# Patient Record
Sex: MALE | Race: BLACK OR AFRICAN AMERICAN | NOT HISPANIC OR LATINO | Employment: OTHER | URBAN - METROPOLITAN AREA
[De-identification: names, ages, dates, MRNs, and addresses within clinical notes are randomized per-mention and may not be internally consistent; named-entity substitution may affect disease eponyms.]

---

## 2017-01-23 ENCOUNTER — HOSPITAL ENCOUNTER (OUTPATIENT)
Dept: FAMILY MEDICINE CLINIC | Facility: CLINIC | Age: 52
Setting detail: SPECIMEN
Discharge: HOME | End: 2017-01-23
Attending: FAMILY MEDICINE | Admitting: FAMILY MEDICINE

## 2017-01-23 ENCOUNTER — CONVERSION ENCOUNTER (OUTPATIENT)
Dept: ORTHOPEDIC SURGERY | Facility: CLINIC | Age: 52
End: 2017-01-23

## 2017-01-23 LAB
ALBUMIN SERPL-MCNC: 2.7 G/DL (ref 3.5–4.8)
ALBUMIN/GLOB SERPL: 0.6 {RATIO} (ref 1–1.7)
ALP SERPL-CCNC: 122 IU/L (ref 32–91)
ALT SERPL-CCNC: 14 IU/L (ref 17–63)
ANION GAP SERPL CALC-SCNC: 11.5 MMOL/L (ref 10–20)
AST SERPL-CCNC: 21 IU/L (ref 15–41)
BILIRUB SERPL-MCNC: 0.4 MG/DL (ref 0.3–1.2)
BUN SERPL-MCNC: 29 MG/DL (ref 8–20)
BUN/CREAT SERPL: 6.9 (ref 6.2–20.3)
CALCIUM SERPL-MCNC: 9.3 MG/DL (ref 8.9–10.3)
CHLORIDE SERPL-SCNC: 106 MMOL/L (ref 101–111)
CHOLEST SERPL-MCNC: 265 MG/DL
CHOLEST/HDLC SERPL: 8.1 {RATIO}
CONV CO2: 25 MMOL/L (ref 22–32)
CONV LDL CHOLESTEROL DIRECT: 188 MG/DL (ref 0–100)
CONV TOTAL PROTEIN: 7.5 G/DL (ref 6.1–7.9)
CREAT UR-MCNC: 4.2 MG/DL (ref 0.7–1.2)
GLOBULIN UR ELPH-MCNC: 4.8 G/DL (ref 2.5–3.8)
GLUCOSE SERPL-MCNC: 86 MG/DL (ref 65–99)
HDLC SERPL-MCNC: 33 MG/DL
LDLC/HDLC SERPL: 5.7 {RATIO}
LIPID INTERPRETATION: ABNORMAL
POTASSIUM SERPL-SCNC: 3.5 MMOL/L (ref 3.6–5.1)
SODIUM SERPL-SCNC: 139 MMOL/L (ref 136–144)
TRIGL SERPL-MCNC: 161 MG/DL
VLDLC SERPL CALC-MCNC: 44.6 MG/DL

## 2017-02-08 ENCOUNTER — CONVERSION ENCOUNTER (OUTPATIENT)
Dept: ORTHOPEDIC SURGERY | Facility: CLINIC | Age: 52
End: 2017-02-08

## 2017-02-14 ENCOUNTER — HOSPITAL ENCOUNTER (OUTPATIENT)
Dept: LAB | Facility: HOSPITAL | Age: 52
Discharge: HOME | End: 2017-02-14
Attending: INTERNAL MEDICINE | Admitting: INTERNAL MEDICINE

## 2017-02-14 LAB
ANION GAP SERPL CALC-SCNC: 11.2 MMOL/L (ref 10–20)
BUN SERPL-MCNC: 25 MG/DL (ref 8–20)
BUN/CREAT SERPL: 5.4 (ref 6.2–20.3)
CALCIUM SERPL-MCNC: 8.7 MG/DL (ref 8.9–10.3)
CHLORIDE SERPL-SCNC: 106 MMOL/L (ref 101–111)
CONV CO2: 24 MMOL/L (ref 22–32)
CREAT UR-MCNC: 4.6 MG/DL (ref 0.7–1.2)
GLUCOSE SERPL-MCNC: 117 MG/DL (ref 65–99)
POTASSIUM SERPL-SCNC: 3.2 MMOL/L (ref 3.6–5.1)
SODIUM SERPL-SCNC: 138 MMOL/L (ref 136–144)

## 2017-02-20 ENCOUNTER — HOSPITAL ENCOUNTER (OUTPATIENT)
Dept: PREADMISSION TESTING | Facility: HOSPITAL | Age: 52
Discharge: HOME | End: 2017-02-20
Attending: SURGERY | Admitting: SURGERY

## 2017-02-20 LAB
ANION GAP SERPL CALC-SCNC: 9.4 MMOL/L (ref 10–20)
BACTERIA SPEC AEROBE CULT: NORMAL
BUN SERPL-MCNC: 24 MG/DL (ref 8–20)
BUN/CREAT SERPL: 5.7 (ref 6.2–20.3)
CALCIUM SERPL-MCNC: 8.9 MG/DL (ref 8.9–10.3)
CHLORIDE SERPL-SCNC: 106 MMOL/L (ref 101–111)
CONV CO2: 28 MMOL/L (ref 22–32)
CREAT UR-MCNC: 4.2 MG/DL (ref 0.7–1.2)
GLUCOSE SERPL-MCNC: 108 MG/DL (ref 65–99)
HCT VFR BLD AUTO: 30.1 % (ref 40–54)
HGB BLD-MCNC: 10.1 G/DL (ref 14–18)
Lab: NORMAL
MICRO REPORT STATUS: NORMAL
POTASSIUM SERPL-SCNC: 3.4 MMOL/L (ref 3.6–5.1)
SODIUM SERPL-SCNC: 140 MMOL/L (ref 136–144)
SPECIMEN SOURCE: NORMAL

## 2017-02-22 ENCOUNTER — HOSPITAL ENCOUNTER (OUTPATIENT)
Dept: CARDIOLOGY | Facility: HOSPITAL | Age: 52
Discharge: HOME | End: 2017-02-22
Attending: SURGERY | Admitting: SURGERY

## 2017-02-23 ENCOUNTER — HOSPITAL ENCOUNTER (OUTPATIENT)
Dept: PREOP | Facility: HOSPITAL | Age: 52
Setting detail: HOSPITAL OUTPATIENT SURGERY
Discharge: HOME | End: 2017-02-23
Attending: SURGERY | Admitting: SURGERY

## 2017-02-23 LAB
GLUCOSE BLD-MCNC: 116 MG/DL (ref 70–105)
GLUCOSE BLD-MCNC: 174 MG/DL (ref 70–105)
GLUCOSE BLD-MCNC: 192 MG/DL (ref 70–105)
POTASSIUM SERPL-SCNC: NORMAL MMOL/L (ref 3.6–5.1)

## 2017-03-17 ENCOUNTER — CONVERSION ENCOUNTER (OUTPATIENT)
Dept: ORTHOPEDIC SURGERY | Facility: CLINIC | Age: 52
End: 2017-03-17

## 2017-04-14 ENCOUNTER — CONVERSION ENCOUNTER (OUTPATIENT)
Dept: ORTHOPEDIC SURGERY | Facility: CLINIC | Age: 52
End: 2017-04-14

## 2017-04-27 ENCOUNTER — HOSPITAL ENCOUNTER (OUTPATIENT)
Dept: PHYSICAL THERAPY | Facility: HOSPITAL | Age: 52
Setting detail: RECURRING SERIES
Discharge: HOME | End: 2017-05-30
Attending: PHYSICIAN ASSISTANT | Admitting: PHYSICIAN ASSISTANT

## 2017-05-10 ENCOUNTER — HOSPITAL ENCOUNTER (OUTPATIENT)
Dept: CARDIOLOGY | Facility: HOSPITAL | Age: 52
Discharge: HOME | End: 2017-05-10
Attending: PHYSICIAN ASSISTANT | Admitting: PHYSICIAN ASSISTANT

## 2017-05-12 ENCOUNTER — CONVERSION ENCOUNTER (OUTPATIENT)
Dept: ORTHOPEDIC SURGERY | Facility: CLINIC | Age: 52
End: 2017-05-12

## 2017-06-09 ENCOUNTER — HOSPITAL ENCOUNTER (OUTPATIENT)
Dept: FAMILY MEDICINE CLINIC | Facility: CLINIC | Age: 52
Setting detail: SPECIMEN
Discharge: HOME | End: 2017-06-09
Attending: FAMILY MEDICINE | Admitting: FAMILY MEDICINE

## 2017-06-09 ENCOUNTER — CONVERSION ENCOUNTER (OUTPATIENT)
Dept: ORTHOPEDIC SURGERY | Facility: CLINIC | Age: 52
End: 2017-06-09

## 2017-06-09 LAB
ALBUMIN SERPL-MCNC: 3.1 G/DL (ref 3.5–4.8)
ALBUMIN/GLOB SERPL: 0.8 {RATIO} (ref 1–1.7)
ALP SERPL-CCNC: 155 IU/L (ref 32–91)
ALT SERPL-CCNC: 28 IU/L (ref 17–63)
ANION GAP SERPL CALC-SCNC: 15.8 MMOL/L (ref 10–20)
AST SERPL-CCNC: 17 IU/L (ref 15–41)
BILIRUB SERPL-MCNC: 0.4 MG/DL (ref 0.3–1.2)
BUN SERPL-MCNC: 43 MG/DL (ref 8–20)
BUN/CREAT SERPL: 7.2 (ref 6.2–20.3)
CALCIUM SERPL-MCNC: 8.8 MG/DL (ref 8.9–10.3)
CHLORIDE SERPL-SCNC: 109 MMOL/L (ref 101–111)
CHOLEST SERPL-MCNC: 275 MG/DL
CHOLEST/HDLC SERPL: 8.3 {RATIO}
CONV CO2: 19 MMOL/L (ref 22–32)
CONV LDL CHOLESTEROL DIRECT: 181 MG/DL (ref 0–100)
CONV TOTAL PROTEIN: 7.1 G/DL (ref 6.1–7.9)
CREAT UR-MCNC: 6 MG/DL (ref 0.7–1.2)
GLOBULIN UR ELPH-MCNC: 4 G/DL (ref 2.5–3.8)
GLUCOSE SERPL-MCNC: 163 MG/DL (ref 65–99)
HDLC SERPL-MCNC: 33 MG/DL
LDLC/HDLC SERPL: 5.5 {RATIO}
LIPID INTERPRETATION: ABNORMAL
POTASSIUM SERPL-SCNC: 4.8 MMOL/L (ref 3.6–5.1)
SODIUM SERPL-SCNC: 139 MMOL/L (ref 136–144)
TRIGL SERPL-MCNC: 278 MG/DL
VLDLC SERPL CALC-MCNC: 61.2 MG/DL

## 2017-06-13 ENCOUNTER — HOSPITAL ENCOUNTER (OUTPATIENT)
Dept: LAB | Facility: HOSPITAL | Age: 52
Discharge: HOME | End: 2017-06-13
Attending: INTERNAL MEDICINE | Admitting: INTERNAL MEDICINE

## 2017-06-14 LAB — HBV SURFACE AG SERPL QL IA: NONREACTIVE

## 2017-07-13 ENCOUNTER — CONVERSION ENCOUNTER (OUTPATIENT)
Dept: ORTHOPEDIC SURGERY | Facility: CLINIC | Age: 52
End: 2017-07-13

## 2017-07-18 ENCOUNTER — HOSPITAL ENCOUNTER (OUTPATIENT)
Dept: OTHER | Facility: HOSPITAL | Age: 52
Discharge: HOME | End: 2017-07-18
Attending: INTERNAL MEDICINE | Admitting: INTERNAL MEDICINE

## 2017-07-18 LAB
HCT VFR BLD AUTO: 29.1 % (ref 40–54)
HGB BLD-MCNC: 9.1 G/DL (ref 14–18)

## 2017-08-21 ENCOUNTER — HOSPITAL ENCOUNTER (OUTPATIENT)
Dept: OTHER | Facility: HOSPITAL | Age: 52
Setting detail: RECURRING SERIES
Discharge: HOME | End: 2017-08-22
Attending: INTERNAL MEDICINE | Admitting: INTERNAL MEDICINE

## 2017-08-22 LAB
BASOPHILS # BLD AUTO: 0.1 10*3/UL (ref 0–0.2)
BASOPHILS NFR BLD AUTO: 1 % (ref 0–2)
DIFFERENTIAL METHOD BLD: (no result)
EOSINOPHIL # BLD AUTO: 0.6 10*3/UL (ref 0–0.3)
EOSINOPHIL # BLD AUTO: 7 % (ref 0–3)
ERYTHROCYTE [DISTWIDTH] IN BLOOD BY AUTOMATED COUNT: 19.3 % (ref 11.5–14.5)
HCT VFR BLD AUTO: 27.2 % (ref 40–54)
HGB BLD-MCNC: 8.7 G/DL (ref 14–18)
LYMPHOCYTES # BLD AUTO: 2.1 10*3/UL (ref 0.8–4.8)
LYMPHOCYTES NFR BLD AUTO: 25 % (ref 18–42)
MCH RBC QN AUTO: 31.7 PG (ref 26–32)
MCHC RBC AUTO-ENTMCNC: 32.2 G/DL (ref 32–36)
MCV RBC AUTO: (no result) FL (ref 80–94)
MONOCYTES # BLD AUTO: 0.7 10*3/UL (ref 0.1–1.3)
MONOCYTES NFR BLD AUTO: 8 % (ref 2–11)
NEUTROPHILS # BLD AUTO: 5.1 10*3/UL (ref 2.3–8.6)
NEUTROPHILS NFR BLD AUTO: 59 % (ref 50–75)
NRBC BLD AUTO-RTO: 2 /100{WBCS}
NRBC/RBC NFR BLD MANUAL: 0 10*3/UL
PLATELET # BLD AUTO: 204 10*3/UL (ref 150–450)
PMV BLD AUTO: 9.1 FL (ref 7.4–10.4)
RBC # BLD AUTO: 2.76 10*6/UL (ref 4.6–6)
WBC # BLD AUTO: 8.6 10*3/UL (ref 4.5–11.5)

## 2017-09-07 ENCOUNTER — CONVERSION ENCOUNTER (OUTPATIENT)
Dept: ORTHOPEDIC SURGERY | Facility: CLINIC | Age: 52
End: 2017-09-07

## 2017-10-26 ENCOUNTER — HOSPITAL ENCOUNTER (OUTPATIENT)
Dept: CT IMAGING | Facility: HOSPITAL | Age: 52
Discharge: HOME | End: 2017-10-26
Attending: INTERNAL MEDICINE | Admitting: INTERNAL MEDICINE

## 2017-10-26 LAB — CREAT BLDA-MCNC: 7.6 MG/DL (ref 0.6–1.3)

## 2017-11-07 ENCOUNTER — OFFICE (AMBULATORY)
Dept: URBAN - METROPOLITAN AREA CLINIC 64 | Facility: CLINIC | Age: 52
End: 2017-11-07
Payer: MEDICAID

## 2017-11-07 VITALS
DIASTOLIC BLOOD PRESSURE: 65 MMHG | WEIGHT: 182 LBS | HEIGHT: 69 IN | SYSTOLIC BLOOD PRESSURE: 102 MMHG | HEART RATE: 98 BPM

## 2017-11-07 DIAGNOSIS — R13.10 DYSPHAGIA, UNSPECIFIED: ICD-10-CM

## 2017-11-07 DIAGNOSIS — R11.2 NAUSEA WITH VOMITING, UNSPECIFIED: ICD-10-CM

## 2017-11-07 DIAGNOSIS — K21.0 GASTRO-ESOPHAGEAL REFLUX DISEASE WITH ESOPHAGITIS: ICD-10-CM

## 2017-11-07 DIAGNOSIS — K59.00 CONSTIPATION, UNSPECIFIED: ICD-10-CM

## 2017-11-07 DIAGNOSIS — R93.3 ABNORMAL FINDINGS ON DIAGNOSTIC IMAGING OF OTHER PARTS OF DI: ICD-10-CM

## 2017-11-07 PROCEDURE — 99213 OFFICE O/P EST LOW 20 MIN: CPT | Performed by: NURSE PRACTITIONER

## 2017-11-07 RX ORDER — RANITIDINE HYDROCHLORIDE 300 MG/1
300 TABLET, FILM COATED ORAL
Qty: 30 | Refills: 11 | Status: COMPLETED
Start: 2017-11-07 | End: 2017-12-12

## 2017-11-07 RX ORDER — POLYETHYLENE GLYCOL 3350 17 G/17G
POWDER, FOR SOLUTION ORAL
Qty: 3 | Refills: 0 | Status: ACTIVE
Start: 2017-11-07

## 2017-12-05 ENCOUNTER — HOSPITAL ENCOUNTER (OUTPATIENT)
Dept: PREOP | Facility: HOSPITAL | Age: 52
Setting detail: HOSPITAL OUTPATIENT SURGERY
Discharge: HOME | End: 2017-12-05
Attending: INTERNAL MEDICINE | Admitting: INTERNAL MEDICINE

## 2017-12-05 ENCOUNTER — ON CAMPUS - OUTPATIENT (AMBULATORY)
Dept: URBAN - METROPOLITAN AREA HOSPITAL 85 | Facility: HOSPITAL | Age: 52
End: 2017-12-05

## 2017-12-05 DIAGNOSIS — K21.0 GASTRO-ESOPHAGEAL REFLUX DISEASE WITH ESOPHAGITIS: ICD-10-CM

## 2017-12-05 DIAGNOSIS — K29.50 UNSPECIFIED CHRONIC GASTRITIS WITHOUT BLEEDING: ICD-10-CM

## 2017-12-05 DIAGNOSIS — R13.10 DYSPHAGIA, UNSPECIFIED: ICD-10-CM

## 2017-12-05 LAB
GLUCOSE BLD-MCNC: ABNORMAL MG/DL (ref 70–105)

## 2017-12-05 PROCEDURE — 43239 EGD BIOPSY SINGLE/MULTIPLE: CPT | Performed by: INTERNAL MEDICINE

## 2017-12-12 ENCOUNTER — OFFICE (AMBULATORY)
Dept: URBAN - METROPOLITAN AREA CLINIC 64 | Facility: CLINIC | Age: 52
End: 2017-12-12

## 2017-12-12 VITALS
HEART RATE: 96 BPM | DIASTOLIC BLOOD PRESSURE: 92 MMHG | HEIGHT: 69 IN | WEIGHT: 186 LBS | SYSTOLIC BLOOD PRESSURE: 161 MMHG

## 2017-12-12 DIAGNOSIS — K21.0 GASTRO-ESOPHAGEAL REFLUX DISEASE WITH ESOPHAGITIS: ICD-10-CM

## 2017-12-12 DIAGNOSIS — R13.10 DYSPHAGIA, UNSPECIFIED: ICD-10-CM

## 2017-12-12 DIAGNOSIS — K59.00 CONSTIPATION, UNSPECIFIED: ICD-10-CM

## 2017-12-12 PROCEDURE — 99214 OFFICE O/P EST MOD 30 MIN: CPT | Performed by: INTERNAL MEDICINE

## 2017-12-12 RX ORDER — RANITIDINE HYDROCHLORIDE 300 MG/1
300 TABLET, FILM COATED ORAL
Qty: 30 | Refills: 11 | Status: COMPLETED
Start: 2017-11-07 | End: 2017-12-12

## 2018-08-28 ENCOUNTER — CONVERSION ENCOUNTER (OUTPATIENT)
Dept: ORTHOPEDIC SURGERY | Facility: CLINIC | Age: 53
End: 2018-08-28

## 2018-08-28 ENCOUNTER — HOSPITAL ENCOUNTER (OUTPATIENT)
Dept: LAB | Facility: HOSPITAL | Age: 53
Discharge: HOME | End: 2018-08-28
Attending: ORTHOPAEDIC SURGERY | Admitting: ORTHOPAEDIC SURGERY

## 2018-08-28 LAB
BASOPHILS # BLD AUTO: 0.1 10*3/UL (ref 0–0.2)
BASOPHILS NFR BLD AUTO: 1 % (ref 0–2)
CRP SERPL-MCNC: 1.64 MG/DL (ref 0–0.7)
DIFFERENTIAL METHOD BLD: (no result)
EOSINOPHIL # BLD AUTO: 0.5 10*3/UL (ref 0–0.3)
EOSINOPHIL # BLD AUTO: 5 % (ref 0–3)
ERYTHROCYTE [DISTWIDTH] IN BLOOD BY AUTOMATED COUNT: 20 % (ref 11.5–14.5)
ERYTHROCYTE [SEDIMENTATION RATE] IN BLOOD BY WESTERGREN METHOD: 50 MM/HR (ref 0–20)
HCT VFR BLD AUTO: 37.2 % (ref 40–54)
HGB BLD-MCNC: 11.9 G/DL (ref 14–18)
LYMPHOCYTES # BLD AUTO: 1.6 10*3/UL (ref 0.8–4.8)
LYMPHOCYTES NFR BLD AUTO: 16 % (ref 18–42)
MCH RBC QN AUTO: 29.7 PG (ref 26–32)
MCHC RBC AUTO-ENTMCNC: 32.1 G/DL (ref 32–36)
MCV RBC AUTO: 92.5 FL (ref 80–94)
MONOCYTES # BLD AUTO: 0.8 10*3/UL (ref 0.1–1.3)
MONOCYTES NFR BLD AUTO: 8 % (ref 2–11)
NEUTROPHILS # BLD AUTO: 6.9 10*3/UL (ref 2.3–8.6)
NEUTROPHILS NFR BLD AUTO: 70 % (ref 50–75)
NRBC BLD AUTO-RTO: 0 /100{WBCS}
NRBC/RBC NFR BLD MANUAL: 0 10*3/UL
PLATELET # BLD AUTO: (no result) 10*3/UL (ref 150–450)
PMV BLD AUTO: 9.5 FL (ref 7.4–10.4)
RBC # BLD AUTO: 4.02 10*6/UL (ref 4.6–6)
WBC # BLD AUTO: 9.8 10*3/UL (ref 4.5–11.5)

## 2018-11-21 ENCOUNTER — HOSPITAL ENCOUNTER (OUTPATIENT)
Dept: OTHER | Facility: HOSPITAL | Age: 53
Setting detail: SPECIMEN
Discharge: HOME | End: 2018-11-21
Attending: INTERNAL MEDICINE | Admitting: INTERNAL MEDICINE

## 2018-11-21 ENCOUNTER — OFFICE (AMBULATORY)
Dept: URBAN - METROPOLITAN AREA PATHOLOGY 4 | Facility: PATHOLOGY | Age: 53
End: 2018-11-21

## 2018-11-21 ENCOUNTER — ON CAMPUS - OUTPATIENT (AMBULATORY)
Dept: URBAN - METROPOLITAN AREA HOSPITAL 2 | Facility: HOSPITAL | Age: 53
End: 2018-11-21

## 2018-11-21 VITALS
HEART RATE: 85 BPM | SYSTOLIC BLOOD PRESSURE: 95 MMHG | SYSTOLIC BLOOD PRESSURE: 133 MMHG | HEART RATE: 93 BPM | DIASTOLIC BLOOD PRESSURE: 52 MMHG | OXYGEN SATURATION: 97 % | HEART RATE: 94 BPM | HEIGHT: 69 IN | DIASTOLIC BLOOD PRESSURE: 83 MMHG | WEIGHT: 194 LBS | RESPIRATION RATE: 16 BRPM | DIASTOLIC BLOOD PRESSURE: 95 MMHG | DIASTOLIC BLOOD PRESSURE: 53 MMHG | OXYGEN SATURATION: 100 % | OXYGEN SATURATION: 99 % | SYSTOLIC BLOOD PRESSURE: 104 MMHG | SYSTOLIC BLOOD PRESSURE: 137 MMHG | HEART RATE: 90 BPM | SYSTOLIC BLOOD PRESSURE: 117 MMHG | SYSTOLIC BLOOD PRESSURE: 88 MMHG | DIASTOLIC BLOOD PRESSURE: 60 MMHG | SYSTOLIC BLOOD PRESSURE: 99 MMHG | DIASTOLIC BLOOD PRESSURE: 63 MMHG | TEMPERATURE: 96.9 F | HEART RATE: 89 BPM | RESPIRATION RATE: 18 BRPM | DIASTOLIC BLOOD PRESSURE: 74 MMHG | OXYGEN SATURATION: 98 % | HEART RATE: 88 BPM

## 2018-11-21 DIAGNOSIS — K22.2 ESOPHAGEAL OBSTRUCTION: ICD-10-CM

## 2018-11-21 DIAGNOSIS — K22.10 ULCER OF ESOPHAGUS WITHOUT BLEEDING: ICD-10-CM

## 2018-11-21 DIAGNOSIS — K21.0 GASTRO-ESOPHAGEAL REFLUX DISEASE WITH ESOPHAGITIS: ICD-10-CM

## 2018-11-21 DIAGNOSIS — R13.10 DYSPHAGIA, UNSPECIFIED: ICD-10-CM

## 2018-11-21 LAB
GI HISTOLOGY: A. SELECT: (no result)
GI HISTOLOGY: PDF REPORT: (no result)

## 2018-11-21 PROCEDURE — 88305 TISSUE EXAM BY PATHOLOGIST: CPT | Mod: 26 | Performed by: INTERNAL MEDICINE

## 2018-11-21 PROCEDURE — 43450 DILATE ESOPHAGUS 1/MULT PASS: CPT | Performed by: INTERNAL MEDICINE

## 2018-11-21 PROCEDURE — 88312 SPECIAL STAINS GROUP 1: CPT | Mod: 26 | Performed by: INTERNAL MEDICINE

## 2018-11-21 PROCEDURE — 43239 EGD BIOPSY SINGLE/MULTIPLE: CPT | Performed by: INTERNAL MEDICINE

## 2018-11-21 RX ORDER — PANTOPRAZOLE SODIUM 40 MG/1
TABLET, DELAYED RELEASE ORAL
Qty: 90 | Refills: 4 | Status: ACTIVE

## 2019-04-15 ENCOUNTER — HOSPITAL ENCOUNTER (OUTPATIENT)
Dept: SLEEP MEDICINE | Facility: HOSPITAL | Age: 54
Discharge: HOME | End: 2019-04-15
Attending: INTERNAL MEDICINE | Admitting: INTERNAL MEDICINE

## 2019-04-29 ENCOUNTER — HOSPITAL ENCOUNTER (OUTPATIENT)
Dept: SLEEP MEDICINE | Facility: HOSPITAL | Age: 54
Discharge: HOME | End: 2019-04-29
Attending: INTERNAL MEDICINE | Admitting: INTERNAL MEDICINE

## 2019-06-04 VITALS
HEIGHT: 69 IN | WEIGHT: 191 LBS | WEIGHT: 184 LBS | WEIGHT: 196.6 LBS | BODY MASS INDEX: 29.12 KG/M2 | SYSTOLIC BLOOD PRESSURE: 132 MMHG | DIASTOLIC BLOOD PRESSURE: 124 MMHG | SYSTOLIC BLOOD PRESSURE: 96 MMHG | WEIGHT: 167 LBS | WEIGHT: 184 LBS | DIASTOLIC BLOOD PRESSURE: 93 MMHG | OXYGEN SATURATION: 100 % | OXYGEN SATURATION: 100 % | SYSTOLIC BLOOD PRESSURE: 158 MMHG | WEIGHT: 176 LBS | OXYGEN SATURATION: 100 % | HEART RATE: 107 BPM | WEIGHT: 191 LBS | SYSTOLIC BLOOD PRESSURE: 182 MMHG | DIASTOLIC BLOOD PRESSURE: 120 MMHG | HEART RATE: 101 BPM | DIASTOLIC BLOOD PRESSURE: 103 MMHG | WEIGHT: 184 LBS | HEART RATE: 64 BPM | DIASTOLIC BLOOD PRESSURE: 80 MMHG | OXYGEN SATURATION: 99 % | WEIGHT: 186 LBS | SYSTOLIC BLOOD PRESSURE: 238 MMHG | DIASTOLIC BLOOD PRESSURE: 78 MMHG | HEART RATE: 77 BPM | HEART RATE: 81 BPM | HEART RATE: 97 BPM | DIASTOLIC BLOOD PRESSURE: 61 MMHG | SYSTOLIC BLOOD PRESSURE: 202 MMHG | OXYGEN SATURATION: 100 % | HEART RATE: 67 BPM | SYSTOLIC BLOOD PRESSURE: 135 MMHG

## 2019-06-12 ENCOUNTER — HOSPITAL ENCOUNTER (OUTPATIENT)
Dept: LAB | Facility: HOSPITAL | Age: 54
Setting detail: SPECIMEN
Discharge: HOME | End: 2019-06-12
Attending: INTERNAL MEDICINE | Admitting: INTERNAL MEDICINE

## 2019-06-12 LAB
ALBUMIN SERPL-MCNC: 3.5 G/DL (ref 3.5–4.8)
ALBUMIN/GLOB SERPL: 1 {RATIO} (ref 1–1.7)
ALP SERPL-CCNC: 153 IU/L (ref 32–91)
ALT SERPL-CCNC: 17 IU/L (ref 17–63)
ANION GAP SERPL CALC-SCNC: 18.2 MMOL/L (ref 10–20)
AST SERPL-CCNC: 15 IU/L (ref 15–41)
BILIRUB SERPL-MCNC: 0.5 MG/DL (ref 0.3–1.2)
BUN SERPL-MCNC: 55 MG/DL (ref 8–20)
BUN/CREAT SERPL: 8.3 (ref 6.2–20.3)
CALCIUM SERPL-MCNC: 8.9 MG/DL (ref 8.9–10.3)
CHLORIDE SERPL-SCNC: 99 MMOL/L (ref 101–111)
CHOLEST SERPL-MCNC: 151 MG/DL
CHOLEST/HDLC SERPL: 5 {RATIO}
CONV CO2: 24 MMOL/L (ref 22–32)
CONV LDL CHOLESTEROL DIRECT: 95 MG/DL (ref 0–100)
CONV TOTAL PROTEIN: 7 G/DL (ref 6.1–7.9)
CREAT UR-MCNC: 6.6 MG/DL (ref 0.7–1.2)
GLOBULIN UR ELPH-MCNC: 3.5 G/DL (ref 2.5–3.8)
GLUCOSE SERPL-MCNC: 138 MG/DL (ref 65–99)
HBA1C MFR BLD: 9.5 % (ref 0–5.6)
HDLC SERPL-MCNC: 30 MG/DL
LDLC/HDLC SERPL: 3.1 {RATIO}
LIPID INTERPRETATION: ABNORMAL
POTASSIUM SERPL-SCNC: 4.2 MMOL/L (ref 3.6–5.1)
SODIUM SERPL-SCNC: 137 MMOL/L (ref 136–144)
TRIGL SERPL-MCNC: 169 MG/DL
VLDLC SERPL CALC-MCNC: 25.7 MG/DL

## 2019-06-24 PROBLEM — M48.50XA COMPRESSION FRACTURE OF VERTEBRA (HCC): Status: ACTIVE | Noted: 2018-08-28

## 2019-06-24 PROBLEM — G40.909 SEIZURE DISORDER: Status: ACTIVE | Noted: 2017-07-05

## 2019-06-24 PROBLEM — R13.10 DYSPHAGIA: Status: ACTIVE | Noted: 2017-08-24

## 2019-06-24 PROBLEM — N18.6 ESRD (END STAGE RENAL DISEASE) (HCC): Status: ACTIVE | Noted: 2019-05-14

## 2019-06-24 PROBLEM — M25.512 SHOULDER PAIN, LEFT: Status: ACTIVE | Noted: 2017-04-14

## 2019-06-24 PROBLEM — M46.44 DISCITIS OF THORACIC REGION: Status: ACTIVE | Noted: 2018-08-28

## 2019-06-24 PROBLEM — I77.0 AV FISTULA (HCC): Status: ACTIVE | Noted: 2017-04-14

## 2019-06-24 PROBLEM — E11.319 DIABETIC RETINOPATHY: Status: ACTIVE | Noted: 2017-06-09

## 2019-06-24 PROBLEM — M46.20 OSTEOMYELITIS OF SPINE: Status: ACTIVE | Noted: 2018-08-28

## 2019-06-24 PROBLEM — H60.501 ACUTE OTITIS EXTERNA OF RIGHT EAR: Status: ACTIVE | Noted: 2017-01-23

## 2019-08-05 RX ORDER — LORAZEPAM 0.5 MG/1
TABLET ORAL
Qty: 90 TABLET | Refills: 1 | OUTPATIENT
Start: 2019-08-05

## 2019-08-12 RX ORDER — LORAZEPAM 0.5 MG/1
TABLET ORAL
Qty: 90 TABLET | Refills: 1 | OUTPATIENT
Start: 2019-08-12

## 2019-08-26 RX ORDER — LORAZEPAM 0.5 MG/1
TABLET ORAL
Qty: 90 TABLET | Refills: 1 | OUTPATIENT
Start: 2019-08-26

## 2019-09-03 ENCOUNTER — OFFICE VISIT (OUTPATIENT)
Dept: ENDOCRINOLOGY | Facility: CLINIC | Age: 54
End: 2019-09-03

## 2019-09-03 ENCOUNTER — APPOINTMENT (OUTPATIENT)
Dept: LAB | Facility: HOSPITAL | Age: 54
End: 2019-09-03

## 2019-09-03 VITALS
HEIGHT: 69 IN | HEART RATE: 95 BPM | DIASTOLIC BLOOD PRESSURE: 65 MMHG | BODY MASS INDEX: 28.73 KG/M2 | WEIGHT: 194 LBS | OXYGEN SATURATION: 97 % | SYSTOLIC BLOOD PRESSURE: 105 MMHG

## 2019-09-03 DIAGNOSIS — I10 ESSENTIAL HYPERTENSION: ICD-10-CM

## 2019-09-03 DIAGNOSIS — N18.6 ESRD (END STAGE RENAL DISEASE) (HCC): ICD-10-CM

## 2019-09-03 DIAGNOSIS — E11.42 DIABETIC PERIPHERAL NEUROPATHY ASSOCIATED WITH TYPE 2 DIABETES MELLITUS (HCC): ICD-10-CM

## 2019-09-03 DIAGNOSIS — E78.2 MIXED HYPERLIPIDEMIA: ICD-10-CM

## 2019-09-03 DIAGNOSIS — E11.29 TYPE 2 DIABETES MELLITUS WITH OTHER DIABETIC KIDNEY COMPLICATION, WITH LONG-TERM CURRENT USE OF INSULIN (HCC): Primary | ICD-10-CM

## 2019-09-03 DIAGNOSIS — Z79.4 TYPE 2 DIABETES MELLITUS WITH OTHER DIABETIC KIDNEY COMPLICATION, WITH LONG-TERM CURRENT USE OF INSULIN (HCC): Primary | ICD-10-CM

## 2019-09-03 PROCEDURE — 99214 OFFICE O/P EST MOD 30 MIN: CPT | Performed by: INTERNAL MEDICINE

## 2019-09-03 RX ORDER — LOSARTAN POTASSIUM AND HYDROCHLOROTHIAZIDE 25; 100 MG/1; MG/1
TABLET ORAL
COMMUNITY
Start: 2013-11-18 | End: 2021-02-10

## 2019-09-03 RX ORDER — FUROSEMIDE 20 MG/1
TABLET ORAL
COMMUNITY
Start: 2015-11-28 | End: 2020-07-07

## 2019-09-03 RX ORDER — LEVOFLOXACIN 500 MG/1
TABLET, FILM COATED ORAL
COMMUNITY
Start: 2015-12-22 | End: 2021-02-10

## 2019-09-03 RX ORDER — AMLODIPINE BESYLATE 5 MG/1
TABLET ORAL
COMMUNITY
Start: 2013-11-18 | End: 2021-02-10

## 2019-09-03 RX ORDER — DOXYCYCLINE HYCLATE 100 MG/1
100 CAPSULE ORAL 2 TIMES DAILY
Refills: 0 | COMMUNITY
Start: 2019-08-08 | End: 2020-07-07

## 2019-09-03 RX ORDER — TERBINAFINE HYDROCHLORIDE 250 MG/1
250 TABLET ORAL DAILY
Refills: 1 | COMMUNITY
Start: 2019-08-08 | End: 2021-02-10

## 2019-09-03 RX ORDER — NIFEDIPINE 60 MG/1
TABLET, FILM COATED, EXTENDED RELEASE ORAL
COMMUNITY
Start: 2015-11-25 | End: 2019-09-03

## 2019-09-03 RX ORDER — PREGABALIN 75 MG/1
CAPSULE ORAL
COMMUNITY
Start: 2015-10-29 | End: 2019-09-03

## 2019-09-03 RX ORDER — HYDROCODONE BITARTRATE AND ACETAMINOPHEN 5; 325 MG/1; MG/1
TABLET ORAL
COMMUNITY
Start: 2015-10-26 | End: 2019-09-03

## 2019-09-03 RX ORDER — FLASH GLUCOSE SCANNING READER
EACH MISCELLANEOUS
COMMUNITY
End: 2021-02-10

## 2019-09-03 RX ORDER — MELOXICAM 7.5 MG/1
TABLET ORAL
COMMUNITY
Start: 2014-03-15 | End: 2019-09-03

## 2019-09-03 RX ORDER — PANTOPRAZOLE SODIUM 40 MG/1
TABLET, DELAYED RELEASE ORAL
Refills: 3 | COMMUNITY
Start: 2019-07-09 | End: 2021-02-10

## 2019-09-03 RX ORDER — CLONIDINE HYDROCHLORIDE 0.3 MG/1
TABLET ORAL
COMMUNITY
Start: 2015-11-25 | End: 2019-09-03

## 2019-09-03 RX ORDER — RAMIPRIL 5 MG/1
CAPSULE ORAL
COMMUNITY
Start: 2015-11-25 | End: 2019-09-03

## 2019-09-03 RX ORDER — BLOOD SUGAR DIAGNOSTIC
STRIP MISCELLANEOUS
COMMUNITY
Start: 2015-11-08 | End: 2021-02-10

## 2019-09-03 RX ORDER — FOLIC ACID 1 MG/1
TABLET ORAL
COMMUNITY
Start: 2015-12-07 | End: 2019-09-03

## 2019-09-03 NOTE — PATIENT INSTRUCTIONS
Please continue current insulin regimen and get all blood test done in the next few days  Please bring blood sugar records at each visit  Please always keep glucose source with you in case of low blood sugars and make sure your sugars above 100 while driving.  Please always get annual eye exam and flu shot.

## 2019-09-03 NOTE — PROGRESS NOTES
Endocrine Progress Note Outpatient     Patient Care Team:  Provider, No Known as PCP - Kerri Kelly MD as PCP - Claims Attributed  Provider, No Known as PCP - Family Medicine    Chief Complaint: Follow up type 2 diabetes    HPI: 54-year-old male with history of type 2 diabetes, hypertension, hyperlipidemia, end-stage renal disease on hemodialysis is here for follow-up.  For type 2 diabetes he is currently on Lantus 30 units subcu daily at bedtime along with Humalog 10 units with each meal.  He tells me that his blood sugars are running about 150 and does not have any low blood sugars.  Unfortunately did not bring blood sugar records for review today.  Hypertension: Well-controlled  Hyperlipidemia: He is currently on Lipitor  Renal failure: On dialysis.    Past Medical History:   Diagnosis Date   • Anemia    • Cataract    • Constipation    • CVA (cerebral vascular accident) (CMS/[facility])     x 8   • Depression    • H/O colonoscopy 2016   • Hyperlipidemia    • Hypertension    • Insomnia    • Renal failure     Stage IV - AI -- on dialysis -- [facility]   • TIA (transient ischemic attack)    • Type 2 diabetes mellitus (CMS/[facility])        Social History     Socioeconomic History   • Marital status:      Spouse name: Not on file   • Number of children: Not on file   • Years of education: Not on file   • Highest education level: Not on file   Tobacco Use   • Smoking status: Never Smoker   • Tobacco comment: Passive Smoke exposure: no   Substance and Sexual Activity   • Alcohol use: Yes   • Drug use: No       Family History   Problem Relation Age of Onset   • Heart disease Mother    • Hypertension Mother    • Hyperlipidemia Mother    • Stroke Mother    • Cancer Sister         Breast Cancer   • Diabetes Sister    • Heart disease Maternal Grandmother    • Hyperlipidemia Maternal Grandmother    • Hypertension Maternal Grandmother        Allergies   Allergen Reactions   • Latex Unknown (See Comments)   • Other Unknown  (See Comments)   • Penicillins Unknown (See Comments)   • Amoxicillin Rash       ROS:   Constitutional:  Denies fatigue, tiredness.    Eyes:  Denies change in visual acuity   HENT:  Denies nasal congestion or sore throat   Respiratory: denies cough, shortness of breath.   Cardiovascular:  denies chest pain, edema   GI:  Denies abdominal pain, nausea, vomiting.   Musculoskeletal:  Denies back pain or joint pain   Integument:  Denies dry skin and rash   Neurologic:  Denies headache, focal weakness or sensory changes   Endocrine:  Denies polyuria or polydipsia   Psychiatric:  Denies depression or anxiety      Vitals:    09/03/19 1331   BP: 105/65   Pulse: 95   SpO2: 97%       Physical Exam:  GEN: NAD, conversant  EYES: EOMI, PERRL, no conjunctival erythema  NECK: no thyromegaly, full ROM   CV: RRR, no murmurs/rubs/gallops, no peripheral edema  LUNG: CTAB, no wheezes/rales/ronchi  SKIN: no rashes, no acanthosis  MSK: no deformities, full ROM of all extremities  NEURO: no tremors, DTR normal  PSYCH: AOX3, appropriate mood, affect normal      Results Review:     I reviewed the patient's new clinical results.    Lab Results   Component Value Date    HGBA1C 9.5 (H) 06/12/2019      Lab Results   Component Value Date    GLUCOSE 138 (H) 06/12/2019    BUN 55 (H) 06/12/2019    CREATININE 6.6 (H) 06/12/2019    EGFRIFAFRI 9 (L) 10/26/2017    BCR 8.3 06/12/2019    K 4.2 06/12/2019    CO2 24 06/12/2019    CALCIUM 8.9 06/12/2019    ALBUMIN 3.5 06/12/2019    LABIL2 1.0 06/12/2019    AST 15 06/12/2019    ALT 17 06/12/2019    CHOL 151 06/12/2019    TRIG 169 (H) 06/12/2019    LDL 95 06/12/2019    HDL 30 (L) 06/12/2019     Lab Results   Component Value Date    TSH 0.31 (L) 07/19/2017         Medication Review: Reviewed.       Current Outpatient Medications:   •  amitriptyline (ELAVIL) 50 MG tablet, TAKE 1 TABLET BY BY MOUTH EVERY DAY AT BEDTIME, Disp: , Rfl: 2  •  amLODIPine (NORVASC) 5 MG tablet, TAKE ONE TABLET BY MOUTH ONE TIME DAILY,  "Disp: , Rfl:   •  atorvastatin (LIPITOR) 40 MG tablet, Take 40 mg by mouth Every Night., Disp: , Rfl: 12  •  B-D INS SYR ULTRAFINE 1CC/31G 31G X 5/16\" 1 ML misc, USE FOUR TIMES DAILY AS DIRECTED (E11.65), Disp: , Rfl: 11  •  clopidogrel (PLAVIX) 75 MG tablet, Daily., Disp: , Rfl:   •  Continuous Blood Gluc  (FREESTYLE ANNABELLE 14 DAY READER) device, , Disp: , Rfl:   •  doxycycline (VIBRAMYCIN) 100 MG capsule, Take 100 mg by mouth 2 (Two) Times a Day., Disp: , Rfl: 0  •  ergocalciferol (DRISDOL) 83904 units capsule, 1 capsule Every 7 (Seven) Days., Disp: , Rfl:   •  furosemide (LASIX) 20 MG tablet, , Disp: , Rfl:   •  gabapentin (NEURONTIN) 600 MG tablet, Take 600 mg by mouth 2 (Two) Times a Day., Disp: , Rfl: 5  •  glucagon (GLUCAGON EMERGENCY) 1 MG injection, GLUCAGON EMERGENCY 1 MG KIT, Disp: , Rfl:   •  Insulin Lispro (HUMALOG KWIKPEN) 100 UNIT/ML solution pen-injector, HUMALOG KWIKPEN 100 UNIT/ML SOPN, Disp: , Rfl:   •  Insulin Pen Needle (BD PEN NEEDLE JEREMY U/F) 32G X 4 MM misc, BD PEN NEEDLE JEREMY U/F 32G X 4 MM, Disp: , Rfl:   •  Insulin Syringe-Needle U-100 31G X 5/16\" 0.3 ML misc, , Disp: , Rfl:   •  LANTUS 100 UNIT/ML injection, INJECT 30 UNITS SUBCUTANEOUSLY EVERY NIGHT AT BEDTIME, Disp: , Rfl: 12  •  levoFLOXacin (LEVAQUIN) 500 MG tablet, Patient is ESRD stage 4 and according to pharmacist recommendation patient should have 500 mg qod, 5 pills for 10 days, Disp: , Rfl:   •  LORazepam (ATIVAN) 0.5 MG tablet, Take 0.5 mg by mouth 3 (Three) Times a Day., Disp: , Rfl: 1  •  losartan-hydrochlorothiazide (HYZAAR) 100-25 MG per tablet, TAKE ONE TABLET BY MOUTH ONE TIME DAILY, Disp: , Rfl:   •  metoprolol tartrate (LOPRESSOR) 50 MG tablet, Take 50 mg by mouth 2 (Two) Times a Day., Disp: , Rfl: 2  •  pantoprazole (PROTONIX) 40 MG EC tablet, TAKE ONE TABLET BY MOUTH EVERY MORNING 30 MINUTES BEFORE FOOD, Disp: , Rfl: 3  •  risperiDONE (risperDAL) 1 MG tablet, Take 1 mg by mouth 2 (Two) Times a Day., Disp: , " "Rfl: 2  •  sevelamer (RENVELA) 800 MG tablet, Take 800 mg by mouth 3 (Three) Times a Day With Meals., Disp: , Rfl: 5  •  terbinafine (lamiSIL) 250 MG tablet, Take 250 mg by mouth Daily., Disp: , Rfl: 1      Assessment/Plan   1.  Diabetes noticed type II: According to the patient blood sugars have improved however I do not have any recent A1c or blood sugar records.  Will check A1c.  I have advised him to bring blood sugar records at each visit.  We will continue current insulin regimen for now.  2.  Hypertension: Well-controlled, continue current medication  3.  Hyperlipidemia: On Lipitor, will follow lipid panel  4.  Renal failure: On dialysis.            Ophelia Murrieta MD FACE.  06/15/19  4:34 PM      EMR Dragon / transcription disclaimer:     \"Dictated utilizing Dragon dictation\".                 "

## 2019-09-04 ENCOUNTER — LAB (OUTPATIENT)
Dept: LAB | Facility: HOSPITAL | Age: 54
End: 2019-09-04

## 2019-09-04 DIAGNOSIS — N18.6 ESRD (END STAGE RENAL DISEASE) (HCC): ICD-10-CM

## 2019-09-04 DIAGNOSIS — E11.42 DIABETIC PERIPHERAL NEUROPATHY ASSOCIATED WITH TYPE 2 DIABETES MELLITUS (HCC): ICD-10-CM

## 2019-09-04 DIAGNOSIS — I10 ESSENTIAL HYPERTENSION: ICD-10-CM

## 2019-09-04 DIAGNOSIS — E78.2 MIXED HYPERLIPIDEMIA: ICD-10-CM

## 2019-09-04 LAB
ALBUMIN SERPL-MCNC: 3.4 G/DL (ref 3.5–4.8)
ALBUMIN/GLOB SERPL: 1 G/DL (ref 1–1.7)
ALP SERPL-CCNC: 109 U/L (ref 32–91)
ALT SERPL W P-5'-P-CCNC: 14 U/L (ref 17–63)
ANION GAP SERPL CALCULATED.3IONS-SCNC: 22.5 MMOL/L (ref 5–15)
ARTICHOKE IGE QN: 75 MG/DL (ref 0–100)
AST SERPL-CCNC: 21 U/L (ref 15–41)
BILIRUB SERPL-MCNC: 0.6 MG/DL (ref 0.3–1.2)
BUN BLD-MCNC: 48 MG/DL (ref 8–20)
BUN/CREAT SERPL: 4.9 (ref 6.2–20.3)
CALCIUM SPEC-SCNC: 8.1 MG/DL (ref 8.9–10.3)
CHLORIDE SERPL-SCNC: 92 MMOL/L (ref 101–111)
CHOLEST SERPL-MCNC: 121 MG/DL
CO2 SERPL-SCNC: 22 MMOL/L (ref 22–32)
CREAT BLD-MCNC: 9.7 MG/DL (ref 0.7–1.2)
GFR SERPL CREATININE-BSD FRML MDRD: 7 ML/MIN/1.73
GFR SERPL CREATININE-BSD FRML MDRD: ABNORMAL ML/MIN/{1.73_M2}
GLOBULIN UR ELPH-MCNC: 3.4 GM/DL (ref 2.5–3.8)
GLUCOSE BLD-MCNC: 251 MG/DL (ref 65–99)
HBA1C MFR BLD: 8.9 % (ref 3.5–5.6)
HDLC SERPL QL: 4.17
HDLC SERPL-MCNC: 29 MG/DL
LDLC/HDLC SERPL: 2.33 {RATIO}
POTASSIUM BLD-SCNC: 4.5 MMOL/L (ref 3.6–5.1)
PROT SERPL-MCNC: 6.8 G/DL (ref 6.1–7.9)
SODIUM BLD-SCNC: 132 MMOL/L (ref 136–144)
TRIGL SERPL-MCNC: 122 MG/DL
VLDLC SERPL-MCNC: 24.4 MG/DL

## 2019-09-04 PROCEDURE — 80061 LIPID PANEL: CPT

## 2019-09-04 PROCEDURE — 83036 HEMOGLOBIN GLYCOSYLATED A1C: CPT

## 2019-09-04 PROCEDURE — 80053 COMPREHEN METABOLIC PANEL: CPT

## 2019-09-04 PROCEDURE — 36415 COLL VENOUS BLD VENIPUNCTURE: CPT

## 2019-12-18 DIAGNOSIS — E11.29 TYPE 2 DIABETES MELLITUS WITH OTHER DIABETIC KIDNEY COMPLICATION, WITH LONG-TERM CURRENT USE OF INSULIN (HCC): ICD-10-CM

## 2019-12-18 DIAGNOSIS — E78.2 MIXED HYPERLIPIDEMIA: Primary | ICD-10-CM

## 2019-12-18 DIAGNOSIS — I10 ESSENTIAL HYPERTENSION: ICD-10-CM

## 2019-12-18 DIAGNOSIS — Z79.4 TYPE 2 DIABETES MELLITUS WITH OTHER DIABETIC KIDNEY COMPLICATION, WITH LONG-TERM CURRENT USE OF INSULIN (HCC): ICD-10-CM

## 2020-03-12 RX ORDER — HYDROXYZINE HYDROCHLORIDE 25 MG/1
TABLET, FILM COATED ORAL
COMMUNITY
Start: 2020-03-09 | End: 2021-02-10 | Stop reason: SDUPTHER

## 2020-03-12 RX ORDER — LOSARTAN POTASSIUM 50 MG/1
50 TABLET ORAL EVERY MORNING
COMMUNITY
Start: 2020-02-26 | End: 2021-10-14

## 2020-03-12 RX ORDER — GABAPENTIN 600 MG/1
TABLET ORAL
COMMUNITY
Start: 2019-12-05 | End: 2021-02-10

## 2020-03-12 RX ORDER — GABAPENTIN 600 MG/1
TABLET ORAL
COMMUNITY
Start: 2020-01-03 | End: 2021-02-10 | Stop reason: SDUPTHER

## 2020-03-12 RX ORDER — CLOTRIMAZOLE AND BETAMETHASONE DIPROPIONATE 10; .64 MG/G; MG/G
CREAM TOPICAL
COMMUNITY
Start: 2020-01-03 | End: 2021-02-10

## 2020-06-03 RX ORDER — GABAPENTIN 600 MG/1
TABLET ORAL
Qty: 180 TABLET | Refills: 4 | OUTPATIENT
Start: 2020-06-03

## 2020-06-03 RX ORDER — PEN NEEDLE, DIABETIC 29 G X1/2"
NEEDLE, DISPOSABLE MISCELLANEOUS
Qty: 100 EACH | Refills: 10 | OUTPATIENT
Start: 2020-06-03

## 2020-07-07 ENCOUNTER — HOSPITAL ENCOUNTER (OUTPATIENT)
Dept: INTERVENTIONAL RADIOLOGY/VASCULAR | Facility: HOSPITAL | Age: 55
Discharge: HOME | End: 2020-07-07
Admitting: INTERNAL MEDICINE

## 2020-07-07 VITALS
WEIGHT: 175 LBS | RESPIRATION RATE: 13 BRPM | HEART RATE: 84 BPM | SYSTOLIC BLOOD PRESSURE: 192 MMHG | OXYGEN SATURATION: 100 % | BODY MASS INDEX: 25.92 KG/M2 | DIASTOLIC BLOOD PRESSURE: 108 MMHG | HEIGHT: 69 IN

## 2020-07-07 DIAGNOSIS — Z99.2 END STAGE RENAL DISEASE ON DIALYSIS (HCC): ICD-10-CM

## 2020-07-07 DIAGNOSIS — N18.6 END STAGE RENAL DISEASE ON DIALYSIS (HCC): ICD-10-CM

## 2020-07-07 PROCEDURE — C1894 INTRO/SHEATH, NON-LASER: HCPCS

## 2020-07-07 PROCEDURE — C1725 CATH, TRANSLUMIN NON-LASER: HCPCS

## 2020-07-07 PROCEDURE — C1769 GUIDE WIRE: HCPCS

## 2020-07-07 PROCEDURE — 25010000002 FENTANYL CITRATE (PF) 100 MCG/2ML SOLUTION: Performed by: RADIOLOGY

## 2020-07-07 PROCEDURE — 0 IOPAMIDOL PER 1 ML: Performed by: INTERNAL MEDICINE

## 2020-07-07 RX ORDER — FENTANYL CITRATE 50 UG/ML
INJECTION, SOLUTION INTRAMUSCULAR; INTRAVENOUS
Status: DISCONTINUED | OUTPATIENT
Start: 2020-07-07 | End: 2020-07-08 | Stop reason: HOSPADM

## 2020-07-07 RX ADMIN — FENTANYL CITRATE 100 MCG: 50 INJECTION, SOLUTION INTRAMUSCULAR; INTRAVENOUS at 15:20

## 2020-07-07 RX ADMIN — IOPAMIDOL 5 ML: 755 INJECTION, SOLUTION INTRAVENOUS at 15:42

## 2020-07-07 RX ADMIN — IOPAMIDOL 50 ML: 755 INJECTION, SOLUTION INTRAVENOUS at 15:42

## 2020-07-07 NOTE — DISCHARGE INSTRUCTIONS
Dialysis Fistulogram, Care After  This sheet gives you information about how to care for yourself after your procedure. Your health care provider may also give you more specific instructions. If you have problems or questions, contact your health care provider.  What can I expect after the procedure?  After the procedure, it is common to have:  · A small amount of discomfort in the area where the small, thin tube (catheter) was placed for the procedure.  · A small amount of bruising around the fistula.  · Sleepiness and tiredness (fatigue).  Follow these instructions at home:  Activity    · Rest at home and do not lift anything that is heavier than 5 lb (2.3 kg) on the day after your procedure.  · Return to your normal activities as told by your health care provider. Ask your health care provider what activities are safe for you.  · Do not drive or use heavy machinery while taking prescription pain medicine.  · Do not drive for 24 hours if you were given a medicine to help you relax (sedative) during your procedure.  Medicines    · Take over-the-counter and prescription medicines only as told by your health care provider.  Puncture site care  · Follow instructions from your health care provider about how to take care of the site where catheters were inserted. Make sure you:  ? Wash your hands with soap and water before you change your bandage (dressing). If soap and water are not available, use hand .  ? Change your dressing as told by your health care provider.  ? Leave stitches (sutures), skin glue, or adhesive strips in place. These skin closures may need to stay in place for 2 weeks or longer. If adhesive strip edges start to loosen and curl up, you may trim the loose edges. Do not remove adhesive strips completely unless your health care provider tells you to do that.  · Check your puncture area every day for signs of infection. Check for:  ? Redness, swelling, or pain.  ? Fluid or  blood.  ? Warmth.  ? Pus or a bad smell.  General instructions  · Do not take baths, swim, or use a hot tub until your health care provider approves. Ask your health care provider if you may take showers. You may only be allowed to take sponge baths.  · Monitor your dialysis fistula closely. Check to make sure that you can feel a vibration or buzz (a thrill) when you put your fingers over the fistula.  · Prevent damage to your graft or fistula:  ? Do not wear tight-fitting clothing or jewelry on the arm or leg that has your graft or fistula.  ? Tell all your health care providers that you have a dialysis fistula or graft.  ? Do not allow blood draws, IVs, or blood pressure readings to be done in the arm that has your fistula or graft.  ? Do not allow flu shots or vaccinations in the arm with your fistula or graft.  · Keep all follow-up visits as told by your health care provider. This is important.  Contact a health care provider if:  · You have redness, swelling, or pain at the site where the catheter was put in.  · You have fluid or blood coming from the catheter site.  · The catheter site feels warm to the touch.  · You have pus or a bad smell coming from the catheter site.  · You have a fever or chills.  Get help right away if:  · You feel weak.  · You have trouble balancing.  · You have trouble moving your arms or legs.  · You have problems with your speech or vision.  · You can no longer feel a vibration or buzz when you put your fingers over your dialysis fistula.  · The limb that was used for the procedure:  ? Swells.  ? Is painful.  ? Is cold.  ? Is discolored, such as blue or pale white.  · You have chest pain or shortness of breath.  Summary  · After a dialysis fistulogram, it is common to have a small amount of discomfort or bruising in the area where the small, thin tube (catheter) was placed.  · Rest at home on the day after your procedure. Return to your normal activities as told by your health care  provider.  · Take over-the-counter and prescription medicines only as told by your health care provider.  · Follow instructions from your health care provider about how to take care of the site where the catheter was inserted.  · Keep all follow-up visits as told by your health care provider.  This information is not intended to replace advice given to you by your health care provider. Make sure you discuss any questions you have with your health care provider.  Document Released: 05/04/2015 Document Revised: 01/18/2019 Document Reviewed: 01/18/2019  Elsevier Patient Education © 2020 [employer]

## 2020-08-11 ENCOUNTER — APPOINTMENT (OUTPATIENT)
Dept: GENERAL RADIOLOGY | Facility: HOSPITAL | Age: 55
End: 2020-08-11

## 2020-08-11 ENCOUNTER — APPOINTMENT (OUTPATIENT)
Dept: CT IMAGING | Facility: HOSPITAL | Age: 55
End: 2020-08-11

## 2020-08-11 ENCOUNTER — HOSPITAL ENCOUNTER (INPATIENT)
Facility: HOSPITAL | Age: 55
LOS: 8 days | Discharge: NURSING, REHAB OR LONG-TERM CARE | End: 2020-08-19
Attending: INTERNAL MEDICINE | Admitting: INTERNAL MEDICINE

## 2020-08-11 ENCOUNTER — HOSPITAL ENCOUNTER (EMERGENCY)
Facility: HOSPITAL | Age: 55
Discharge: STILL ADMITTED OR ADMINISTRATIVE | End: 2020-08-12

## 2020-08-11 DIAGNOSIS — M62.82 EXERTIONAL RHABDOMYOLYSIS: ICD-10-CM

## 2020-08-11 DIAGNOSIS — G40.901 STATUS EPILEPTICUS (HCC): Primary | ICD-10-CM

## 2020-08-11 DIAGNOSIS — J69.0 ASPIRATION PNEUMONIA OF BOTH LOWER LOBES DUE TO VOMIT (HCC): ICD-10-CM

## 2020-08-11 DIAGNOSIS — E87.5 HYPERKALEMIA: ICD-10-CM

## 2020-08-11 DIAGNOSIS — E11.65 UNCONTROLLED TYPE 2 DIABETES MELLITUS WITH HYPERGLYCEMIA (HCC): ICD-10-CM

## 2020-08-11 DIAGNOSIS — N18.6 CHRONIC KIDNEY DISEASE ON CHRONIC DIALYSIS (HCC): ICD-10-CM

## 2020-08-11 DIAGNOSIS — Z99.2 CHRONIC KIDNEY DISEASE ON CHRONIC DIALYSIS (HCC): ICD-10-CM

## 2020-08-11 LAB
ALBUMIN SERPL-MCNC: 3.9 G/DL (ref 3.5–5.2)
ALBUMIN/GLOB SERPL: 1.3 G/DL
ALP SERPL-CCNC: 115 U/L (ref 39–117)
ALT SERPL W P-5'-P-CCNC: 18 U/L (ref 1–41)
AMPHET+METHAMPHET UR QL: NEGATIVE
ANION GAP SERPL CALCULATED.3IONS-SCNC: 17 MMOL/L (ref 5–15)
ANION GAP SERPL CALCULATED.3IONS-SCNC: 20 MMOL/L (ref 5–15)
ANION GAP SERPL CALCULATED.3IONS-SCNC: 27 MMOL/L (ref 5–15)
APTT PPP: 24.2 SECONDS (ref 24–31)
ARTERIAL PATENCY WRIST A: POSITIVE
ARTERIAL PATENCY WRIST A: POSITIVE
AST SERPL-CCNC: 28 U/L (ref 1–40)
ATMOSPHERIC PRESS: ABNORMAL MM[HG]
ATMOSPHERIC PRESS: ABNORMAL MM[HG]
BACTERIA UR QL AUTO: ABNORMAL /HPF
BARBITURATES UR QL SCN: NEGATIVE
BASE EXCESS BLDA CALC-SCNC: -10.2 MMOL/L (ref 0–3)
BASE EXCESS BLDA CALC-SCNC: -12.3 MMOL/L (ref 0–3)
BASOPHILS # BLD AUTO: 0 10*3/MM3 (ref 0–0.2)
BASOPHILS NFR BLD AUTO: 0.1 % (ref 0–1.5)
BDY SITE: ABNORMAL
BDY SITE: ABNORMAL
BENZODIAZ UR QL SCN: NEGATIVE
BILIRUB SERPL-MCNC: 0.3 MG/DL (ref 0–1.2)
BILIRUB UR QL STRIP: NEGATIVE
BUN SERPL-MCNC: 67 MG/DL (ref 8–23)
BUN SERPL-MCNC: 70 MG/DL (ref 6–20)
BUN SERPL-MCNC: 71 MG/DL (ref 6–20)
BUN SERPL-MCNC: ABNORMAL MG/DL
BUN/CREAT SERPL: ABNORMAL
CALCIUM SPEC-SCNC: 7.4 MG/DL (ref 8.6–10.5)
CALCIUM SPEC-SCNC: 9.2 MG/DL (ref 8.6–10.5)
CALCIUM SPEC-SCNC: 9.3 MG/DL (ref 8.6–10.5)
CANNABINOIDS SERPL QL: NEGATIVE
CHLORIDE SERPL-SCNC: 102 MMOL/L (ref 98–107)
CHLORIDE SERPL-SCNC: 86 MMOL/L (ref 98–107)
CHLORIDE SERPL-SCNC: 92 MMOL/L (ref 98–107)
CK SERPL-CCNC: 2382 U/L (ref 20–200)
CLARITY UR: ABNORMAL
CO2 BLDA-SCNC: 16.2 MMOL/L (ref 22–29)
CO2 BLDA-SCNC: 16.2 MMOL/L (ref 22–29)
CO2 SERPL-SCNC: 13 MMOL/L (ref 22–29)
CO2 SERPL-SCNC: 14 MMOL/L (ref 22–29)
CO2 SERPL-SCNC: 15 MMOL/L (ref 22–29)
COCAINE UR QL: NEGATIVE
COLOR UR: YELLOW
CREAT SERPL-MCNC: 11.44 MG/DL (ref 0.76–1.27)
CREAT SERPL-MCNC: 11.55 MG/DL (ref 0.76–1.27)
CREAT SERPL-MCNC: 9.84 MG/DL (ref 0.76–1.27)
D DIMER PPP FEU-MCNC: 4.82 MG/L (FEU) (ref 0–0.59)
D-LACTATE SERPL-SCNC: 2.3 MMOL/L (ref 0.5–2)
D-LACTATE SERPL-SCNC: 3.1 MMOL/L (ref 0.5–2)
DEPRECATED RDW RBC AUTO: 53.8 FL (ref 37–54)
EOSINOPHIL # BLD AUTO: 0 10*3/MM3 (ref 0–0.4)
EOSINOPHIL NFR BLD AUTO: 0 % (ref 0.3–6.2)
ERYTHROCYTE [DISTWIDTH] IN BLOOD BY AUTOMATED COUNT: 17.8 % (ref 12.3–15.4)
GFR SERPL CREATININE-BSD FRML MDRD: 4 ML/MIN/1.73
GFR SERPL CREATININE-BSD FRML MDRD: 5 ML/MIN/1.73
GFR SERPL CREATININE-BSD FRML MDRD: 6 ML/MIN/1.73
GFR SERPL CREATININE-BSD FRML MDRD: 7 ML/MIN/1.73
GFR SERPL CREATININE-BSD FRML MDRD: ABNORMAL ML/MIN/{1.73_M2}
GFR SERPL CREATININE-BSD FRML MDRD: ABNORMAL ML/MIN/{1.73_M2}
GLOBULIN UR ELPH-MCNC: 3.1 GM/DL
GLUCOSE BLDC GLUCOMTR-MCNC: 361 MG/DL (ref 70–105)
GLUCOSE BLDC GLUCOMTR-MCNC: >500 MG/DL (ref 70–105)
GLUCOSE BLDC GLUCOMTR-MCNC: >500 MG/DL (ref 70–105)
GLUCOSE SERPL-MCNC: 550 MG/DL (ref 65–99)
GLUCOSE SERPL-MCNC: 724 MG/DL (ref 65–99)
GLUCOSE SERPL-MCNC: 872 MG/DL (ref 65–99)
GLUCOSE UR STRIP-MCNC: ABNORMAL MG/DL
GRAN CASTS URNS QL MICRO: ABNORMAL /LPF
HCO3 BLDA-SCNC: 15 MMOL/L (ref 21–28)
HCO3 BLDA-SCNC: 15.2 MMOL/L (ref 21–28)
HCT VFR BLD AUTO: 36.5 % (ref 37.5–51)
HEMODILUTION: NO
HEMODILUTION: NO
HGB BLD-MCNC: 10.9 G/DL (ref 13–17.7)
HGB UR QL STRIP.AUTO: ABNORMAL
HOLD SPECIMEN: NORMAL
HYALINE CASTS UR QL AUTO: ABNORMAL /LPF
INHALED O2 CONCENTRATION: 100 %
INHALED O2 CONCENTRATION: 80 %
INR PPP: 1.07 (ref 0.93–1.1)
KETONES UR QL STRIP: ABNORMAL
L PNEUMO1 AG UR QL IA: NEGATIVE
LACTATE HOLD SPECIMEN: NORMAL
LEUKOCYTE ESTERASE UR QL STRIP.AUTO: ABNORMAL
LIPASE SERPL-CCNC: 12 U/L (ref 13–60)
LYMPHOCYTES # BLD AUTO: 1.3 10*3/MM3 (ref 0.7–3.1)
LYMPHOCYTES NFR BLD AUTO: 6.5 % (ref 19.6–45.3)
MAGNESIUM SERPL-MCNC: 1.3 MG/DL (ref 1.6–2.6)
MAGNESIUM SERPL-MCNC: 1.6 MG/DL (ref 1.6–2.6)
MAGNESIUM SERPL-MCNC: 1.7 MG/DL (ref 1.6–2.6)
MCH RBC QN AUTO: 25.9 PG (ref 26.6–33)
MCHC RBC AUTO-ENTMCNC: 29.9 G/DL (ref 31.5–35.7)
MCV RBC AUTO: 86.6 FL (ref 79–97)
METHADONE UR QL SCN: NEGATIVE
MODALITY: ABNORMAL
MODALITY: ABNORMAL
MONOCYTES # BLD AUTO: 1.9 10*3/MM3 (ref 0.1–0.9)
MONOCYTES NFR BLD AUTO: 9.7 % (ref 5–12)
NEUTROPHILS NFR BLD AUTO: 16.2 10*3/MM3 (ref 1.7–7)
NEUTROPHILS NFR BLD AUTO: 83.7 % (ref 42.7–76)
NITRITE UR QL STRIP: NEGATIVE
NRBC BLD AUTO-RTO: 0 /100 WBC (ref 0–0.2)
NT-PROBNP SERPL-MCNC: ABNORMAL PG/ML (ref 0–900)
OPIATES UR QL: NEGATIVE
OXYCODONE UR QL SCN: NEGATIVE
PCO2 BLDA: 31.5 MM HG (ref 35–48)
PCO2 BLDA: 38.3 MM HG (ref 35–48)
PEEP RESPIRATORY: 5 CM[H2O]
PH BLDA: 7.2 PH UNITS (ref 7.35–7.45)
PH BLDA: 7.29 PH UNITS (ref 7.35–7.45)
PH UR STRIP.AUTO: 6 [PH] (ref 5–8)
PHOSPHATE SERPL-MCNC: 2.9 MG/DL (ref 2.5–4.5)
PHOSPHATE SERPL-MCNC: 3.9 MG/DL (ref 2.5–4.5)
PLATELET # BLD AUTO: 120 10*3/MM3 (ref 140–450)
PMV BLD AUTO: 10.9 FL (ref 6–12)
PO2 BLDA: 394.3 MM HG (ref 83–108)
PO2 BLDA: 91.9 MM HG (ref 83–108)
POTASSIUM SERPL-SCNC: 3.7 MMOL/L (ref 3.5–5.2)
POTASSIUM SERPL-SCNC: 4.8 MMOL/L (ref 3.5–5.2)
POTASSIUM SERPL-SCNC: 5.3 MMOL/L (ref 3.5–5.2)
PROT SERPL-MCNC: 7 G/DL (ref 6–8.5)
PROT UR QL STRIP: ABNORMAL
PROTHROMBIN TIME: 11.5 SECONDS (ref 9.6–11.7)
RBC # BLD AUTO: 4.21 10*6/MM3 (ref 4.14–5.8)
RBC # UR: ABNORMAL /HPF
REF LAB TEST METHOD: ABNORMAL
RESPIRATORY RATE: 18
S PNEUM AG SPEC QL LA: NEGATIVE
SAO2 % BLDCOA: 100 % (ref 94–98)
SAO2 % BLDCOA: 95.1 % (ref 94–98)
SARS-COV-2 RNA PNL SPEC NAA+PROBE: NOT DETECTED
SODIUM SERPL-SCNC: 126 MMOL/L (ref 136–145)
SODIUM SERPL-SCNC: 127 MMOL/L (ref 136–145)
SODIUM SERPL-SCNC: 133 MMOL/L (ref 136–145)
SP GR UR STRIP: 1.02 (ref 1–1.03)
SQUAMOUS #/AREA URNS HPF: ABNORMAL /HPF
TROPONIN T SERPL-MCNC: 0.23 NG/ML (ref 0–0.03)
UROBILINOGEN UR QL STRIP: ABNORMAL
VENTILATOR MODE: ABNORMAL
VT ON VENT VENT: 450 ML
WBC # BLD AUTO: 19.3 10*3/MM3 (ref 3.4–10.8)
WBC UR QL AUTO: ABNORMAL /HPF

## 2020-08-11 PROCEDURE — 80307 DRUG TEST PRSMV CHEM ANLYZR: CPT | Performed by: EMERGENCY MEDICINE

## 2020-08-11 PROCEDURE — 87205 SMEAR GRAM STAIN: CPT | Performed by: NURSE PRACTITIONER

## 2020-08-11 PROCEDURE — 36600 WITHDRAWAL OF ARTERIAL BLOOD: CPT

## 2020-08-11 PROCEDURE — 84484 ASSAY OF TROPONIN QUANT: CPT | Performed by: EMERGENCY MEDICINE

## 2020-08-11 PROCEDURE — 63710000001 INSULIN REGULAR HUMAN PER 5 UNITS: Performed by: EMERGENCY MEDICINE

## 2020-08-11 PROCEDURE — 80051 ELECTROLYTE PANEL: CPT

## 2020-08-11 PROCEDURE — 82330 ASSAY OF CALCIUM: CPT

## 2020-08-11 PROCEDURE — 82962 GLUCOSE BLOOD TEST: CPT

## 2020-08-11 PROCEDURE — 5A1945Z RESPIRATORY VENTILATION, 24-96 CONSECUTIVE HOURS: ICD-10-PCS | Performed by: EMERGENCY MEDICINE

## 2020-08-11 PROCEDURE — 71045 X-RAY EXAM CHEST 1 VIEW: CPT

## 2020-08-11 PROCEDURE — 85025 COMPLETE CBC W/AUTO DIFF WBC: CPT | Performed by: EMERGENCY MEDICINE

## 2020-08-11 PROCEDURE — P9612 CATHETERIZE FOR URINE SPEC: HCPCS

## 2020-08-11 PROCEDURE — 63710000001 INSULIN REGULAR HUMAN PER 5 UNITS: Performed by: NURSE PRACTITIONER

## 2020-08-11 PROCEDURE — 94799 UNLISTED PULMONARY SVC/PX: CPT

## 2020-08-11 PROCEDURE — 87070 CULTURE OTHR SPECIMN AEROBIC: CPT | Performed by: NURSE PRACTITIONER

## 2020-08-11 PROCEDURE — 82803 BLOOD GASES ANY COMBINATION: CPT

## 2020-08-11 PROCEDURE — 87077 CULTURE AEROBIC IDENTIFY: CPT | Performed by: NURSE PRACTITIONER

## 2020-08-11 PROCEDURE — 87641 MR-STAPH DNA AMP PROBE: CPT | Performed by: NURSE PRACTITIONER

## 2020-08-11 PROCEDURE — 0DH67UZ INSERTION OF FEEDING DEVICE INTO STOMACH, VIA NATURAL OR ARTIFICIAL OPENING: ICD-10-PCS | Performed by: HOSPITALIST

## 2020-08-11 PROCEDURE — 87899 AGENT NOS ASSAY W/OPTIC: CPT | Performed by: NURSE PRACTITIONER

## 2020-08-11 PROCEDURE — 25010000002 LEVETIRACETAM IN NACL 0.82% 500 MG/100ML SOLUTION: Performed by: EMERGENCY MEDICINE

## 2020-08-11 PROCEDURE — 25010000002 PROPOFOL 10 MG/ML EMULSION: Performed by: EMERGENCY MEDICINE

## 2020-08-11 PROCEDURE — 80048 BASIC METABOLIC PNL TOTAL CA: CPT | Performed by: NURSE PRACTITIONER

## 2020-08-11 PROCEDURE — 87147 CULTURE TYPE IMMUNOLOGIC: CPT | Performed by: NURSE PRACTITIONER

## 2020-08-11 PROCEDURE — 70450 CT HEAD/BRAIN W/O DYE: CPT

## 2020-08-11 PROCEDURE — 94002 VENT MGMT INPAT INIT DAY: CPT

## 2020-08-11 PROCEDURE — 93005 ELECTROCARDIOGRAM TRACING: CPT

## 2020-08-11 PROCEDURE — 87040 BLOOD CULTURE FOR BACTERIA: CPT | Performed by: NURSE PRACTITIONER

## 2020-08-11 PROCEDURE — 83690 ASSAY OF LIPASE: CPT | Performed by: NURSE PRACTITIONER

## 2020-08-11 PROCEDURE — 83880 ASSAY OF NATRIURETIC PEPTIDE: CPT | Performed by: EMERGENCY MEDICINE

## 2020-08-11 PROCEDURE — 99285 EMERGENCY DEPT VISIT HI MDM: CPT

## 2020-08-11 PROCEDURE — 85730 THROMBOPLASTIN TIME PARTIAL: CPT | Performed by: EMERGENCY MEDICINE

## 2020-08-11 PROCEDURE — 85610 PROTHROMBIN TIME: CPT | Performed by: EMERGENCY MEDICINE

## 2020-08-11 PROCEDURE — 83735 ASSAY OF MAGNESIUM: CPT | Performed by: NURSE PRACTITIONER

## 2020-08-11 PROCEDURE — 25010000002 PIPERACILLIN SOD-TAZOBACTAM PER 1 G: Performed by: EMERGENCY MEDICINE

## 2020-08-11 PROCEDURE — 82550 ASSAY OF CK (CPK): CPT | Performed by: EMERGENCY MEDICINE

## 2020-08-11 PROCEDURE — 25010000002 LORAZEPAM PER 2 MG: Performed by: EMERGENCY MEDICINE

## 2020-08-11 PROCEDURE — 0100U HC BIOFIRE FILMARRAY RESP PANEL 2: CPT | Performed by: NURSE PRACTITIONER

## 2020-08-11 PROCEDURE — 85018 HEMOGLOBIN: CPT

## 2020-08-11 PROCEDURE — 83036 HEMOGLOBIN GLYCOSYLATED A1C: CPT | Performed by: NURSE PRACTITIONER

## 2020-08-11 PROCEDURE — 84100 ASSAY OF PHOSPHORUS: CPT | Performed by: NURSE PRACTITIONER

## 2020-08-11 PROCEDURE — 85379 FIBRIN DEGRADATION QUANT: CPT | Performed by: EMERGENCY MEDICINE

## 2020-08-11 PROCEDURE — 83605 ASSAY OF LACTIC ACID: CPT

## 2020-08-11 PROCEDURE — 3E0G76Z INTRODUCTION OF NUTRITIONAL SUBSTANCE INTO UPPER GI, VIA NATURAL OR ARTIFICIAL OPENING: ICD-10-PCS | Performed by: HOSPITALIST

## 2020-08-11 PROCEDURE — 0BH17EZ INSERTION OF ENDOTRACHEAL AIRWAY INTO TRACHEA, VIA NATURAL OR ARTIFICIAL OPENING: ICD-10-PCS | Performed by: EMERGENCY MEDICINE

## 2020-08-11 PROCEDURE — 87635 SARS-COV-2 COVID-19 AMP PRB: CPT | Performed by: EMERGENCY MEDICINE

## 2020-08-11 PROCEDURE — 81001 URINALYSIS AUTO W/SCOPE: CPT | Performed by: EMERGENCY MEDICINE

## 2020-08-11 PROCEDURE — 87186 SC STD MICRODIL/AGAR DIL: CPT | Performed by: NURSE PRACTITIONER

## 2020-08-11 PROCEDURE — 87181 SC STD AGAR DILUTION PER AGT: CPT | Performed by: NURSE PRACTITIONER

## 2020-08-11 PROCEDURE — 99211 OFF/OP EST MAY X REQ PHY/QHP: CPT

## 2020-08-11 PROCEDURE — 80053 COMPREHEN METABOLIC PANEL: CPT | Performed by: EMERGENCY MEDICINE

## 2020-08-11 PROCEDURE — 87086 URINE CULTURE/COLONY COUNT: CPT | Performed by: EMERGENCY MEDICINE

## 2020-08-11 PROCEDURE — 93005 ELECTROCARDIOGRAM TRACING: CPT | Performed by: INTERNAL MEDICINE

## 2020-08-11 PROCEDURE — 82607 VITAMIN B-12: CPT | Performed by: NURSE PRACTITIONER

## 2020-08-11 RX ORDER — INSULIN GLARGINE 100 [IU]/ML
40 INJECTION, SOLUTION SUBCUTANEOUS NIGHTLY
COMMUNITY
End: 2020-08-19 | Stop reason: HOSPADM

## 2020-08-11 RX ORDER — PROPOFOL 10 MG/ML
VIAL (ML) INTRAVENOUS
Status: DISCONTINUED
Start: 2020-08-11 | End: 2020-08-12 | Stop reason: HOSPADM

## 2020-08-11 RX ORDER — AMITRIPTYLINE HYDROCHLORIDE 50 MG/1
50 TABLET, FILM COATED ORAL NIGHTLY
COMMUNITY
End: 2021-02-16 | Stop reason: HOSPADM

## 2020-08-11 RX ORDER — SODIUM CHLORIDE 0.9 % (FLUSH) 0.9 %
10 SYRINGE (ML) INJECTION ONCE AS NEEDED
Status: DISCONTINUED | OUTPATIENT
Start: 2020-08-11 | End: 2020-08-12

## 2020-08-11 RX ORDER — HYDROXYZINE HYDROCHLORIDE 25 MG/1
25 TABLET, FILM COATED ORAL 2 TIMES DAILY PRN
COMMUNITY
End: 2021-02-10 | Stop reason: SDUPTHER

## 2020-08-11 RX ORDER — PIPERACILLIN SODIUM, TAZOBACTAM SODIUM 3; .375 G/15ML; G/15ML
INJECTION, POWDER, LYOPHILIZED, FOR SOLUTION INTRAVENOUS
Status: DISPENSED
Start: 2020-08-11 | End: 2020-08-12

## 2020-08-11 RX ORDER — LEVETIRACETAM 5 MG/ML
INJECTION INTRAVASCULAR
Status: COMPLETED | OUTPATIENT
Start: 2020-08-11 | End: 2020-08-11

## 2020-08-11 RX ORDER — HEPARIN SODIUM 5000 [USP'U]/ML
5000 INJECTION, SOLUTION INTRAVENOUS; SUBCUTANEOUS EVERY 8 HOURS SCHEDULED
Status: DISCONTINUED | OUTPATIENT
Start: 2020-08-11 | End: 2020-08-19 | Stop reason: HOSPADM

## 2020-08-11 RX ORDER — LORAZEPAM 2 MG/ML
INJECTION INTRAMUSCULAR
Status: COMPLETED | OUTPATIENT
Start: 2020-08-11 | End: 2020-08-12

## 2020-08-11 RX ORDER — MAGNESIUM SULFATE HEPTAHYDRATE 40 MG/ML
2 INJECTION, SOLUTION INTRAVENOUS ONCE
Status: COMPLETED | OUTPATIENT
Start: 2020-08-11 | End: 2020-08-12

## 2020-08-11 RX ORDER — DEXTROSE AND SODIUM CHLORIDE 5; .45 G/100ML; G/100ML
75 INJECTION, SOLUTION INTRAVENOUS CONTINUOUS PRN
Status: DISCONTINUED | OUTPATIENT
Start: 2020-08-11 | End: 2020-08-12

## 2020-08-11 RX ORDER — PROPOFOL 10 MG/ML
VIAL (ML) INTRAVENOUS
Status: DISPENSED
Start: 2020-08-11 | End: 2020-08-12

## 2020-08-11 RX ORDER — ETOMIDATE 2 MG/ML
INJECTION INTRAVENOUS
Status: COMPLETED | OUTPATIENT
Start: 2020-08-11 | End: 2020-08-11

## 2020-08-11 RX ORDER — ACETAMINOPHEN 650 MG/1
650 SUPPOSITORY RECTAL EVERY 4 HOURS PRN
Status: DISCONTINUED | OUTPATIENT
Start: 2020-08-11 | End: 2020-08-19 | Stop reason: HOSPADM

## 2020-08-11 RX ORDER — NALOXONE HYDROCHLORIDE 1 MG/ML
INJECTION INTRAMUSCULAR; INTRAVENOUS; SUBCUTANEOUS
Status: COMPLETED | OUTPATIENT
Start: 2020-08-11 | End: 2020-08-11

## 2020-08-11 RX ORDER — DEXTROSE MONOHYDRATE 25 G/50ML
12.5 INJECTION, SOLUTION INTRAVENOUS AS NEEDED
Status: DISCONTINUED | OUTPATIENT
Start: 2020-08-11 | End: 2020-08-12

## 2020-08-11 RX ORDER — LABETALOL HYDROCHLORIDE 5 MG/ML
10 INJECTION, SOLUTION INTRAVENOUS EVERY 6 HOURS PRN
Status: DISCONTINUED | OUTPATIENT
Start: 2020-08-11 | End: 2020-08-19 | Stop reason: HOSPADM

## 2020-08-11 RX ORDER — DEXTROSE MONOHYDRATE 25 G/50ML
50 INJECTION, SOLUTION INTRAVENOUS
Status: DISCONTINUED | OUTPATIENT
Start: 2020-08-11 | End: 2020-08-12

## 2020-08-11 RX ORDER — SODIUM CHLORIDE 450 MG/100ML
150 INJECTION, SOLUTION INTRAVENOUS CONTINUOUS PRN
Status: DISCONTINUED | OUTPATIENT
Start: 2020-08-11 | End: 2020-08-12

## 2020-08-11 RX ORDER — SODIUM CHLORIDE 0.9 % (FLUSH) 0.9 %
10 SYRINGE (ML) INJECTION AS NEEDED
Status: DISCONTINUED | OUTPATIENT
Start: 2020-08-11 | End: 2020-08-12

## 2020-08-11 RX ORDER — LEVETIRACETAM 10 MG/ML
INJECTION INTRAVASCULAR
Status: DISPENSED
Start: 2020-08-11 | End: 2020-08-12

## 2020-08-11 RX ORDER — ONDANSETRON 4 MG/1
4 TABLET, FILM COATED ORAL EVERY 6 HOURS PRN
Status: DISCONTINUED | OUTPATIENT
Start: 2020-08-11 | End: 2020-08-19 | Stop reason: HOSPADM

## 2020-08-11 RX ORDER — ACETAMINOPHEN 325 MG/1
650 TABLET ORAL EVERY 4 HOURS PRN
Status: DISCONTINUED | OUTPATIENT
Start: 2020-08-11 | End: 2020-08-19 | Stop reason: HOSPADM

## 2020-08-11 RX ORDER — ONDANSETRON 2 MG/ML
4 INJECTION INTRAMUSCULAR; INTRAVENOUS EVERY 6 HOURS PRN
Status: DISCONTINUED | OUTPATIENT
Start: 2020-08-11 | End: 2020-08-19 | Stop reason: HOSPADM

## 2020-08-11 RX ORDER — SODIUM CHLORIDE 9 MG/ML
10 INJECTION, SOLUTION INTRAVENOUS CONTINUOUS PRN
Status: DISCONTINUED | OUTPATIENT
Start: 2020-08-11 | End: 2020-08-12

## 2020-08-11 RX ORDER — SODIUM CHLORIDE 0.9 % (FLUSH) 0.9 %
3 SYRINGE (ML) INJECTION EVERY 12 HOURS SCHEDULED
Status: DISCONTINUED | OUTPATIENT
Start: 2020-08-11 | End: 2020-08-12

## 2020-08-11 RX ORDER — RISPERIDONE 1 MG/1
1 TABLET ORAL 2 TIMES DAILY
COMMUNITY
End: 2021-02-10 | Stop reason: SDUPTHER

## 2020-08-11 RX ORDER — PROPOFOL 10 MG/ML
VIAL (ML) INTRAVENOUS
Status: COMPLETED | OUTPATIENT
Start: 2020-08-11 | End: 2020-08-11

## 2020-08-11 RX ORDER — LEVETIRACETAM 10 MG/ML
1000 INJECTION INTRAVASCULAR EVERY 24 HOURS
Status: DISCONTINUED | OUTPATIENT
Start: 2020-08-12 | End: 2020-08-12

## 2020-08-11 RX ORDER — DEXTROSE AND SODIUM CHLORIDE 5; .9 G/100ML; G/100ML
150 INJECTION, SOLUTION INTRAVENOUS CONTINUOUS PRN
Status: DISCONTINUED | OUTPATIENT
Start: 2020-08-11 | End: 2020-08-12

## 2020-08-11 RX ORDER — METOPROLOL TARTRATE 50 MG/1
50 TABLET, FILM COATED ORAL 2 TIMES DAILY
COMMUNITY
End: 2021-02-10 | Stop reason: SDUPTHER

## 2020-08-11 RX ORDER — ACETAMINOPHEN 650 MG/1
975 SUPPOSITORY RECTAL ONCE
Status: COMPLETED | OUTPATIENT
Start: 2020-08-11 | End: 2020-08-11

## 2020-08-11 RX ORDER — PANTOPRAZOLE SODIUM 40 MG/1
40 TABLET, DELAYED RELEASE ORAL DAILY
COMMUNITY
End: 2021-02-10 | Stop reason: SDUPTHER

## 2020-08-11 RX ORDER — ATORVASTATIN CALCIUM 40 MG/1
40 TABLET, FILM COATED ORAL DAILY
COMMUNITY
End: 2021-02-10

## 2020-08-11 RX ORDER — PANTOPRAZOLE SODIUM 40 MG/10ML
40 INJECTION, POWDER, LYOPHILIZED, FOR SOLUTION INTRAVENOUS
Status: DISCONTINUED | OUTPATIENT
Start: 2020-08-12 | End: 2020-08-16

## 2020-08-11 RX ORDER — SODIUM CHLORIDE 0.9 % (FLUSH) 0.9 %
10 SYRINGE (ML) INJECTION AS NEEDED
Status: DISCONTINUED | OUTPATIENT
Start: 2020-08-11 | End: 2020-08-19 | Stop reason: HOSPADM

## 2020-08-11 RX ORDER — SODIUM CHLORIDE 0.9 % (FLUSH) 0.9 %
10 SYRINGE (ML) INJECTION EVERY 12 HOURS SCHEDULED
Status: DISCONTINUED | OUTPATIENT
Start: 2020-08-11 | End: 2020-08-19 | Stop reason: HOSPADM

## 2020-08-11 RX ORDER — CHLORHEXIDINE GLUCONATE 0.12 MG/ML
15 RINSE ORAL EVERY 12 HOURS SCHEDULED
Status: DISCONTINUED | OUTPATIENT
Start: 2020-08-11 | End: 2020-08-19 | Stop reason: HOSPADM

## 2020-08-11 RX ADMIN — NALOXONE HYDROCHLORIDE 2 MG: 1 INJECTION PARENTERAL at 18:51

## 2020-08-11 RX ADMIN — SODIUM CHLORIDE 0.1 UNITS/KG/HR: 9 INJECTION, SOLUTION INTRAVENOUS at 20:40

## 2020-08-11 RX ADMIN — SODIUM CHLORIDE 1000 ML: 0.9 INJECTION, SOLUTION INTRAVENOUS at 19:29

## 2020-08-11 RX ADMIN — LEVETIRACETAM 1 G: 5 INJECTION INTRAVENOUS at 19:01

## 2020-08-11 RX ADMIN — ETOMIDATE 20 MG: 2 INJECTION, SOLUTION INTRAVENOUS at 18:52

## 2020-08-11 RX ADMIN — LORAZEPAM 2 MG: 2 INJECTION, SOLUTION INTRAMUSCULAR; INTRAVENOUS at 18:50

## 2020-08-11 RX ADMIN — PIPERACILLIN SODIUM,TAZOBACTAM SODIUM 3.38 G: 3; .375 INJECTION, POWDER, FOR SOLUTION INTRAVENOUS at 19:09

## 2020-08-11 RX ADMIN — SODIUM CHLORIDE 8 UNITS/HR: 9 INJECTION, SOLUTION INTRAVENOUS at 22:25

## 2020-08-11 RX ADMIN — PROPOFOL 50 MCG/KG/MIN: 10 INJECTION, EMULSION INTRAVENOUS at 19:15

## 2020-08-11 RX ADMIN — ACETAMINOPHEN 975 MG: 650 SUPPOSITORY RECTAL at 20:45

## 2020-08-11 RX ADMIN — SODIUM CHLORIDE 500 ML: 900 INJECTION, SOLUTION INTRAVENOUS at 22:22

## 2020-08-12 ENCOUNTER — APPOINTMENT (OUTPATIENT)
Dept: CARDIOLOGY | Facility: HOSPITAL | Age: 55
End: 2020-08-12

## 2020-08-12 ENCOUNTER — APPOINTMENT (OUTPATIENT)
Dept: MRI IMAGING | Facility: HOSPITAL | Age: 55
End: 2020-08-12

## 2020-08-12 ENCOUNTER — APPOINTMENT (OUTPATIENT)
Dept: GENERAL RADIOLOGY | Facility: HOSPITAL | Age: 55
End: 2020-08-12

## 2020-08-12 ENCOUNTER — APPOINTMENT (OUTPATIENT)
Dept: NEUROLOGY | Facility: HOSPITAL | Age: 55
End: 2020-08-12

## 2020-08-12 PROBLEM — N18.6 ESRD (END STAGE RENAL DISEASE): Status: ACTIVE | Noted: 2020-08-12

## 2020-08-12 PROBLEM — J69.0 ASPIRATION PNEUMONIA DUE TO VOMITUS (HCC): Status: ACTIVE | Noted: 2020-08-12

## 2020-08-12 PROBLEM — I10 ESSENTIAL HYPERTENSION: Status: ACTIVE | Noted: 2020-08-12

## 2020-08-12 PROBLEM — E11.10 DKA (DIABETIC KETOACIDOSES): Status: ACTIVE | Noted: 2020-08-12

## 2020-08-12 PROBLEM — Z86.73 HISTORY OF CVA (CEREBROVASCULAR ACCIDENT): Status: ACTIVE | Noted: 2020-08-12

## 2020-08-12 PROBLEM — J96.01 ACUTE RESPIRATORY FAILURE WITH HYPOXIA (HCC): Status: ACTIVE | Noted: 2020-08-12

## 2020-08-12 PROBLEM — F32.A DEPRESSION: Status: ACTIVE | Noted: 2020-08-12

## 2020-08-12 PROBLEM — A41.9 SEPSIS (HCC): Status: ACTIVE | Noted: 2020-08-12

## 2020-08-12 LAB
ANION GAP SERPL CALCULATED.3IONS-SCNC: 15 MMOL/L (ref 5–15)
ANION GAP SERPL CALCULATED.3IONS-SCNC: 19 MMOL/L (ref 5–15)
ANISOCYTOSIS BLD QL: ABNORMAL
ARTERIAL PATENCY WRIST A: POSITIVE
ATMOSPHERIC PRESS: ABNORMAL MM[HG]
B PARAPERT DNA SPEC QL NAA+PROBE: NOT DETECTED
B PERT DNA SPEC QL NAA+PROBE: NOT DETECTED
BACTERIA SPEC AEROBE CULT: NO GROWTH
BASE EXCESS BLDA CALC-SCNC: -5.7 MMOL/L (ref 0–3)
BDY SITE: ABNORMAL
BH CV ECHO MEAS - ACS: 2 CM
BH CV ECHO MEAS - AO MAX PG (FULL): 5.1 MMHG
BH CV ECHO MEAS - AO MAX PG: 10.5 MMHG
BH CV ECHO MEAS - AO MEAN PG (FULL): 2.2 MMHG
BH CV ECHO MEAS - AO MEAN PG: 4.9 MMHG
BH CV ECHO MEAS - AO ROOT AREA (BSA CORRECTED): 1.7
BH CV ECHO MEAS - AO ROOT AREA: 8.3 CM^2
BH CV ECHO MEAS - AO ROOT DIAM: 3.3 CM
BH CV ECHO MEAS - AO V2 MAX: 161.8 CM/SEC
BH CV ECHO MEAS - AO V2 MEAN: 100.8 CM/SEC
BH CV ECHO MEAS - AO V2 VTI: 20.3 CM
BH CV ECHO MEAS - AORTIC HR: 109.5 BPM
BH CV ECHO MEAS - AORTIC R-R: 0.55 SEC
BH CV ECHO MEAS - AVA(I,A): 3.9 CM^2
BH CV ECHO MEAS - AVA(I,D): 3.9 CM^2
BH CV ECHO MEAS - AVA(V,A): 3 CM^2
BH CV ECHO MEAS - AVA(V,D): 3 CM^2
BH CV ECHO MEAS - BSA(HAYCOCK): 2 M^2
BH CV ECHO MEAS - BSA: 1.9 M^2
BH CV ECHO MEAS - BZI_BMI: 41.6 KILOGRAMS/M^2
BH CV ECHO MEAS - BZI_METRIC_HEIGHT: 149.9 CM
BH CV ECHO MEAS - BZI_METRIC_WEIGHT: 93.4 KG
BH CV ECHO MEAS - CI(AO): 9.9 L/MIN/M^2
BH CV ECHO MEAS - CI(LVOT): 4.7 L/MIN/M^2
BH CV ECHO MEAS - CO(AO): 18.5 L/MIN
BH CV ECHO MEAS - CO(LVOT): 8.8 L/MIN
BH CV ECHO MEAS - EDV(CUBED): 51.9 ML
BH CV ECHO MEAS - EDV(MOD-SP4): 80.4 ML
BH CV ECHO MEAS - EDV(TEICH): 59.3 ML
BH CV ECHO MEAS - EF(CUBED): 40.2 %
BH CV ECHO MEAS - EF(MOD-BP): 44 %
BH CV ECHO MEAS - EF(MOD-SP4): 44.2 %
BH CV ECHO MEAS - EF(TEICH): 33.8 %
BH CV ECHO MEAS - ESV(CUBED): 31.1 ML
BH CV ECHO MEAS - ESV(MOD-SP4): 44.9 ML
BH CV ECHO MEAS - ESV(TEICH): 39.2 ML
BH CV ECHO MEAS - FS: 15.7 %
BH CV ECHO MEAS - IVS/LVPW: 1.1
BH CV ECHO MEAS - IVSD: 1.7 CM
BH CV ECHO MEAS - LA DIMENSION(2D): 3.5 CM
BH CV ECHO MEAS - LV DIASTOLIC VOL/BSA (35-75): 43.1 ML/M^2
BH CV ECHO MEAS - LV MASS(C)D: 244.1 GRAMS
BH CV ECHO MEAS - LV MASS(C)DI: 130.7 GRAMS/M^2
BH CV ECHO MEAS - LV MAX PG: 5.4 MMHG
BH CV ECHO MEAS - LV MEAN PG: 2.7 MMHG
BH CV ECHO MEAS - LV SYSTOLIC VOL/BSA (12-30): 24 ML/M^2
BH CV ECHO MEAS - LV V1 MAX: 115.9 CM/SEC
BH CV ECHO MEAS - LV V1 MEAN: 74.3 CM/SEC
BH CV ECHO MEAS - LV V1 VTI: 19 CM
BH CV ECHO MEAS - LVIDD: 3.7 CM
BH CV ECHO MEAS - LVIDS: 3.1 CM
BH CV ECHO MEAS - LVOT AREA: 4.2 CM^2
BH CV ECHO MEAS - LVOT DIAM: 2.3 CM
BH CV ECHO MEAS - LVPWD: 1.6 CM
BH CV ECHO MEAS - MV A MAX VEL: 102.1 CM/SEC
BH CV ECHO MEAS - MV DEC SLOPE: 277.8 CM/SEC^2
BH CV ECHO MEAS - MV DEC TIME: 0.22 SEC
BH CV ECHO MEAS - MV E MAX VEL: 59.9 CM/SEC
BH CV ECHO MEAS - MV E/A: 0.59
BH CV ECHO MEAS - MV MAX PG: 4 MMHG
BH CV ECHO MEAS - MV MEAN PG: 2 MMHG
BH CV ECHO MEAS - MV V2 MAX: 100 CM/SEC
BH CV ECHO MEAS - MV V2 MEAN: 67.6 CM/SEC
BH CV ECHO MEAS - MV V2 VTI: 17.1 CM
BH CV ECHO MEAS - MVA(VTI): 4.7 CM^2
BH CV ECHO MEAS - PA MAX PG (FULL): 2 MMHG
BH CV ECHO MEAS - PA MAX PG: 5.4 MMHG
BH CV ECHO MEAS - PA V2 MAX: 115.8 CM/SEC
BH CV ECHO MEAS - PULM A REVS DUR: 0.11 SEC
BH CV ECHO MEAS - PULM A REVS VEL: 29.2 CM/SEC
BH CV ECHO MEAS - PULM DIAS VEL: 47.4 CM/SEC
BH CV ECHO MEAS - PULM S/D: 1.2
BH CV ECHO MEAS - PULM SYS VEL: 55.2 CM/SEC
BH CV ECHO MEAS - RV MAX PG: 3.4 MMHG
BH CV ECHO MEAS - RV MEAN PG: 1.9 MMHG
BH CV ECHO MEAS - RV V1 MAX: 92.2 CM/SEC
BH CV ECHO MEAS - RV V1 MEAN: 65.7 CM/SEC
BH CV ECHO MEAS - RV V1 VTI: 13 CM
BH CV ECHO MEAS - RVDD: 3.9 CM
BH CV ECHO MEAS - SI(AO): 90.4 ML/M^2
BH CV ECHO MEAS - SI(CUBED): 11.2 ML/M^2
BH CV ECHO MEAS - SI(LVOT): 42.9 ML/M^2
BH CV ECHO MEAS - SI(MOD-SP4): 19 ML/M^2
BH CV ECHO MEAS - SI(TEICH): 10.7 ML/M^2
BH CV ECHO MEAS - SV(AO): 168.8 ML
BH CV ECHO MEAS - SV(CUBED): 20.9 ML
BH CV ECHO MEAS - SV(LVOT): 80.1 ML
BH CV ECHO MEAS - SV(MOD-SP4): 35.5 ML
BH CV ECHO MEAS - SV(TEICH): 20.1 ML
BUN SERPL-MCNC: 71 MG/DL (ref 6–20)
BUN SERPL-MCNC: 76 MG/DL (ref 6–20)
BUN SERPL-MCNC: ABNORMAL MG/DL
BUN SERPL-MCNC: ABNORMAL MG/DL
BUN/CREAT SERPL: ABNORMAL
BUN/CREAT SERPL: ABNORMAL
C PNEUM DNA NPH QL NAA+NON-PROBE: NOT DETECTED
CALCIUM SPEC-SCNC: 9.2 MG/DL (ref 8.6–10.5)
CALCIUM SPEC-SCNC: 9.5 MG/DL (ref 8.6–10.5)
CHLORIDE SERPL-SCNC: 102 MMOL/L (ref 98–107)
CHLORIDE SERPL-SCNC: 96 MMOL/L (ref 98–107)
CHOLEST SERPL-MCNC: 122 MG/DL (ref 0–200)
CK SERPL-CCNC: 3038 U/L (ref 20–200)
CO2 BLDA-SCNC: 20.3 MMOL/L (ref 22–29)
CO2 SERPL-SCNC: 17 MMOL/L (ref 22–29)
CO2 SERPL-SCNC: 19 MMOL/L (ref 22–29)
CREAT SERPL-MCNC: 11.67 MG/DL (ref 0.76–1.27)
CREAT SERPL-MCNC: 11.8 MG/DL (ref 0.76–1.27)
DEPRECATED RDW RBC AUTO: 48.6 FL (ref 37–54)
ERYTHROCYTE [DISTWIDTH] IN BLOOD BY AUTOMATED COUNT: 17.5 % (ref 12.3–15.4)
FLUAV H1 2009 PAND RNA NPH QL NAA+PROBE: NOT DETECTED
FLUAV H1 HA GENE NPH QL NAA+PROBE: NOT DETECTED
FLUAV H3 RNA NPH QL NAA+PROBE: NOT DETECTED
FLUAV SUBTYP SPEC NAA+PROBE: NOT DETECTED
FLUBV RNA ISLT QL NAA+PROBE: NOT DETECTED
GFR SERPL CREATININE-BSD FRML MDRD: 5 ML/MIN/1.73
GFR SERPL CREATININE-BSD FRML MDRD: 6 ML/MIN/1.73
GFR SERPL CREATININE-BSD FRML MDRD: ABNORMAL ML/MIN/{1.73_M2}
GFR SERPL CREATININE-BSD FRML MDRD: ABNORMAL ML/MIN/{1.73_M2}
GLUCOSE BLDC GLUCOMTR-MCNC: 107 MG/DL (ref 70–105)
GLUCOSE BLDC GLUCOMTR-MCNC: 110 MG/DL (ref 70–105)
GLUCOSE BLDC GLUCOMTR-MCNC: 117 MG/DL (ref 70–105)
GLUCOSE BLDC GLUCOMTR-MCNC: 134 MG/DL (ref 70–105)
GLUCOSE BLDC GLUCOMTR-MCNC: 136 MG/DL (ref 70–105)
GLUCOSE BLDC GLUCOMTR-MCNC: 150 MG/DL (ref 70–105)
GLUCOSE BLDC GLUCOMTR-MCNC: 184 MG/DL (ref 70–105)
GLUCOSE BLDC GLUCOMTR-MCNC: 212 MG/DL (ref 70–105)
GLUCOSE BLDC GLUCOMTR-MCNC: 212 MG/DL (ref 70–105)
GLUCOSE BLDC GLUCOMTR-MCNC: 213 MG/DL (ref 70–105)
GLUCOSE BLDC GLUCOMTR-MCNC: 250 MG/DL (ref 70–105)
GLUCOSE BLDC GLUCOMTR-MCNC: 284 MG/DL (ref 70–105)
GLUCOSE BLDC GLUCOMTR-MCNC: 50 MG/DL (ref 70–105)
GLUCOSE BLDC GLUCOMTR-MCNC: >500 MG/DL (ref 70–105)
GLUCOSE SERPL-MCNC: 438 MG/DL (ref 65–99)
GLUCOSE SERPL-MCNC: 50 MG/DL (ref 65–99)
HADV DNA SPEC NAA+PROBE: NOT DETECTED
HBA1C MFR BLD: 13.73 % (ref 4.8–5.6)
HBV SURFACE AG SERPL QL IA: NORMAL
HCO3 BLDA-SCNC: 19.3 MMOL/L (ref 21–28)
HCOV 229E RNA SPEC QL NAA+PROBE: NOT DETECTED
HCOV HKU1 RNA SPEC QL NAA+PROBE: NOT DETECTED
HCOV NL63 RNA SPEC QL NAA+PROBE: NOT DETECTED
HCOV OC43 RNA SPEC QL NAA+PROBE: NOT DETECTED
HCT VFR BLD AUTO: 30.7 % (ref 37.5–51)
HDLC SERPL-MCNC: 34 MG/DL (ref 40–60)
HEMODILUTION: NO
HGB BLD-MCNC: 9.9 G/DL (ref 13–17.7)
HMPV RNA NPH QL NAA+NON-PROBE: NOT DETECTED
HPIV1 RNA SPEC QL NAA+PROBE: NOT DETECTED
HPIV2 RNA SPEC QL NAA+PROBE: NOT DETECTED
HPIV3 RNA NPH QL NAA+PROBE: NOT DETECTED
HPIV4 P GENE NPH QL NAA+PROBE: NOT DETECTED
INHALED O2 CONCENTRATION: 40 %
LARGE PLATELETS: ABNORMAL
LDLC SERPL CALC-MCNC: 42 MG/DL (ref 0–100)
LDLC/HDLC SERPL: 1.24 {RATIO}
LYMPHOCYTES # BLD MANUAL: 1.73 10*3/MM3 (ref 0.7–3.1)
LYMPHOCYTES NFR BLD MANUAL: 12 % (ref 19.6–45.3)
LYMPHOCYTES NFR BLD MANUAL: 3 % (ref 5–12)
M PNEUMO IGG SER IA-ACNC: NOT DETECTED
MAGNESIUM SERPL-MCNC: 2.3 MG/DL (ref 1.6–2.6)
MCH RBC QN AUTO: 25.7 PG (ref 26.6–33)
MCHC RBC AUTO-ENTMCNC: 32.2 G/DL (ref 31.5–35.7)
MCV RBC AUTO: 79.8 FL (ref 79–97)
MICROCYTES BLD QL: ABNORMAL
MODALITY: ABNORMAL
MONOCYTES # BLD AUTO: 0.43 10*3/MM3 (ref 0.1–0.9)
MRSA DNA SPEC QL NAA+PROBE: NORMAL
NEUTROPHILS # BLD AUTO: 12.24 10*3/MM3 (ref 1.7–7)
NEUTROPHILS NFR BLD MANUAL: 85 % (ref 42.7–76)
PCO2 BLDA: 35 MM HG (ref 35–48)
PEEP RESPIRATORY: 5 CM[H2O]
PH BLDA: 7.35 PH UNITS (ref 7.35–7.45)
PHOSPHATE SERPL-MCNC: 3.3 MG/DL (ref 2.5–4.5)
PLATELET # BLD AUTO: 100 10*3/MM3 (ref 140–450)
PMV BLD AUTO: 8.9 FL (ref 6–12)
PO2 BLDA: 120.3 MM HG (ref 83–108)
POTASSIUM SERPL-SCNC: 3.8 MMOL/L (ref 3.5–5.2)
POTASSIUM SERPL-SCNC: 4.2 MMOL/L (ref 3.5–5.2)
RBC # BLD AUTO: 3.85 10*6/MM3 (ref 4.14–5.8)
RESPIRATORY RATE: 18
RHINOVIRUS RNA SPEC NAA+PROBE: NOT DETECTED
RSV RNA NPH QL NAA+NON-PROBE: NOT DETECTED
SAO2 % BLDCOA: 98.5 % (ref 94–98)
SCAN SLIDE: NORMAL
SODIUM SERPL-SCNC: 132 MMOL/L (ref 136–145)
SODIUM SERPL-SCNC: 136 MMOL/L (ref 136–145)
TRIGL SERPL-MCNC: 229 MG/DL (ref 0–150)
TROPONIN T SERPL-MCNC: 0.2 NG/ML (ref 0–0.03)
TROPONIN T SERPL-MCNC: 0.27 NG/ML (ref 0–0.03)
TROPONIN T SERPL-MCNC: 0.28 NG/ML (ref 0–0.03)
TROPONIN T SERPL-MCNC: 0.3 NG/ML (ref 0–0.03)
TSH SERPL DL<=0.05 MIU/L-ACNC: 0.39 UIU/ML (ref 0.27–4.2)
VENTILATOR MODE: ABNORMAL
VIT B12 BLD-MCNC: 822 PG/ML (ref 211–946)
VLDLC SERPL-MCNC: 45.8 MG/DL
VT ON VENT VENT: 450 ML
WBC # BLD AUTO: 14.4 10*3/MM3 (ref 3.4–10.8)
WBC MORPH BLD: NORMAL

## 2020-08-12 PROCEDURE — 99222 1ST HOSP IP/OBS MODERATE 55: CPT | Performed by: NURSE PRACTITIONER

## 2020-08-12 PROCEDURE — 83735 ASSAY OF MAGNESIUM: CPT | Performed by: NURSE PRACTITIONER

## 2020-08-12 PROCEDURE — 25010000002 LEVETIRACETAM IN NACL 0.82% 500 MG/100ML SOLUTION: Performed by: INTERNAL MEDICINE

## 2020-08-12 PROCEDURE — 70551 MRI BRAIN STEM W/O DYE: CPT

## 2020-08-12 PROCEDURE — 25010000002 LORAZEPAM PER 2 MG

## 2020-08-12 PROCEDURE — 25010000002 HEPARIN (PORCINE) PER 1000 UNITS: Performed by: NURSE PRACTITIONER

## 2020-08-12 PROCEDURE — 84484 ASSAY OF TROPONIN QUANT: CPT | Performed by: NURSE PRACTITIONER

## 2020-08-12 PROCEDURE — 94799 UNLISTED PULMONARY SVC/PX: CPT

## 2020-08-12 PROCEDURE — 74018 RADEX ABDOMEN 1 VIEW: CPT

## 2020-08-12 PROCEDURE — 25010000002 PROPOFOL 10 MG/ML EMULSION: Performed by: NURSE PRACTITIONER

## 2020-08-12 PROCEDURE — 80061 LIPID PANEL: CPT | Performed by: NURSE PRACTITIONER

## 2020-08-12 PROCEDURE — 71045 X-RAY EXAM CHEST 1 VIEW: CPT

## 2020-08-12 PROCEDURE — 87340 HEPATITIS B SURFACE AG IA: CPT | Performed by: INTERNAL MEDICINE

## 2020-08-12 PROCEDURE — 85007 BL SMEAR W/DIFF WBC COUNT: CPT | Performed by: NURSE PRACTITIONER

## 2020-08-12 PROCEDURE — 93306 TTE W/DOPPLER COMPLETE: CPT

## 2020-08-12 PROCEDURE — 85025 COMPLETE CBC W/AUTO DIFF WBC: CPT | Performed by: NURSE PRACTITIONER

## 2020-08-12 PROCEDURE — 84100 ASSAY OF PHOSPHORUS: CPT | Performed by: NURSE PRACTITIONER

## 2020-08-12 PROCEDURE — 25010000002 MAGNESIUM SULFATE 2 GM/50ML SOLUTION: Performed by: NURSE PRACTITIONER

## 2020-08-12 PROCEDURE — 25010000002 LORAZEPAM PER 2 MG: Performed by: EMERGENCY MEDICINE

## 2020-08-12 PROCEDURE — 93306 TTE W/DOPPLER COMPLETE: CPT | Performed by: INTERNAL MEDICINE

## 2020-08-12 PROCEDURE — 36600 WITHDRAWAL OF ARTERIAL BLOOD: CPT

## 2020-08-12 PROCEDURE — 5A1D70Z PERFORMANCE OF URINARY FILTRATION, INTERMITTENT, LESS THAN 6 HOURS PER DAY: ICD-10-PCS | Performed by: INTERNAL MEDICINE

## 2020-08-12 PROCEDURE — 95822 EEG COMA OR SLEEP ONLY: CPT | Performed by: PSYCHIATRY & NEUROLOGY

## 2020-08-12 PROCEDURE — 25010000002 CEFTRIAXONE PER 250 MG: Performed by: NURSE PRACTITIONER

## 2020-08-12 PROCEDURE — 80048 BASIC METABOLIC PNL TOTAL CA: CPT | Performed by: NURSE PRACTITIONER

## 2020-08-12 PROCEDURE — 82962 GLUCOSE BLOOD TEST: CPT

## 2020-08-12 PROCEDURE — 63710000001 INSULIN GLARGINE PER 5 UNITS: Performed by: INTERNAL MEDICINE

## 2020-08-12 PROCEDURE — 84443 ASSAY THYROID STIM HORMONE: CPT | Performed by: NURSE PRACTITIONER

## 2020-08-12 PROCEDURE — 95822 EEG COMA OR SLEEP ONLY: CPT

## 2020-08-12 PROCEDURE — 82550 ASSAY OF CK (CPK): CPT | Performed by: NURSE PRACTITIONER

## 2020-08-12 PROCEDURE — 82803 BLOOD GASES ANY COMBINATION: CPT

## 2020-08-12 PROCEDURE — 80048 BASIC METABOLIC PNL TOTAL CA: CPT | Performed by: INTERNAL MEDICINE

## 2020-08-12 PROCEDURE — 94003 VENT MGMT INPAT SUBQ DAY: CPT

## 2020-08-12 PROCEDURE — 63710000001 INSULIN LISPRO (HUMAN) PER 5 UNITS: Performed by: INTERNAL MEDICINE

## 2020-08-12 PROCEDURE — 63710000001 INSULIN REGULAR HUMAN PER 5 UNITS: Performed by: NURSE PRACTITIONER

## 2020-08-12 RX ORDER — NICOTINE POLACRILEX 4 MG
15 LOZENGE BUCCAL
Status: DISCONTINUED | OUTPATIENT
Start: 2020-08-12 | End: 2020-08-19 | Stop reason: HOSPADM

## 2020-08-12 RX ORDER — METOPROLOL TARTRATE 50 MG/1
50 TABLET, FILM COATED ORAL EVERY 12 HOURS SCHEDULED
Status: DISCONTINUED | OUTPATIENT
Start: 2020-08-12 | End: 2020-08-15

## 2020-08-12 RX ORDER — LORAZEPAM 2 MG/ML
INJECTION INTRAMUSCULAR
Status: DISPENSED
Start: 2020-08-12 | End: 2020-08-12

## 2020-08-12 RX ORDER — LEVETIRACETAM 5 MG/ML
500 INJECTION INTRAVASCULAR EVERY 12 HOURS SCHEDULED
Status: DISCONTINUED | OUTPATIENT
Start: 2020-08-12 | End: 2020-08-19 | Stop reason: HOSPADM

## 2020-08-12 RX ORDER — SODIUM CHLORIDE 9 MG/ML
125 INJECTION, SOLUTION INTRAVENOUS CONTINUOUS
Status: DISCONTINUED | OUTPATIENT
Start: 2020-08-12 | End: 2020-08-12

## 2020-08-12 RX ORDER — DEXTROSE MONOHYDRATE 25 G/50ML
25 INJECTION, SOLUTION INTRAVENOUS
Status: DISCONTINUED | OUTPATIENT
Start: 2020-08-12 | End: 2020-08-19 | Stop reason: HOSPADM

## 2020-08-12 RX ORDER — INSULIN GLARGINE 100 [IU]/ML
20 INJECTION, SOLUTION SUBCUTANEOUS EVERY MORNING
Status: DISCONTINUED | OUTPATIENT
Start: 2020-08-12 | End: 2020-08-13

## 2020-08-12 RX ORDER — LORAZEPAM 2 MG/ML
INJECTION INTRAMUSCULAR
Status: COMPLETED
Start: 2020-08-12 | End: 2020-08-12

## 2020-08-12 RX ORDER — PROPOFOL 10 MG/ML
VIAL (ML) INTRAVENOUS
Status: DISPENSED
Start: 2020-08-12 | End: 2020-08-12

## 2020-08-12 RX ADMIN — SODIUM CHLORIDE 10 ML/HR: 900 INJECTION, SOLUTION INTRAVENOUS at 09:26

## 2020-08-12 RX ADMIN — HEPARIN SODIUM 5000 UNITS: 5000 INJECTION INTRAVENOUS; SUBCUTANEOUS at 01:07

## 2020-08-12 RX ADMIN — Medication 10 ML: at 09:10

## 2020-08-12 RX ADMIN — METOPROLOL TARTRATE 2.5 MG: 5 INJECTION INTRAVENOUS at 01:07

## 2020-08-12 RX ADMIN — SODIUM CHLORIDE 125 ML/HR: 0.9 INJECTION, SOLUTION INTRAVENOUS at 01:38

## 2020-08-12 RX ADMIN — LABETALOL 20 MG/4 ML (5 MG/ML) INTRAVENOUS SYRINGE 10 MG: at 09:57

## 2020-08-12 RX ADMIN — SODIUM CHLORIDE 100 MG: 900 INJECTION, SOLUTION INTRAVENOUS at 21:57

## 2020-08-12 RX ADMIN — INSULIN LISPRO 2 UNITS: 100 INJECTION, SOLUTION INTRAVENOUS; SUBCUTANEOUS at 11:49

## 2020-08-12 RX ADMIN — CEFTRIAXONE SODIUM 2 G: 2 INJECTION, POWDER, FOR SOLUTION INTRAMUSCULAR; INTRAVENOUS at 09:09

## 2020-08-12 RX ADMIN — LORAZEPAM 2 MG: 2 INJECTION INTRAMUSCULAR; INTRAVENOUS at 14:22

## 2020-08-12 RX ADMIN — CHLORHEXIDINE GLUCONATE 0.12% ORAL RINSE 15 ML: 1.2 LIQUID ORAL at 09:10

## 2020-08-12 RX ADMIN — METRONIDAZOLE 500 MG: 500 INJECTION, SOLUTION INTRAVENOUS at 17:59

## 2020-08-12 RX ADMIN — LEVETIRACETAM 500 MG: 5 INJECTION INTRAVENOUS at 10:34

## 2020-08-12 RX ADMIN — PANTOPRAZOLE SODIUM 40 MG: 40 INJECTION, POWDER, FOR SOLUTION INTRAVENOUS at 09:09

## 2020-08-12 RX ADMIN — SODIUM CHLORIDE 11 UNITS/HR: 9 INJECTION, SOLUTION INTRAVENOUS at 05:49

## 2020-08-12 RX ADMIN — INSULIN GLARGINE 20 UNITS: 100 INJECTION, SOLUTION SUBCUTANEOUS at 10:34

## 2020-08-12 RX ADMIN — CHLORHEXIDINE GLUCONATE 0.12% ORAL RINSE 15 ML: 1.2 LIQUID ORAL at 21:57

## 2020-08-12 RX ADMIN — PROPOFOL 30 MCG/KG/MIN: 10 INJECTION, EMULSION INTRAVENOUS at 04:19

## 2020-08-12 RX ADMIN — HEPARIN SODIUM 5000 UNITS: 5000 INJECTION INTRAVENOUS; SUBCUTANEOUS at 21:57

## 2020-08-12 RX ADMIN — DEXTROSE MONOHYDRATE 50 ML: 25 INJECTION, SOLUTION INTRAVENOUS at 06:12

## 2020-08-12 RX ADMIN — SODIUM CHLORIDE 200 MG: 9 INJECTION, SOLUTION INTRAVENOUS at 16:26

## 2020-08-12 RX ADMIN — LEVETIRACETAM 500 MG: 5 INJECTION INTRAVENOUS at 21:58

## 2020-08-12 RX ADMIN — DEXTROSE AND SODIUM CHLORIDE 75 ML/HR: 5; 450 INJECTION, SOLUTION INTRAVENOUS at 06:31

## 2020-08-12 RX ADMIN — LORAZEPAM 2 MG: 2 INJECTION, SOLUTION INTRAMUSCULAR; INTRAVENOUS at 14:22

## 2020-08-12 RX ADMIN — DEXTROSE MONOHYDRATE AND SODIUM CHLORIDE 150 ML/HR: 5; .9 INJECTION, SOLUTION INTRAVENOUS at 05:45

## 2020-08-12 RX ADMIN — MAGNESIUM SULFATE HEPTAHYDRATE 2 G: 40 INJECTION, SOLUTION INTRAVENOUS at 01:09

## 2020-08-12 RX ADMIN — METOPROLOL TARTRATE 50 MG: 50 TABLET, FILM COATED ORAL at 21:57

## 2020-08-12 RX ADMIN — METRONIDAZOLE 500 MG: 500 INJECTION, SOLUTION INTRAVENOUS at 11:49

## 2020-08-12 RX ADMIN — LORAZEPAM 2 MG: 2 INJECTION, SOLUTION INTRAMUSCULAR; INTRAVENOUS at 11:54

## 2020-08-12 RX ADMIN — Medication 10 ML: at 21:58

## 2020-08-12 RX ADMIN — INSULIN LISPRO 4 UNITS: 100 INJECTION, SOLUTION INTRAVENOUS; SUBCUTANEOUS at 17:58

## 2020-08-12 RX ADMIN — HEPARIN SODIUM 5000 UNITS: 5000 INJECTION INTRAVENOUS; SUBCUTANEOUS at 14:56

## 2020-08-12 RX ADMIN — HEPARIN SODIUM 5000 UNITS: 5000 INJECTION INTRAVENOUS; SUBCUTANEOUS at 05:49

## 2020-08-12 RX ADMIN — METRONIDAZOLE 500 MG: 500 INJECTION, SOLUTION INTRAVENOUS at 05:49

## 2020-08-13 ENCOUNTER — APPOINTMENT (OUTPATIENT)
Dept: GENERAL RADIOLOGY | Facility: HOSPITAL | Age: 55
End: 2020-08-13

## 2020-08-13 ENCOUNTER — APPOINTMENT (OUTPATIENT)
Dept: CARDIOLOGY | Facility: HOSPITAL | Age: 55
End: 2020-08-13

## 2020-08-13 LAB
ALBUMIN SERPL-MCNC: 3 G/DL (ref 3.5–5.2)
AMMONIA BLD-SCNC: <10 UMOL/L (ref 16–60)
ANION GAP SERPL CALCULATED.3IONS-SCNC: 18 MMOL/L (ref 5–15)
ARTERIAL PATENCY WRIST A: NEGATIVE
ATMOSPHERIC PRESS: ABNORMAL MM[HG]
BASE EXCESS BLDA CALC-SCNC: -1.9 MMOL/L (ref 0–3)
BASOPHILS # BLD AUTO: 0.1 10*3/MM3 (ref 0–0.2)
BASOPHILS NFR BLD AUTO: 0.6 % (ref 0–1.5)
BDY SITE: ABNORMAL
BH CV XLRA MEAS LEFT BULB PSV: -123 CM/SEC
BH CV XLRA MEAS LEFT CCA RATIO VEL: 289 CM/SEC
BH CV XLRA MEAS LEFT DIST CCA EDV: 23.2 CM/SEC
BH CV XLRA MEAS LEFT DIST CCA PSV: 157 CM/SEC
BH CV XLRA MEAS LEFT DIST ICA EDV: -37.8 CM/SEC
BH CV XLRA MEAS LEFT DIST ICA PSV: -120 CM/SEC
BH CV XLRA MEAS LEFT ICA RATIO VEL: -138 CM/SEC
BH CV XLRA MEAS LEFT ICA/CCA RATIO: -0.48
BH CV XLRA MEAS LEFT PROX CCA EDV: 25.2 CM/SEC
BH CV XLRA MEAS LEFT PROX CCA PSV: 289 CM/SEC
BH CV XLRA MEAS LEFT PROX ECA PSV: -177 CM/SEC
BH CV XLRA MEAS LEFT PROX ICA EDV: -34.1 CM/SEC
BH CV XLRA MEAS LEFT PROX ICA PSV: -138 CM/SEC
BH CV XLRA MEAS LEFT PROX SCLA PSV: 322 CM/SEC
BH CV XLRA MEAS RIGHT BULB PSV: 112 CM/SEC
BH CV XLRA MEAS RIGHT CCA RATIO VEL: 134 CM/SEC
BH CV XLRA MEAS RIGHT DIST CCA EDV: 26.9 CM/SEC
BH CV XLRA MEAS RIGHT DIST CCA PSV: 84 CM/SEC
BH CV XLRA MEAS RIGHT DIST ICA EDV: -39 CM/SEC
BH CV XLRA MEAS RIGHT DIST ICA PSV: -119 CM/SEC
BH CV XLRA MEAS RIGHT ICA RATIO VEL: 140 CM/SEC
BH CV XLRA MEAS RIGHT ICA/CCA RATIO: 1
BH CV XLRA MEAS RIGHT PROX CCA EDV: 20.8 CM/SEC
BH CV XLRA MEAS RIGHT PROX CCA PSV: 134 CM/SEC
BH CV XLRA MEAS RIGHT PROX ECA PSV: -97.9 CM/SEC
BH CV XLRA MEAS RIGHT PROX ICA EDV: 24.3 CM/SEC
BH CV XLRA MEAS RIGHT PROX ICA PSV: 140 CM/SEC
BUN SERPL-MCNC: 36 MG/DL (ref 6–20)
BUN SERPL-MCNC: ABNORMAL MG/DL
BUN/CREAT SERPL: ABNORMAL
CA-I BLDA-SCNC: 0.95 MMOL/L (ref 1.15–1.33)
CALCIUM SPEC-SCNC: 8.8 MG/DL (ref 8.6–10.5)
CHLORIDE SERPL-SCNC: 93 MMOL/L (ref 98–107)
CO2 BLDA-SCNC: 24.3 MMOL/L (ref 22–29)
CO2 SERPL-SCNC: 22 MMOL/L (ref 22–29)
CREAT SERPL-MCNC: 6.9 MG/DL (ref 0.76–1.27)
D-LACTATE SERPL-SCNC: 2.9 MMOL/L (ref 0.5–2)
DEPRECATED RDW RBC AUTO: 49.9 FL (ref 37–54)
EOSINOPHIL # BLD AUTO: 0.2 10*3/MM3 (ref 0–0.4)
EOSINOPHIL NFR BLD AUTO: 2.3 % (ref 0.3–6.2)
ERYTHROCYTE [DISTWIDTH] IN BLOOD BY AUTOMATED COUNT: 17.8 % (ref 12.3–15.4)
GFR SERPL CREATININE-BSD FRML MDRD: 10 ML/MIN/1.73
GFR SERPL CREATININE-BSD FRML MDRD: ABNORMAL ML/MIN/{1.73_M2}
GLUCOSE BLDC GLUCOMTR-MCNC: 107 MG/DL (ref 70–105)
GLUCOSE BLDC GLUCOMTR-MCNC: 214 MG/DL (ref 70–105)
GLUCOSE BLDC GLUCOMTR-MCNC: 253 MG/DL (ref 70–105)
GLUCOSE BLDC GLUCOMTR-MCNC: 263 MG/DL (ref 70–105)
GLUCOSE BLDC GLUCOMTR-MCNC: 328 MG/DL (ref 70–105)
GLUCOSE BLDC GLUCOMTR-MCNC: >700 MG/DL (ref 74–100)
GLUCOSE BLDC GLUCOMTR-MCNC: >700 MG/DL (ref 74–100)
GLUCOSE SERPL-MCNC: 279 MG/DL (ref 65–99)
HCO3 BLDA-SCNC: 23.1 MMOL/L (ref 21–28)
HCT VFR BLD AUTO: 27.8 % (ref 37.5–51)
HCT VFR BLDA CALC: 37 % (ref 38–51)
HEMODILUTION: NO
HGB BLD-MCNC: 9 G/DL (ref 13–17.7)
HGB BLDA-MCNC: 12.7 G/DL (ref 12–17)
HOLD SPECIMEN: NORMAL
INHALED O2 CONCENTRATION: 40 %
LACTATE HOLD SPECIMEN: NORMAL
LYMPHOCYTES # BLD AUTO: 0.8 10*3/MM3 (ref 0.7–3.1)
LYMPHOCYTES NFR BLD AUTO: 8.2 % (ref 19.6–45.3)
MAGNESIUM SERPL-MCNC: 1.9 MG/DL (ref 1.6–2.6)
MCH RBC QN AUTO: 25.8 PG (ref 26.6–33)
MCHC RBC AUTO-ENTMCNC: 32.3 G/DL (ref 31.5–35.7)
MCV RBC AUTO: 80 FL (ref 79–97)
MODALITY: ABNORMAL
MONOCYTES # BLD AUTO: 0.8 10*3/MM3 (ref 0.1–0.9)
MONOCYTES NFR BLD AUTO: 8.1 % (ref 5–12)
NEUTROPHILS NFR BLD AUTO: 8 10*3/MM3 (ref 1.7–7)
NEUTROPHILS NFR BLD AUTO: 80.8 % (ref 42.7–76)
NRBC BLD AUTO-RTO: 0 /100 WBC (ref 0–0.2)
PCO2 BLDA: 39.4 MM HG (ref 35–48)
PEEP RESPIRATORY: 5 CM[H2O]
PH BLDA: 7.38 PH UNITS (ref 7.35–7.45)
PHOSPHATE SERPL-MCNC: 4.5 MG/DL (ref 2.5–4.5)
PLATELET # BLD AUTO: 87 10*3/MM3 (ref 140–450)
PMV BLD AUTO: 8.9 FL (ref 6–12)
PO2 BLDA: 126.3 MM HG (ref 83–108)
POTASSIUM BLDA-SCNC: 5.2 MMOL/L (ref 3.5–4.5)
POTASSIUM SERPL-SCNC: 4.1 MMOL/L (ref 3.5–5.2)
RBC # BLD AUTO: 3.48 10*6/MM3 (ref 4.14–5.8)
RESPIRATORY RATE: 18
SAO2 % BLDCOA: 98.8 % (ref 94–98)
SODIUM BLD-SCNC: 121 MMOL/L (ref 138–146)
SODIUM SERPL-SCNC: 133 MMOL/L (ref 136–145)
TRIGL SERPL-MCNC: 307 MG/DL (ref 0–150)
VENTILATOR MODE: ABNORMAL
VT ON VENT VENT: 450 ML
WBC # BLD AUTO: 9.8 10*3/MM3 (ref 3.4–10.8)

## 2020-08-13 PROCEDURE — 83735 ASSAY OF MAGNESIUM: CPT | Performed by: NURSE PRACTITIONER

## 2020-08-13 PROCEDURE — 82803 BLOOD GASES ANY COMBINATION: CPT

## 2020-08-13 PROCEDURE — 94799 UNLISTED PULMONARY SVC/PX: CPT

## 2020-08-13 PROCEDURE — 93880 EXTRACRANIAL BILAT STUDY: CPT

## 2020-08-13 PROCEDURE — 25010000002 HEPARIN (PORCINE) PER 1000 UNITS: Performed by: NURSE PRACTITIONER

## 2020-08-13 PROCEDURE — 25010000002 LEVETIRACETAM IN NACL 0.82% 500 MG/100ML SOLUTION: Performed by: INTERNAL MEDICINE

## 2020-08-13 PROCEDURE — 25010000002 CEFTRIAXONE PER 250 MG: Performed by: NURSE PRACTITIONER

## 2020-08-13 PROCEDURE — 84478 ASSAY OF TRIGLYCERIDES: CPT | Performed by: INTERNAL MEDICINE

## 2020-08-13 PROCEDURE — 99233 SBSQ HOSP IP/OBS HIGH 50: CPT | Performed by: NURSE PRACTITIONER

## 2020-08-13 PROCEDURE — 82140 ASSAY OF AMMONIA: CPT | Performed by: INTERNAL MEDICINE

## 2020-08-13 PROCEDURE — 80069 RENAL FUNCTION PANEL: CPT | Performed by: INTERNAL MEDICINE

## 2020-08-13 PROCEDURE — 94003 VENT MGMT INPAT SUBQ DAY: CPT

## 2020-08-13 PROCEDURE — 63710000001 INSULIN GLARGINE PER 5 UNITS: Performed by: INTERNAL MEDICINE

## 2020-08-13 PROCEDURE — 71045 X-RAY EXAM CHEST 1 VIEW: CPT

## 2020-08-13 PROCEDURE — 82962 GLUCOSE BLOOD TEST: CPT

## 2020-08-13 PROCEDURE — 85025 COMPLETE CBC W/AUTO DIFF WBC: CPT | Performed by: NURSE PRACTITIONER

## 2020-08-13 PROCEDURE — 63710000001 INSULIN LISPRO (HUMAN) PER 5 UNITS: Performed by: INTERNAL MEDICINE

## 2020-08-13 RX ORDER — WHEY PROTEIN ISOLATE 6 G-25/7 G
1 POWDER (GRAM) ORAL 2 TIMES DAILY
Status: DISCONTINUED | OUTPATIENT
Start: 2020-08-13 | End: 2020-08-16

## 2020-08-13 RX ORDER — INSULIN GLARGINE 100 [IU]/ML
25 INJECTION, SOLUTION SUBCUTANEOUS EVERY MORNING
Status: DISCONTINUED | OUTPATIENT
Start: 2020-08-14 | End: 2020-08-14

## 2020-08-13 RX ADMIN — LABETALOL 20 MG/4 ML (5 MG/ML) INTRAVENOUS SYRINGE 10 MG: at 12:19

## 2020-08-13 RX ADMIN — PANTOPRAZOLE SODIUM 40 MG: 40 INJECTION, POWDER, FOR SOLUTION INTRAVENOUS at 08:06

## 2020-08-13 RX ADMIN — HEPARIN SODIUM 5000 UNITS: 5000 INJECTION INTRAVENOUS; SUBCUTANEOUS at 21:07

## 2020-08-13 RX ADMIN — CHLORHEXIDINE GLUCONATE 0.12% ORAL RINSE 15 ML: 1.2 LIQUID ORAL at 20:15

## 2020-08-13 RX ADMIN — HEPARIN SODIUM 5000 UNITS: 5000 INJECTION INTRAVENOUS; SUBCUTANEOUS at 15:34

## 2020-08-13 RX ADMIN — CHLORHEXIDINE GLUCONATE 0.12% ORAL RINSE 15 ML: 1.2 LIQUID ORAL at 08:07

## 2020-08-13 RX ADMIN — INSULIN GLARGINE 20 UNITS: 100 INJECTION, SOLUTION SUBCUTANEOUS at 06:00

## 2020-08-13 RX ADMIN — Medication 1 PACKET: at 20:43

## 2020-08-13 RX ADMIN — SODIUM CHLORIDE 100 MG: 900 INJECTION, SOLUTION INTRAVENOUS at 20:35

## 2020-08-13 RX ADMIN — Medication 10 ML: at 08:07

## 2020-08-13 RX ADMIN — LEVETIRACETAM 500 MG: 5 INJECTION INTRAVENOUS at 08:07

## 2020-08-13 RX ADMIN — LEVETIRACETAM 500 MG: 5 INJECTION INTRAVENOUS at 20:17

## 2020-08-13 RX ADMIN — CEFTRIAXONE SODIUM 2 G: 2 INJECTION, POWDER, FOR SOLUTION INTRAMUSCULAR; INTRAVENOUS at 08:06

## 2020-08-13 RX ADMIN — METRONIDAZOLE 500 MG: 500 INJECTION, SOLUTION INTRAVENOUS at 17:45

## 2020-08-13 RX ADMIN — SODIUM CHLORIDE 100 MG: 900 INJECTION, SOLUTION INTRAVENOUS at 08:29

## 2020-08-13 RX ADMIN — INSULIN LISPRO 8 UNITS: 100 INJECTION, SOLUTION INTRAVENOUS; SUBCUTANEOUS at 11:21

## 2020-08-13 RX ADMIN — METRONIDAZOLE 500 MG: 500 INJECTION, SOLUTION INTRAVENOUS at 00:40

## 2020-08-13 RX ADMIN — INSULIN LISPRO 4 UNITS: 100 INJECTION, SOLUTION INTRAVENOUS; SUBCUTANEOUS at 06:00

## 2020-08-13 RX ADMIN — HEPARIN SODIUM 5000 UNITS: 5000 INJECTION INTRAVENOUS; SUBCUTANEOUS at 06:00

## 2020-08-13 RX ADMIN — Medication 10 ML: at 20:16

## 2020-08-13 RX ADMIN — INSULIN LISPRO 8 UNITS: 100 INJECTION, SOLUTION INTRAVENOUS; SUBCUTANEOUS at 17:45

## 2020-08-13 RX ADMIN — LABETALOL 20 MG/4 ML (5 MG/ML) INTRAVENOUS SYRINGE 10 MG: at 23:41

## 2020-08-13 RX ADMIN — METRONIDAZOLE 500 MG: 500 INJECTION, SOLUTION INTRAVENOUS at 06:00

## 2020-08-13 RX ADMIN — METOPROLOL TARTRATE 50 MG: 50 TABLET, FILM COATED ORAL at 20:15

## 2020-08-13 RX ADMIN — METRONIDAZOLE 500 MG: 500 INJECTION, SOLUTION INTRAVENOUS at 11:21

## 2020-08-14 ENCOUNTER — APPOINTMENT (OUTPATIENT)
Dept: GENERAL RADIOLOGY | Facility: HOSPITAL | Age: 55
End: 2020-08-14

## 2020-08-14 LAB
ALBUMIN SERPL-MCNC: 3.2 G/DL (ref 3.5–5.2)
ANION GAP SERPL CALCULATED.3IONS-SCNC: 17 MMOL/L (ref 5–15)
ARTERIAL PATENCY WRIST A: POSITIVE
ARTERIAL PATENCY WRIST A: POSITIVE
ATMOSPHERIC PRESS: ABNORMAL MM[HG]
ATMOSPHERIC PRESS: ABNORMAL MM[HG]
BASE EXCESS BLDA CALC-SCNC: 0.6 MMOL/L (ref 0–3)
BASE EXCESS BLDA CALC-SCNC: 4.2 MMOL/L (ref 0–3)
BASOPHILS # BLD AUTO: 0.1 10*3/MM3 (ref 0–0.2)
BASOPHILS NFR BLD AUTO: 0.7 % (ref 0–1.5)
BDY SITE: ABNORMAL
BDY SITE: ABNORMAL
BUN SERPL-MCNC: 50 MG/DL (ref 6–20)
BUN SERPL-MCNC: ABNORMAL MG/DL
BUN/CREAT SERPL: ABNORMAL
CALCIUM SPEC-SCNC: 9.4 MG/DL (ref 8.6–10.5)
CHLORIDE SERPL-SCNC: 97 MMOL/L (ref 98–107)
CO2 BLDA-SCNC: 27.1 MMOL/L (ref 22–29)
CO2 BLDA-SCNC: 30.3 MMOL/L (ref 22–29)
CO2 SERPL-SCNC: 22 MMOL/L (ref 22–29)
CREAT SERPL-MCNC: 8.45 MG/DL (ref 0.76–1.27)
DEPRECATED RDW RBC AUTO: 52.5 FL (ref 37–54)
EOSINOPHIL # BLD AUTO: 0.3 10*3/MM3 (ref 0–0.4)
EOSINOPHIL NFR BLD AUTO: 2.9 % (ref 0.3–6.2)
ERYTHROCYTE [DISTWIDTH] IN BLOOD BY AUTOMATED COUNT: 18.4 % (ref 12.3–15.4)
GFR SERPL CREATININE-BSD FRML MDRD: 8 ML/MIN/1.73
GFR SERPL CREATININE-BSD FRML MDRD: ABNORMAL ML/MIN/{1.73_M2}
GLUCOSE BLDC GLUCOMTR-MCNC: 127 MG/DL (ref 70–105)
GLUCOSE BLDC GLUCOMTR-MCNC: 157 MG/DL (ref 70–105)
GLUCOSE BLDC GLUCOMTR-MCNC: 189 MG/DL (ref 70–105)
GLUCOSE BLDC GLUCOMTR-MCNC: 267 MG/DL (ref 70–105)
GLUCOSE BLDC GLUCOMTR-MCNC: 299 MG/DL (ref 70–105)
GLUCOSE SERPL-MCNC: 264 MG/DL (ref 65–99)
HCO3 BLDA-SCNC: 25.8 MMOL/L (ref 21–28)
HCO3 BLDA-SCNC: 29 MMOL/L (ref 21–28)
HCT VFR BLD AUTO: 29.7 % (ref 37.5–51)
HEMODILUTION: NO
HEMODILUTION: NO
HGB BLD-MCNC: 9.5 G/DL (ref 13–17.7)
INHALED O2 CONCENTRATION: 40 %
INHALED O2 CONCENTRATION: 40 %
LYMPHOCYTES # BLD AUTO: 1 10*3/MM3 (ref 0.7–3.1)
LYMPHOCYTES NFR BLD AUTO: 11.2 % (ref 19.6–45.3)
MAGNESIUM SERPL-MCNC: 2.2 MG/DL (ref 1.6–2.6)
MCH RBC QN AUTO: 26 PG (ref 26.6–33)
MCHC RBC AUTO-ENTMCNC: 32.1 G/DL (ref 31.5–35.7)
MCV RBC AUTO: 81.1 FL (ref 79–97)
MODALITY: ABNORMAL
MODALITY: ABNORMAL
MONOCYTES # BLD AUTO: 0.9 10*3/MM3 (ref 0.1–0.9)
MONOCYTES NFR BLD AUTO: 9.3 % (ref 5–12)
NEUTROPHILS NFR BLD AUTO: 7.1 10*3/MM3 (ref 1.7–7)
NEUTROPHILS NFR BLD AUTO: 75.9 % (ref 42.7–76)
NRBC BLD AUTO-RTO: 0.1 /100 WBC (ref 0–0.2)
PCO2 BLDA: 43.1 MM HG (ref 35–48)
PCO2 BLDA: 43.4 MM HG (ref 35–48)
PEEP RESPIRATORY: 5 CM[H2O]
PEEP RESPIRATORY: 5 CM[H2O]
PH BLDA: 7.39 PH UNITS (ref 7.35–7.45)
PH BLDA: 7.43 PH UNITS (ref 7.35–7.45)
PHOSPHATE SERPL-MCNC: 4.6 MG/DL (ref 2.5–4.5)
PLATELET # BLD AUTO: 111 10*3/MM3 (ref 140–450)
PMV BLD AUTO: 9.7 FL (ref 6–12)
PO2 BLDA: 103.6 MM HG (ref 83–108)
PO2 BLDA: 113.7 MM HG (ref 83–108)
POTASSIUM SERPL-SCNC: 3.5 MMOL/L (ref 3.5–5.2)
PSV: 8 CMH2O
RBC # BLD AUTO: 3.67 10*6/MM3 (ref 4.14–5.8)
RESPIRATORY RATE: 18
SAO2 % BLDCOA: 98.1 % (ref 94–98)
SAO2 % BLDCOA: 98.4 % (ref 94–98)
SODIUM SERPL-SCNC: 136 MMOL/L (ref 136–145)
VENTILATOR MODE: ABNORMAL
VENTILATOR MODE: ABNORMAL
VT ON VENT VENT: 450 ML
WBC # BLD AUTO: 9.3 10*3/MM3 (ref 3.4–10.8)

## 2020-08-14 PROCEDURE — 99232 SBSQ HOSP IP/OBS MODERATE 35: CPT | Performed by: NURSE PRACTITIONER

## 2020-08-14 PROCEDURE — 63710000001 INSULIN LISPRO (HUMAN) PER 5 UNITS: Performed by: INTERNAL MEDICINE

## 2020-08-14 PROCEDURE — 74018 RADEX ABDOMEN 1 VIEW: CPT

## 2020-08-14 PROCEDURE — 25010000002 LEVETIRACETAM IN NACL 0.82% 500 MG/100ML SOLUTION: Performed by: INTERNAL MEDICINE

## 2020-08-14 PROCEDURE — 94003 VENT MGMT INPAT SUBQ DAY: CPT

## 2020-08-14 PROCEDURE — 83735 ASSAY OF MAGNESIUM: CPT | Performed by: NURSE PRACTITIONER

## 2020-08-14 PROCEDURE — 36600 WITHDRAWAL OF ARTERIAL BLOOD: CPT

## 2020-08-14 PROCEDURE — 80069 RENAL FUNCTION PANEL: CPT | Performed by: INTERNAL MEDICINE

## 2020-08-14 PROCEDURE — 25010000002 HEPARIN (PORCINE) PER 1000 UNITS: Performed by: NURSE PRACTITIONER

## 2020-08-14 PROCEDURE — 94799 UNLISTED PULMONARY SVC/PX: CPT

## 2020-08-14 PROCEDURE — 85025 COMPLETE CBC W/AUTO DIFF WBC: CPT | Performed by: NURSE PRACTITIONER

## 2020-08-14 PROCEDURE — 82803 BLOOD GASES ANY COMBINATION: CPT

## 2020-08-14 PROCEDURE — 25010000002 PROPOFOL 10 MG/ML EMULSION: Performed by: NURSE PRACTITIONER

## 2020-08-14 PROCEDURE — 82962 GLUCOSE BLOOD TEST: CPT

## 2020-08-14 PROCEDURE — 71045 X-RAY EXAM CHEST 1 VIEW: CPT

## 2020-08-14 PROCEDURE — 94640 AIRWAY INHALATION TREATMENT: CPT

## 2020-08-14 PROCEDURE — 63710000001 INSULIN GLARGINE PER 5 UNITS: Performed by: INTERNAL MEDICINE

## 2020-08-14 PROCEDURE — 25010000002 CEFTRIAXONE PER 250 MG: Performed by: NURSE PRACTITIONER

## 2020-08-14 RX ORDER — ALBUTEROL SULFATE 2.5 MG/3ML
2.5 SOLUTION RESPIRATORY (INHALATION)
Status: DISCONTINUED | OUTPATIENT
Start: 2020-08-14 | End: 2020-08-19 | Stop reason: HOSPADM

## 2020-08-14 RX ORDER — INSULIN GLARGINE 100 [IU]/ML
20 INJECTION, SOLUTION SUBCUTANEOUS EVERY 12 HOURS SCHEDULED
Status: DISCONTINUED | OUTPATIENT
Start: 2020-08-14 | End: 2020-08-19 | Stop reason: HOSPADM

## 2020-08-14 RX ADMIN — METRONIDAZOLE 500 MG: 500 INJECTION, SOLUTION INTRAVENOUS at 17:28

## 2020-08-14 RX ADMIN — HEPARIN SODIUM 5000 UNITS: 5000 INJECTION INTRAVENOUS; SUBCUTANEOUS at 13:47

## 2020-08-14 RX ADMIN — LEVETIRACETAM 500 MG: 5 INJECTION INTRAVENOUS at 09:19

## 2020-08-14 RX ADMIN — ACETAMINOPHEN 650 MG: 650 SUPPOSITORY RECTAL at 17:25

## 2020-08-14 RX ADMIN — ALBUTEROL SULFATE 2.5 MG: 2.5 SOLUTION RESPIRATORY (INHALATION) at 23:20

## 2020-08-14 RX ADMIN — Medication 10 ML: at 08:19

## 2020-08-14 RX ADMIN — CHLORHEXIDINE GLUCONATE 0.12% ORAL RINSE 15 ML: 1.2 LIQUID ORAL at 08:18

## 2020-08-14 RX ADMIN — INSULIN LISPRO 8 UNITS: 100 INJECTION, SOLUTION INTRAVENOUS; SUBCUTANEOUS at 08:12

## 2020-08-14 RX ADMIN — Medication 1 PACKET: at 08:18

## 2020-08-14 RX ADMIN — PANTOPRAZOLE SODIUM 40 MG: 40 INJECTION, POWDER, FOR SOLUTION INTRAVENOUS at 08:18

## 2020-08-14 RX ADMIN — HEPARIN SODIUM 5000 UNITS: 5000 INJECTION INTRAVENOUS; SUBCUTANEOUS at 21:34

## 2020-08-14 RX ADMIN — METRONIDAZOLE 500 MG: 500 INJECTION, SOLUTION INTRAVENOUS at 11:48

## 2020-08-14 RX ADMIN — INSULIN LISPRO 10 UNITS: 100 INJECTION, SOLUTION INTRAVENOUS; SUBCUTANEOUS at 00:04

## 2020-08-14 RX ADMIN — INSULIN LISPRO 3 UNITS: 100 INJECTION, SOLUTION INTRAVENOUS; SUBCUTANEOUS at 11:48

## 2020-08-14 RX ADMIN — METOPROLOL TARTRATE 50 MG: 50 TABLET, FILM COATED ORAL at 08:18

## 2020-08-14 RX ADMIN — METRONIDAZOLE 500 MG: 500 INJECTION, SOLUTION INTRAVENOUS at 06:22

## 2020-08-14 RX ADMIN — LABETALOL 20 MG/4 ML (5 MG/ML) INTRAVENOUS SYRINGE 10 MG: at 21:35

## 2020-08-14 RX ADMIN — SODIUM CHLORIDE 100 MG: 900 INJECTION, SOLUTION INTRAVENOUS at 22:17

## 2020-08-14 RX ADMIN — ALBUTEROL SULFATE 2.5 MG: 2.5 SOLUTION RESPIRATORY (INHALATION) at 12:00

## 2020-08-14 RX ADMIN — PROPOFOL 5 MCG/KG/MIN: 10 INJECTION, EMULSION INTRAVENOUS at 02:50

## 2020-08-14 RX ADMIN — INSULIN GLARGINE 25 UNITS: 100 INJECTION, SOLUTION SUBCUTANEOUS at 08:13

## 2020-08-14 RX ADMIN — INSULIN LISPRO 3 UNITS: 100 INJECTION, SOLUTION INTRAVENOUS; SUBCUTANEOUS at 17:28

## 2020-08-14 RX ADMIN — SODIUM CHLORIDE 100 MG: 900 INJECTION, SOLUTION INTRAVENOUS at 09:58

## 2020-08-14 RX ADMIN — LEVETIRACETAM 500 MG: 5 INJECTION INTRAVENOUS at 20:11

## 2020-08-14 RX ADMIN — CEFTRIAXONE SODIUM 2 G: 2 INJECTION, POWDER, FOR SOLUTION INTRAMUSCULAR; INTRAVENOUS at 08:11

## 2020-08-14 RX ADMIN — HEPARIN SODIUM 5000 UNITS: 5000 INJECTION INTRAVENOUS; SUBCUTANEOUS at 06:21

## 2020-08-14 RX ADMIN — METRONIDAZOLE 500 MG: 500 INJECTION, SOLUTION INTRAVENOUS at 00:05

## 2020-08-14 RX ADMIN — LABETALOL 20 MG/4 ML (5 MG/ML) INTRAVENOUS SYRINGE 10 MG: at 16:19

## 2020-08-15 LAB
BASOPHILS # BLD AUTO: 0.1 10*3/MM3 (ref 0–0.2)
BASOPHILS NFR BLD AUTO: 0.8 % (ref 0–1.5)
DEPRECATED RDW RBC AUTO: 52.1 FL (ref 37–54)
EOSINOPHIL # BLD AUTO: 0.4 10*3/MM3 (ref 0–0.4)
EOSINOPHIL NFR BLD AUTO: 2.6 % (ref 0.3–6.2)
ERYTHROCYTE [DISTWIDTH] IN BLOOD BY AUTOMATED COUNT: 18.4 % (ref 12.3–15.4)
GLUCOSE BLDC GLUCOMTR-MCNC: 109 MG/DL (ref 70–105)
GLUCOSE BLDC GLUCOMTR-MCNC: 169 MG/DL (ref 70–105)
GLUCOSE BLDC GLUCOMTR-MCNC: 207 MG/DL (ref 70–105)
GLUCOSE BLDC GLUCOMTR-MCNC: 208 MG/DL (ref 70–105)
GLUCOSE BLDC GLUCOMTR-MCNC: 221 MG/DL (ref 70–105)
HCT VFR BLD AUTO: 32.6 % (ref 37.5–51)
HGB BLD-MCNC: 10.4 G/DL (ref 13–17.7)
LYMPHOCYTES # BLD AUTO: 1.2 10*3/MM3 (ref 0.7–3.1)
LYMPHOCYTES NFR BLD AUTO: 9 % (ref 19.6–45.3)
MCH RBC QN AUTO: 25.8 PG (ref 26.6–33)
MCHC RBC AUTO-ENTMCNC: 31.8 G/DL (ref 31.5–35.7)
MCV RBC AUTO: 81.2 FL (ref 79–97)
MONOCYTES # BLD AUTO: 1.3 10*3/MM3 (ref 0.1–0.9)
MONOCYTES NFR BLD AUTO: 9.5 % (ref 5–12)
NEUTROPHILS NFR BLD AUTO: 10.6 10*3/MM3 (ref 1.7–7)
NEUTROPHILS NFR BLD AUTO: 78.1 % (ref 42.7–76)
NRBC BLD AUTO-RTO: 0.1 /100 WBC (ref 0–0.2)
PLATELET # BLD AUTO: 132 10*3/MM3 (ref 140–450)
PMV BLD AUTO: 10.7 FL (ref 6–12)
RBC # BLD AUTO: 4.02 10*6/MM3 (ref 4.14–5.8)
WBC # BLD AUTO: 13.5 10*3/MM3 (ref 3.4–10.8)

## 2020-08-15 PROCEDURE — 63710000001 INSULIN GLARGINE PER 5 UNITS: Performed by: INTERNAL MEDICINE

## 2020-08-15 PROCEDURE — 94668 MNPJ CHEST WALL SBSQ: CPT

## 2020-08-15 PROCEDURE — 25010000002 LEVETIRACETAM IN NACL 0.82% 500 MG/100ML SOLUTION: Performed by: INTERNAL MEDICINE

## 2020-08-15 PROCEDURE — 63710000001 INSULIN LISPRO (HUMAN) PER 5 UNITS: Performed by: INTERNAL MEDICINE

## 2020-08-15 PROCEDURE — 85025 COMPLETE CBC W/AUTO DIFF WBC: CPT | Performed by: NURSE PRACTITIONER

## 2020-08-15 PROCEDURE — 25010000002 HYDRALAZINE PER 20 MG: Performed by: INTERNAL MEDICINE

## 2020-08-15 PROCEDURE — 94799 UNLISTED PULMONARY SVC/PX: CPT

## 2020-08-15 PROCEDURE — 92610 EVALUATE SWALLOWING FUNCTION: CPT

## 2020-08-15 PROCEDURE — 25010000002 CEFTRIAXONE PER 250 MG: Performed by: NURSE PRACTITIONER

## 2020-08-15 PROCEDURE — 25010000002 HEPARIN (PORCINE) PER 1000 UNITS: Performed by: NURSE PRACTITIONER

## 2020-08-15 PROCEDURE — 94667 MNPJ CHEST WALL 1ST: CPT

## 2020-08-15 PROCEDURE — 82962 GLUCOSE BLOOD TEST: CPT

## 2020-08-15 RX ORDER — HYDRALAZINE HYDROCHLORIDE 20 MG/ML
20 INJECTION INTRAMUSCULAR; INTRAVENOUS EVERY 4 HOURS PRN
Status: DISCONTINUED | OUTPATIENT
Start: 2020-08-15 | End: 2020-08-19 | Stop reason: HOSPADM

## 2020-08-15 RX ORDER — METOPROLOL TARTRATE 50 MG/1
100 TABLET, FILM COATED ORAL EVERY 12 HOURS SCHEDULED
Status: DISCONTINUED | OUTPATIENT
Start: 2020-08-15 | End: 2020-08-16

## 2020-08-15 RX ORDER — LOSARTAN POTASSIUM 50 MG/1
50 TABLET ORAL
Status: DISCONTINUED | OUTPATIENT
Start: 2020-08-15 | End: 2020-08-19 | Stop reason: HOSPADM

## 2020-08-15 RX ADMIN — NYSTATIN 500000 UNITS: 500000 SUSPENSION ORAL at 20:17

## 2020-08-15 RX ADMIN — Medication 10 ML: at 20:17

## 2020-08-15 RX ADMIN — METRONIDAZOLE 500 MG: 500 INJECTION, SOLUTION INTRAVENOUS at 17:10

## 2020-08-15 RX ADMIN — CEFTRIAXONE SODIUM 2 G: 2 INJECTION, POWDER, FOR SOLUTION INTRAMUSCULAR; INTRAVENOUS at 13:09

## 2020-08-15 RX ADMIN — CHLORHEXIDINE GLUCONATE 0.12% ORAL RINSE 15 ML: 1.2 LIQUID ORAL at 10:34

## 2020-08-15 RX ADMIN — METRONIDAZOLE 500 MG: 500 INJECTION, SOLUTION INTRAVENOUS at 23:02

## 2020-08-15 RX ADMIN — INSULIN LISPRO 5 UNITS: 100 INJECTION, SOLUTION INTRAVENOUS; SUBCUTANEOUS at 17:07

## 2020-08-15 RX ADMIN — NYSTATIN 500000 UNITS: 500000 SUSPENSION ORAL at 17:10

## 2020-08-15 RX ADMIN — SODIUM CHLORIDE 100 MG: 900 INJECTION, SOLUTION INTRAVENOUS at 13:01

## 2020-08-15 RX ADMIN — LOSARTAN POTASSIUM 50 MG: 50 TABLET, FILM COATED ORAL at 13:01

## 2020-08-15 RX ADMIN — METOPROLOL TARTRATE 50 MG: 50 TABLET, FILM COATED ORAL at 00:00

## 2020-08-15 RX ADMIN — ALBUTEROL SULFATE 2.5 MG: 2.5 SOLUTION RESPIRATORY (INHALATION) at 23:57

## 2020-08-15 RX ADMIN — METRONIDAZOLE 500 MG: 500 INJECTION, SOLUTION INTRAVENOUS at 00:12

## 2020-08-15 RX ADMIN — HYDRALAZINE HYDROCHLORIDE 20 MG: 20 INJECTION INTRAMUSCULAR; INTRAVENOUS at 15:53

## 2020-08-15 RX ADMIN — HEPARIN SODIUM 5000 UNITS: 5000 INJECTION INTRAVENOUS; SUBCUTANEOUS at 13:01

## 2020-08-15 RX ADMIN — SODIUM CHLORIDE 100 MG: 900 INJECTION, SOLUTION INTRAVENOUS at 20:56

## 2020-08-15 RX ADMIN — LABETALOL 20 MG/4 ML (5 MG/ML) INTRAVENOUS SYRINGE 10 MG: at 13:57

## 2020-08-15 RX ADMIN — INSULIN GLARGINE 20 UNITS: 100 INJECTION, SOLUTION SUBCUTANEOUS at 20:16

## 2020-08-15 RX ADMIN — ALBUTEROL SULFATE 2.5 MG: 2.5 SOLUTION RESPIRATORY (INHALATION) at 12:27

## 2020-08-15 RX ADMIN — ALBUTEROL SULFATE 2.5 MG: 2.5 SOLUTION RESPIRATORY (INHALATION) at 06:52

## 2020-08-15 RX ADMIN — METRONIDAZOLE 500 MG: 500 INJECTION, SOLUTION INTRAVENOUS at 13:01

## 2020-08-15 RX ADMIN — INSULIN LISPRO 5 UNITS: 100 INJECTION, SOLUTION INTRAVENOUS; SUBCUTANEOUS at 22:59

## 2020-08-15 RX ADMIN — METOPROLOL TARTRATE 50 MG: 50 TABLET, FILM COATED ORAL at 10:34

## 2020-08-15 RX ADMIN — PANTOPRAZOLE SODIUM 40 MG: 40 INJECTION, POWDER, FOR SOLUTION INTRAVENOUS at 13:01

## 2020-08-15 RX ADMIN — LEVETIRACETAM 500 MG: 5 INJECTION INTRAVENOUS at 13:01

## 2020-08-15 RX ADMIN — Medication 1 PACKET: at 20:19

## 2020-08-15 RX ADMIN — LABETALOL 20 MG/4 ML (5 MG/ML) INTRAVENOUS SYRINGE 10 MG: at 19:45

## 2020-08-15 RX ADMIN — LEVETIRACETAM 500 MG: 5 INJECTION INTRAVENOUS at 20:17

## 2020-08-15 RX ADMIN — HEPARIN SODIUM 5000 UNITS: 5000 INJECTION INTRAVENOUS; SUBCUTANEOUS at 21:00

## 2020-08-15 RX ADMIN — METOPROLOL TARTRATE 100 MG: 50 TABLET, FILM COATED ORAL at 20:19

## 2020-08-15 RX ADMIN — HEPARIN SODIUM 5000 UNITS: 5000 INJECTION INTRAVENOUS; SUBCUTANEOUS at 06:08

## 2020-08-15 RX ADMIN — CHLORHEXIDINE GLUCONATE 0.12% ORAL RINSE 15 ML: 1.2 LIQUID ORAL at 20:17

## 2020-08-15 RX ADMIN — ALBUTEROL SULFATE 2.5 MG: 2.5 SOLUTION RESPIRATORY (INHALATION) at 19:22

## 2020-08-16 ENCOUNTER — APPOINTMENT (OUTPATIENT)
Dept: CT IMAGING | Facility: HOSPITAL | Age: 55
End: 2020-08-16

## 2020-08-16 ENCOUNTER — APPOINTMENT (OUTPATIENT)
Dept: GENERAL RADIOLOGY | Facility: HOSPITAL | Age: 55
End: 2020-08-16

## 2020-08-16 LAB
ALBUMIN SERPL-MCNC: 3.1 G/DL (ref 3.5–5.2)
ALBUMIN/GLOB SERPL: 0.9 G/DL
ALP SERPL-CCNC: 97 U/L (ref 39–117)
ALT SERPL W P-5'-P-CCNC: 21 U/L (ref 1–41)
ANION GAP SERPL CALCULATED.3IONS-SCNC: 17 MMOL/L (ref 5–15)
ANION GAP SERPL CALCULATED.3IONS-SCNC: 18 MMOL/L (ref 5–15)
APTT PPP: 31.5 SECONDS (ref 24–31)
AST SERPL-CCNC: 21 U/L (ref 1–40)
BACTERIA SPEC AEROBE CULT: NORMAL
BACTERIA SPEC AEROBE CULT: NORMAL
BACTERIA SPEC RESP CULT: ABNORMAL
BASOPHILS # BLD AUTO: 0 10*3/MM3 (ref 0–0.2)
BASOPHILS # BLD AUTO: 0 10*3/MM3 (ref 0–0.2)
BASOPHILS NFR BLD AUTO: 0.2 % (ref 0–1.5)
BASOPHILS NFR BLD AUTO: 0.4 % (ref 0–1.5)
BILIRUB SERPL-MCNC: 0.2 MG/DL (ref 0–1.2)
BUN SERPL-MCNC: 40 MG/DL (ref 6–20)
BUN SERPL-MCNC: 48 MG/DL (ref 6–20)
BUN SERPL-MCNC: ABNORMAL MG/DL
BUN SERPL-MCNC: ABNORMAL MG/DL
BUN/CREAT SERPL: ABNORMAL
BUN/CREAT SERPL: ABNORMAL
CALCIUM SPEC-SCNC: 9.6 MG/DL (ref 8.6–10.5)
CALCIUM SPEC-SCNC: 9.6 MG/DL (ref 8.6–10.5)
CHLORIDE SERPL-SCNC: 97 MMOL/L (ref 98–107)
CHLORIDE SERPL-SCNC: 98 MMOL/L (ref 98–107)
CO2 SERPL-SCNC: 22 MMOL/L (ref 22–29)
CO2 SERPL-SCNC: 23 MMOL/L (ref 22–29)
CREAT SERPL-MCNC: 7.6 MG/DL (ref 0.76–1.27)
CREAT SERPL-MCNC: 8.18 MG/DL (ref 0.76–1.27)
DEPRECATED RDW RBC AUTO: 52.5 FL (ref 37–54)
DEPRECATED RDW RBC AUTO: 52.9 FL (ref 37–54)
EOSINOPHIL # BLD AUTO: 0.2 10*3/MM3 (ref 0–0.4)
EOSINOPHIL # BLD AUTO: 0.2 10*3/MM3 (ref 0–0.4)
EOSINOPHIL NFR BLD AUTO: 1.4 % (ref 0.3–6.2)
EOSINOPHIL NFR BLD AUTO: 1.6 % (ref 0.3–6.2)
ERYTHROCYTE [DISTWIDTH] IN BLOOD BY AUTOMATED COUNT: 18.3 % (ref 12.3–15.4)
ERYTHROCYTE [DISTWIDTH] IN BLOOD BY AUTOMATED COUNT: 18.6 % (ref 12.3–15.4)
GFR SERPL CREATININE-BSD FRML MDRD: 8 ML/MIN/1.73
GFR SERPL CREATININE-BSD FRML MDRD: 9 ML/MIN/1.73
GFR SERPL CREATININE-BSD FRML MDRD: ABNORMAL ML/MIN/{1.73_M2}
GFR SERPL CREATININE-BSD FRML MDRD: ABNORMAL ML/MIN/{1.73_M2}
GLOBULIN UR ELPH-MCNC: 3.6 GM/DL
GLUCOSE BLDC GLUCOMTR-MCNC: 137 MG/DL (ref 70–105)
GLUCOSE BLDC GLUCOMTR-MCNC: 160 MG/DL (ref 70–105)
GLUCOSE BLDC GLUCOMTR-MCNC: 212 MG/DL (ref 70–105)
GLUCOSE BLDC GLUCOMTR-MCNC: 242 MG/DL (ref 70–105)
GLUCOSE BLDC GLUCOMTR-MCNC: 278 MG/DL (ref 70–105)
GLUCOSE SERPL-MCNC: 131 MG/DL (ref 65–99)
GLUCOSE SERPL-MCNC: 282 MG/DL (ref 65–99)
GRAM STN SPEC: ABNORMAL
HCT VFR BLD AUTO: 30.8 % (ref 37.5–51)
HCT VFR BLD AUTO: 34 % (ref 37.5–51)
HGB BLD-MCNC: 10.8 G/DL (ref 13–17.7)
HGB BLD-MCNC: 9.9 G/DL (ref 13–17.7)
INR PPP: 1.06 (ref 0.93–1.1)
LYMPHOCYTES # BLD AUTO: 1.1 10*3/MM3 (ref 0.7–3.1)
LYMPHOCYTES # BLD AUTO: 1.4 10*3/MM3 (ref 0.7–3.1)
LYMPHOCYTES NFR BLD AUTO: 8.6 % (ref 19.6–45.3)
LYMPHOCYTES NFR BLD AUTO: 9.5 % (ref 19.6–45.3)
MAGNESIUM SERPL-MCNC: 2.2 MG/DL (ref 1.6–2.6)
MCH RBC QN AUTO: 25.9 PG (ref 26.6–33)
MCH RBC QN AUTO: 26.1 PG (ref 26.6–33)
MCHC RBC AUTO-ENTMCNC: 31.8 G/DL (ref 31.5–35.7)
MCHC RBC AUTO-ENTMCNC: 32.3 G/DL (ref 31.5–35.7)
MCV RBC AUTO: 80.4 FL (ref 79–97)
MCV RBC AUTO: 82 FL (ref 79–97)
MONOCYTES # BLD AUTO: 1.3 10*3/MM3 (ref 0.1–0.9)
MONOCYTES # BLD AUTO: 1.4 10*3/MM3 (ref 0.1–0.9)
MONOCYTES NFR BLD AUTO: 9.7 % (ref 5–12)
MONOCYTES NFR BLD AUTO: 9.9 % (ref 5–12)
NEUTROPHILS NFR BLD AUTO: 10.5 10*3/MM3 (ref 1.7–7)
NEUTROPHILS NFR BLD AUTO: 11.4 10*3/MM3 (ref 1.7–7)
NEUTROPHILS NFR BLD AUTO: 78.8 % (ref 42.7–76)
NEUTROPHILS NFR BLD AUTO: 79.9 % (ref 42.7–76)
NRBC BLD AUTO-RTO: 0 /100 WBC (ref 0–0.2)
NRBC BLD AUTO-RTO: 0.1 /100 WBC (ref 0–0.2)
PHOSPHATE SERPL-MCNC: 3.9 MG/DL (ref 2.5–4.5)
PLATELET # BLD AUTO: 177 10*3/MM3 (ref 140–450)
PLATELET # BLD AUTO: 195 10*3/MM3 (ref 140–450)
PMV BLD AUTO: 9.5 FL (ref 6–12)
PMV BLD AUTO: 9.6 FL (ref 6–12)
POTASSIUM SERPL-SCNC: 3.2 MMOL/L (ref 3.5–5.2)
POTASSIUM SERPL-SCNC: 3.5 MMOL/L (ref 3.5–5.2)
PROT SERPL-MCNC: 6.7 G/DL (ref 6–8.5)
PROTHROMBIN TIME: 11.5 SECONDS (ref 9.6–11.7)
RBC # BLD AUTO: 3.83 10*6/MM3 (ref 4.14–5.8)
RBC # BLD AUTO: 4.14 10*6/MM3 (ref 4.14–5.8)
SODIUM SERPL-SCNC: 136 MMOL/L (ref 136–145)
SODIUM SERPL-SCNC: 139 MMOL/L (ref 136–145)
TROPONIN T SERPL-MCNC: 0.15 NG/ML (ref 0–0.03)
WBC # BLD AUTO: 13.1 10*3/MM3 (ref 3.4–10.8)
WBC # BLD AUTO: 14.4 10*3/MM3 (ref 3.4–10.8)

## 2020-08-16 PROCEDURE — 25010000002 CEFTRIAXONE PER 250 MG: Performed by: NURSE PRACTITIONER

## 2020-08-16 PROCEDURE — 85610 PROTHROMBIN TIME: CPT | Performed by: INTERNAL MEDICINE

## 2020-08-16 PROCEDURE — 85730 THROMBOPLASTIN TIME PARTIAL: CPT | Performed by: INTERNAL MEDICINE

## 2020-08-16 PROCEDURE — 99222 1ST HOSP IP/OBS MODERATE 55: CPT | Performed by: INTERNAL MEDICINE

## 2020-08-16 PROCEDURE — 63710000001 INSULIN GLARGINE PER 5 UNITS: Performed by: INTERNAL MEDICINE

## 2020-08-16 PROCEDURE — 71045 X-RAY EXAM CHEST 1 VIEW: CPT

## 2020-08-16 PROCEDURE — 94799 UNLISTED PULMONARY SVC/PX: CPT

## 2020-08-16 PROCEDURE — 85025 COMPLETE CBC W/AUTO DIFF WBC: CPT | Performed by: NURSE PRACTITIONER

## 2020-08-16 PROCEDURE — 25010000002 LEVETIRACETAM IN NACL 0.82% 500 MG/100ML SOLUTION: Performed by: INTERNAL MEDICINE

## 2020-08-16 PROCEDURE — 97112 NEUROMUSCULAR REEDUCATION: CPT

## 2020-08-16 PROCEDURE — 63710000001 INSULIN LISPRO (HUMAN) PER 5 UNITS: Performed by: INTERNAL MEDICINE

## 2020-08-16 PROCEDURE — 25010000002 HEPARIN (PORCINE) PER 1000 UNITS: Performed by: NURSE PRACTITIONER

## 2020-08-16 PROCEDURE — 94668 MNPJ CHEST WALL SBSQ: CPT

## 2020-08-16 PROCEDURE — 97162 PT EVAL MOD COMPLEX 30 MIN: CPT

## 2020-08-16 PROCEDURE — 25010000002 HYDRALAZINE PER 20 MG: Performed by: INTERNAL MEDICINE

## 2020-08-16 PROCEDURE — 97166 OT EVAL MOD COMPLEX 45 MIN: CPT

## 2020-08-16 PROCEDURE — 83735 ASSAY OF MAGNESIUM: CPT | Performed by: NURSE PRACTITIONER

## 2020-08-16 PROCEDURE — 84100 ASSAY OF PHOSPHORUS: CPT | Performed by: NURSE PRACTITIONER

## 2020-08-16 PROCEDURE — 85025 COMPLETE CBC W/AUTO DIFF WBC: CPT | Performed by: INTERNAL MEDICINE

## 2020-08-16 PROCEDURE — 70450 CT HEAD/BRAIN W/O DYE: CPT

## 2020-08-16 PROCEDURE — 92526 ORAL FUNCTION THERAPY: CPT

## 2020-08-16 PROCEDURE — 80053 COMPREHEN METABOLIC PANEL: CPT | Performed by: NURSE PRACTITIONER

## 2020-08-16 PROCEDURE — 84520 ASSAY OF UREA NITROGEN: CPT | Performed by: INTERNAL MEDICINE

## 2020-08-16 PROCEDURE — 84484 ASSAY OF TROPONIN QUANT: CPT | Performed by: INTERNAL MEDICINE

## 2020-08-16 PROCEDURE — 82962 GLUCOSE BLOOD TEST: CPT

## 2020-08-16 RX ORDER — POTASSIUM CHLORIDE 20 MEQ/1
20 TABLET, EXTENDED RELEASE ORAL ONCE
Status: COMPLETED | OUTPATIENT
Start: 2020-08-16 | End: 2020-08-16

## 2020-08-16 RX ORDER — METOPROLOL TARTRATE 50 MG/1
50 TABLET, FILM COATED ORAL EVERY 12 HOURS SCHEDULED
Status: DISCONTINUED | OUTPATIENT
Start: 2020-08-16 | End: 2020-08-19 | Stop reason: HOSPADM

## 2020-08-16 RX ORDER — RISPERIDONE 1 MG/1
1 TABLET ORAL EVERY 12 HOURS SCHEDULED
Status: DISCONTINUED | OUTPATIENT
Start: 2020-08-16 | End: 2020-08-19 | Stop reason: HOSPADM

## 2020-08-16 RX ORDER — AMITRIPTYLINE HYDROCHLORIDE 50 MG/1
50 TABLET, FILM COATED ORAL NIGHTLY
Status: DISCONTINUED | OUTPATIENT
Start: 2020-08-16 | End: 2020-08-19 | Stop reason: HOSPADM

## 2020-08-16 RX ORDER — PANTOPRAZOLE SODIUM 40 MG/1
40 TABLET, DELAYED RELEASE ORAL
Status: DISCONTINUED | OUTPATIENT
Start: 2020-08-17 | End: 2020-08-19 | Stop reason: HOSPADM

## 2020-08-16 RX ADMIN — POTASSIUM CHLORIDE 20 MEQ: 1500 TABLET, EXTENDED RELEASE ORAL at 11:26

## 2020-08-16 RX ADMIN — HYDRALAZINE HYDROCHLORIDE 20 MG: 20 INJECTION INTRAMUSCULAR; INTRAVENOUS at 03:44

## 2020-08-16 RX ADMIN — METOPROLOL TARTRATE 100 MG: 50 TABLET, FILM COATED ORAL at 10:45

## 2020-08-16 RX ADMIN — ALBUTEROL SULFATE 2.5 MG: 2.5 SOLUTION RESPIRATORY (INHALATION) at 20:01

## 2020-08-16 RX ADMIN — HEPARIN SODIUM 5000 UNITS: 5000 INJECTION INTRAVENOUS; SUBCUTANEOUS at 05:10

## 2020-08-16 RX ADMIN — CEFTRIAXONE SODIUM 2 G: 2 INJECTION, POWDER, FOR SOLUTION INTRAMUSCULAR; INTRAVENOUS at 07:58

## 2020-08-16 RX ADMIN — HYDRALAZINE HYDROCHLORIDE 20 MG: 20 INJECTION INTRAMUSCULAR; INTRAVENOUS at 08:43

## 2020-08-16 RX ADMIN — Medication 10 ML: at 08:01

## 2020-08-16 RX ADMIN — RISPERIDONE 1 MG: 1 TABLET ORAL at 13:44

## 2020-08-16 RX ADMIN — METRONIDAZOLE 500 MG: 500 INJECTION, SOLUTION INTRAVENOUS at 05:10

## 2020-08-16 RX ADMIN — INSULIN GLARGINE 20 UNITS: 100 INJECTION, SOLUTION SUBCUTANEOUS at 21:28

## 2020-08-16 RX ADMIN — NYSTATIN 500000 UNITS: 500000 SUSPENSION ORAL at 20:09

## 2020-08-16 RX ADMIN — ALBUTEROL SULFATE 2.5 MG: 2.5 SOLUTION RESPIRATORY (INHALATION) at 08:13

## 2020-08-16 RX ADMIN — AMITRIPTYLINE HYDROCHLORIDE 50 MG: 50 TABLET, FILM COATED ORAL at 20:09

## 2020-08-16 RX ADMIN — LEVETIRACETAM 500 MG: 5 INJECTION INTRAVENOUS at 20:09

## 2020-08-16 RX ADMIN — METOPROLOL TARTRATE 50 MG: 50 TABLET, FILM COATED ORAL at 20:09

## 2020-08-16 RX ADMIN — SODIUM CHLORIDE 100 MG: 900 INJECTION, SOLUTION INTRAVENOUS at 21:28

## 2020-08-16 RX ADMIN — PANTOPRAZOLE SODIUM 40 MG: 40 INJECTION, POWDER, FOR SOLUTION INTRAVENOUS at 08:01

## 2020-08-16 RX ADMIN — LOSARTAN POTASSIUM 50 MG: 50 TABLET, FILM COATED ORAL at 10:45

## 2020-08-16 RX ADMIN — HEPARIN SODIUM 5000 UNITS: 5000 INJECTION INTRAVENOUS; SUBCUTANEOUS at 21:17

## 2020-08-16 RX ADMIN — METRONIDAZOLE 500 MG: 500 INJECTION, SOLUTION INTRAVENOUS at 17:36

## 2020-08-16 RX ADMIN — INSULIN LISPRO 8 UNITS: 100 INJECTION, SOLUTION INTRAVENOUS; SUBCUTANEOUS at 17:36

## 2020-08-16 RX ADMIN — Medication 10 ML: at 20:09

## 2020-08-16 RX ADMIN — INSULIN LISPRO 3 UNITS: 100 INJECTION, SOLUTION INTRAVENOUS; SUBCUTANEOUS at 11:26

## 2020-08-16 RX ADMIN — NYSTATIN 500000 UNITS: 500000 SUSPENSION ORAL at 17:36

## 2020-08-16 RX ADMIN — METRONIDAZOLE 500 MG: 500 INJECTION, SOLUTION INTRAVENOUS at 11:26

## 2020-08-16 RX ADMIN — INSULIN GLARGINE 20 UNITS: 100 INJECTION, SOLUTION SUBCUTANEOUS at 08:01

## 2020-08-16 RX ADMIN — RISPERIDONE 1 MG: 1 TABLET ORAL at 20:09

## 2020-08-16 RX ADMIN — CHLORHEXIDINE GLUCONATE 0.12% ORAL RINSE 15 ML: 1.2 LIQUID ORAL at 20:09

## 2020-08-16 RX ADMIN — LEVETIRACETAM 500 MG: 5 INJECTION INTRAVENOUS at 08:01

## 2020-08-16 RX ADMIN — HYDRALAZINE HYDROCHLORIDE 20 MG: 20 INJECTION INTRAMUSCULAR; INTRAVENOUS at 18:56

## 2020-08-16 RX ADMIN — HYDRALAZINE HYDROCHLORIDE 20 MG: 20 INJECTION INTRAMUSCULAR; INTRAVENOUS at 00:11

## 2020-08-16 RX ADMIN — HEPARIN SODIUM 5000 UNITS: 5000 INJECTION INTRAVENOUS; SUBCUTANEOUS at 13:44

## 2020-08-16 RX ADMIN — NYSTATIN 500000 UNITS: 500000 SUSPENSION ORAL at 11:26

## 2020-08-16 RX ADMIN — SODIUM CHLORIDE 100 MG: 900 INJECTION, SOLUTION INTRAVENOUS at 09:13

## 2020-08-17 LAB
GLUCOSE BLDC GLUCOMTR-MCNC: 102 MG/DL (ref 70–105)
GLUCOSE BLDC GLUCOMTR-MCNC: 135 MG/DL (ref 70–105)
GLUCOSE BLDC GLUCOMTR-MCNC: 194 MG/DL (ref 70–105)
GLUCOSE BLDC GLUCOMTR-MCNC: 198 MG/DL (ref 70–105)

## 2020-08-17 PROCEDURE — 97112 NEUROMUSCULAR REEDUCATION: CPT

## 2020-08-17 PROCEDURE — 63710000001 INSULIN LISPRO (HUMAN) PER 5 UNITS: Performed by: INTERNAL MEDICINE

## 2020-08-17 PROCEDURE — 82962 GLUCOSE BLOOD TEST: CPT

## 2020-08-17 PROCEDURE — 94799 UNLISTED PULMONARY SVC/PX: CPT

## 2020-08-17 PROCEDURE — 63710000001 INSULIN GLARGINE PER 5 UNITS: Performed by: INTERNAL MEDICINE

## 2020-08-17 PROCEDURE — 92526 ORAL FUNCTION THERAPY: CPT

## 2020-08-17 PROCEDURE — 25010000002 HEPARIN (PORCINE) PER 1000 UNITS: Performed by: NURSE PRACTITIONER

## 2020-08-17 PROCEDURE — 99232 SBSQ HOSP IP/OBS MODERATE 35: CPT | Performed by: HOSPITALIST

## 2020-08-17 PROCEDURE — 97530 THERAPEUTIC ACTIVITIES: CPT

## 2020-08-17 PROCEDURE — 25010000002 CEFTRIAXONE PER 250 MG: Performed by: HOSPITALIST

## 2020-08-17 PROCEDURE — 5A1D70Z PERFORMANCE OF URINARY FILTRATION, INTERMITTENT, LESS THAN 6 HOURS PER DAY: ICD-10-PCS | Performed by: INTERNAL MEDICINE

## 2020-08-17 PROCEDURE — 25010000002 LEVETIRACETAM IN NACL 0.82% 500 MG/100ML SOLUTION: Performed by: INTERNAL MEDICINE

## 2020-08-17 PROCEDURE — 94760 N-INVAS EAR/PLS OXIMETRY 1: CPT

## 2020-08-17 RX ADMIN — HEPARIN SODIUM 5000 UNITS: 5000 INJECTION INTRAVENOUS; SUBCUTANEOUS at 21:02

## 2020-08-17 RX ADMIN — SODIUM CHLORIDE 100 MG: 900 INJECTION, SOLUTION INTRAVENOUS at 20:58

## 2020-08-17 RX ADMIN — INSULIN GLARGINE 20 UNITS: 100 INJECTION, SOLUTION SUBCUTANEOUS at 21:03

## 2020-08-17 RX ADMIN — LEVETIRACETAM 500 MG: 5 INJECTION INTRAVENOUS at 13:42

## 2020-08-17 RX ADMIN — NYSTATIN 500000 UNITS: 500000 SUSPENSION ORAL at 13:43

## 2020-08-17 RX ADMIN — ALBUTEROL SULFATE 2.5 MG: 2.5 SOLUTION RESPIRATORY (INHALATION) at 00:00

## 2020-08-17 RX ADMIN — ALBUTEROL SULFATE 2.5 MG: 2.5 SOLUTION RESPIRATORY (INHALATION) at 06:53

## 2020-08-17 RX ADMIN — METRONIDAZOLE 500 MG: 500 INJECTION, SOLUTION INTRAVENOUS at 00:19

## 2020-08-17 RX ADMIN — NYSTATIN 500000 UNITS: 500000 SUSPENSION ORAL at 21:02

## 2020-08-17 RX ADMIN — CEFTRIAXONE SODIUM 2 G: 2 INJECTION, POWDER, FOR SOLUTION INTRAMUSCULAR; INTRAVENOUS at 13:42

## 2020-08-17 RX ADMIN — HEPARIN SODIUM 5000 UNITS: 5000 INJECTION INTRAVENOUS; SUBCUTANEOUS at 13:42

## 2020-08-17 RX ADMIN — LOSARTAN POTASSIUM 50 MG: 50 TABLET, FILM COATED ORAL at 13:42

## 2020-08-17 RX ADMIN — RISPERIDONE 1 MG: 1 TABLET ORAL at 21:03

## 2020-08-17 RX ADMIN — ALBUTEROL SULFATE 2.5 MG: 2.5 SOLUTION RESPIRATORY (INHALATION) at 23:20

## 2020-08-17 RX ADMIN — METRONIDAZOLE 500 MG: 500 INJECTION, SOLUTION INTRAVENOUS at 05:51

## 2020-08-17 RX ADMIN — METOPROLOL TARTRATE 50 MG: 50 TABLET, FILM COATED ORAL at 21:03

## 2020-08-17 RX ADMIN — Medication 10 ML: at 21:02

## 2020-08-17 RX ADMIN — METOPROLOL TARTRATE 50 MG: 50 TABLET, FILM COATED ORAL at 13:43

## 2020-08-17 RX ADMIN — LEVETIRACETAM 500 MG: 5 INJECTION INTRAVENOUS at 20:58

## 2020-08-17 RX ADMIN — PANTOPRAZOLE SODIUM 40 MG: 40 TABLET, DELAYED RELEASE ORAL at 05:51

## 2020-08-17 RX ADMIN — METRONIDAZOLE 500 MG: 500 INJECTION, SOLUTION INTRAVENOUS at 13:42

## 2020-08-17 RX ADMIN — INSULIN LISPRO 3 UNITS: 100 INJECTION, SOLUTION INTRAVENOUS; SUBCUTANEOUS at 18:06

## 2020-08-17 RX ADMIN — ALBUTEROL SULFATE 2.5 MG: 2.5 SOLUTION RESPIRATORY (INHALATION) at 19:54

## 2020-08-17 RX ADMIN — HEPARIN SODIUM 5000 UNITS: 5000 INJECTION INTRAVENOUS; SUBCUTANEOUS at 05:51

## 2020-08-17 RX ADMIN — CHLORHEXIDINE GLUCONATE 0.12% ORAL RINSE 15 ML: 1.2 LIQUID ORAL at 13:44

## 2020-08-17 RX ADMIN — Medication 10 ML: at 13:43

## 2020-08-17 RX ADMIN — CHLORHEXIDINE GLUCONATE 0.12% ORAL RINSE 15 ML: 1.2 LIQUID ORAL at 21:02

## 2020-08-17 RX ADMIN — RISPERIDONE 1 MG: 1 TABLET ORAL at 13:43

## 2020-08-17 RX ADMIN — SODIUM CHLORIDE 100 MG: 900 INJECTION, SOLUTION INTRAVENOUS at 13:45

## 2020-08-17 RX ADMIN — AMITRIPTYLINE HYDROCHLORIDE 50 MG: 50 TABLET, FILM COATED ORAL at 21:03

## 2020-08-17 RX ADMIN — NYSTATIN 500000 UNITS: 500000 SUSPENSION ORAL at 18:01

## 2020-08-17 RX ADMIN — METRONIDAZOLE 500 MG: 500 INJECTION, SOLUTION INTRAVENOUS at 18:00

## 2020-08-18 ENCOUNTER — APPOINTMENT (OUTPATIENT)
Dept: GENERAL RADIOLOGY | Facility: HOSPITAL | Age: 55
End: 2020-08-18

## 2020-08-18 LAB
ANION GAP SERPL CALCULATED.3IONS-SCNC: 16 MMOL/L (ref 5–15)
BUN SERPL-MCNC: 27 MG/DL (ref 6–20)
BUN SERPL-MCNC: ABNORMAL MG/DL
BUN/CREAT SERPL: ABNORMAL
CALCIUM SPEC-SCNC: 9.4 MG/DL (ref 8.6–10.5)
CHLORIDE SERPL-SCNC: 97 MMOL/L (ref 98–107)
CO2 SERPL-SCNC: 24 MMOL/L (ref 22–29)
CREAT SERPL-MCNC: 5.71 MG/DL (ref 0.76–1.27)
GFR SERPL CREATININE-BSD FRML MDRD: 13 ML/MIN/1.73
GFR SERPL CREATININE-BSD FRML MDRD: ABNORMAL ML/MIN/{1.73_M2}
GLUCOSE BLDC GLUCOMTR-MCNC: 131 MG/DL (ref 70–105)
GLUCOSE BLDC GLUCOMTR-MCNC: 171 MG/DL (ref 70–105)
GLUCOSE BLDC GLUCOMTR-MCNC: 177 MG/DL (ref 70–105)
GLUCOSE BLDC GLUCOMTR-MCNC: 180 MG/DL (ref 70–105)
GLUCOSE SERPL-MCNC: 143 MG/DL (ref 65–99)
MAGNESIUM SERPL-MCNC: 2.2 MG/DL (ref 1.6–2.6)
POTASSIUM SERPL-SCNC: 3.5 MMOL/L (ref 3.5–5.2)
SODIUM SERPL-SCNC: 137 MMOL/L (ref 136–145)

## 2020-08-18 PROCEDURE — 94799 UNLISTED PULMONARY SVC/PX: CPT

## 2020-08-18 PROCEDURE — 99232 SBSQ HOSP IP/OBS MODERATE 35: CPT | Performed by: NURSE PRACTITIONER

## 2020-08-18 PROCEDURE — 25010000002 CEFTRIAXONE PER 250 MG: Performed by: HOSPITALIST

## 2020-08-18 PROCEDURE — 80048 BASIC METABOLIC PNL TOTAL CA: CPT | Performed by: HOSPITALIST

## 2020-08-18 PROCEDURE — 92611 MOTION FLUOROSCOPY/SWALLOW: CPT

## 2020-08-18 PROCEDURE — 94668 MNPJ CHEST WALL SBSQ: CPT

## 2020-08-18 PROCEDURE — 25010000002 LEVETIRACETAM IN NACL 0.82% 500 MG/100ML SOLUTION: Performed by: INTERNAL MEDICINE

## 2020-08-18 PROCEDURE — 71045 X-RAY EXAM CHEST 1 VIEW: CPT

## 2020-08-18 PROCEDURE — 63710000001 INSULIN LISPRO (HUMAN) PER 5 UNITS: Performed by: INTERNAL MEDICINE

## 2020-08-18 PROCEDURE — 74230 X-RAY XM SWLNG FUNCJ C+: CPT

## 2020-08-18 PROCEDURE — 94760 N-INVAS EAR/PLS OXIMETRY 1: CPT

## 2020-08-18 PROCEDURE — 82962 GLUCOSE BLOOD TEST: CPT

## 2020-08-18 PROCEDURE — 63710000001 INSULIN GLARGINE PER 5 UNITS: Performed by: INTERNAL MEDICINE

## 2020-08-18 PROCEDURE — 99232 SBSQ HOSP IP/OBS MODERATE 35: CPT | Performed by: HOSPITALIST

## 2020-08-18 PROCEDURE — 83735 ASSAY OF MAGNESIUM: CPT | Performed by: NURSE PRACTITIONER

## 2020-08-18 PROCEDURE — 25010000002 HEPARIN (PORCINE) PER 1000 UNITS: Performed by: NURSE PRACTITIONER

## 2020-08-18 RX ORDER — IPRATROPIUM BROMIDE AND ALBUTEROL SULFATE 2.5; .5 MG/3ML; MG/3ML
3 SOLUTION RESPIRATORY (INHALATION) EVERY 4 HOURS PRN
Status: DISCONTINUED | OUTPATIENT
Start: 2020-08-18 | End: 2020-08-19 | Stop reason: HOSPADM

## 2020-08-18 RX ORDER — DOXYCYCLINE 100 MG/1
100 TABLET ORAL EVERY 12 HOURS SCHEDULED
Status: DISCONTINUED | OUTPATIENT
Start: 2020-08-18 | End: 2020-08-19 | Stop reason: HOSPADM

## 2020-08-18 RX ADMIN — ALBUTEROL SULFATE 2.5 MG: 2.5 SOLUTION RESPIRATORY (INHALATION) at 23:14

## 2020-08-18 RX ADMIN — METRONIDAZOLE 500 MG: 500 INJECTION, SOLUTION INTRAVENOUS at 05:50

## 2020-08-18 RX ADMIN — AMITRIPTYLINE HYDROCHLORIDE 50 MG: 50 TABLET, FILM COATED ORAL at 21:50

## 2020-08-18 RX ADMIN — LOSARTAN POTASSIUM 50 MG: 50 TABLET, FILM COATED ORAL at 08:42

## 2020-08-18 RX ADMIN — METRONIDAZOLE 500 MG: 500 INJECTION, SOLUTION INTRAVENOUS at 00:41

## 2020-08-18 RX ADMIN — HEPARIN SODIUM 5000 UNITS: 5000 INJECTION INTRAVENOUS; SUBCUTANEOUS at 21:32

## 2020-08-18 RX ADMIN — LEVETIRACETAM 500 MG: 5 INJECTION INTRAVENOUS at 08:41

## 2020-08-18 RX ADMIN — ALBUTEROL SULFATE 2.5 MG: 2.5 SOLUTION RESPIRATORY (INHALATION) at 18:22

## 2020-08-18 RX ADMIN — HEPARIN SODIUM 5000 UNITS: 5000 INJECTION INTRAVENOUS; SUBCUTANEOUS at 15:16

## 2020-08-18 RX ADMIN — RISPERIDONE 1 MG: 1 TABLET ORAL at 21:50

## 2020-08-18 RX ADMIN — DOXYCYCLINE 100 MG: 100 TABLET, FILM COATED ORAL at 21:58

## 2020-08-18 RX ADMIN — INSULIN LISPRO 3 UNITS: 100 INJECTION, SOLUTION INTRAVENOUS; SUBCUTANEOUS at 12:17

## 2020-08-18 RX ADMIN — CEFTRIAXONE SODIUM 2 G: 2 INJECTION, POWDER, FOR SOLUTION INTRAMUSCULAR; INTRAVENOUS at 08:42

## 2020-08-18 RX ADMIN — NYSTATIN 500000 UNITS: 500000 SUSPENSION ORAL at 08:41

## 2020-08-18 RX ADMIN — BARIUM SULFATE 50 ML: 400 SUSPENSION ORAL at 12:30

## 2020-08-18 RX ADMIN — Medication 10 ML: at 21:33

## 2020-08-18 RX ADMIN — INSULIN GLARGINE 20 UNITS: 100 INJECTION, SOLUTION SUBCUTANEOUS at 08:55

## 2020-08-18 RX ADMIN — SODIUM CHLORIDE 100 MG: 900 INJECTION, SOLUTION INTRAVENOUS at 21:33

## 2020-08-18 RX ADMIN — LEVETIRACETAM 500 MG: 5 INJECTION INTRAVENOUS at 21:33

## 2020-08-18 RX ADMIN — METRONIDAZOLE 500 MG: 500 INJECTION, SOLUTION INTRAVENOUS at 12:17

## 2020-08-18 RX ADMIN — HEPARIN SODIUM 5000 UNITS: 5000 INJECTION INTRAVENOUS; SUBCUTANEOUS at 05:50

## 2020-08-18 RX ADMIN — ALBUTEROL SULFATE 2.5 MG: 2.5 SOLUTION RESPIRATORY (INHALATION) at 08:09

## 2020-08-18 RX ADMIN — SODIUM CHLORIDE 100 MG: 900 INJECTION, SOLUTION INTRAVENOUS at 08:42

## 2020-08-18 RX ADMIN — INSULIN LISPRO 3 UNITS: 100 INJECTION, SOLUTION INTRAVENOUS; SUBCUTANEOUS at 17:30

## 2020-08-18 RX ADMIN — CHLORHEXIDINE GLUCONATE 0.12% ORAL RINSE 15 ML: 1.2 LIQUID ORAL at 21:48

## 2020-08-18 RX ADMIN — ALBUTEROL SULFATE 2.5 MG: 2.5 SOLUTION RESPIRATORY (INHALATION) at 10:34

## 2020-08-18 RX ADMIN — CHLORHEXIDINE GLUCONATE 0.12% ORAL RINSE 15 ML: 1.2 LIQUID ORAL at 08:41

## 2020-08-18 RX ADMIN — METOPROLOL TARTRATE 50 MG: 50 TABLET, FILM COATED ORAL at 08:43

## 2020-08-18 RX ADMIN — PANTOPRAZOLE SODIUM 40 MG: 40 TABLET, DELAYED RELEASE ORAL at 05:50

## 2020-08-18 RX ADMIN — Medication 10 ML: at 08:43

## 2020-08-18 RX ADMIN — BARIUM SULFATE 183 ML: 960 POWDER, FOR SUSPENSION ORAL at 12:30

## 2020-08-18 RX ADMIN — METOPROLOL TARTRATE 50 MG: 50 TABLET, FILM COATED ORAL at 21:50

## 2020-08-18 RX ADMIN — RISPERIDONE 1 MG: 1 TABLET ORAL at 08:45

## 2020-08-18 RX ADMIN — NYSTATIN 500000 UNITS: 500000 SUSPENSION ORAL at 12:17

## 2020-08-18 RX ADMIN — INSULIN GLARGINE 20 UNITS: 100 INJECTION, SOLUTION SUBCUTANEOUS at 21:33

## 2020-08-18 RX ADMIN — NYSTATIN 500000 UNITS: 500000 SUSPENSION ORAL at 21:48

## 2020-08-18 RX ADMIN — DOXYCYCLINE 100 MG: 100 TABLET, FILM COATED ORAL at 15:16

## 2020-08-18 RX ADMIN — NYSTATIN 500000 UNITS: 500000 SUSPENSION ORAL at 17:30

## 2020-08-19 VITALS
BODY MASS INDEX: 40.69 KG/M2 | TEMPERATURE: 97.6 F | HEIGHT: 60 IN | RESPIRATION RATE: 18 BRPM | WEIGHT: 207.23 LBS | HEART RATE: 106 BPM | OXYGEN SATURATION: 97 % | DIASTOLIC BLOOD PRESSURE: 64 MMHG | SYSTOLIC BLOOD PRESSURE: 110 MMHG

## 2020-08-19 LAB
ALBUMIN SERPL-MCNC: 3.1 G/DL (ref 3.5–5.2)
ANION GAP SERPL CALCULATED.3IONS-SCNC: 17 MMOL/L (ref 5–15)
BUN SERPL-MCNC: 38 MG/DL (ref 6–20)
BUN SERPL-MCNC: ABNORMAL MG/DL
BUN/CREAT SERPL: ABNORMAL
CALCIUM SPEC-SCNC: 9.7 MG/DL (ref 8.6–10.5)
CHLORIDE SERPL-SCNC: 99 MMOL/L (ref 98–107)
CO2 SERPL-SCNC: 25 MMOL/L (ref 22–29)
CREAT SERPL-MCNC: 7.69 MG/DL (ref 0.76–1.27)
DEPRECATED RDW RBC AUTO: 52.9 FL (ref 37–54)
EOSINOPHIL # BLD MANUAL: 0.21 10*3/MM3 (ref 0–0.4)
EOSINOPHIL NFR BLD MANUAL: 2 % (ref 0.3–6.2)
ERYTHROCYTE [DISTWIDTH] IN BLOOD BY AUTOMATED COUNT: 18.7 % (ref 12.3–15.4)
GFR SERPL CREATININE-BSD FRML MDRD: 9 ML/MIN/1.73
GFR SERPL CREATININE-BSD FRML MDRD: ABNORMAL ML/MIN/{1.73_M2}
GLUCOSE BLDC GLUCOMTR-MCNC: 105 MG/DL (ref 70–105)
GLUCOSE BLDC GLUCOMTR-MCNC: 142 MG/DL (ref 70–105)
GLUCOSE SERPL-MCNC: 129 MG/DL (ref 65–99)
HCT VFR BLD AUTO: 28.5 % (ref 37.5–51)
HGB BLD-MCNC: 9.1 G/DL (ref 13–17.7)
LYMPHOCYTES # BLD MANUAL: 1.35 10*3/MM3 (ref 0.7–3.1)
LYMPHOCYTES NFR BLD MANUAL: 13 % (ref 19.6–45.3)
LYMPHOCYTES NFR BLD MANUAL: 17 % (ref 5–12)
MAGNESIUM SERPL-MCNC: 2.1 MG/DL (ref 1.6–2.6)
MCH RBC QN AUTO: 25.8 PG (ref 26.6–33)
MCHC RBC AUTO-ENTMCNC: 31.9 G/DL (ref 31.5–35.7)
MCV RBC AUTO: 81 FL (ref 79–97)
METAMYELOCYTES NFR BLD MANUAL: 3 % (ref 0–0)
MONOCYTES # BLD AUTO: 1.77 10*3/MM3 (ref 0.1–0.9)
MYELOCYTES NFR BLD MANUAL: 3 % (ref 0–0)
NEUTROPHILS # BLD AUTO: 6.45 10*3/MM3 (ref 1.7–7)
NEUTROPHILS NFR BLD MANUAL: 50 % (ref 42.7–76)
NEUTS BAND NFR BLD MANUAL: 12 % (ref 0–5)
PHOSPHATE SERPL-MCNC: 6.9 MG/DL (ref 2.5–4.5)
PLAT MORPH BLD: NORMAL
PLATELET # BLD AUTO: 200 10*3/MM3 (ref 140–450)
PMV BLD AUTO: 8.9 FL (ref 6–12)
POTASSIUM SERPL-SCNC: 3.4 MMOL/L (ref 3.5–5.2)
RBC # BLD AUTO: 3.51 10*6/MM3 (ref 4.14–5.8)
RBC MORPH BLD: NORMAL
SCAN SLIDE: NORMAL
SODIUM SERPL-SCNC: 141 MMOL/L (ref 136–145)
WBC # BLD AUTO: 10.4 10*3/MM3 (ref 3.4–10.8)
WBC MORPH BLD: NORMAL

## 2020-08-19 PROCEDURE — 85007 BL SMEAR W/DIFF WBC COUNT: CPT | Performed by: NURSE PRACTITIONER

## 2020-08-19 PROCEDURE — 25010000002 LEVETIRACETAM IN NACL 0.82% 500 MG/100ML SOLUTION: Performed by: INTERNAL MEDICINE

## 2020-08-19 PROCEDURE — 94668 MNPJ CHEST WALL SBSQ: CPT

## 2020-08-19 PROCEDURE — 5A1D70Z PERFORMANCE OF URINARY FILTRATION, INTERMITTENT, LESS THAN 6 HOURS PER DAY: ICD-10-PCS | Performed by: INTERNAL MEDICINE

## 2020-08-19 PROCEDURE — 85025 COMPLETE CBC W/AUTO DIFF WBC: CPT | Performed by: NURSE PRACTITIONER

## 2020-08-19 PROCEDURE — 92526 ORAL FUNCTION THERAPY: CPT

## 2020-08-19 PROCEDURE — 25010000002 HEPARIN (PORCINE) PER 1000 UNITS: Performed by: NURSE PRACTITIONER

## 2020-08-19 PROCEDURE — 82962 GLUCOSE BLOOD TEST: CPT

## 2020-08-19 PROCEDURE — 80069 RENAL FUNCTION PANEL: CPT | Performed by: INTERNAL MEDICINE

## 2020-08-19 PROCEDURE — 94799 UNLISTED PULMONARY SVC/PX: CPT

## 2020-08-19 PROCEDURE — 99239 HOSP IP/OBS DSCHRG MGMT >30: CPT | Performed by: HOSPITALIST

## 2020-08-19 PROCEDURE — 83735 ASSAY OF MAGNESIUM: CPT | Performed by: NURSE PRACTITIONER

## 2020-08-19 PROCEDURE — 25010000002 EPOETIN ALFA-EPBX 10000 UNIT/ML SOLUTION: Performed by: INTERNAL MEDICINE

## 2020-08-19 RX ORDER — LEVETIRACETAM 500 MG/1
500 TABLET ORAL 2 TIMES DAILY
Qty: 30 TABLET | Refills: 0 | Status: SHIPPED | OUTPATIENT
Start: 2020-08-19 | End: 2021-02-10

## 2020-08-19 RX ORDER — LOSARTAN POTASSIUM 50 MG/1
50 TABLET ORAL
Qty: 10 TABLET | Refills: 0 | Status: SHIPPED | OUTPATIENT
Start: 2020-08-20 | End: 2021-02-10 | Stop reason: SDUPTHER

## 2020-08-19 RX ORDER — LACOSAMIDE 100 MG/1
100 TABLET ORAL EVERY 12 HOURS SCHEDULED
Qty: 60 TABLET | Refills: 0 | Status: SHIPPED | OUTPATIENT
Start: 2020-08-19 | End: 2021-02-10

## 2020-08-19 RX ORDER — DOXYCYCLINE 100 MG/1
100 TABLET ORAL EVERY 12 HOURS SCHEDULED
Qty: 11 TABLET | Refills: 0 | Status: SHIPPED | OUTPATIENT
Start: 2020-08-19 | End: 2020-08-25

## 2020-08-19 RX ORDER — INSULIN GLARGINE 100 [IU]/ML
20 INJECTION, SOLUTION SUBCUTANEOUS EVERY 12 HOURS SCHEDULED
Qty: 10 ML | Refills: 0 | Status: SHIPPED | OUTPATIENT
Start: 2020-08-19 | End: 2021-02-10

## 2020-08-19 RX ADMIN — LEVETIRACETAM 500 MG: 5 INJECTION INTRAVENOUS at 12:06

## 2020-08-19 RX ADMIN — EPOETIN ALFA-EPBX 10000 UNITS: 10000 INJECTION, SOLUTION INTRAVENOUS; SUBCUTANEOUS at 11:30

## 2020-08-19 RX ADMIN — RISPERIDONE 1 MG: 1 TABLET ORAL at 12:05

## 2020-08-19 RX ADMIN — DOXYCYCLINE 100 MG: 100 TABLET, FILM COATED ORAL at 12:05

## 2020-08-19 RX ADMIN — PANTOPRAZOLE SODIUM 40 MG: 40 TABLET, DELAYED RELEASE ORAL at 05:59

## 2020-08-19 RX ADMIN — HEPARIN SODIUM 5000 UNITS: 5000 INJECTION INTRAVENOUS; SUBCUTANEOUS at 05:59

## 2020-08-19 RX ADMIN — NYSTATIN 500000 UNITS: 500000 SUSPENSION ORAL at 12:06

## 2020-08-19 RX ADMIN — ALBUTEROL SULFATE 2.5 MG: 2.5 SOLUTION RESPIRATORY (INHALATION) at 06:37

## 2020-08-19 RX ADMIN — HEPARIN SODIUM 5000 UNITS: 5000 INJECTION INTRAVENOUS; SUBCUTANEOUS at 14:31

## 2020-08-19 RX ADMIN — LOSARTAN POTASSIUM 50 MG: 50 TABLET, FILM COATED ORAL at 12:14

## 2020-08-19 RX ADMIN — SODIUM CHLORIDE 100 MG: 900 INJECTION, SOLUTION INTRAVENOUS at 12:06

## 2020-08-19 RX ADMIN — CHLORHEXIDINE GLUCONATE 0.12% ORAL RINSE 15 ML: 1.2 LIQUID ORAL at 12:05

## 2020-08-19 RX ADMIN — Medication 10 ML: at 12:15

## 2020-08-25 RX ORDER — GABAPENTIN 600 MG/1
TABLET ORAL
Qty: 180 TABLET | Refills: 4 | OUTPATIENT
Start: 2020-08-25

## 2020-12-20 ENCOUNTER — HOSPITAL ENCOUNTER (EMERGENCY)
Facility: HOSPITAL | Age: 55
Discharge: HOME | End: 2020-12-20
Attending: EMERGENCY MEDICINE | Admitting: EMERGENCY MEDICINE

## 2020-12-20 VITALS
DIASTOLIC BLOOD PRESSURE: 90 MMHG | HEIGHT: 69 IN | TEMPERATURE: 97.9 F | SYSTOLIC BLOOD PRESSURE: 182 MMHG | WEIGHT: 201.5 LBS | OXYGEN SATURATION: 98 % | BODY MASS INDEX: 29.84 KG/M2 | HEART RATE: 92 BPM | RESPIRATION RATE: 16 BRPM

## 2020-12-20 DIAGNOSIS — B37.0 THRUSH, ORAL: Primary | ICD-10-CM

## 2020-12-20 PROCEDURE — 99282 EMERGENCY DEPT VISIT SF MDM: CPT

## 2020-12-20 RX ORDER — FLUCONAZOLE 150 MG/1
150 TABLET ORAL ONCE
Qty: 1 TABLET | Refills: 0 | Status: SHIPPED | OUTPATIENT
Start: 2020-12-20 | End: 2020-12-20

## 2020-12-20 RX ORDER — HYDROXYZINE HYDROCHLORIDE 25 MG/1
25 TABLET, FILM COATED ORAL EVERY 6 HOURS PRN
Qty: 12 TABLET | Refills: 0 | Status: SHIPPED | OUTPATIENT
Start: 2020-12-20

## 2020-12-20 NOTE — DISCHARGE INSTRUCTIONS
Keep a close eye on your blood sugars  Medication as directed  Continue current medication  Follow-up closely with Dr. Jones

## 2020-12-20 NOTE — ED PROVIDER NOTES
Subjective   55-year-old male reporting onset of white plaquing in his mouth as well as a feeling his tongue was slightly enlarged.  He reports no asymmetric areas.  He states he is never reacted to Cozaar in the past.  He reports no previous episodes of angioedema.  He denies shortness of breath or wheezing.  He reports his blood sugars have been in the subtwo 100 range.  He reports no chest pain or shortness of breath.  Denies abdominal pain nausea or vomiting          Review of Systems   HENT: Positive for sore throat. Negative for nosebleeds, sinus pressure, sinus pain and trouble swallowing.    Eyes: Positive for visual disturbance.   Respiratory: Negative for shortness of breath and wheezing.    Gastrointestinal: Negative for abdominal pain and nausea.   Skin: Negative for rash.   Hematological: Does not bruise/bleed easily.   All other systems reviewed and are negative.      Past Medical History:   Diagnosis Date   • Anemia    • Cataract    • Constipation    • CVA (cerebral vascular accident) (CMS/[facility])     x 8   • CVA (cerebral vascular accident) (CMS/[facility])    • Depression    • Diabetes mellitus (CMS/[facility])    • ESRD (end stage renal disease) on dialysis (CMS/[facility])    • H/O colonoscopy 2016   • History of noncompliance with medical treatment    • HLD (hyperlipidemia) .   • Hyperlipidemia    • Hypertension    • Insomnia    • Neuropathy    • Renal failure     Stage IV - AI -- on dialysis -- [facility]   • Retinopathy    • TIA (transient ischemic attack)    • Type 2 diabetes mellitus (CMS/[facility])        Allergies   Allergen Reactions   • Latex Unknown (See Comments)   • Other Unknown (See Comments)   • Penicillins Unknown (See Comments)   • Latex, Natural Rubber Unknown - High Severity   • Penicillins Unknown - High Severity   • Amoxicillin Rash       Past Surgical History:   Procedure Laterality Date   • ANKLE SURGERY Left    • ANKLE SURGERY     • APPENDECTOMY     • ARTERIOVENOUS FISTULA     • CATARACT EXTRACTION, BILATERAL   2018   • COLON RESECTION  2016   • OTHER SURGICAL HISTORY Left 02/23/2017    Left Distula Placement Dr Guan       Family History   Problem Relation Age of Onset   • Heart disease Mother    • Heart attack Mother    • Hypertension Mother    • Breast cancer Sister    • Hyperlipidemia Mother    • Stroke Mother    • Cancer Sister         Breast Cancer   • Diabetes Sister    • Heart disease Maternal Grandmother    • Hyperlipidemia Maternal Grandmother    • Hypertension Maternal Grandmother        Social History     Socioeconomic History   • Marital status:      Spouse name: Not on file   • Number of children: Not on file   • Years of education: Not on file   • Highest education level: Not on file   Tobacco Use   • Smoking status: Never Smoker   • Smokeless tobacco: Current User     Types: Chew   • Tobacco comment: Passive Smoke exposure: no   Substance and Sexual Activity   • Alcohol use: Not Currently   • Drug use: Defer   • Sexual activity: Defer   Social History Narrative    ** Merged History Encounter **                Objective   Physical Exam  Alert Collin Coma Scale 15   HEENT: Pupils equal and reactive to light. Conjunctivae are not injected. normal tympanic membranes. Oropharynx shows classic candidal plaquing without evidence of angioedema of the tongue.  There is no soft palate or uvular swelling and nares are normal.   Neck: Supple. Midline trachea. No JVD. No goiter.   Chest: Clear and equal breath sounds bilaterally regular rate and rhythm without murmur or rub.   Abdomen: Positive bowel sounds nontender nondistended. No rebound or peritoneal signs. No CVA tenderness.   Extremities no clubbing cyanosis or edema motor sensory exam is normal the full range of motion is intact   skin: Warm and dry, no rashes or petechia.   Lymphatic: No regional lymphadenopathy. No calf pain, swelling or Bernie's sign  Alert Collin Coma Scale 15     Procedures           ED Course                                          MDM  Number of Diagnoses or Management Options  Risk of Complications, Morbidity, and/or Mortality  Presenting problems: high  Diagnostic procedures: high  Management options: high  General comments: Patient will be discharged a prescription for nystatin and Diflucan.  He was also given a prescription for Atarax as needed for discomfort.  He is encouraged to continue his medication watch his blood sugars closely is encouraged to follow-up with primary care provider.  The patient was stable at discharge and vocalized understanding of discharge instructions and warnings        Final diagnoses:   Thrush, oral            Baljit Abdullahi MD  12/20/20 [id number]

## 2020-12-20 NOTE — ED NOTES
Patient reports tongue swelling since yesterday, no trouble breathing, eating, or drinking. Denies injury or allergies.      Larissa Andres LPN  12/20/20 0256

## 2020-12-21 DIAGNOSIS — E11.29 TYPE 2 DIABETES MELLITUS WITH OTHER DIABETIC KIDNEY COMPLICATION, WITH LONG-TERM CURRENT USE OF INSULIN (HCC): Primary | ICD-10-CM

## 2020-12-21 DIAGNOSIS — Z79.4 TYPE 2 DIABETES MELLITUS WITH OTHER DIABETIC KIDNEY COMPLICATION, WITH LONG-TERM CURRENT USE OF INSULIN (HCC): Primary | ICD-10-CM

## 2020-12-24 RX ORDER — PEN NEEDLE, DIABETIC 29 G X1/2"
NEEDLE, DISPOSABLE MISCELLANEOUS
Qty: 100 EACH | Refills: 10 | Status: SHIPPED | OUTPATIENT
Start: 2020-12-24 | End: 2021-02-10

## 2021-02-02 ENCOUNTER — LAB (OUTPATIENT)
Dept: LAB | Facility: HOSPITAL | Age: 56
End: 2021-02-02

## 2021-02-02 ENCOUNTER — TRANSCRIBE ORDERS (OUTPATIENT)
Dept: ADMINISTRATIVE | Facility: HOSPITAL | Age: 56
End: 2021-02-02

## 2021-02-02 DIAGNOSIS — E11.22 TYPE 2 DIABETES MELLITUS WITH ESRD (END-STAGE RENAL DISEASE) (HCC): Primary | ICD-10-CM

## 2021-02-02 DIAGNOSIS — Z79.899 ENCOUNTER FOR LONG-TERM (CURRENT) USE OF OTHER MEDICATIONS: ICD-10-CM

## 2021-02-02 DIAGNOSIS — N18.6 TYPE 2 DIABETES MELLITUS WITH ESRD (END-STAGE RENAL DISEASE) (HCC): Primary | ICD-10-CM

## 2021-02-02 DIAGNOSIS — N18.6 TYPE 2 DIABETES MELLITUS WITH ESRD (END-STAGE RENAL DISEASE) (HCC): ICD-10-CM

## 2021-02-02 DIAGNOSIS — E11.22 TYPE 2 DIABETES MELLITUS WITH ESRD (END-STAGE RENAL DISEASE) (HCC): ICD-10-CM

## 2021-02-02 LAB
ALBUMIN SERPL-MCNC: 3.7 G/DL (ref 3.5–5.2)
ALBUMIN/GLOB SERPL: 1.3 G/DL
ALP SERPL-CCNC: 173 U/L (ref 39–117)
ALT SERPL W P-5'-P-CCNC: 11 U/L (ref 1–41)
AMPHET+METHAMPHET UR QL: NEGATIVE
ANION GAP SERPL CALCULATED.3IONS-SCNC: 10.8 MMOL/L (ref 5–15)
AST SERPL-CCNC: 13 U/L (ref 1–40)
BARBITURATES UR QL SCN: NEGATIVE
BASOPHILS # BLD AUTO: 0.05 10*3/MM3 (ref 0–0.2)
BASOPHILS NFR BLD AUTO: 0.7 % (ref 0–1.5)
BENZODIAZ UR QL SCN: NEGATIVE
BILIRUB SERPL-MCNC: 0.3 MG/DL (ref 0–1.2)
BUN SERPL-MCNC: 20 MG/DL (ref 6–20)
BUN/CREAT SERPL: 3.6 (ref 7–25)
CALCIUM SPEC-SCNC: 10.4 MG/DL (ref 8.6–10.5)
CANNABINOIDS SERPL QL: NEGATIVE
CHLORIDE SERPL-SCNC: 95 MMOL/L (ref 98–107)
CHOLEST SERPL-MCNC: 144 MG/DL (ref 0–200)
CO2 SERPL-SCNC: 28.2 MMOL/L (ref 22–29)
COCAINE UR QL: NEGATIVE
CREAT SERPL-MCNC: 5.61 MG/DL (ref 0.76–1.27)
DEPRECATED RDW RBC AUTO: 49.8 FL (ref 37–54)
EOSINOPHIL # BLD AUTO: 0.3 10*3/MM3 (ref 0–0.4)
EOSINOPHIL NFR BLD AUTO: 3.9 % (ref 0.3–6.2)
ERYTHROCYTE [DISTWIDTH] IN BLOOD BY AUTOMATED COUNT: 15.4 % (ref 12.3–15.4)
GFR SERPL CREATININE-BSD FRML MDRD: 13 ML/MIN/1.73
GFR SERPL CREATININE-BSD FRML MDRD: ABNORMAL ML/MIN/{1.73_M2}
GLOBULIN UR ELPH-MCNC: 2.9 GM/DL
GLUCOSE SERPL-MCNC: 359 MG/DL (ref 65–99)
HBA1C MFR BLD: 9.2 % (ref 3.5–5.6)
HCT VFR BLD AUTO: 34.9 % (ref 37.5–51)
HDLC SERPL-MCNC: 43 MG/DL (ref 40–60)
HGB BLD-MCNC: 11.3 G/DL (ref 13–17.7)
IMM GRANULOCYTES # BLD AUTO: 0.02 10*3/MM3 (ref 0–0.05)
IMM GRANULOCYTES NFR BLD AUTO: 0.3 % (ref 0–0.5)
LDLC SERPL CALC-MCNC: 72 MG/DL (ref 0–100)
LDLC/HDLC SERPL: 1.54 {RATIO}
LYMPHOCYTES # BLD AUTO: 1.6 10*3/MM3 (ref 0.7–3.1)
LYMPHOCYTES NFR BLD AUTO: 20.9 % (ref 19.6–45.3)
MCH RBC QN AUTO: 29.7 PG (ref 26.6–33)
MCHC RBC AUTO-ENTMCNC: 32.4 G/DL (ref 31.5–35.7)
MCV RBC AUTO: 91.6 FL (ref 79–97)
METHADONE UR QL SCN: NEGATIVE
MONOCYTES # BLD AUTO: 0.55 10*3/MM3 (ref 0.1–0.9)
MONOCYTES NFR BLD AUTO: 7.2 % (ref 5–12)
NEUTROPHILS NFR BLD AUTO: 5.12 10*3/MM3 (ref 1.7–7)
NEUTROPHILS NFR BLD AUTO: 67 % (ref 42.7–76)
NRBC BLD AUTO-RTO: 0.3 /100 WBC (ref 0–0.2)
OPIATES UR QL: NEGATIVE
OXYCODONE UR QL SCN: NEGATIVE
PLATELET # BLD AUTO: 176 10*3/MM3 (ref 140–450)
PMV BLD AUTO: 11.5 FL (ref 6–12)
POTASSIUM SERPL-SCNC: 4.3 MMOL/L (ref 3.5–5.2)
PROT SERPL-MCNC: 6.6 G/DL (ref 6–8.5)
RBC # BLD AUTO: 3.81 10*6/MM3 (ref 4.14–5.8)
SODIUM SERPL-SCNC: 134 MMOL/L (ref 136–145)
TRIGL SERPL-MCNC: 173 MG/DL (ref 0–150)
TSH SERPL DL<=0.05 MIU/L-ACNC: 1.69 UIU/ML (ref 0.27–4.2)
VLDLC SERPL-MCNC: 29 MG/DL (ref 5–40)
WBC # BLD AUTO: 7.64 10*3/MM3 (ref 3.4–10.8)

## 2021-02-02 PROCEDURE — 80307 DRUG TEST PRSMV CHEM ANLYZR: CPT

## 2021-02-02 PROCEDURE — 84443 ASSAY THYROID STIM HORMONE: CPT

## 2021-02-02 PROCEDURE — 80061 LIPID PANEL: CPT

## 2021-02-02 PROCEDURE — 85025 COMPLETE CBC W/AUTO DIFF WBC: CPT

## 2021-02-02 PROCEDURE — 80053 COMPREHEN METABOLIC PANEL: CPT

## 2021-02-02 PROCEDURE — 36415 COLL VENOUS BLD VENIPUNCTURE: CPT

## 2021-02-02 PROCEDURE — 83036 HEMOGLOBIN GLYCOSYLATED A1C: CPT

## 2021-02-09 ENCOUNTER — HOSPITAL ENCOUNTER (INPATIENT)
Facility: HOSPITAL | Age: 56
LOS: 7 days | Discharge: HOME | End: 2021-02-16
Attending: EMERGENCY MEDICINE | Admitting: FAMILY MEDICINE

## 2021-02-09 ENCOUNTER — APPOINTMENT (OUTPATIENT)
Dept: GENERAL RADIOLOGY | Facility: HOSPITAL | Age: 56
End: 2021-02-09

## 2021-02-09 DIAGNOSIS — IMO0002 UNCONTROLLED INSULIN DEPENDENT TYPE 1 DIABETES MELLITUS: ICD-10-CM

## 2021-02-09 DIAGNOSIS — R73.9 HYPERGLYCEMIA: ICD-10-CM

## 2021-02-09 DIAGNOSIS — R53.1 WEAKNESS: Primary | ICD-10-CM

## 2021-02-09 DIAGNOSIS — N18.9 CHRONIC RENAL FAILURE, UNSPECIFIED CKD STAGE: ICD-10-CM

## 2021-02-09 LAB — GLUCOSE BLDC GLUCOMTR-MCNC: >500 MG/DL (ref 70–105)

## 2021-02-09 PROCEDURE — 82962 GLUCOSE BLOOD TEST: CPT

## 2021-02-09 PROCEDURE — 71045 X-RAY EXAM CHEST 1 VIEW: CPT

## 2021-02-09 PROCEDURE — 99285 EMERGENCY DEPT VISIT HI MDM: CPT

## 2021-02-09 PROCEDURE — 93005 ELECTROCARDIOGRAM TRACING: CPT | Performed by: EMERGENCY MEDICINE

## 2021-02-09 RX ORDER — SODIUM CHLORIDE 0.9 % (FLUSH) 0.9 %
10 SYRINGE (ML) INJECTION AS NEEDED
Status: DISCONTINUED | OUTPATIENT
Start: 2021-02-09 | End: 2021-02-16 | Stop reason: HOSPADM

## 2021-02-10 ENCOUNTER — APPOINTMENT (OUTPATIENT)
Dept: CT IMAGING | Facility: HOSPITAL | Age: 56
End: 2021-02-10

## 2021-02-10 LAB
ALBUMIN SERPL-MCNC: 3.8 G/DL (ref 3.5–5.2)
ALBUMIN/GLOB SERPL: 1.2 G/DL
ALP SERPL-CCNC: 141 U/L (ref 39–117)
ALT SERPL W P-5'-P-CCNC: 13 U/L (ref 1–41)
ANION GAP SERPL CALCULATED.3IONS-SCNC: 11 MMOL/L (ref 5–15)
APTT PPP: 24.9 SECONDS (ref 24–31)
ARTERIAL PATENCY WRIST A: POSITIVE
AST SERPL-CCNC: 31 U/L (ref 1–40)
ATMOSPHERIC PRESS: ABNORMAL MM[HG]
ATMOSPHERIC PRESS: ABNORMAL MM[HG]
BASE EXCESS BLDA CALC-SCNC: 3 MMOL/L (ref 0–3)
BASE EXCESS BLDV CALC-SCNC: 5.6 MMOL/L
BASOPHILS # BLD AUTO: 0.1 10*3/MM3 (ref 0–0.2)
BASOPHILS NFR BLD AUTO: 0.8 % (ref 0–1.5)
BDY SITE: ABNORMAL
BDY SITE: ABNORMAL
BILIRUB SERPL-MCNC: 0.3 MG/DL (ref 0–1.2)
BUN SERPL-MCNC: 25 MG/DL (ref 6–20)
BUN/CREAT SERPL: 4.2 (ref 7–25)
CALCIUM SPEC-SCNC: 9.9 MG/DL (ref 8.6–10.5)
CHLORIDE SERPL-SCNC: 93 MMOL/L (ref 98–107)
CO2 BLDA-SCNC: 30.4 MMOL/L (ref 22–29)
CO2 BLDA-SCNC: 35.9 MMOL/L (ref 22–29)
CO2 SERPL-SCNC: 28 MMOL/L (ref 22–29)
CREAT SERPL-MCNC: 5.96 MG/DL (ref 0.76–1.27)
DEPRECATED RDW RBC AUTO: 60.4 FL (ref 37–54)
EOSINOPHIL # BLD AUTO: 0.3 10*3/MM3 (ref 0–0.4)
EOSINOPHIL NFR BLD AUTO: 3.8 % (ref 0.3–6.2)
ERYTHROCYTE [DISTWIDTH] IN BLOOD BY AUTOMATED COUNT: 18.8 % (ref 12.3–15.4)
GFR SERPL CREATININE-BSD FRML MDRD: 12 ML/MIN/1.73
GFR SERPL CREATININE-BSD FRML MDRD: ABNORMAL ML/MIN/{1.73_M2}
GLOBULIN UR ELPH-MCNC: 3.1 GM/DL
GLUCOSE BLDC GLUCOMTR-MCNC: 125 MG/DL (ref 70–105)
GLUCOSE BLDC GLUCOMTR-MCNC: 132 MG/DL (ref 70–105)
GLUCOSE BLDC GLUCOMTR-MCNC: 159 MG/DL (ref 70–105)
GLUCOSE BLDC GLUCOMTR-MCNC: 437 MG/DL (ref 70–105)
GLUCOSE BLDC GLUCOMTR-MCNC: 66 MG/DL (ref 70–105)
GLUCOSE BLDC GLUCOMTR-MCNC: 69 MG/DL (ref 70–105)
GLUCOSE BLDC GLUCOMTR-MCNC: 78 MG/DL (ref 70–105)
GLUCOSE SERPL-MCNC: 548 MG/DL (ref 65–99)
HBA1C MFR BLD: 9.4 % (ref 3.5–5.6)
HCO3 BLDA-SCNC: 28.9 MMOL/L (ref 21–28)
HCO3 BLDV-SCNC: 33.8 MMOL/L
HCT VFR BLD AUTO: 31.8 % (ref 37.5–51)
HEMODILUTION: NO
HGB BLD-MCNC: 10.3 G/DL (ref 13–17.7)
HOLD SPECIMEN: NORMAL
HOLD SPECIMEN: NORMAL
INHALED O2 CONCENTRATION: 21 %
INHALED O2 CONCENTRATION: 21 %
INR PPP: 1.04 (ref 0.93–1.1)
LYMPHOCYTES # BLD AUTO: 0.9 10*3/MM3 (ref 0.7–3.1)
LYMPHOCYTES NFR BLD AUTO: 11.4 % (ref 19.6–45.3)
MAGNESIUM SERPL-MCNC: 2.2 MG/DL (ref 1.6–2.6)
MCH RBC QN AUTO: 29.6 PG (ref 26.6–33)
MCHC RBC AUTO-ENTMCNC: 32.4 G/DL (ref 31.5–35.7)
MCV RBC AUTO: 91.3 FL (ref 79–97)
MODALITY: ABNORMAL
MODALITY: ABNORMAL
MONOCYTES # BLD AUTO: 0.8 10*3/MM3 (ref 0.1–0.9)
MONOCYTES NFR BLD AUTO: 9.2 % (ref 5–12)
NEUTROPHILS NFR BLD AUTO: 6.2 10*3/MM3 (ref 1.7–7)
NEUTROPHILS NFR BLD AUTO: 74.8 % (ref 42.7–76)
NRBC BLD AUTO-RTO: 0.1 /100 WBC (ref 0–0.2)
PCO2 BLDA: 49.4 MM HG (ref 35–48)
PCO2 BLDV: 67.9 MM HG (ref 42–51)
PH BLDA: 7.38 PH UNITS (ref 7.35–7.45)
PH BLDV: 7.31 PH UNITS (ref 7.32–7.43)
PHOSPHATE SERPL-MCNC: 2.8 MG/DL (ref 2.5–4.5)
PLATELET # BLD AUTO: 168 10*3/MM3 (ref 140–450)
PMV BLD AUTO: 9.9 FL (ref 6–12)
PO2 BLDA: 77.1 MM HG (ref 83–108)
PO2 BLDV: 22.1 MM HG
POTASSIUM SERPL-SCNC: 4.6 MMOL/L (ref 3.5–5.2)
PROT SERPL-MCNC: 6.9 G/DL (ref 6–8.5)
PROTHROMBIN TIME: 11.4 SECONDS (ref 9.6–11.7)
RBC # BLD AUTO: 3.48 10*6/MM3 (ref 4.14–5.8)
SAO2 % BLDCOA: 94.7 % (ref 94–98)
SAO2 % BLDCOV: 30.3 %
SARS-COV-2 ORF1AB RESP QL NAA+PROBE: DETECTED
SODIUM SERPL-SCNC: 132 MMOL/L (ref 136–145)
TROPONIN T SERPL-MCNC: 0.11 NG/ML (ref 0–0.03)
TROPONIN T SERPL-MCNC: 0.13 NG/ML (ref 0–0.03)
TSH SERPL DL<=0.05 MIU/L-ACNC: 2.03 UIU/ML (ref 0.27–4.2)
WBC # BLD AUTO: 8.3 10*3/MM3 (ref 3.4–10.8)

## 2021-02-10 PROCEDURE — 85730 THROMBOPLASTIN TIME PARTIAL: CPT | Performed by: EMERGENCY MEDICINE

## 2021-02-10 PROCEDURE — 82803 BLOOD GASES ANY COMBINATION: CPT

## 2021-02-10 PROCEDURE — 63710000001 INSULIN GLARGINE PER 5 UNITS: Performed by: INTERNAL MEDICINE

## 2021-02-10 PROCEDURE — 63710000001 INSULIN LISPRO (HUMAN) PER 5 UNITS: Performed by: INTERNAL MEDICINE

## 2021-02-10 PROCEDURE — G0378 HOSPITAL OBSERVATION PER HR: HCPCS

## 2021-02-10 PROCEDURE — 83036 HEMOGLOBIN GLYCOSYLATED A1C: CPT | Performed by: INTERNAL MEDICINE

## 2021-02-10 PROCEDURE — 70450 CT HEAD/BRAIN W/O DYE: CPT

## 2021-02-10 PROCEDURE — 36600 WITHDRAWAL OF ARTERIAL BLOOD: CPT

## 2021-02-10 PROCEDURE — U0004 COV-19 TEST NON-CDC HGH THRU: HCPCS | Performed by: EMERGENCY MEDICINE

## 2021-02-10 PROCEDURE — 63710000001 INSULIN LISPRO (HUMAN) PER 5 UNITS: Performed by: EMERGENCY MEDICINE

## 2021-02-10 PROCEDURE — 84484 ASSAY OF TROPONIN QUANT: CPT | Performed by: INTERNAL MEDICINE

## 2021-02-10 PROCEDURE — 84484 ASSAY OF TROPONIN QUANT: CPT | Performed by: EMERGENCY MEDICINE

## 2021-02-10 PROCEDURE — 84100 ASSAY OF PHOSPHORUS: CPT | Performed by: EMERGENCY MEDICINE

## 2021-02-10 PROCEDURE — 82962 GLUCOSE BLOOD TEST: CPT

## 2021-02-10 PROCEDURE — 80053 COMPREHEN METABOLIC PANEL: CPT | Performed by: EMERGENCY MEDICINE

## 2021-02-10 PROCEDURE — 85025 COMPLETE CBC W/AUTO DIFF WBC: CPT | Performed by: EMERGENCY MEDICINE

## 2021-02-10 PROCEDURE — 85610 PROTHROMBIN TIME: CPT | Performed by: EMERGENCY MEDICINE

## 2021-02-10 PROCEDURE — 83735 ASSAY OF MAGNESIUM: CPT | Performed by: EMERGENCY MEDICINE

## 2021-02-10 PROCEDURE — 5A1D70Z PERFORMANCE OF URINARY FILTRATION, INTERMITTENT, LESS THAN 6 HOURS PER DAY: ICD-10-PCS | Performed by: INTERNAL MEDICINE

## 2021-02-10 PROCEDURE — 84443 ASSAY THYROID STIM HORMONE: CPT | Performed by: EMERGENCY MEDICINE

## 2021-02-10 RX ORDER — INSULIN LISPRO 100 [IU]/ML
0-14 INJECTION, SOLUTION INTRAVENOUS; SUBCUTANEOUS AS NEEDED
Status: DISCONTINUED | OUTPATIENT
Start: 2021-02-10 | End: 2021-02-16 | Stop reason: HOSPADM

## 2021-02-10 RX ORDER — SODIUM CHLORIDE 0.9 % (FLUSH) 0.9 %
3 SYRINGE (ML) INJECTION EVERY 12 HOURS SCHEDULED
Status: DISCONTINUED | OUTPATIENT
Start: 2021-02-10 | End: 2021-02-16 | Stop reason: HOSPADM

## 2021-02-10 RX ORDER — LEVETIRACETAM 500 MG/1
500 TABLET, EXTENDED RELEASE ORAL DAILY
Status: DISCONTINUED | OUTPATIENT
Start: 2021-02-10 | End: 2021-02-16 | Stop reason: HOSPADM

## 2021-02-10 RX ORDER — ZINC SULFATE 50(220)MG
220 CAPSULE ORAL DAILY
Status: DISCONTINUED | OUTPATIENT
Start: 2021-02-10 | End: 2021-02-16 | Stop reason: HOSPADM

## 2021-02-10 RX ORDER — LOSARTAN POTASSIUM 50 MG/1
50 TABLET ORAL EVERY MORNING
Status: DISCONTINUED | OUTPATIENT
Start: 2021-02-10 | End: 2021-02-16 | Stop reason: HOSPADM

## 2021-02-10 RX ORDER — NICOTINE POLACRILEX 4 MG
15 LOZENGE BUCCAL
Status: DISCONTINUED | OUTPATIENT
Start: 2021-02-10 | End: 2021-02-16 | Stop reason: HOSPADM

## 2021-02-10 RX ORDER — OLMESARTAN MEDOXOMIL 40 MG/1
40 TABLET ORAL DAILY
COMMUNITY
End: 2021-02-16 | Stop reason: HOSPADM

## 2021-02-10 RX ORDER — FAMOTIDINE 20 MG/1
40 TABLET, FILM COATED ORAL DAILY
Status: DISCONTINUED | OUTPATIENT
Start: 2021-02-10 | End: 2021-02-10

## 2021-02-10 RX ORDER — GABAPENTIN 300 MG/1
600 CAPSULE ORAL EVERY 12 HOURS SCHEDULED
Status: DISCONTINUED | OUTPATIENT
Start: 2021-02-10 | End: 2021-02-11

## 2021-02-10 RX ORDER — ACETAMINOPHEN 325 MG/1
650 TABLET ORAL EVERY 4 HOURS PRN
Status: DISCONTINUED | OUTPATIENT
Start: 2021-02-10 | End: 2021-02-16 | Stop reason: HOSPADM

## 2021-02-10 RX ORDER — AMLODIPINE BESYLATE 10 MG/1
10 TABLET ORAL DAILY
COMMUNITY
End: 2022-09-30 | Stop reason: HOSPADM

## 2021-02-10 RX ORDER — ONDANSETRON 2 MG/ML
4 INJECTION INTRAMUSCULAR; INTRAVENOUS EVERY 6 HOURS PRN
Status: DISCONTINUED | OUTPATIENT
Start: 2021-02-10 | End: 2021-02-16 | Stop reason: HOSPADM

## 2021-02-10 RX ORDER — ATORVASTATIN CALCIUM 40 MG/1
40 TABLET, FILM COATED ORAL NIGHTLY
Status: DISCONTINUED | OUTPATIENT
Start: 2021-02-10 | End: 2021-02-16 | Stop reason: HOSPADM

## 2021-02-10 RX ORDER — INSULIN LISPRO 100 [IU]/ML
10 INJECTION, SOLUTION INTRAVENOUS; SUBCUTANEOUS ONCE
Status: COMPLETED | OUTPATIENT
Start: 2021-02-10 | End: 2021-02-10

## 2021-02-10 RX ORDER — AMOXICILLIN 250 MG
1 CAPSULE ORAL 2 TIMES DAILY
Status: DISCONTINUED | OUTPATIENT
Start: 2021-02-10 | End: 2021-02-16 | Stop reason: HOSPADM

## 2021-02-10 RX ORDER — LEVETIRACETAM 500 MG/1
500 TABLET, EXTENDED RELEASE ORAL DAILY
COMMUNITY

## 2021-02-10 RX ORDER — AMOXICILLIN 250 MG
1 CAPSULE ORAL 2 TIMES DAILY
Status: ON HOLD | COMMUNITY
End: 2022-09-20

## 2021-02-10 RX ORDER — AMLODIPINE BESYLATE 5 MG/1
10 TABLET ORAL DAILY
Status: DISCONTINUED | OUTPATIENT
Start: 2021-02-10 | End: 2021-02-11

## 2021-02-10 RX ORDER — ASCORBIC ACID 500 MG
500 TABLET ORAL DAILY
Status: DISCONTINUED | OUTPATIENT
Start: 2021-02-10 | End: 2021-02-16 | Stop reason: HOSPADM

## 2021-02-10 RX ORDER — INSULIN GLARGINE 100 [IU]/ML
40 INJECTION, SOLUTION SUBCUTANEOUS NIGHTLY
Status: DISCONTINUED | OUTPATIENT
Start: 2021-02-10 | End: 2021-02-10

## 2021-02-10 RX ORDER — SODIUM CHLORIDE 0.9 % (FLUSH) 0.9 %
3-10 SYRINGE (ML) INJECTION AS NEEDED
Status: DISCONTINUED | OUTPATIENT
Start: 2021-02-10 | End: 2021-02-16 | Stop reason: HOSPADM

## 2021-02-10 RX ORDER — SEVELAMER CARBONATE 800 MG/1
2400 TABLET, FILM COATED ORAL
Status: DISCONTINUED | OUTPATIENT
Start: 2021-02-10 | End: 2021-02-16 | Stop reason: HOSPADM

## 2021-02-10 RX ORDER — DOCUSATE SODIUM 100 MG/1
100 CAPSULE, LIQUID FILLED ORAL 2 TIMES DAILY
Status: ON HOLD | COMMUNITY
End: 2022-09-20

## 2021-02-10 RX ORDER — INSULIN LISPRO 100 [IU]/ML
0-14 INJECTION, SOLUTION INTRAVENOUS; SUBCUTANEOUS
Status: DISCONTINUED | OUTPATIENT
Start: 2021-02-10 | End: 2021-02-16 | Stop reason: HOSPADM

## 2021-02-10 RX ORDER — INSULIN GLARGINE 100 [IU]/ML
40 INJECTION, SOLUTION SUBCUTANEOUS ONCE
Status: COMPLETED | OUTPATIENT
Start: 2021-02-10 | End: 2021-02-10

## 2021-02-10 RX ORDER — METOPROLOL TARTRATE 50 MG/1
50 TABLET, FILM COATED ORAL 2 TIMES DAILY
Status: DISCONTINUED | OUTPATIENT
Start: 2021-02-10 | End: 2021-02-16 | Stop reason: HOSPADM

## 2021-02-10 RX ORDER — DEXTROSE MONOHYDRATE 25 G/50ML
25 INJECTION, SOLUTION INTRAVENOUS
Status: DISCONTINUED | OUTPATIENT
Start: 2021-02-10 | End: 2021-02-16 | Stop reason: HOSPADM

## 2021-02-10 RX ORDER — INSULIN LISPRO 100 [IU]/ML
8 INJECTION, SOLUTION INTRAVENOUS; SUBCUTANEOUS ONCE
Status: COMPLETED | OUTPATIENT
Start: 2021-02-10 | End: 2021-02-10

## 2021-02-10 RX ORDER — AMITRIPTYLINE HYDROCHLORIDE 25 MG/1
50 TABLET, FILM COATED ORAL NIGHTLY
Status: DISCONTINUED | OUTPATIENT
Start: 2021-02-10 | End: 2021-02-11

## 2021-02-10 RX ORDER — DOCUSATE SODIUM 100 MG/1
100 CAPSULE, LIQUID FILLED ORAL 2 TIMES DAILY
Status: DISCONTINUED | OUTPATIENT
Start: 2021-02-10 | End: 2021-02-16 | Stop reason: HOSPADM

## 2021-02-10 RX ADMIN — DOCUSATE SODIUM 50 MG AND SENNOSIDES 8.6 MG 1 TABLET: 8.6; 5 TABLET, FILM COATED ORAL at 10:55

## 2021-02-10 RX ADMIN — DOCUSATE SODIUM 100 MG: 100 CAPSULE, LIQUID FILLED ORAL at 10:56

## 2021-02-10 RX ADMIN — AMLODIPINE BESYLATE 10 MG: 5 TABLET ORAL at 10:56

## 2021-02-10 RX ADMIN — LEVETIRACETAM 500 MG: 500 TABLET, FILM COATED, EXTENDED RELEASE ORAL at 10:55

## 2021-02-10 RX ADMIN — SODIUM CHLORIDE 500 ML: 0.9 INJECTION, SOLUTION INTRAVENOUS at 01:05

## 2021-02-10 RX ADMIN — AMITRIPTYLINE HYDROCHLORIDE 50 MG: 25 TABLET, FILM COATED ORAL at 21:16

## 2021-02-10 RX ADMIN — INSULIN LISPRO 8 UNITS: 100 INJECTION, SOLUTION INTRAVENOUS; SUBCUTANEOUS at 01:46

## 2021-02-10 RX ADMIN — GABAPENTIN 600 MG: 300 CAPSULE ORAL at 21:16

## 2021-02-10 RX ADMIN — DEXTROSE 15 G: 15 GEL ORAL at 07:52

## 2021-02-10 RX ADMIN — DOCUSATE SODIUM 50 MG AND SENNOSIDES 8.6 MG 1 TABLET: 8.6; 5 TABLET, FILM COATED ORAL at 21:17

## 2021-02-10 RX ADMIN — Medication 3 ML: at 21:18

## 2021-02-10 RX ADMIN — METOPROLOL TARTRATE 50 MG: 50 TABLET, FILM COATED ORAL at 21:19

## 2021-02-10 RX ADMIN — Medication 3 ML: at 10:54

## 2021-02-10 RX ADMIN — SEVELAMER CARBONATE 2400 MG: 800 TABLET, FILM COATED ORAL at 11:00

## 2021-02-10 RX ADMIN — INSULIN LISPRO 10 UNITS: 100 INJECTION, SOLUTION INTRAVENOUS; SUBCUTANEOUS at 04:03

## 2021-02-10 RX ADMIN — LOSARTAN POTASSIUM 50 MG: 50 TABLET, FILM COATED ORAL at 10:55

## 2021-02-10 RX ADMIN — ATORVASTATIN CALCIUM 40 MG: 40 TABLET, FILM COATED ORAL at 21:17

## 2021-02-10 RX ADMIN — DOCUSATE SODIUM 100 MG: 100 CAPSULE, LIQUID FILLED ORAL at 21:17

## 2021-02-10 RX ADMIN — METOPROLOL TARTRATE 50 MG: 50 TABLET, FILM COATED ORAL at 10:56

## 2021-02-10 RX ADMIN — GABAPENTIN 600 MG: 300 CAPSULE ORAL at 10:55

## 2021-02-10 RX ADMIN — INSULIN GLARGINE 40 UNITS: 100 INJECTION, SOLUTION SUBCUTANEOUS at 04:03

## 2021-02-10 NOTE — NURSING NOTE
Patient arrived from dialysis accompanied by transporter. Pt. Was in no acute distress, but remains extremely drowsy. Pt. Is able to briefly wake up and tell me his name, birthday, and where he is bur drifts right back to sleep. Pt. Is on room air and vitals stable at this time. Will continue to monitor.

## 2021-02-10 NOTE — ED PROVIDER NOTES
Subjective   History of Present Illness  General weakness  55-year-old male with history of chronic renal failure and diabetes presents with 2 to 3 days worth of increased general weakness.  Reportedly has had a couple of falls.  He denies trauma.  He reports no headache or chest pain or abdominal pain or syncope.  He reports no focal numbness or weakness.  He denies vomiting or diarrhea.  He states he had dialysis yesterday.  Per EMS report his blood sugar was running high on their meter.  He states his last dose of insulin was yesterday.  Review of Systems   Constitutional: Negative for appetite change, fatigue and fever.   HENT: Negative.    Eyes: Negative.    Respiratory: Negative.    Cardiovascular: Negative.    Gastrointestinal: Negative.    Genitourinary: Negative.    Musculoskeletal: Negative.    Skin: Negative.    Neurological: Positive for weakness. Negative for dizziness, seizures, syncope, light-headedness, numbness and headaches.   Psychiatric/Behavioral: Negative.        Past Medical History:   Diagnosis Date   • Anemia    • Cataract    • Constipation    • CVA (cerebral vascular accident) (CMS/[facility])     x 8   • CVA (cerebral vascular accident) (CMS/[facility])    • Depression    • Diabetes mellitus (CMS/[facility])    • ESRD (end stage renal disease) on dialysis (CMS/[facility])    • H/O colonoscopy 2016   • History of noncompliance with medical treatment    • HLD (hyperlipidemia) .   • Hyperlipidemia    • Hypertension    • Insomnia    • Neuropathy    • Renal failure     Stage IV - AI -- on dialysis -- mwf   • Retinopathy    • TIA (transient ischemic attack)    • Type 2 diabetes mellitus (CMS/[facility])        Allergies   Allergen Reactions   • Latex Unknown (See Comments)   • Other Unknown (See Comments)   • Penicillins Unknown (See Comments)   • Latex, Natural Rubber Unknown - High Severity   • Penicillins Unknown - High Severity   • Amoxicillin Rash       Past Surgical History:   Procedure Laterality Date   • ANKLE SURGERY  "Left    • ANKLE SURGERY     • APPENDECTOMY     • ARTERIOVENOUS FISTULA     • CATARACT EXTRACTION, BILATERAL  2018   • COLON RESECTION  2016   • OTHER SURGICAL HISTORY Left 02/23/2017    Left Distula Placement Dr Guan       Family History   Problem Relation Age of Onset   • Heart disease Mother    • Heart attack Mother    • Hypertension Mother    • Breast cancer Sister    • Hyperlipidemia Mother    • Stroke Mother    • Cancer Sister         Breast Cancer   • Diabetes Sister    • Heart disease Maternal Grandmother    • Hyperlipidemia Maternal Grandmother    • Hypertension Maternal Grandmother        Social History     Socioeconomic History   • Marital status:      Spouse name: Not on file   • Number of children: Not on file   • Years of education: Not on file   • Highest education level: Not on file   Tobacco Use   • Smoking status: Never Smoker   • Smokeless tobacco: Current User     Types: Chew   • Tobacco comment: Passive Smoke exposure: no   Substance and Sexual Activity   • Alcohol use: Not Currently   • Drug use: Defer   • Sexual activity: Defer   Social History Narrative    ** Merged History Encounter **            Prior to Admission medications    Medication Sig Start Date End Date Taking? Authorizing Provider   amitriptyline (ELAVIL) 50 MG tablet TAKE 1 TABLET BY BY MOUTH EVERY DAY AT BEDTIME 5/13/19   Andrea oLck MD   amitriptyline (ELAVIL) 50 MG tablet Take 50 mg by mouth Every Night.    Andrea Lock MD   amLODIPine (NORVASC) 5 MG tablet TAKE ONE TABLET BY MOUTH ONE TIME DAILY 11/18/13   Andrea Lock MD   atorvastatin (LIPITOR) 40 MG tablet Take 40 mg by mouth Every Night. 5/13/19   Andrea Lock MD   atorvastatin (LIPITOR) 40 MG tablet Take 40 mg by mouth Daily.    ProviderAndrea MD   BD Insulin Syringe U/F 31G X 5/16\" 1 ML misc USE FOUR TIMES DAILY AS DIRECTED (E11.65) 12/24/20   Ophelia Murrieta MD   clotrimazole-betamethasone (LOTRISONE) 1-0.05 % cream " " 1/3/20   Andrea Lock MD   Continuous Blood Gluc  (FREESTYLE ANNABELLE 14 DAY READER) device     Andrea Lock MD   ergocalciferol (DRISDOL) 51666 units capsule 1 capsule Every 7 (Seven) Days. 9/7/17   Andrea Lock MD   gabapentin (NEURONTIN) 600 MG tablet Take 600 mg by mouth 2 (Two) Times a Day. 5/14/19   Andrea Lock MD   gabapentin (NEURONTIN) 600 MG tablet TAKE 1 TABLET BY MOUTH TWICE DAILY 12/5/19   Andrea Lock MD   gabapentin (NEURONTIN) 600 MG tablet  1/3/20   Andrea Lock MD   glucagon (GLUCAGON EMERGENCY) 1 MG injection GLUCAGON EMERGENCY 1 MG KIT 7/9/15   Andrea Lock MD   hydrOXYzine (ATARAX) 25 MG tablet TAKE ONE TABLET BY MOUTH TWICE DAILY AS NEEDED FOR ITCHING 3/9/20   Andrea Lock MD   hydrOXYzine (ATARAX) 25 MG tablet Take 1 tablet by mouth Every 6 (Six) Hours As Needed for Itching (Discomfort from tongue irritation). 12/20/20   Baljit Abdullahi MD   hydrOXYzine (ATARAX) 25 MG tablet Take 25 mg by mouth 2 (Two) Times a Day As Needed for Itching.    Andrea Lock MD   insulin glargine (LANTUS) 100 UNIT/ML injection Inject 20 Units under the skin into the appropriate area as directed Every 12 (Twelve) Hours. 8/19/20   Sandra Gross MD   Insulin Lispro (HUMALOG KWIKPEN) 100 UNIT/ML solution pen-injector HUMALOG KWIKPEN 100 UNIT/ML SOPN 2/8/18   Andrea Lock MD   Insulin Pen Needle (BD PEN NEEDLE JEREMY U/F) 32G X 4 MM misc BD PEN NEEDLE JEREMY U/F 32G X 4 MM 2/8/18   Andrea Lock MD   Insulin Syringe-Needle U-100 31G X 5/16\" 0.3 ML misc  11/8/15   Andrea Lock MD   lacosamide (Vimpat) 100 MG tablet tablet Take 1 tablet by mouth Every 12 (Twelve) Hours. 8/19/20   Sandra Gross MD   LANTUS 100 UNIT/ML injection INJECT 30 UNITS SUBCUTANEOUSLY EVERY NIGHT AT BEDTIME 5/13/19   Andrea Lock MD   levETIRAcetam (Keppra) 500 MG tablet Take 1 tablet by mouth 2 (Two) Times a Day. 8/19/20   " "Sandra Gross MD   levoFLOXacin (LEVAQUIN) 500 MG tablet Patient is ESRD stage 4 and according to pharmacist recommendation patient should have 500 mg qod, 5 pills for 10 days 12/22/15   Andrea Lock MD   LORazepam (ATIVAN) 0.5 MG tablet Take 0.5 mg by mouth 3 (Three) Times a Day. 5/14/19   Andrea Lock MD   losartan (COZAAR) 50 MG tablet Take 50 mg by mouth Every Morning. 2/26/20   Andrea Lock MD   losartan (COZAAR) 50 MG tablet Take 1 tablet by mouth Daily. 8/20/20   Sandra Gross MD   losartan-hydrochlorothiazide (HYZAAR) 100-25 MG per tablet TAKE ONE TABLET BY MOUTH ONE TIME DAILY 11/18/13   Andrea Lock MD   metoprolol tartrate (LOPRESSOR) 50 MG tablet Take 50 mg by mouth 2 (Two) Times a Day. 5/13/19   Andrea Lock MD   metoprolol tartrate (LOPRESSOR) 50 MG tablet Take 50 mg by mouth 2 (Two) Times a Day.    Andrea Lock MD   nystatin (MYCOSTATIN) 063497 UNIT/ML suspension Swish 1 teaspoon in your mouth 4 times daily for 1 minute then swallow 12/20/20   Baljit Abdullahi MD   pantoprazole (PROTONIX) 40 MG EC tablet TAKE ONE TABLET BY MOUTH EVERY MORNING 30 MINUTES BEFORE FOOD 7/9/19   Andrea Lock MD   pantoprazole (PROTONIX) 40 MG EC tablet Take 40 mg by mouth Daily.    Andrea Lokc MD   risperiDONE (risperDAL) 1 MG tablet Take 1 mg by mouth 2 (Two) Times a Day. 5/13/19   Andrea Lock MD   risperiDONE (risperDAL) 1 MG tablet Take 1 mg by mouth 2 (Two) Times a Day.    Andrea Lock MD   sevelamer (RENVELA) 800 MG tablet Take 800 mg by mouth 3 (Three) Times a Day With Meals. 6/4/19   Andrea Lock MD   terbinafine (lamiSIL) 250 MG tablet Take 250 mg by mouth Daily. 8/8/19   Andrea Lock MD     /61   Pulse 92   Temp 98.3 °F (36.8 °C) (Oral)   Resp 19   Ht 175.3 cm (69\")   Wt 90.7 kg (200 lb)   SpO2 97%   BMI 29.53 kg/m²   I examined the patient using the appropriate personal protective " equipment.        Objective   Physical Exam  General: Well-developed male no acute distress, awake and alert  Eyes: Pupils round and reactive, sclera nonicteric  HEENT: Mucous membranes somewhat dry, no mucosal swelling  Neck: Supple, no nuchal rigidity, no lymphadenopathy  Respirations: Respirations nonlabored, equal breath sounds bilaterally, clear lungs  Heart regular rate and rhythm, no murmurs rubs or gallops,   Abdomen soft nontender nondistended, no hepatosplenomegaly,   Extremities no clubbing cyanosis or edema, calves are symmetric and nontender  Neuro cranial nerves II through XII intact , normal sensory/motor function and strength in four extremities, speech slightly slurred, reported as baseline; no facial droop, normal finger to nose, normal heel to shin, no nuchal rigidity  Psych oriented, pleasant affect  Skin no rash, brisk cap refill  Procedures           ED Course      My EKG interpretation sinus rhythm, borderline first-degree AV block, rate of 91, left ventricular hypertrophy           Results for orders placed or performed during the hospital encounter of 02/09/21   Comprehensive Metabolic Panel    Specimen: Blood   Result Value Ref Range    Glucose 548 (C) 65 - 99 mg/dL    BUN 25 (H) 6 - 20 mg/dL    Creatinine 5.96 (H) 0.76 - 1.27 mg/dL    Sodium 132 (L) 136 - 145 mmol/L    Potassium 4.6 3.5 - 5.2 mmol/L    Chloride 93 (L) 98 - 107 mmol/L    CO2 28.0 22.0 - 29.0 mmol/L    Calcium 9.9 8.6 - 10.5 mg/dL    Total Protein 6.9 6.0 - 8.5 g/dL    Albumin 3.80 3.50 - 5.20 g/dL    ALT (SGPT) 13 1 - 41 U/L    AST (SGOT) 31 1 - 40 U/L    Alkaline Phosphatase 141 (H) 39 - 117 U/L    Total Bilirubin 0.3 0.0 - 1.2 mg/dL    eGFR Non  Amer      eGFR  African Amer 12 (L) >60 mL/min/1.73    Globulin 3.1 gm/dL    A/G Ratio 1.2 g/dL    BUN/Creatinine Ratio 4.2 (L) 7.0 - 25.0    Anion Gap 11.0 5.0 - 15.0 mmol/L   Protime-INR    Specimen: Blood   Result Value Ref Range    Protime 11.4 9.6 - 11.7 Seconds    INR  1.04 0.93 - 1.10   aPTT    Specimen: Blood   Result Value Ref Range    PTT 24.9 24.0 - 31.0 seconds   Magnesium    Specimen: Blood   Result Value Ref Range    Magnesium 2.2 1.6 - 2.6 mg/dL   Phosphorus    Specimen: Blood   Result Value Ref Range    Phosphorus 2.8 2.5 - 4.5 mg/dL   TSH    Specimen: Blood   Result Value Ref Range    TSH 2.030 0.270 - 4.200 uIU/mL   Troponin    Specimen: Blood   Result Value Ref Range    Troponin T 0.132 (C) 0.000 - 0.030 ng/mL   CBC Auto Differential    Specimen: Blood   Result Value Ref Range    WBC 8.30 3.40 - 10.80 10*3/mm3    RBC 3.48 (L) 4.14 - 5.80 10*6/mm3    Hemoglobin 10.3 (L) 13.0 - 17.7 g/dL    Hematocrit 31.8 (L) 37.5 - 51.0 %    MCV 91.3 79.0 - 97.0 fL    MCH 29.6 26.6 - 33.0 pg    MCHC 32.4 31.5 - 35.7 g/dL    RDW 18.8 (H) 12.3 - 15.4 %    RDW-SD 60.4 (H) 37.0 - 54.0 fl    MPV 9.9 6.0 - 12.0 fL    Platelets 168 140 - 450 10*3/mm3    Neutrophil % 74.8 42.7 - 76.0 %    Lymphocyte % 11.4 (L) 19.6 - 45.3 %    Monocyte % 9.2 5.0 - 12.0 %    Eosinophil % 3.8 0.3 - 6.2 %    Basophil % 0.8 0.0 - 1.5 %    Neutrophils, Absolute 6.20 1.70 - 7.00 10*3/mm3    Lymphocytes, Absolute 0.90 0.70 - 3.10 10*3/mm3    Monocytes, Absolute 0.80 0.10 - 0.90 10*3/mm3    Eosinophils, Absolute 0.30 0.00 - 0.40 10*3/mm3    Basophils, Absolute 0.10 0.00 - 0.20 10*3/mm3    nRBC 0.1 0.0 - 0.2 /100 WBC   Blood Gas, Venous -    Specimen: Venous Blood   Result Value Ref Range    Site RA     pH, Venous 7.306 (L) 7.320 - 7.430 pH Units    pCO2, Venous 67.9 (H) 42.0 - 51.0 mm Hg    pO2, Venous 22.1 (C) >=40.0 mm Hg    HCO3, Venous 33.8 mmol/L    Base Excess, Venous 5.6 mmol/L    O2 Saturation, Venous 30.3 %    CO2 Content 35.9 (H) 22 - 29 mmol/L    Barometric Pressure for Blood Gas      Modality Room Air     FIO2 21 %   POC Glucose Once    Specimen: Blood   Result Value Ref Range    Glucose >500 (C) 70 - 105 mg/dL   ECG 12 Lead   Result Value Ref Range    QT Interval 371 ms                          My chest  x-ray interpretation cardiomegaly, no acute infiltrate, pending radiology report    Head CT report  IMPRESSION:     1. No acute intracranial abnormality identified by CT.  2. At least moderate generalized brain volume loss, advanced for age.  3. Numerous chronic infarcts in the cerebellum. Chronic lacunar infarcts in the basal ganglia and thalamus.  4. Small vessel ischemic changes in the cerebral white matter. Atherosclerosis.          MDM  Patient presents with some reported generalized weakness.  He has severe hyperglycemia secondary to uncontrolled insulin-dependent diabetes.  However he is not in DKA his serum bicarb is normal and has no significant gap acidosis.  His blood gas pH was slightly depressed.  He was ordered subcu insulin.  His potassium is normal.  He is due for dialysis in the morning.  He does not have findings of volume overload tonight and does appear somewhat dehydrated was given a small dose of some IV fluids.  Troponin was borderline elevated but likely this was secondary to the chronic renal failure, he is not describing ischemic chest pain or syncope.  Case discussed with Dr. Campos and patient placed asp observation for further management of his weakness and hyperglycemia.  Nephrology will be consulted.  A urinalysis is pending.  Patient states he makes very little urine.  Final diagnoses:   Weakness   Chronic renal failure, unspecified CKD stage   Hyperglycemia   Uncontrolled insulin dependent type 1 diabetes mellitus (CMS/[facility])            Jer Townsend MD  02/10/21 0146       Jer Townsend MD  02/10/21 0146

## 2021-02-10 NOTE — PLAN OF CARE
Goal Outcome Evaluation:  Plan of Care Reviewed With: patient  Progress: no change  Outcome Summary: Patient admitted overnight from ER after a fall at home.  Patient with CKD and uncontrolled DM type 1.  Patient blood sugar initially greater than 500, came down to 400's; insulin given and will continue to monitor.

## 2021-02-10 NOTE — CONSULTS
Referring Provider: Dr. HANSEL Campos  Reason for Consultation: ESRD    Chief complaint generalized weakness    History of present illness:    Patient is 55 years old -American gentleman well-known to me and has history of ESRD, hypertension, diabetes, CVA came to the hospital with generalized weakness and feeling very sleepy and tired.  Patient also had decreased appetite and oral intake.  His blood sugar was found to be very high.  As per report he had fallen at home.  Patient denied any headache, dizziness, fever, cough, expectoration, shortness of breath, nausea or vomiting.  Patient's ABG showed hypercapnic respiratory failure with hypoxia    History  Past Medical History:   Diagnosis Date   • Anemia    • Cataract    • Constipation    • CVA (cerebral vascular accident) (CMS/[facility])     x 8   • CVA (cerebral vascular accident) (CMS/[facility])    • Depression    • Diabetes mellitus (CMS/[facility])    • ESRD (end stage renal disease) on dialysis (CMS/[facility])    • H/O colonoscopy 2016   • History of noncompliance with medical treatment    • HLD (hyperlipidemia) .   • Hyperlipidemia    • Hypertension    • Insomnia    • Neuropathy    • Renal failure     Stage IV - AI -- on dialysis -- mwf   • Retinopathy    • TIA (transient ischemic attack)    • Type 2 diabetes mellitus (CMS/[facility])      Past Surgical History:   Procedure Laterality Date   • ANKLE SURGERY Left    • ANKLE SURGERY     • APPENDECTOMY     • ARTERIOVENOUS FISTULA     • CATARACT EXTRACTION, BILATERAL  2018   • COLON RESECTION  2016   • OTHER SURGICAL HISTORY Left 02/23/2017    Left Distula Placement Dr Guan     Social History     Tobacco Use   • Smoking status: Never Smoker   • Smokeless tobacco: Current User     Types: Chew   • Tobacco comment: Passive Smoke exposure: no   Substance Use Topics   • Alcohol use: Not Currently   • Drug use: Defer     Family History   Problem Relation Age of Onset   • Heart disease Mother    • Heart attack Mother    • Hypertension Mother     • Breast cancer Sister    • Hyperlipidemia Mother    • Stroke Mother    • Cancer Sister         Breast Cancer   • Diabetes Sister    • Heart disease Maternal Grandmother    • Hyperlipidemia Maternal Grandmother    • Hypertension Maternal Grandmother        Review of Systems  ROS  All system reviewed, negative except as mentioned history present illness  Objective     Vital Signs  Temp:  [97.3 °F (36.3 °C)-98.3 °F (36.8 °C)] 97.3 °F (36.3 °C)  Heart Rate:  [92-95] 92  Resp:  [18-22] 22  BP: (126-184)/() 145/102    No intake/output data recorded.  I/O last 3 completed shifts:  In: 500 [IV Piggyback:500]  Out: -     Physical Exam:  Physical Exam  General.  Drowsy but arousable  Head.  Atraumatic, normocephalic  Neck.  Supple  ENT floor mucosa moist  Respiratory.  Clear to auscultation anteriorly  Cardiovascular.  Regular rhythm  Abdominal.  Obese, soft, nontender  Extremities.  No edema  Results Review:   I reviewed the patient's new clinical results.    Lab Results   Component Value Date    CALCIUM 9.9 02/10/2021    PHOS 2.8 02/10/2021     Results from last 7 days   Lab Units 02/10/21  0034   MAGNESIUM mg/dL 2.2   SODIUM mmol/L 132*   POTASSIUM mmol/L 4.6   CHLORIDE mmol/L 93*   CO2 mmol/L 28.0   BUN mg/dL 25*   CREATININE mg/dL 5.96*   GLUCOSE mg/dL 548*   CALCIUM mg/dL 9.9   WBC 10*3/mm3 8.30   HEMOGLOBIN g/dL 10.3*   PLATELETS 10*3/mm3 168   ALT (SGPT) U/L 13   AST (SGOT) U/L 31     Lab Results   Component Value Date    CKTOTAL 3,038 (H) 08/12/2020    TROPONINT 0.132 (C) 02/10/2021     Estimated Creatinine Clearance: 15.4 mL/min (A) (by C-G formula based on SCr of 5.96 mg/dL (H)).No results found for: URICACID    Brief Urine Lab Results  (Last result in the past 365 days)      Color   Clarity   Blood   Leuk Est   Nitrite   Protein   CREAT   Urine HCG        08/11/20 2049 Yellow Cloudy  Comment:  Result checked  Large (3+) Small (1+) Negative >=300 mg/dL (3+)               Prior to Admission medications     Medication Sig Start Date End Date Taking? Authorizing Provider   amitriptyline (ELAVIL) 50 MG tablet Take 50 mg by mouth Every Night.   Yes Andrea Lock MD   amLODIPine (NORVASC) 10 MG tablet Take 10 mg by mouth Daily.   Yes nAdrea Lock MD   atorvastatin (LIPITOR) 40 MG tablet Take 40 mg by mouth Every Night. 5/13/19  Yes Andrea Lock MD   docusate sodium (COLACE) 100 MG capsule Take 100 mg by mouth 2 (Two) Times a Day.   Yes Andrea Lock MD   gabapentin (NEURONTIN) 600 MG tablet Take 600 mg by mouth 2 (Two) Times a Day. 5/14/19  Yes Andrea Lock MD   hydrOXYzine (ATARAX) 25 MG tablet Take 1 tablet by mouth Every 6 (Six) Hours As Needed for Itching (Discomfort from tongue irritation). 12/20/20  Yes Baljit Abdullahi MD   Insulin Lispro (HUMALOG KWIKPEN) 100 UNIT/ML solution pen-injector Inject 6 Units under the skin into the appropriate area as directed 3 (Three) Times a Day. Per [facility] 2/8/18  Yes Andrea Lock MD   LANTUS 100 UNIT/ML injection Inject 40 Units under the skin into the appropriate area as directed Every Night. 5/13/19  Yes Andrea Lock MD   levETIRAcetam XR (KEPPRA XR) 500 MG 24 hr tablet Take 500 mg by mouth Daily.   Yes ProviderAndrea MD   losartan (COZAAR) 50 MG tablet Take 50 mg by mouth Every Morning. 2/26/20  Yes Andrea Lock MD   metoprolol tartrate (LOPRESSOR) 50 MG tablet Take 50 mg by mouth 2 (Two) Times a Day. 5/13/19  Yes Andrea Lock MD   olmesartan (BENICAR) 40 MG tablet Take 40 mg by mouth Daily.   Yes Andrea Lock MD   risperiDONE (risperDAL) 1 MG tablet Take 1 mg by mouth 2 (Two) Times a Day. 5/13/19  Yes Andrea Lock MD   sennosides-docusate (senna-docusate sodium) 8.6-50 MG per tablet Take 1 tablet by mouth 2 (two) times a day.   Yes Andrea Lock MD   sevelamer (RENVELA) 800 MG tablet Take 2,400 mg by mouth 3 (Three) Times a Day With Meals. 6/4/19  Yes Provider,  "MD Andrea   glucagon (GLUCAGON EMERGENCY) 1 MG injection GLUCAGON EMERGENCY 1 MG KIT 7/9/15   Andrea Lock MD   amitriptyline (ELAVIL) 50 MG tablet TAKE 1 TABLET BY BY MOUTH EVERY DAY AT BEDTIME 5/13/19 2/10/21  Andrea Lock MD   amLODIPine (NORVASC) 5 MG tablet TAKE ONE TABLET BY MOUTH ONE TIME DAILY 11/18/13 2/10/21  Andrea Lock MD   atorvastatin (LIPITOR) 40 MG tablet Take 40 mg by mouth Daily.  2/10/21  Andrea Lock MD   BD Insulin Syringe U/F 31G X 5/16\" 1 ML misc USE FOUR TIMES DAILY AS DIRECTED (E11.65) 12/24/20 2/10/21  Ophelia Murrieta MD   clotrimazole-betamethasone (LOTRISONE) 1-0.05 % cream  1/3/20 2/10/21  Andrea Lock MD   Continuous Blood Gluc  (FREESTYLE ANNABELLE 14 DAY READER) device   2/10/21  Andrea Lock MD   ergocalciferol (DRISDOL) 77939 units capsule 1 capsule Every 7 (Seven) Days. 9/7/17 2/10/21  Andrea Lock MD   gabapentin (NEURONTIN) 600 MG tablet TAKE 1 TABLET BY MOUTH TWICE DAILY 12/5/19 2/10/21  Andrea Lock MD   gabapentin (NEURONTIN) 600 MG tablet  1/3/20 2/10/21  Andrea Lock MD   hydrOXYzine (ATARAX) 25 MG tablet TAKE ONE TABLET BY MOUTH TWICE DAILY AS NEEDED FOR ITCHING 3/9/20 2/10/21  Andrea oLck MD   hydrOXYzine (ATARAX) 25 MG tablet Take 25 mg by mouth 2 (Two) Times a Day As Needed for Itching.  2/10/21  Andrea Lock MD   insulin glargine (LANTUS) 100 UNIT/ML injection Inject 20 Units under the skin into the appropriate area as directed Every 12 (Twelve) Hours. 8/19/20 2/10/21  Sandra Gross MD   Insulin Pen Needle (BD PEN NEEDLE JEREMY U/F) 32G X 4 MM misc BD PEN NEEDLE JEREMY U/F 32G X 4 MM 2/8/18 2/10/21  Andrea Lock MD   Insulin Syringe-Needle U-100 31G X 5/16\" 0.3 ML misc  11/8/15 2/10/21  Provider, MD Andrea   lacosamide (Vimpat) 100 MG tablet tablet Take 1 tablet by mouth Every 12 (Twelve) Hours. 8/19/20 2/10/21  Sandra Gross MD   levETIRAcetam " (Keppra) 500 MG tablet Take 1 tablet by mouth 2 (Two) Times a Day. 8/19/20 2/10/21  Sandra Gross MD   levoFLOXacin (LEVAQUIN) 500 MG tablet Patient is ESRD stage 4 and according to pharmacist recommendation patient should have 500 mg qod, 5 pills for 10 days 12/22/15 2/10/21  Andrea Lock MD   LORazepam (ATIVAN) 0.5 MG tablet Take 0.5 mg by mouth 3 (Three) Times a Day. 5/14/19 2/10/21  Andrea Lock MD   losartan (COZAAR) 50 MG tablet Take 1 tablet by mouth Daily. 8/20/20 2/10/21  Sandra Gross MD   losartan-hydrochlorothiazide (HYZAAR) 100-25 MG per tablet TAKE ONE TABLET BY MOUTH ONE TIME DAILY 11/18/13 2/10/21  Andrea Lock MD   metoprolol tartrate (LOPRESSOR) 50 MG tablet Take 50 mg by mouth 2 (Two) Times a Day.  2/10/21  Andrea Lock MD   nystatin (MYCOSTATIN) 991640 UNIT/ML suspension Swish 1 teaspoon in your mouth 4 times daily for 1 minute then swallow 12/20/20 2/10/21  Baljit Abdullahi MD   pantoprazole (PROTONIX) 40 MG EC tablet TAKE ONE TABLET BY MOUTH EVERY MORNING 30 MINUTES BEFORE FOOD 7/9/19 2/10/21  Andrea Lock MD   pantoprazole (PROTONIX) 40 MG EC tablet Take 40 mg by mouth Daily.  2/10/21  Andrea Lock MD   risperiDONE (risperDAL) 1 MG tablet Take 1 mg by mouth 2 (Two) Times a Day.  2/10/21  Andrea Lock MD   terbinafine (lamiSIL) 250 MG tablet Take 250 mg by mouth Daily. 8/8/19 2/10/21  Andrea Lock MD       amitriptyline, 50 mg, Oral, Nightly  amLODIPine, 10 mg, Oral, Daily  atorvastatin, 40 mg, Oral, Nightly  docusate sodium, 100 mg, Oral, BID  gabapentin, 600 mg, Oral, Q12H  insulin glargine, 40 Units, Subcutaneous, Nightly  insulin lispro, 0-14 Units, Subcutaneous, TID AC  levETIRAcetam XR, 500 mg, Oral, Daily  losartan, 50 mg, Oral, QAM  metoprolol tartrate, 50 mg, Oral, BID  sennosides-docusate, 1 tablet, Oral, BID  sevelamer, 2,400 mg, Oral, TID With Meals  sodium chloride, 3 mL, Intravenous, Q12H            Assessment/Plan       1.  ESRD  Patient gets dialysis MWF as outpatient  Electrolytes, volume status okay  2.  Hypertension with ESRD  Patient blood pressure was high on presentation but improving  Outpatient antihypertensives already restarted  3.  Anemia with ESRD  4.  Hyperglycemia    Resc:  I will provide hemodialysis today  Hemoglobin acceptable.  No need for BEST at this time  Continue current and hypertensives  Repeat ABG    I discussed the patients findings and my recommendations with patient and nursing staff    Salinas Ayala MD  02/10/21  10:53 EST

## 2021-02-10 NOTE — PROGRESS NOTES
Discharge Planning Assessment   [place]     Patient Name: Baljit Kurtz  MRN: [id number]  Today's Date: 2/10/2021    Admit Date: 2/9/2021           Discharge Plan     Row Name 02/10/21 1533       Plan    Plan  Pt sleeping, will not speak to CM, will follow up 2/11.Current HD MWF. PT/OT pending.        Continued Care and Services - Admitted Since 2/9/2021    Coordination has not been started for this encounter.           Met with patient in room wearing PPE: mask, goggles.      Maintained distance greater than six feet and spent less than 15 minutes in the room.               Belle Shea RN

## 2021-02-10 NOTE — H&P
Patient Care Team:  Kerri Jones MD as PCP - General (Family Medicine)  Kerri Jones MD (Family Medicine)    Chief complaint weakness    Subjective     Patient is a 55 y.o. male with end-stage renal disease and type 1 diabetes presents with generalized weakness times several days at home.  He told the ER staff he had fallen at home but denies this to me.  He denies any musculoskeletal complaints.  His blood sugar was over 500 in the ER.  His only specific complaints to me today is that he is cold.    Review of Systems   Constitutional: Positive for activity change, appetite change and fatigue. Negative for fever.   HENT: Negative for facial swelling.    Eyes: Negative for visual disturbance.   Respiratory: Negative for cough, shortness of breath, wheezing and stridor.    Cardiovascular: Negative for chest pain, palpitations and leg swelling.   Gastrointestinal: Negative for constipation, diarrhea, nausea and vomiting.   Genitourinary: Negative for dysuria.   Musculoskeletal: Negative for arthralgias, back pain, gait problem and neck pain.   Skin: Negative for rash.   Neurological: Positive for weakness. Negative for dizziness and headaches.   Psychiatric/Behavioral: Negative for confusion.          History  Past Medical History:   Diagnosis Date   • Anemia    • Cataract    • Constipation    • CVA (cerebral vascular accident) (CMS/HCC)     x 8   • CVA (cerebral vascular accident) (CMS/HCC)    • Depression    • Diabetes mellitus (CMS/HCC)    • ESRD (end stage renal disease) on dialysis (CMS/HCC)    • H/O colonoscopy 2016   • History of noncompliance with medical treatment    • HLD (hyperlipidemia) .   • Hyperlipidemia    • Hypertension    • Insomnia    • Neuropathy    • Renal failure     Stage IV - AI -- on dialysis -- [facility]   • Retinopathy    • TIA (transient ischemic attack)    • Type 2 diabetes mellitus (CMS/HCC)      Past Surgical History:   Procedure Laterality Date   • ANKLE SURGERY Left    • ANKLE  SURGERY     • APPENDECTOMY     • ARTERIOVENOUS FISTULA     • CATARACT EXTRACTION, BILATERAL  2018   • COLON RESECTION  2016   • OTHER SURGICAL HISTORY Left 02/23/2017    Left Distula Placement Dr Guan     Family History   Problem Relation Age of Onset   • Heart disease Mother    • Heart attack Mother    • Hypertension Mother    • Breast cancer Sister    • Hyperlipidemia Mother    • Stroke Mother    • Cancer Sister         Breast Cancer   • Diabetes Sister    • Heart disease Maternal Grandmother    • Hyperlipidemia Maternal Grandmother    • Hypertension Maternal Grandmother      Social History     Tobacco Use   • Smoking status: Never Smoker   • Smokeless tobacco: Current User     Types: Chew   • Tobacco comment: Passive Smoke exposure: no   Substance Use Topics   • Alcohol use: Not Currently   • Drug use: Defer     Medications Prior to Admission   Medication Sig Dispense Refill Last Dose   • amitriptyline (ELAVIL) 50 MG tablet Take 50 mg by mouth Every Night.      • amLODIPine (NORVASC) 10 MG tablet Take 10 mg by mouth Daily.      • atorvastatin (LIPITOR) 40 MG tablet Take 40 mg by mouth Every Night.  12    • docusate sodium (COLACE) 100 MG capsule Take 100 mg by mouth 2 (Two) Times a Day.      • gabapentin (NEURONTIN) 600 MG tablet Take 600 mg by mouth 2 (Two) Times a Day.  5    • hydrOXYzine (ATARAX) 25 MG tablet Take 1 tablet by mouth Every 6 (Six) Hours As Needed for Itching (Discomfort from tongue irritation). 12 tablet 0    • Insulin Lispro (HUMALOG KWIKPEN) 100 UNIT/ML solution pen-injector Inject 6 Units under the skin into the appropriate area as directed 3 (Three) Times a Day. Per SSI      • LANTUS 100 UNIT/ML injection Inject 40 Units under the skin into the appropriate area as directed Every Night.  12    • levETIRAcetam XR (KEPPRA XR) 500 MG 24 hr tablet Take 500 mg by mouth Daily.      • losartan (COZAAR) 50 MG tablet Take 50 mg by mouth Every Morning.      • metoprolol tartrate (LOPRESSOR) 50 MG  tablet Take 50 mg by mouth 2 (Two) Times a Day.  2    • olmesartan (BENICAR) 40 MG tablet Take 40 mg by mouth Daily.      • risperiDONE (risperDAL) 1 MG tablet Take 1 mg by mouth 2 (Two) Times a Day.  2    • sennosides-docusate (senna-docusate sodium) 8.6-50 MG per tablet Take 1 tablet by mouth 2 (two) times a day.      • sevelamer (RENVELA) 800 MG tablet Take 2,400 mg by mouth 3 (Three) Times a Day With Meals.  5    • glucagon (GLUCAGON EMERGENCY) 1 MG injection GLUCAGON EMERGENCY 1 MG KIT        Allergies:  Latex; Other; Penicillins; Latex, natural rubber; Penicillins; and Amoxicillin    Objective     Vital Signs  Temp:  [97.3 °F (36.3 °C)-98.3 °F (36.8 °C)] 97.3 °F (36.3 °C)  Heart Rate:  [92-95] 92  Resp:  [18-22] 22  BP: (126-184)/() 145/102     Physical Exam:      General Appearance:    Alert, cooperative, in no acute distress   Head:    Normocephalic, without obvious abnormality, atraumatic   Eyes:            Lids and lashes normal, conjunctivae and sclerae normal, no   icterus, no pallor, corneas clear, PERRLA   Ears:    Ears appear intact with no abnormalities noted   Throat:   No oral lesions, no thrush, oral mucosa moist   Neck:   No adenopathy, supple, trachea midline, no thyromegaly, no   carotid bruit, no JVD   Lungs:     Clear to auscultation,respirations regular, even and                  unlabored    Heart:    Regular rhythm and normal rate, normal S1 and S2, no            murmur, no gallop, no rub, no click   Chest Wall:    No abnormalities observed   Abdomen:     Normal bowel sounds, no masses, no organomegaly, soft        non-tender, non-distended, no guarding, no rebound                tenderness   Extremities:  Left arm AV fistula with thrill   Pulses:   Pulses palpable and equal bilaterally   Skin:   No bleeding, bruising or rash   Lymph nodes:   No palpable adenopathy   Neurologic:  Unable to fully assess       Results Review:     Imaging Results (Last 24 Hours)     Procedure Component  Value Units Date/Time    XR Chest 1 View [[id number]] Collected: 02/10/21 0724     Updated: 02/10/21 0727    Narrative:      EXAMINATION: XR CHEST 1 VW-     DATE OF EXAM: 2/10/2021 12:13 AM     INDICATION: weakness.  Fall, weakness      COMPARISON: Chest radiograph dated 8/18/2020     TECHNIQUE: Portable AP view of the chest was obtained.     FINDINGS:  There is unchanged cardiomegaly. Pulmonary vascularity appears within  normal limits. No evidence of pulmonary edema. There is no acute  airspace consolidation, pleural effusion, or pneumothorax. No acute  change from prior exam. There are degenerative changes of the thoracic  spine.       Impression:      Stable cardiomegaly without acute pulmonary process. No acute change  from prior exam.     Electronically Signed By-Chemo Keating MD On:2/10/2021 7:24 AM  This report was finalized on [id number] by  Chemo Keating MD.    CT Head Without Contrast [[id number]] Collected: 02/10/21 0114     Updated: 02/10/21 0118    Narrative:      EXAMINATION: CT HEAD WITHOUT CONTRAST    DATE: 2/10/2021 12:48 AM    INDICATION: Weakness. Recent falls.    COMPARISON: CT head 8/11/2020    TECHNIQUE: Noncontrast CT imaging through the brain was performed in the axial plane. Coronal reformats were generated. CT dose lowering techniques were used, to include: automated exposure control, adjustment for patient size, and or use of iterative   reconstruction.    FINDINGS:    Intracranial contents:    The ventricles and sulci are at least moderately prominent  There is no midline shift or mass effect.  There is no abnormal extra-axial fluid collection or acute intracranial hemorrhage.    Numerous chronic infarcts in the cerebellum. Chronic lacunar infarcts in bilateral basal ganglia and thalamus. Moderate confluent hypodensity throughout the cerebral white matter. Dense atherosclerotic calcification of the internal carotid arteries.    Bones and extracranial soft tissues:    The  calvarium is intact. The visualized paranasal sinuses and mastoid air cells are clear.        Impression:        1. No acute intracranial abnormality identified by CT.  2. At least moderate generalized brain volume loss, advanced for age.  3. Numerous chronic infarcts in the cerebellum. Chronic lacunar infarcts in the basal ganglia and thalamus.  4. Small vessel ischemic changes in the cerebral white matter. Atherosclerosis.      Yogi Hager M.D.  Neuroradiologist            Electronically signed by:  Yogi Hager M.D.    2/9/2021 11:17 PM           Lab Results (last 24 hours)     Procedure Component Value Units Date/Time    POC Glucose Once [[id number]]  (Abnormal) Collected: 02/10/21 0746    Specimen: Blood Updated: 02/10/21 0751     Glucose 69 mg/dL      Comment: Serial Number: [id number]:  [id number]       POC Glucose Once [[id number]]  (Abnormal) Collected: 02/10/21 0728    Specimen: Blood Updated: 02/10/21 0729     Glucose 66 mg/dL      Comment: Serial Number: [id number]:  [id number]       POC Glucose Once [[id number]]  (Abnormal) Collected: 02/10/21 0304    Specimen: Blood Updated: 02/10/21 0306     Glucose 437 mg/dL      Comment: Serial Number: [id number]:  [id number]       COVID PRE-OP / PRE-PROCEDURE SCREENING ORDER (NO ISOLATION) - Swab, Nasopharynx [[id number]] Collected: 02/10/21 0153    Specimen: Swab from Nasopharynx Updated: 02/10/21 0156    Narrative:      The following orders were created for panel order COVID PRE-OP / PRE-PROCEDURE SCREENING ORDER (NO ISOLATION) - Swab, Nasopharynx.  Procedure                               Abnormality         Status                     ---------                               -----------         ------                     COVID-19,APTIMA PANTHER,...[[id number]]                      In process                   Please view results for these tests on the individual orders.    COVID-19,APTIMA PANTHERRACHEL IN-HOUSE, NP/OP SWAB IN UTM/VTM/SALINE  TRANSPORT MEDIA,24 HR TAT - Swab, Nasopharynx [[id number]] Collected: 02/10/21 0153    Specimen: Swab from Nasopharynx Updated: 02/10/21 0156    Extra Tubes [[id number]] Collected: 02/10/21 0034    Specimen: Blood, Venous Line Updated: 02/10/21 0146    Narrative:      The following orders were created for panel order Extra Tubes.  Procedure                               Abnormality         Status                     ---------                               -----------         ------                     Gold Top - SST[[id number]]                                   Final result                 Please view results for these tests on the individual orders.    Gold Top - SST [[id number]] Collected: 02/10/21 0034    Specimen: Blood Updated: 02/10/21 0146     Extra Tube Hold for add-ons.     Comment: Auto resulted.       Troponin [[id number]]  (Abnormal) Collected: 02/10/21 0034    Specimen: Blood Updated: 02/10/21 0128     Troponin T 0.132 ng/mL     Narrative:      Troponin T Reference Range:  <= 0.03 ng/mL-   Negative for AMI  >0.03 ng/mL-     Abnormal for myocardial necrosis.  Clinicians would have to utilize clinical acumen, EKG, Troponin and serial changes to determine if it is an Acute Myocardial Infarction or myocardial injury due to an underlying chronic condition.       Results may be falsely decreased if patient taking Biotin.      Comprehensive Metabolic Panel [[id number]]  (Abnormal) Collected: 02/10/21 0034    Specimen: Blood Updated: 02/10/21 0128     Glucose 548 mg/dL      BUN 25 mg/dL      Creatinine 5.96 mg/dL      Sodium 132 mmol/L      Potassium 4.6 mmol/L      Comment: Slight hemolysis detected by analyzer. Results may be affected.        Chloride 93 mmol/L      CO2 28.0 mmol/L      Calcium 9.9 mg/dL      Total Protein 6.9 g/dL      Albumin 3.80 g/dL      ALT (SGPT) 13 U/L      AST (SGOT) 31 U/L      Comment: Slight hemolysis detected by analyzer. Results may be affected.        Alkaline Phosphatase 141 U/L       Total Bilirubin 0.3 mg/dL      eGFR Non  Amer --     Comment: <15 Indicative of kidney failure.        eGFR  African Amer 12 mL/min/1.73      Comment: <15 Indicative of kidney failure.        Globulin 3.1 gm/dL      A/G Ratio 1.2 g/dL      BUN/Creatinine Ratio 4.2     Anion Gap 11.0 mmol/L     Narrative:      GFR Normal >60  Chronic Kidney Disease <60  Kidney Failure <15      TSH [[id number]]  (Normal) Collected: 02/10/21 0034    Specimen: Blood Updated: 02/10/21 0126     TSH 2.030 uIU/mL     Magnesium [[id number]]  (Normal) Collected: 02/10/21 0034    Specimen: Blood Updated: 02/10/21 0120     Magnesium 2.2 mg/dL     Phosphorus [[id number]]  (Normal) Collected: 02/10/21 0034    Specimen: Blood Updated: 02/10/21 0120     Phosphorus 2.8 mg/dL     Protime-INR [[id number]]  (Normal) Collected: 02/10/21 0034    Specimen: Blood Updated: 02/10/21 0056     Protime 11.4 Seconds      INR 1.04    aPTT [[id number]]  (Normal) Collected: 02/10/21 0034    Specimen: Blood Updated: 02/10/21 0056     PTT 24.9 seconds     Blood Gas, Venous - [[id number]]  (Abnormal) Collected: 02/10/21 0034    Specimen: Venous Blood Updated: 02/10/21 0050     Site RA     pH, Venous 7.306 pH Units      pCO2, Venous 67.9 mm Hg      pO2, Venous 22.1 mm Hg      HCO3, Venous 33.8 mmol/L      Base Excess, Venous 5.6 mmol/L      Comment: Serial Number: [id number]:  [id number]        O2 Saturation, Venous 30.3 %      CO2 Content 35.9 mmol/L      Barometric Pressure for Blood Gas --     Comment: N/A        Modality Room Air     FIO2 21 %     CBC & Differential [[id number]]  (Abnormal) Collected: 02/10/21 0034    Specimen: Blood Updated: 02/10/21 0046    Narrative:      The following orders were created for panel order CBC & Differential.  Procedure                               Abnormality         Status                     ---------                               -----------         ------                     CBC Auto Differential[[id number]]        Abnormal             Final result                 Please view results for these tests on the individual orders.    CBC Auto Differential [[id number]]  (Abnormal) Collected: 02/10/21 0034    Specimen: Blood Updated: 02/10/21 0046     WBC 8.30 10*3/mm3      RBC 3.48 10*6/mm3      Hemoglobin 10.3 g/dL      Hematocrit 31.8 %      MCV 91.3 fL      MCH 29.6 pg      MCHC 32.4 g/dL      RDW 18.8 %      RDW-SD 60.4 fl      MPV 9.9 fL      Platelets 168 10*3/mm3      Neutrophil % 74.8 %      Lymphocyte % 11.4 %      Monocyte % 9.2 %      Eosinophil % 3.8 %      Basophil % 0.8 %      Neutrophils, Absolute 6.20 10*3/mm3      Lymphocytes, Absolute 0.90 10*3/mm3      Monocytes, Absolute 0.80 10*3/mm3      Eosinophils, Absolute 0.30 10*3/mm3      Basophils, Absolute 0.10 10*3/mm3      nRBC 0.1 /100 WBC     POC Glucose Once [[id number]]  (Abnormal) Collected: 02/09/21 2340    Specimen: Blood Updated: 02/09/21 2341     Glucose >500 mg/dL      Comment: Serial Number: [id number]:  [id number]              I reviewed the patient's new clinical results.    Assessment/Plan       Weakness  Acute respiratory acidosis -recheck ABG this morning and consider BiPAP if needed.  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  Essential hypertension -continue home medications        I discussed the patients findings and my recommendations with patient.     Karine Campos MD  02/10/21  08:26 EST

## 2021-02-11 LAB
ALBUMIN SERPL-MCNC: 3.6 G/DL (ref 3.5–5.2)
ANION GAP SERPL CALCULATED.3IONS-SCNC: 12 MMOL/L (ref 5–15)
ARTERIAL PATENCY WRIST A: POSITIVE
ATMOSPHERIC PRESS: ABNORMAL MM[HG]
BASE EXCESS BLDA CALC-SCNC: 3.8 MMOL/L (ref 0–3)
BDY SITE: ABNORMAL
BUN SERPL-MCNC: 18 MG/DL (ref 6–20)
BUN/CREAT SERPL: 3.2 (ref 7–25)
CALCIUM SPEC-SCNC: 9.8 MG/DL (ref 8.6–10.5)
CHLORIDE SERPL-SCNC: 97 MMOL/L (ref 98–107)
CO2 BLDA-SCNC: 32.6 MMOL/L (ref 22–29)
CO2 SERPL-SCNC: 26 MMOL/L (ref 22–29)
CREAT SERPL-MCNC: 5.61 MG/DL (ref 0.76–1.27)
DEPRECATED RDW RBC AUTO: 60.8 FL (ref 37–54)
ERYTHROCYTE [DISTWIDTH] IN BLOOD BY AUTOMATED COUNT: 18.7 % (ref 12.3–15.4)
GFR SERPL CREATININE-BSD FRML MDRD: 13 ML/MIN/1.73
GFR SERPL CREATININE-BSD FRML MDRD: ABNORMAL ML/MIN/{1.73_M2}
GLUCOSE BLDC GLUCOMTR-MCNC: 100 MG/DL (ref 70–105)
GLUCOSE BLDC GLUCOMTR-MCNC: 185 MG/DL (ref 70–105)
GLUCOSE BLDC GLUCOMTR-MCNC: 260 MG/DL (ref 70–105)
GLUCOSE BLDC GLUCOMTR-MCNC: 281 MG/DL (ref 70–105)
GLUCOSE BLDC GLUCOMTR-MCNC: 92 MG/DL (ref 70–105)
GLUCOSE SERPL-MCNC: 219 MG/DL (ref 65–99)
HCO3 BLDA-SCNC: 30.8 MMOL/L (ref 21–28)
HCT VFR BLD AUTO: 32.9 % (ref 37.5–51)
HEMODILUTION: NO
HGB BLD-MCNC: 10.7 G/DL (ref 13–17.7)
MCH RBC QN AUTO: 29.8 PG (ref 26.6–33)
MCHC RBC AUTO-ENTMCNC: 32.5 G/DL (ref 31.5–35.7)
MCV RBC AUTO: 91.6 FL (ref 79–97)
MODALITY: ABNORMAL
PCO2 BLDA: 58.8 MM HG (ref 35–48)
PH BLDA: 7.33 PH UNITS (ref 7.35–7.45)
PHOSPHATE SERPL-MCNC: 2.8 MG/DL (ref 2.5–4.5)
PLATELET # BLD AUTO: 149 10*3/MM3 (ref 140–450)
PMV BLD AUTO: 8.8 FL (ref 6–12)
PO2 BLDA: 27.2 MM HG (ref 83–108)
POTASSIUM SERPL-SCNC: 4.1 MMOL/L (ref 3.5–5.2)
RBC # BLD AUTO: 3.59 10*6/MM3 (ref 4.14–5.8)
SAO2 % BLDCOA: 44.4 % (ref 94–98)
SODIUM SERPL-SCNC: 135 MMOL/L (ref 136–145)
WBC # BLD AUTO: 7.6 10*3/MM3 (ref 3.4–10.8)

## 2021-02-11 PROCEDURE — G0378 HOSPITAL OBSERVATION PER HR: HCPCS

## 2021-02-11 PROCEDURE — 85027 COMPLETE CBC AUTOMATED: CPT | Performed by: INTERNAL MEDICINE

## 2021-02-11 PROCEDURE — 25010000002 HEPARIN (PORCINE) PER 1000 UNITS: Performed by: INTERNAL MEDICINE

## 2021-02-11 PROCEDURE — 97162 PT EVAL MOD COMPLEX 30 MIN: CPT

## 2021-02-11 PROCEDURE — 80069 RENAL FUNCTION PANEL: CPT | Performed by: INTERNAL MEDICINE

## 2021-02-11 PROCEDURE — 82962 GLUCOSE BLOOD TEST: CPT

## 2021-02-11 PROCEDURE — 63710000001 INSULIN LISPRO (HUMAN) PER 5 UNITS: Performed by: INTERNAL MEDICINE

## 2021-02-11 RX ORDER — HEPARIN SODIUM 5000 [USP'U]/ML
5000 INJECTION, SOLUTION INTRAVENOUS; SUBCUTANEOUS EVERY 12 HOURS SCHEDULED
Status: DISCONTINUED | OUTPATIENT
Start: 2021-02-11 | End: 2021-02-16 | Stop reason: HOSPADM

## 2021-02-11 RX ORDER — GABAPENTIN 300 MG/1
300 CAPSULE ORAL EVERY 12 HOURS SCHEDULED
Status: DISCONTINUED | OUTPATIENT
Start: 2021-02-11 | End: 2021-02-11

## 2021-02-11 RX ORDER — AMITRIPTYLINE HYDROCHLORIDE 25 MG/1
25 TABLET, FILM COATED ORAL NIGHTLY
Status: DISCONTINUED | OUTPATIENT
Start: 2021-02-11 | End: 2021-02-16 | Stop reason: HOSPADM

## 2021-02-11 RX ORDER — ASPIRIN 81 MG/1
81 TABLET ORAL DAILY
Status: DISCONTINUED | OUTPATIENT
Start: 2021-02-11 | End: 2021-02-11

## 2021-02-11 RX ORDER — GABAPENTIN 300 MG/1
300 CAPSULE ORAL NIGHTLY
Status: DISCONTINUED | OUTPATIENT
Start: 2021-02-11 | End: 2021-02-16 | Stop reason: HOSPADM

## 2021-02-11 RX ADMIN — ZINC SULFATE 220 MG (50 MG) CAPSULE 220 MG: CAPSULE at 08:08

## 2021-02-11 RX ADMIN — SEVELAMER CARBONATE 2400 MG: 800 TABLET, FILM COATED ORAL at 17:09

## 2021-02-11 RX ADMIN — Medication 3 ML: at 20:21

## 2021-02-11 RX ADMIN — LOSARTAN POTASSIUM 50 MG: 50 TABLET, FILM COATED ORAL at 06:15

## 2021-02-11 RX ADMIN — INSULIN LISPRO 8 UNITS: 100 INJECTION, SOLUTION INTRAVENOUS; SUBCUTANEOUS at 17:10

## 2021-02-11 RX ADMIN — OXYCODONE HYDROCHLORIDE AND ACETAMINOPHEN 500 MG: 500 TABLET ORAL at 08:08

## 2021-02-11 RX ADMIN — ATORVASTATIN CALCIUM 40 MG: 40 TABLET, FILM COATED ORAL at 20:21

## 2021-02-11 RX ADMIN — LEVETIRACETAM 500 MG: 500 TABLET, FILM COATED, EXTENDED RELEASE ORAL at 08:07

## 2021-02-11 RX ADMIN — AMITRIPTYLINE HYDROCHLORIDE 25 MG: 25 TABLET, FILM COATED ORAL at 20:21

## 2021-02-11 RX ADMIN — DOCUSATE SODIUM 100 MG: 100 CAPSULE, LIQUID FILLED ORAL at 20:21

## 2021-02-11 RX ADMIN — SEVELAMER CARBONATE 2400 MG: 800 TABLET, FILM COATED ORAL at 08:06

## 2021-02-11 RX ADMIN — INSULIN LISPRO 3 UNITS: 100 INJECTION, SOLUTION INTRAVENOUS; SUBCUTANEOUS at 08:08

## 2021-02-11 RX ADMIN — METOPROLOL TARTRATE 50 MG: 50 TABLET, FILM COATED ORAL at 08:07

## 2021-02-11 RX ADMIN — METOPROLOL TARTRATE 50 MG: 50 TABLET, FILM COATED ORAL at 20:21

## 2021-02-11 RX ADMIN — DOCUSATE SODIUM 50 MG AND SENNOSIDES 8.6 MG 1 TABLET: 8.6; 5 TABLET, FILM COATED ORAL at 20:21

## 2021-02-11 RX ADMIN — DOCUSATE SODIUM 50 MG AND SENNOSIDES 8.6 MG 1 TABLET: 8.6; 5 TABLET, FILM COATED ORAL at 08:07

## 2021-02-11 RX ADMIN — AMLODIPINE BESYLATE 10 MG: 5 TABLET ORAL at 08:08

## 2021-02-11 RX ADMIN — SEVELAMER CARBONATE 2400 MG: 800 TABLET, FILM COATED ORAL at 12:58

## 2021-02-11 RX ADMIN — GABAPENTIN 300 MG: 300 CAPSULE ORAL at 08:08

## 2021-02-11 RX ADMIN — DOCUSATE SODIUM 100 MG: 100 CAPSULE, LIQUID FILLED ORAL at 08:07

## 2021-02-11 RX ADMIN — Medication 3 ML: at 08:08

## 2021-02-11 RX ADMIN — HEPARIN SODIUM 5000 UNITS: 5000 INJECTION INTRAVENOUS; SUBCUTANEOUS at 20:21

## 2021-02-11 RX ADMIN — GABAPENTIN 300 MG: 300 CAPSULE ORAL at 20:21

## 2021-02-11 NOTE — PLAN OF CARE
Goal Outcome Evaluation:  Plan of Care Reviewed With: patient  Progress: no change  Outcome Summary: pt is stable with episodes of confusion. 2L of O2 addded to pt ~0300 due to labs. pt has been moving in bed on his own and turning side to side during the shift. pt was able to take all meds during shift. will continue to monitor pt during rest of shift.

## 2021-02-11 NOTE — CONSULTS
Diabetes Education    Patient Name:  Baljit Kurtz  YOB: 1965  MRN: [id number]  Admit Date:  2/9/2021      Pt contacted by phone due to Covid positive. Pt seen due to adm bs of 548. A1c on 2/10/2021 9.4%. Pt states he takes Lantus 30 units hs. He states he takes Humalog based on sliding scale. Pt states he has insulin pens at home. Reviewed with pt his A1c result of 9.4% and adm bs of 548. Pt states not sure why bs high on adm. Pt has PCP and sees routinely. Pt wears [employer] CGMS. He states he is checking bs 4 times/day and bs are sent to MD office. He states does not have routine problems with low bs. He may have lows 2 times/month. Discussed proper tx for lows. Pt lives pt self. Pt having some difficulty answering educator questions over phone.     Educator contacted ex-wife, Aliza, to verify information. Aliza states pt using insulin pen for the Lantus and has vials/syringes for administering the Humalog. Aliza not sure how often pt using the Humalog. She is not sure why adm bs 548. Aliza wanting pt to go to assisted living facility.     Will continue to follow. If pt d/cd to assisted living, no further education needed. If pt to return home, educator will need to visit pt to determine if able to use insulin pens and vials correctly and will review insulin dosages and when to give.      Electronically signed by:  Loreto Martines RN  02/11/21 15:30 EST

## 2021-02-11 NOTE — THERAPY EVALUATION
Patient Name: Baljit Kurtz  : 1965    MRN: [id number]                              Today's Date: 2021       Admit Date: 2021    Visit Dx:     ICD-10-CM ICD-9-CM   1. Weakness  R53.1 780.79   2. Chronic renal failure, unspecified CKD stage  N18.9 585.9   3. Hyperglycemia  R73.9 790.29   4. Uncontrolled insulin dependent type 1 diabetes mellitus (CMS/[facility])  E10.65 250.03     Patient Active Problem List   Diagnosis   • Acute otitis externa of right ear   • AV fistula (CMS/[facility])   • Compression fracture of vertebra (CMS/[facility])   • Depression   • Diabetes mellitus, type II (CMS/[facility])   • Diabetic peripheral neuropathy associated with type 2 diabetes mellitus (CMS/[facility])   • Diabetic retinopathy (CMS/[facility])   • Difficult or painful urination   • Discitis of thoracic region   • Dysphagia   • Encounter for general adult medical examination without abnormal findings   • Hyperlipidemia   • Hypertension   • Insomnia   • Onychomycosis of toenail   • Osteomyelitis of spine (CMS/[facility])   • Peripheral neuropathy   • ESRD (end stage renal disease) (CMS/[facility])   • Renal failure   • Seizure disorder (CMS/[facility])   • Speech and language deficits, late effect of cerebrovascular disease   • Shoulder pain, left   • Stage 5 chronic kidney disease (CMS/[facility])   • Tinea pedis   • Transient cerebral ischemia   • Weakness   • Status epilepticus (CMS/[facility])   • Sepsis (CMS/[facility])   • Aspiration pneumonia due to vomitus (CMS/[facility])   • Acute respiratory failure with hypoxia (CMS/[facility])   • DKA (diabetic ketoacidoses) (CMS/[facility])   • Essential hypertension   • ESRD (end stage renal disease) (CMS/[facility])   • Depression   • History of CVA (cerebrovascular accident)     Past Medical History:   Diagnosis Date   • Anemia    • Cataract    • Constipation    • CVA (cerebral vascular accident) (CMS/[facility])     x 8   • CVA (cerebral vascular accident) (CMS/[facility])    • Depression    • Diabetes mellitus (CMS/[facility])    • ESRD (end stage renal disease) on dialysis (CMS/[facility])     • H/O colonoscopy 2016   • History of noncompliance with medical treatment    • HLD (hyperlipidemia) .   • Hyperlipidemia    • Hypertension    • Insomnia    • Neuropathy    • Renal failure     Stage IV - AI -- on dialysis -- [facility]   • Retinopathy    • TIA (transient ischemic attack)    • Type 2 diabetes mellitus (CMS/[facility])      Past Surgical History:   Procedure Laterality Date   • ANKLE SURGERY Left    • ANKLE SURGERY     • APPENDECTOMY     • ARTERIOVENOUS FISTULA     • CATARACT EXTRACTION, BILATERAL  2018   • COLON RESECTION  2016   • OTHER SURGICAL HISTORY Left 02/23/2017    Left Distula Placement Dr Guan     General Information     Row Name 02/11/21 1641          Physical Therapy Time and Intention    Document Type  evaluation  -EL     Mode of Treatment  individual therapy;physical therapy  -EL     Row Name 02/11/21 1641          General Information    Patient Profile Reviewed  yes  -EL     Prior Level of Function  independent:;ADL's  -EL     Row Name 02/11/21 1641          Living Environment    Lives With  alone  -[facility] Name 02/11/21 1641          Home Main Entrance    Number of Stairs, Main Entrance  none  -     Row Name 02/11/21 1641          Stairs Within Home, Primary    Number of Stairs, Within Home, Primary  none  -[facility] Name 02/11/21 1641          Cognition    Orientation Status (Cognition)  oriented to;person;place Pt appears to have some confusion with conversation  -[facility] Name 02/11/21 1641          Safety Issues, Functional Mobility    Impairments Affecting Function (Mobility)  balance;cognition  -EL     Cognitive Impairments, Mobility Safety/Performance  awareness, need for assistance;problem-solving/reasoning;safety precaution awareness  -EL       User Key  (r) = Recorded By, (t) = Taken By, (c) = Cosigned By    Initials Name Provider Type    EL Collin White PT Physical Therapist        Mobility     Row Name 02/11/21 1642          Bed Mobility    Bed Mobility  sit-supine  -EL      Sit-Supine [place] (Bed Mobility)  moderate assist (50% patient effort)  -EL     Assistive Device (Bed Mobility)  bed rails  -EL     Row Name 02/11/21 1642          Bed-Chair Transfer    Bed-Chair [place] (Transfers)  contact guard  -EL     Assistive Device (Bed-Chair Transfers)  walker, front-wheeled  -EL     Row Name 02/11/21 1642          Sit-Stand Transfer    Sit-Stand [place] (Transfers)  contact guard  -EL     Assistive Device (Sit-Stand Transfers)  walker, front-wheeled  -EL     Row Name 02/11/21 1642          Gait/Stairs (Locomotion)    [place] Level (Gait)  contact guard  -EL     Assistive Device (Gait)  walker, front-wheeled  -EL     Distance in Feet (Gait)  40  -EL     Deviations/Abnormal Patterns (Gait)  gait speed decreased;weight shifting decreased  -EL       User Key  (r) = Recorded By, (t) = Taken By, (c) = Cosigned By    Initials Name Provider Type    Collin Wilson, PT Physical Therapist        Obj/Interventions     Row Name 02/11/21 1644          Range of Motion Comprehensive    General Range of Motion  bilateral lower extremity ROM WFL  -EL     Row Name 02/11/21 1644          Strength Comprehensive (MMT)    General Manual Muscle Testing (MMT) Assessment  lower extremity strength deficits identified  -EL     Comment, General Manual Muscle Testing (MMT) Assessment  BLE 3+/5 gross  -EL     Row Name 02/11/21 1644          Balance    Balance Assessment  sitting static balance;standing static balance;standing dynamic balance  -EL     Static Sitting Balance  WFL  -EL     Static Standing Balance  mild impairment;supported  -EL     Dynamic Standing Balance  mild impairment;supported  -EL     Row Name 02/11/21 1644          Sensory Assessment (Somatosensory)    Sensory Assessment (Somatosensory)  -- n/t in toes  -EL       User Key  (r) = Recorded By, (t) = Taken By, (c) = Cosigned By    Initials Name Provider Type    Collin Wilson, PT Physical Therapist        Goals/Plan     Row Name  02/11/21 1650          Bed Mobility Goal 1 (PT)    Activity/Assistive Device (Bed Mobility Goal 1, PT)  bed mobility activities, all  -EL     [place] Level/Cues Needed (Bed Mobility Goal 1, PT)  modified independence  -EL     Time Frame (Bed Mobility Goal 1, PT)  long term goal (LTG);2 weeks  -EL     Row Name 02/11/21 1650          Transfer Goal 1 (PT)    Activity/Assistive Device (Transfer Goal 1, PT)  transfers, all;walker, rolling  -EL     [place] Level/Cues Needed (Transfer Goal 1, PT)  modified independence  -EL     Time Frame (Transfer Goal 1, PT)  long term goal (LTG);2 weeks  -EL     Row Name 02/11/21 1650          Gait Training Goal 1 (PT)    Activity/Assistive Device (Gait Training Goal 1, PT)  gait (walking locomotion)  -EL     [place] Level (Gait Training Goal 1, PT)  modified independence  -EL     Distance (Gait Training Goal 1, PT)  150  -EL     Time Frame (Gait Training Goal 1, PT)  long term goal (LTG);2 weeks  -EL       User Key  (r) = Recorded By, (t) = Taken By, (c) = Cosigned By    Initials Name Provider Type    Collin Wilson, PT Physical Therapist        Clinical Impression     Row Name 02/11/21 6696          Pain    Additional Documentation  Pain Scale: FACES Pre/Post-Treatment (Group)  -EL     Row Name 02/11/21 1276          Pain Scale: FACES Pre/Post-Treatment    Pain: FACES Scale, Pretreatment  2-->hurts little bit  -EL     Posttreatment Pain Rating  2-->hurts little bit  -EL     Pain Location - Orientation  generalized  -EL     Row Name 02/11/21 7439          Plan of Care Review    Plan of Care Reviewed With  patient  -EL     Outcome Summary  Pt is a 56 YO M who reports living home alone, typically performing all ambulation with cane or RWx, and performs all ADLs. Pt reports his ex wife drives him. Pt this date demonstrates fair functional moblity, but some confusion, at times was talking but not to therapist. Some concern that pt will have difficulty caring for self when  d/c. Recommendation this date is IP rehab pending progress. PPE worn includes gloves and mask with goggles.  -EL     Row Name 02/11/21 1645          Therapy Assessment/Plan (PT)    Rehab Potential (PT)  fair, will monitor progress closely  -EL     Criteria for Skilled Interventions Met (PT)  yes  -EL     Predicted Duration of Therapy Intervention (PT)  Until d/c  -EL     Row Name 02/11/21 1645          Vital Signs    O2 Delivery Pre Treatment  room air  -EL     O2 Delivery Intra Treatment  room air  -EL     O2 Delivery Post Treatment  room air  -EL     Pre Patient Position  Supine  -EL     Intra Patient Position  Standing  -EL     Post Patient Position  Sitting  -EL     Row Name 02/11/21 1645          Positioning and Restraints    Pre-Treatment Position  in bed  -EL     Post Treatment Position  chair  -EL     In Chair  notified nsg;sitting;call light within reach;encouraged to call for assist;exit alarm on Alarm pad placed, no box in room, nurse notified.  -EL       User Key  (r) = Recorded By, (t) = Taken By, (c) = Cosigned By    Initials Name Provider Type    Collin Wilson, PT Physical Therapist        Outcome Measures     Row Name 02/11/21 1651          How much help from another person do you currently need...    Turning from your back to your side while in flat bed without using bedrails?  3  -EL     Moving from lying on back to sitting on the side of a flat bed without bedrails?  2  -EL     Moving to and from a bed to a chair (including a wheelchair)?  3  -EL     Standing up from a chair using your arms (e.g., wheelchair, bedside chair)?  3  -EL     Climbing 3-5 steps with a railing?  2  -EL     To walk in hospital room?  3  -EL     AM-PAC 6 Clicks Score (PT)  16  -EL     Row Name 02/11/21 1651          Functional Assessment    Outcome Measure Options  AM-PAC 6 Clicks Basic Mobility (PT)  -EL       User Key  (r) = Recorded By, (t) = Taken By, (c) = Cosigned By    Initials Name Provider Type    Collin Wilson,  PT Physical Therapist        Physical Therapy Education                 Title: PT OT SLP Therapies (Done)     Topic: Physical Therapy (Done)     Point: Mobility training (Done)     Learning Progress Summary           Patient Acceptance, E,TB, VU by  at 2/11/2021 1652                   Point: Precautions (Done)     Learning Progress Summary           Patient Acceptance, E,TB, VU by  at 2/11/2021 1652                               User Key     Initials Effective Dates Name Provider Type Discipline     06/23/20 -  Collin White PT Physical Therapist PT              PT Recommendation and Plan  Planned Therapy Interventions (PT): balance training, neuromuscular re-education, bed mobility training, transfer training, gait training, patient/family education, strengthening  Plan of Care Reviewed With: patient  Outcome Summary: Pt is a 56 YO M who reports living home alone, typically performing all ambulation with cane or RWx, and performs all ADLs. Pt reports his ex wife drives him. Pt this date demonstrates fair functional moblity, but some confusion, at times was talking but not to therapist. Some concern that pt will have difficulty caring for self when d/c. Recommendation this date is IP rehab pending progress. PPE worn includes gloves and mask with goggles.     Time Calculation:   PT Charges     Row Name 02/11/21 1653             Time Calculation    Start Time  1442  -EL      Stop Time  1510  -EL      Time Calculation (min)  28 min  -EL      PT Received On  02/11/21  -EL      PT - Next Appointment  02/12/21  -      PT Goal Re-Cert Due Date  02/25/21  -        User Key  (r) = Recorded By, (t) = Taken By, (c) = Cosigned By    Initials Name Provider Type     Collin White PT Physical Therapist        Therapy Charges for Today     Code Description Service Date Service Provider Modifiers Qty    [id number]  PT EVAL MOD COMPLEXITY 4 2/11/2021 Collin White, PT GP 1          PT G-Codes  Outcome Measure Options: AM-PAC  6 Clicks Basic Mobility (PT)  AM-PAC 6 Clicks Score (PT): 16    Collin White, PT  2/11/2021

## 2021-02-11 NOTE — PROGRESS NOTES
Discharge Planning Assessment  [facility]     Patient Name: Baljit Kurtz  MRN: [id number]  Today's Date: 2/11/2021    Admit Date: 2/9/2021    Discharge Needs Assessment     Row Name 02/11/21 1252       Living Environment    Lives With  alone    Current Living Arrangements  home/apartment/condo    Primary Care Provided by  self    Provides Primary Care For  no one, unable/limited ability to care for self    Family Caregiver if Needed  other (see comments) Ex Wife, Aliza    Able to Return to Prior Arrangements  yes       Resource/Environmental Concerns    Resource/Environmental Concerns  none       Transition Planning    Patient/Family Anticipates Transition to  home with family       Discharge Needs Assessment    Readmission Within the Last 30 Days  no previous admission in last 30 days    Equipment Currently Used at Home  cpap    Equipment Needed After Discharge  none        Discharge Plan     Row Name 02/11/21 1253       Plan    Plan  DC Plan: Pending PT/OT recommendation. Current HD MWF [facility]    Patient/Family in Agreement with Plan  yes    Plan Comments  S/w pt ex spouse, Aliza via telephone. She states that pt lives alone. Does not drive (she provides all transport). Confirmed PCP: Karen. Affords meds/food. She reports that pt has [facility] services. She questions patient capability to continue to live alone. She reports that he has been reluctant to consider any alternatives, though she reports multiple falls. She would like to see him move to an assisted living facility. Will notify SW of possible placement needs.    Row Name 02/11/21 1981       Plan    Plan Comments  attempted to call pt in room d/t covid precautions. No answer. Will have nursing confirm pt phone nearby during unit rounding this am and attempt again at later time.        Continued Care and Services - Admitted Since 2/9/2021    Coordination has not been started for this encounter.         Demographic Summary    No  documentation.       Functional Status     Row Name 02/11/21 1252       Functional Status    Usual Activity Tolerance  fair    Current Activity Tolerance  fair       Employment/    Employment Status  disabled        Phone communication or documentation only - no physical contact with patient or family.             Winifred Garcia RN

## 2021-02-11 NOTE — PROGRESS NOTES
LOS: 0 days   Patient Care Team:  Kerri Jones MD as PCP - General (Family Medicine)  Kerri Jones MD (Family Medicine)    Subjective     Interval History:    Patient Complaints: No specific complaints. He denies cough, shortness of breath, loss of taste or smell.     History taken from: patient    Review of Systems   Constitutional: Positive for activity change, appetite change and fatigue. Negative for fever.   HENT: Negative for ear pain, facial swelling, postnasal drip and rhinorrhea.    Eyes: Negative for visual disturbance.   Respiratory: Negative for cough, shortness of breath, wheezing and stridor.    Cardiovascular: Negative for chest pain, palpitations and leg swelling.   Gastrointestinal: Negative for constipation, diarrhea, nausea and vomiting.   Genitourinary: Negative for dysuria.   Musculoskeletal: Negative for back pain.   Skin: Negative for rash.   Neurological: Negative for dizziness and light-headedness.   Psychiatric/Behavioral: Positive for confusion.           Objective     Vital Signs  Temp:  [96.5 °F (35.8 °C)-96.9 °F (36.1 °C)] 96.9 °F (36.1 °C)  Heart Rate:  [84-98] 89  Resp:  [17-22] 20  BP: (133-186)/() 145/88    Physical Exam:     General Appearance:    Alert, cooperative, in no acute distress, speech is somewhat garbled at times   Head:    Normocephalic, without obvious abnormality, atraumatic   Eyes:            Lids and lashes normal, conjunctivae and sclerae normal, no   icterus, no pallor, corneas clear, PERRLA   Ears:    Ears appear intact with no abnormalities noted   Throat:   No oral lesions, no thrush, oral mucosa moist   Neck:   No adenopathy, supple, trachea midline, no thyromegaly, no   carotid bruit, no JVD   Lungs:     Clear to auscultation,respirations regular, even and                  unlabored    Heart:    Regular rhythm and normal rate, normal S1 and S2, no            murmur, no gallop, no rub, no click   Chest Wall:    No abnormalities observed    Abdomen:     Normal bowel sounds, no masses, no organomegaly, soft        Non-tender non-distended, no guarding,   Extremities:   Moves all extremities well, no edema, no cyanosis, no             Redness; AV fistula in left upper extremity   Pulses:   Pulses palpable and equal bilaterally   Skin:   No bleeding, bruising or rash   Lymph nodes:   No palpable adenopathy   Neurologic:  Gait not assessed; no lateralizing neurologic deficits noted        Results Review:    Lab Results (last 24 hours)     Procedure Component Value Units Date/Time    POC Glucose Once [[id number]]  (Abnormal) Collected: 02/11/21 0724    Specimen: Blood Updated: 02/11/21 0725     Glucose 185 mg/dL      Comment: Serial Number: [id number]:  [id number]       Renal Function Panel [[id number]]  (Abnormal) Collected: 02/11/21 0534    Specimen: Blood Updated: 02/11/21 0651     Glucose 219 mg/dL      BUN 18 mg/dL      Creatinine 5.61 mg/dL      Sodium 135 mmol/L      Potassium 4.1 mmol/L      Chloride 97 mmol/L      CO2 26.0 mmol/L      Calcium 9.8 mg/dL      Albumin 3.60 g/dL      Phosphorus 2.8 mg/dL      Anion Gap 12.0 mmol/L      BUN/Creatinine Ratio 3.2     eGFR Non  Amer --     Comment: <15 Indicative of kidney failure.        eGFR  African Amer 13 mL/min/1.73      Comment: <15 Indicative of kidney failure.       Narrative:      GFR Normal >60  Chronic Kidney Disease <60  Kidney Failure <15      CBC (No Diff) [[id number]]  (Abnormal) Collected: 02/11/21 0534    Specimen: Blood Updated: 02/11/21 0633     WBC 7.60 10*3/mm3      RBC 3.59 10*6/mm3      Hemoglobin 10.7 g/dL      Hematocrit 32.9 %      MCV 91.6 fL      MCH 29.8 pg      MCHC 32.5 g/dL      RDW 18.7 %      RDW-SD 60.8 fl      MPV 8.8 fL      Platelets 149 10*3/mm3     POC Glucose Once [[id number]]  (Normal) Collected: 02/11/21 0235    Specimen: Blood Updated: 02/11/21 0236     Glucose 92 mg/dL      Comment: Serial Number: [id number]:  [id number]       Blood Gas,  Arterial - [[id number]]  (Abnormal) Collected: 02/10/21 1120    Specimen: Arterial Blood Updated: 02/11/21 0037     Site Left Radial     Joe's Test Positive     pH, Arterial 7.328 pH units      pCO2, Arterial 58.8 mm Hg      pO2, Arterial 27.2 mm Hg      HCO3, Arterial 30.8 mmol/L      Base Excess, Arterial 3.8 mmol/L      Comment: Serial Number: [id number]:  [id number]        O2 Saturation, Arterial 44.4 %      CO2 Content 32.6 mmol/L      Barometric Pressure for Blood Gas --     Comment: N/A        Modality Room Air     Hemodilution No    POC Glucose Once [[id number]]  (Abnormal) Collected: 02/10/21 1930    Specimen: Blood Updated: 02/10/21 1931     Glucose 159 mg/dL      Comment: Serial Number: [id number]:  [id number]       POC Glucose Once [[id number]]  (Abnormal) Collected: 02/10/21 1647    Specimen: Blood Updated: 02/10/21 1648     Glucose 125 mg/dL      Comment: Serial Number: [id number]:  [id number]       Troponin [[id number]]  (Abnormal) Collected: 02/10/21 1400    Specimen: Blood Updated: 02/10/21 1535     Troponin T 0.109 ng/mL     Narrative:      Troponin T Reference Range:  <= 0.03 ng/mL-   Negative for AMI  >0.03 ng/mL-     Abnormal for myocardial necrosis.  Clinicians would have to utilize clinical acumen, EKG, Troponin and serial changes to determine if it is an Acute Myocardial Infarction or myocardial injury due to an underlying chronic condition.       Results may be falsely decreased if patient taking Biotin.      [place] Draw [[id number]] Collected: 02/10/21 1400    Specimen: Blood Updated: 02/10/21 1501    Narrative:      The following orders were created for panel order [place] Draw.  Procedure                               Abnormality         Status                     ---------                               -----------         ------                     Green Top (Gel)[[id number]]                                  Final result                 Please view results for these tests on the  individual orders.    Green Top (Gel) [[id number]] Collected: 02/10/21 1400    Specimen: Blood Updated: 02/10/21 1501     Extra Tube Hold for add-ons.     Comment: Auto resulted.       COVID PRE-OP / PRE-PROCEDURE SCREENING ORDER (NO ISOLATION) - Swab, Nasopharynx [[id number]]  (Abnormal) Collected: 02/10/21 0153    Specimen: Swab from Nasopharynx Updated: 02/10/21 1432    Narrative:      The following orders were created for panel order COVID PRE-OP / PRE-PROCEDURE SCREENING ORDER (NO ISOLATION) - Swab, Nasopharynx.  Procedure                               Abnormality         Status                     ---------                               -----------         ------                     COVID-19,APTIMA PANTHER,...[[id number]]  Abnormal            Final result                 Please view results for these tests on the individual orders.    COVID-19,APTIMA PANTHER,[facility], NP/OP SWAB IN UTM/VTM/[facility] TRANSPORT MEDIA,24 HR TAT - Swab, Nasopharynx [[id number]]  (Abnormal) Collected: 02/10/21 0153    Specimen: Swab from Nasopharynx Updated: 02/10/21 1432     COVID19 Detected    Narrative:      Fact sheet for providers: https://www.fda.gov/media/194327/download     Fact sheet for patients: https://www.fda.gov/media/930416/download    Test performed by RT PCR.    POC Glucose Once [[id number]]  (Abnormal) Collected: 02/10/21 1203    Specimen: Blood Updated: 02/10/21 1213     Glucose 132 mg/dL      Comment: Serial Number: [id number]:  [id number]       Blood Gas, Arterial - [[id number]]  (Abnormal) Collected: 02/10/21 1130    Specimen: Arterial Blood Updated: 02/10/21 1141     Site Right Brachial     Joe's Test Positive     pH, Arterial 7.375 pH units      pCO2, Arterial 49.4 mm Hg      pO2, Arterial 77.1 mm Hg      HCO3, Arterial 28.9 mmol/L      Base Excess, Arterial 3.0 mmol/L      Comment: Serial Number: [id number]:  [id number]        O2 Saturation, Arterial 94.7 %      CO2 Content 30.4 mmol/L      Barometric  Pressure for Blood Gas --     Comment: N/A        Modality Room Air     FIO2 21 %      Hemodilution No    Hemoglobin A1c [[id number]]  (Abnormal) Collected: 02/10/21 0034    Specimen: Blood Updated: 02/10/21 1137     Hemoglobin A1C 9.4 %     Narrative:      Hemoglobin A1C Reference Range:    <5.7 %        Normal  5.7-6.4 %     Increased risk for diabetes  > 6.4 %        Diabetes       These guidelines have been recommended by the American Diabetic Association for Hgb A1c.      The following 2010 guidelines have been recommended by the American Diabetes Association for Hemoglobin A1c.    HBA1c 5.7-6.4% Increased risk for future diabetes (pre-diabetes)  HBA1c     >6.4% Diabetes      POC Glucose Once [[id number]]  (Normal) Collected: 02/10/21 0824    Specimen: Blood Updated: 02/10/21 0826     Glucose 78 mg/dL      Comment: Serial Number: [id number]:  [id number]              Imaging Results (Last 24 Hours)     ** No results found for the last 24 hours. **               I reviewed the patient's new clinical results.    Medication Review:   Scheduled Meds:amitriptyline, 25 mg, Oral, Nightly  amLODIPine, 10 mg, Oral, Daily  ascorbic acid, 500 mg, Oral, Daily  atorvastatin, 40 mg, Oral, Nightly  docusate sodium, 100 mg, Oral, BID  gabapentin, 300 mg, Oral, Q12H  insulin lispro, 0-14 Units, Subcutaneous, TID AC  levETIRAcetam XR, 500 mg, Oral, Daily  losartan, 50 mg, Oral, QAM  metoprolol tartrate, 50 mg, Oral, BID  sennosides-docusate, 1 tablet, Oral, BID  sevelamer, 2,400 mg, Oral, TID With Meals  sodium chloride, 3 mL, Intravenous, Q12H  zinc sulfate, 220 mg, Oral, Daily      Continuous Infusions:   PRN Meds:.•  acetaminophen  •  dextrose  •  dextrose  •  glucagon (human recombinant)  •  insulin lispro **AND** insulin lispro  •  ondansetron  •  [COMPLETED] Insert peripheral IV **AND** sodium chloride  •  sodium chloride     Assessment/Plan       Weakness  Acute Covid infection -zinc, vitamin C, aspirin.  End-stage  renal disease  Diabetes with complication of nephropathy and hypoglycemia  Essential hypertension -losartan, metoprolol  Multiple prior CVAs  Chronic cerebellar ataxia  Seizure disorder  Secondary hyperparathyroidism    Patient is more alert and interactive today. Reducing doses of gabapentin and amitriptyline due to sedation. Long-acting insulin has been held due to low blood sugars throughout most the day yesterday. Continue sliding scale insulin and encourage increased oral intake. Continue dialysis on his routine schedule. Continue isolation for Covid infection. Likely will need skilled rehab due to generalized weakness, balance problems and high risk for falls.    Plan for disposition: To be determined    Karine Campos MD  02/11/21  08:21 EST

## 2021-02-11 NOTE — PLAN OF CARE
Goal Outcome Evaluation:  Plan of Care Reviewed With: patient     Outcome Summary: Pt is a 54 YO M who reports living home alone, typically performing all ambulation with cane or RWx, and performs all ADLs. Pt reports his ex wife drives him. Pt this date demonstrates fair functional moblity, but some confusion, at times was talking but not to therapist. Some concern that pt will have difficulty caring for self when d/c. Recommendation this date is IP rehab pending progress. PPE worn includes gloves and mask with goggles.

## 2021-02-11 NOTE — PROGRESS NOTES
"   LOS: 0 days    Patient Care Team:  Kerri Jones MD as PCP - General (Family Medicine)  Kerri Jones MD (Family Medicine)      Subjective     Patient tested positive for COVID-19  Patient is more awake and able to answer simple question  No acute respite distress overnight    Objective     Vital Sign Min/Max for last 24 hours  Temp:  [96.5 °F (35.8 °C)-97.5 °F (36.4 °C)] 97.5 °F (36.4 °C)  Heart Rate:  [78-98] 78  Resp:  [17-22] 17  BP: ()/() 87/62                       Flowsheet Rows      First Filed Value   Admission Height  175.3 cm (69\") Documented at 02/09/2021 2337   Admission Weight  90.7 kg (200 lb) Documented at 02/09/2021 2337          I/O this shift:  In: 480 [P.O.:480]  Out: -   I/O last 3 completed shifts:  In: 1820 [P.O.:720; I.V.:600; IV Piggyback:500]  Out: 2100 [Other:2100]    Physical Exam:  Physical Exam    General.  Drowsy but arousable  Head.  Atraumatic, normocephalic  Neck.  Supple  ENT floor mucosa moist  Respiratory.  Clear to auscultation anteriorly  Cardiovascular.  Regular rhythm  Abdominal.  Obese, soft, nontender  Extremities.  No edema       LABS:  Lab Results   Component Value Date    CALCIUM 9.8 02/11/2021    PHOS 2.8 02/11/2021     Results from last 7 days   Lab Units 02/11/21  0534 02/10/21  0034   MAGNESIUM mg/dL  --  2.2   SODIUM mmol/L 135* 132*   POTASSIUM mmol/L 4.1 4.6   CHLORIDE mmol/L 97* 93*   CO2 mmol/L 26.0 28.0   BUN mg/dL 18 25*   CREATININE mg/dL 5.61* 5.96*   GLUCOSE mg/dL 219* 548*   CALCIUM mg/dL 9.8 9.9   WBC 10*3/mm3 7.60 8.30   HEMOGLOBIN g/dL 10.7* 10.3*   PLATELETS 10*3/mm3 149 168   ALT (SGPT) U/L  --  13   AST (SGOT) U/L  --  31     Lab Results   Component Value Date    CKTOTAL 3,038 (H) 08/12/2020    TROPONINT 0.109 (C) 02/10/2021     Estimated Creatinine Clearance: 15.3 mL/min (A) (by C-G formula based on SCr of 5.61 mg/dL (H)).  Results from last 7 days   Lab Units 02/10/21  1130   PH, ARTERIAL pH units 7.375   PO2 ART mm Hg 77.1* "   PCO2, ARTERIAL mm Hg 49.4*   HCO3 ART mmol/L 28.9*     Brief Urine Lab Results  (Last result in the past 365 days)      Color   Clarity   Blood   Leuk Est   Nitrite   Protein   CREAT   Urine HCG        08/11/20 2049 Yellow Cloudy  Comment:  Result checked  Large (3+) Small (1+) Negative >=300 mg/dL (3+)             WEIGHTS:     Wt Readings from Last 1 Encounters:   02/11/21 0507 87.9 kg (193 lb 12.6 oz)   02/10/21 1730 89.3 kg (196 lb 13.9 oz)   02/09/21 2337 90.7 kg (200 lb)       amitriptyline, 25 mg, Oral, Nightly  amLODIPine, 10 mg, Oral, Daily  ascorbic acid, 500 mg, Oral, Daily  aspirin, 81 mg, Oral, Daily  atorvastatin, 40 mg, Oral, Nightly  docusate sodium, 100 mg, Oral, BID  gabapentin, 300 mg, Oral, Q12H  insulin lispro, 0-14 Units, Subcutaneous, TID AC  levETIRAcetam XR, 500 mg, Oral, Daily  losartan, 50 mg, Oral, QAM  metoprolol tartrate, 50 mg, Oral, BID  sennosides-docusate, 1 tablet, Oral, BID  sevelamer, 2,400 mg, Oral, TID With Meals  sodium chloride, 3 mL, Intravenous, Q12H  zinc sulfate, 220 mg, Oral, Daily           Assessment/Plan     1.  ESRD  S/p incomplete dialysis yesterday  Electrolytes, volume status okay  2.  Hypertension with ESRD  Blood pressure running low today  3.  Anemia with ESRD  4.  Hyperglycemia  5.  COVID-19 infection     Resc:  Dialysis tomorrow  Hemoglobin acceptable.  No need for BEST at this time  Stop amlodipine.  I will increase losartan dose if needed  Decrease gabapentin dose      Salinas Ayala MD  02/11/21  11:46 EST

## 2021-02-11 NOTE — CONSULTS
Referring Provider: Dr. HANSEL Campos  Reason for Consultation: ESRD    Chief complaint generalized weakness    History of present illness:    Patient is 55 years old -American gentleman well-known to me and has history of ESRD, hypertension, diabetes, CVA came to the hospital with generalized weakness and feeling very sleepy and tired.  Patient also had decreased appetite and oral intake.  His blood sugar was found to be very high.  As per report he had fallen at home.  Patient denied any headache, dizziness, fever, cough, expectoration, shortness of breath, nausea or vomiting.  Patient's ABG showed hypercapnic respiratory failure with hypoxia    History  Past Medical History:   Diagnosis Date   • Anemia    • Cataract    • Constipation    • CVA (cerebral vascular accident) (CMS/[facility])     x 8   • CVA (cerebral vascular accident) (CMS/[facility])    • Depression    • Diabetes mellitus (CMS/[facility])    • ESRD (end stage renal disease) on dialysis (CMS/[facility])    • H/O colonoscopy 2016   • History of noncompliance with medical treatment    • HLD (hyperlipidemia) .   • Hyperlipidemia    • Hypertension    • Insomnia    • Neuropathy    • Renal failure     Stage IV - AI -- on dialysis -- mwf   • Retinopathy    • TIA (transient ischemic attack)    • Type 2 diabetes mellitus (CMS/[facility])      Past Surgical History:   Procedure Laterality Date   • ANKLE SURGERY Left    • ANKLE SURGERY     • APPENDECTOMY     • ARTERIOVENOUS FISTULA     • CATARACT EXTRACTION, BILATERAL  2018   • COLON RESECTION  2016   • OTHER SURGICAL HISTORY Left 02/23/2017    Left Distula Placement Dr Guan     Social History     Tobacco Use   • Smoking status: Never Smoker   • Smokeless tobacco: Current User     Types: Chew   • Tobacco comment: Passive Smoke exposure: no   Substance Use Topics   • Alcohol use: Not Currently   • Drug use: Defer     Family History   Problem Relation Age of Onset   • Heart disease Mother    • Heart attack Mother    • Hypertension Mother       • Breast cancer Sister    • Hyperlipidemia Mother    • Stroke Mother    • Cancer Sister         Breast Cancer   • Diabetes Sister    • Heart disease Maternal Grandmother    • Hyperlipidemia Maternal Grandmother    • Hypertension Maternal Grandmother        Review of Systems  ROS  All system reviewed, negative except as mentioned history present illness  Objective     Vital Signs  Temp:  [96.5 °F (35.8 °C)-97.5 °F (36.4 °C)] 97.5 °F (36.4 °C)  Heart Rate:  [78-98] 78  Resp:  [17-22] 17  BP: ()/() 87/62    I/O this shift:  In: 480 [P.O.:480]  Out: -   I/O last 3 completed shifts:  In: 1820 [P.O.:720; I.V.:600; IV Piggyback:500]  Out: 2100 [Other:2100]    Physical Exam:  Physical Exam  General.  Drowsy but arousable  Head.  Atraumatic, normocephalic  Neck.  Supple  ENT floor mucosa moist  Respiratory.  Clear to auscultation anteriorly  Cardiovascular.  Regular rhythm  Abdominal.  Obese, soft, nontender  Extremities.  No edema  Results Review:   I reviewed the patient's new clinical results.    Lab Results   Component Value Date    CALCIUM 9.8 02/11/2021    PHOS 2.8 02/11/2021     Results from last 7 days   Lab Units 02/11/21  0534 02/10/21  0034   MAGNESIUM mg/dL  --  2.2   SODIUM mmol/L 135* 132*   POTASSIUM mmol/L 4.1 4.6   CHLORIDE mmol/L 97* 93*   CO2 mmol/L 26.0 28.0   BUN mg/dL 18 25*   CREATININE mg/dL 5.61* 5.96*   GLUCOSE mg/dL 219* 548*   CALCIUM mg/dL 9.8 9.9   WBC 10*3/mm3 7.60 8.30   HEMOGLOBIN g/dL 10.7* 10.3*   PLATELETS 10*3/mm3 149 168   ALT (SGPT) U/L  --  13   AST (SGOT) U/L  --  31     Lab Results   Component Value Date    CKTOTAL 3,038 (H) 08/12/2020    TROPONINT 0.109 (C) 02/10/2021     Estimated Creatinine Clearance: 15.3 mL/min (A) (by C-G formula based on SCr of 5.61 mg/dL (H)).No results found for: URICACID    Brief Urine Lab Results  (Last result in the past 365 days)      Color   Clarity   Blood   Leuk Est   Nitrite   Protein   CREAT   Urine HCG        08/11/20 2049 Yellow  Cloudy  Comment:  Result checked  Large (3+) Small (1+) Negative >=300 mg/dL (3+)               Prior to Admission medications    Medication Sig Start Date End Date Taking? Authorizing Provider   amitriptyline (ELAVIL) 50 MG tablet Take 50 mg by mouth Every Night.   Yes Andrea Lock MD   amLODIPine (NORVASC) 10 MG tablet Take 10 mg by mouth Daily.   Yes Andrea Lock MD   atorvastatin (LIPITOR) 40 MG tablet Take 40 mg by mouth Every Night. 5/13/19  Yes Andrea Lock MD   docusate sodium (COLACE) 100 MG capsule Take 100 mg by mouth 2 (Two) Times a Day.   Yes Andrea Lock MD   gabapentin (NEURONTIN) 600 MG tablet Take 600 mg by mouth 2 (Two) Times a Day. 5/14/19  Yes Andrea Lock MD   hydrOXYzine (ATARAX) 25 MG tablet Take 1 tablet by mouth Every 6 (Six) Hours As Needed for Itching (Discomfort from tongue irritation). 12/20/20  Yes Baljit Abdullahi MD   Insulin Lispro (HUMALOG KWIKPEN) 100 UNIT/ML solution pen-injector Inject 6 Units under the skin into the appropriate area as directed 3 (Three) Times a Day. Per SSI 2/8/18  Yes Andrea Lock MD   LANTUS 100 UNIT/ML injection Inject 40 Units under the skin into the appropriate area as directed Every Night. 5/13/19  Yes Andrea Lock MD   levETIRAcetam XR (KEPPRA XR) 500 MG 24 hr tablet Take 500 mg by mouth Daily.   Yes Andrea Lock MD   losartan (COZAAR) 50 MG tablet Take 50 mg by mouth Every Morning. 2/26/20  Yes Andrea Lock MD   metoprolol tartrate (LOPRESSOR) 50 MG tablet Take 50 mg by mouth 2 (Two) Times a Day. 5/13/19  Yes Andrea Lock MD   olmesartan (BENICAR) 40 MG tablet Take 40 mg by mouth Daily.   Yes Andrea Lock MD   risperiDONE (risperDAL) 1 MG tablet Take 1 mg by mouth 2 (Two) Times a Day. 5/13/19  Yes Andrea Lock MD   sennosides-docusate (senna-docusate sodium) 8.6-50 MG per tablet Take 1 tablet by mouth 2 (two) times a day.   Yes Ashvin  "MD Andrea   sevelamer (RENVELA) 800 MG tablet Take 2,400 mg by mouth 3 (Three) Times a Day With Meals. 6/4/19  Yes Andrea Lock MD   glucagon (GLUCAGON EMERGENCY) 1 MG injection GLUCAGON EMERGENCY 1 MG KIT 7/9/15   Andrea Lock MD   amitriptyline (ELAVIL) 50 MG tablet TAKE 1 TABLET BY BY MOUTH EVERY DAY AT BEDTIME 5/13/19 2/10/21  Andrea Lock MD   amLODIPine (NORVASC) 5 MG tablet TAKE ONE TABLET BY MOUTH ONE TIME DAILY 11/18/13 2/10/21  Andrea Lock MD   atorvastatin (LIPITOR) 40 MG tablet Take 40 mg by mouth Daily.  2/10/21  Andrea Lock MD   BD Insulin Syringe U/F 31G X 5/16\" 1 ML misc USE FOUR TIMES DAILY AS DIRECTED (E11.65) 12/24/20 2/10/21  Ophelia Murrieta MD   clotrimazole-betamethasone (LOTRISONE) 1-0.05 % cream  1/3/20 2/10/21  Andrea Lock MD   Continuous Blood Gluc  (FREESTYLE ANNABELLE 14 DAY READER) device   2/10/21  Andrea Lock MD   ergocalciferol (DRISDOL) 76860 units capsule 1 capsule Every 7 (Seven) Days. 9/7/17 2/10/21  Andrea Lock MD   gabapentin (NEURONTIN) 600 MG tablet TAKE 1 TABLET BY MOUTH TWICE DAILY 12/5/19 2/10/21  Andrea Lock MD   gabapentin (NEURONTIN) 600 MG tablet  1/3/20 2/10/21  Andrea Lock MD   hydrOXYzine (ATARAX) 25 MG tablet TAKE ONE TABLET BY MOUTH TWICE DAILY AS NEEDED FOR ITCHING 3/9/20 2/10/21  Andrea Lock MD   hydrOXYzine (ATARAX) 25 MG tablet Take 25 mg by mouth 2 (Two) Times a Day As Needed for Itching.  2/10/21  Andrea Lock MD   insulin glargine (LANTUS) 100 UNIT/ML injection Inject 20 Units under the skin into the appropriate area as directed Every 12 (Twelve) Hours. 8/19/20 2/10/21  Sandra Gross MD   Insulin Pen Needle (BD PEN NEEDLE JEREMY U/F) 32G X 4 MM misc BD PEN NEEDLE JEREMY U/F 32G X 4 MM 2/8/18 2/10/21  Provider, Historical, MD   Insulin Syringe-Needle U-100 31G X 5/16\" 0.3 ML misc  11/8/15 2/10/21  Provider, Historical, MD   lacosamide " (Vimpat) 100 MG tablet tablet Take 1 tablet by mouth Every 12 (Twelve) Hours. 8/19/20 2/10/21  Sandra Gross MD   levETIRAcetam (Keppra) 500 MG tablet Take 1 tablet by mouth 2 (Two) Times a Day. 8/19/20 2/10/21  Sandra Gross MD   levoFLOXacin (LEVAQUIN) 500 MG tablet Patient is ESRD stage 4 and according to pharmacist recommendation patient should have 500 mg qod, 5 pills for 10 days 12/22/15 2/10/21  Andrea Lock MD   LORazepam (ATIVAN) 0.5 MG tablet Take 0.5 mg by mouth 3 (Three) Times a Day. 5/14/19 2/10/21  Andrea Lock MD   losartan (COZAAR) 50 MG tablet Take 1 tablet by mouth Daily. 8/20/20 2/10/21  Sandra Gross MD   losartan-hydrochlorothiazide (HYZAAR) 100-25 MG per tablet TAKE ONE TABLET BY MOUTH ONE TIME DAILY 11/18/13 2/10/21  Andrea Lock MD   metoprolol tartrate (LOPRESSOR) 50 MG tablet Take 50 mg by mouth 2 (Two) Times a Day.  2/10/21  Andrea Lock MD   nystatin (MYCOSTATIN) 019941 UNIT/ML suspension Swish 1 teaspoon in your mouth 4 times daily for 1 minute then swallow 12/20/20 2/10/21  Baljit Abdullahi MD   pantoprazole (PROTONIX) 40 MG EC tablet TAKE ONE TABLET BY MOUTH EVERY MORNING 30 MINUTES BEFORE FOOD 7/9/19 2/10/21  Andrea Lock MD   pantoprazole (PROTONIX) 40 MG EC tablet Take 40 mg by mouth Daily.  2/10/21  Andrea Lock MD   risperiDONE (risperDAL) 1 MG tablet Take 1 mg by mouth 2 (Two) Times a Day.  2/10/21  Andrea Lock MD   terbinafine (lamiSIL) 250 MG tablet Take 250 mg by mouth Daily. 8/8/19 2/10/21  Andrea Lock MD       amitriptyline, 25 mg, Oral, Nightly  amLODIPine, 10 mg, Oral, Daily  ascorbic acid, 500 mg, Oral, Daily  aspirin, 81 mg, Oral, Daily  atorvastatin, 40 mg, Oral, Nightly  docusate sodium, 100 mg, Oral, BID  gabapentin, 300 mg, Oral, Q12H  insulin lispro, 0-14 Units, Subcutaneous, TID AC  levETIRAcetam XR, 500 mg, Oral, Daily  losartan, 50 mg, Oral, QAM  metoprolol tartrate, 50 mg, Oral,  BID  sennosides-docusate, 1 tablet, Oral, BID  sevelamer, 2,400 mg, Oral, TID With Meals  sodium chloride, 3 mL, Intravenous, Q12H  zinc sulfate, 220 mg, Oral, Daily           Assessment/Plan       1.  ESRD  Patient gets dialysis MWF as outpatient  Electrolytes, volume status okay  2.  Hypertension with ESRD  Patient blood pressure was high on presentation but improving  Outpatient antihypertensives already restarted  3.  Anemia with ESRD  4.  Hyperglycemia    Resc:  I will provide hemodialysis today  Hemoglobin acceptable.  No need for BEST at this time  Continue current and hypertensives  Repeat ABG    I discussed the patients findings and my recommendations with patient and nursing staff    Salinas Ayala MD  02/11/21  11:46 EST

## 2021-02-12 LAB
ALBUMIN SERPL-MCNC: 3.8 G/DL (ref 3.5–5.2)
ANION GAP SERPL CALCULATED.3IONS-SCNC: 10 MMOL/L (ref 5–15)
BUN SERPL-MCNC: 36 MG/DL (ref 6–20)
BUN/CREAT SERPL: 4.8 (ref 7–25)
CALCIUM SPEC-SCNC: 9.4 MG/DL (ref 8.6–10.5)
CHLORIDE SERPL-SCNC: 97 MMOL/L (ref 98–107)
CO2 SERPL-SCNC: 26 MMOL/L (ref 22–29)
CREAT SERPL-MCNC: 7.57 MG/DL (ref 0.76–1.27)
D DIMER PPP FEU-MCNC: 0.93 MG/L (FEU) (ref 0–0.59)
DEPRECATED RDW RBC AUTO: 57.3 FL (ref 37–54)
ERYTHROCYTE [DISTWIDTH] IN BLOOD BY AUTOMATED COUNT: 17.9 % (ref 12.3–15.4)
GFR SERPL CREATININE-BSD FRML MDRD: 9 ML/MIN/1.73
GFR SERPL CREATININE-BSD FRML MDRD: ABNORMAL ML/MIN/{1.73_M2}
GLUCOSE BLDC GLUCOMTR-MCNC: 186 MG/DL (ref 70–105)
GLUCOSE BLDC GLUCOMTR-MCNC: 239 MG/DL (ref 70–105)
GLUCOSE SERPL-MCNC: 207 MG/DL (ref 65–99)
HBV SURFACE AG SERPL QL IA: NORMAL
HCT VFR BLD AUTO: 32.1 % (ref 37.5–51)
HGB BLD-MCNC: 10.6 G/DL (ref 13–17.7)
MCH RBC QN AUTO: 30 PG (ref 26.6–33)
MCHC RBC AUTO-ENTMCNC: 33 G/DL (ref 31.5–35.7)
MCV RBC AUTO: 90.9 FL (ref 79–97)
PHOSPHATE SERPL-MCNC: 3.5 MG/DL (ref 2.5–4.5)
PLATELET # BLD AUTO: 169 10*3/MM3 (ref 140–450)
PMV BLD AUTO: 8.9 FL (ref 6–12)
POTASSIUM SERPL-SCNC: 4.4 MMOL/L (ref 3.5–5.2)
QT INTERVAL: 371 MS
RBC # BLD AUTO: 3.53 10*6/MM3 (ref 4.14–5.8)
SODIUM SERPL-SCNC: 133 MMOL/L (ref 136–145)
WBC # BLD AUTO: 8.7 10*3/MM3 (ref 3.4–10.8)

## 2021-02-12 PROCEDURE — 25010000002 HEPARIN (PORCINE) PER 1000 UNITS: Performed by: INTERNAL MEDICINE

## 2021-02-12 PROCEDURE — 82962 GLUCOSE BLOOD TEST: CPT

## 2021-02-12 PROCEDURE — 87340 HEPATITIS B SURFACE AG IA: CPT | Performed by: INTERNAL MEDICINE

## 2021-02-12 PROCEDURE — 97166 OT EVAL MOD COMPLEX 45 MIN: CPT

## 2021-02-12 PROCEDURE — 85379 FIBRIN DEGRADATION QUANT: CPT | Performed by: INTERNAL MEDICINE

## 2021-02-12 PROCEDURE — 80069 RENAL FUNCTION PANEL: CPT | Performed by: INTERNAL MEDICINE

## 2021-02-12 PROCEDURE — 63710000001 INSULIN LISPRO (HUMAN) PER 5 UNITS: Performed by: INTERNAL MEDICINE

## 2021-02-12 PROCEDURE — 85027 COMPLETE CBC AUTOMATED: CPT | Performed by: INTERNAL MEDICINE

## 2021-02-12 PROCEDURE — 63710000001 INSULIN GLARGINE PER 5 UNITS: Performed by: INTERNAL MEDICINE

## 2021-02-12 PROCEDURE — 86706 HEP B SURFACE ANTIBODY: CPT | Performed by: INTERNAL MEDICINE

## 2021-02-12 PROCEDURE — 97116 GAIT TRAINING THERAPY: CPT

## 2021-02-12 RX ORDER — INSULIN GLARGINE 100 [IU]/ML
25 INJECTION, SOLUTION SUBCUTANEOUS NIGHTLY
Status: DISCONTINUED | OUTPATIENT
Start: 2021-02-12 | End: 2021-02-15

## 2021-02-12 RX ORDER — MELATONIN
1000 DAILY
Status: DISCONTINUED | OUTPATIENT
Start: 2021-02-12 | End: 2021-02-16 | Stop reason: HOSPADM

## 2021-02-12 RX ORDER — INSULIN GLARGINE 100 [IU]/ML
20 INJECTION, SOLUTION SUBCUTANEOUS NIGHTLY
Status: DISCONTINUED | OUTPATIENT
Start: 2021-02-12 | End: 2021-02-12

## 2021-02-12 RX ADMIN — OXYCODONE HYDROCHLORIDE AND ACETAMINOPHEN 500 MG: 500 TABLET ORAL at 08:42

## 2021-02-12 RX ADMIN — Medication 1000 UNITS: at 17:47

## 2021-02-12 RX ADMIN — LOSARTAN POTASSIUM 50 MG: 50 TABLET, FILM COATED ORAL at 08:41

## 2021-02-12 RX ADMIN — SEVELAMER CARBONATE 2400 MG: 800 TABLET, FILM COATED ORAL at 13:11

## 2021-02-12 RX ADMIN — INSULIN LISPRO 3 UNITS: 100 INJECTION, SOLUTION INTRAVENOUS; SUBCUTANEOUS at 17:47

## 2021-02-12 RX ADMIN — DOCUSATE SODIUM 50 MG AND SENNOSIDES 8.6 MG 1 TABLET: 8.6; 5 TABLET, FILM COATED ORAL at 21:19

## 2021-02-12 RX ADMIN — INSULIN GLARGINE 25 UNITS: 100 INJECTION, SOLUTION SUBCUTANEOUS at 21:19

## 2021-02-12 RX ADMIN — SEVELAMER CARBONATE 2400 MG: 800 TABLET, FILM COATED ORAL at 08:41

## 2021-02-12 RX ADMIN — METOPROLOL TARTRATE 50 MG: 50 TABLET, FILM COATED ORAL at 08:41

## 2021-02-12 RX ADMIN — Medication 3 ML: at 21:20

## 2021-02-12 RX ADMIN — SEVELAMER CARBONATE 2400 MG: 800 TABLET, FILM COATED ORAL at 17:46

## 2021-02-12 RX ADMIN — ZINC SULFATE 220 MG (50 MG) CAPSULE 220 MG: CAPSULE at 08:41

## 2021-02-12 RX ADMIN — HEPARIN SODIUM 5000 UNITS: 5000 INJECTION INTRAVENOUS; SUBCUTANEOUS at 13:11

## 2021-02-12 RX ADMIN — DOCUSATE SODIUM 100 MG: 100 CAPSULE, LIQUID FILLED ORAL at 08:41

## 2021-02-12 RX ADMIN — INSULIN LISPRO 3 UNITS: 100 INJECTION, SOLUTION INTRAVENOUS; SUBCUTANEOUS at 08:34

## 2021-02-12 RX ADMIN — LEVETIRACETAM 500 MG: 500 TABLET, FILM COATED, EXTENDED RELEASE ORAL at 08:41

## 2021-02-12 RX ADMIN — DOCUSATE SODIUM 50 MG AND SENNOSIDES 8.6 MG 1 TABLET: 8.6; 5 TABLET, FILM COATED ORAL at 08:42

## 2021-02-12 RX ADMIN — INSULIN LISPRO 5 UNITS: 100 INJECTION, SOLUTION INTRAVENOUS; SUBCUTANEOUS at 13:11

## 2021-02-12 RX ADMIN — METOPROLOL TARTRATE 50 MG: 50 TABLET, FILM COATED ORAL at 21:19

## 2021-02-12 RX ADMIN — GABAPENTIN 300 MG: 300 CAPSULE ORAL at 21:19

## 2021-02-12 RX ADMIN — ATORVASTATIN CALCIUM 40 MG: 40 TABLET, FILM COATED ORAL at 21:19

## 2021-02-12 RX ADMIN — HEPARIN SODIUM 5000 UNITS: 5000 INJECTION INTRAVENOUS; SUBCUTANEOUS at 21:19

## 2021-02-12 RX ADMIN — AMITRIPTYLINE HYDROCHLORIDE 25 MG: 25 TABLET, FILM COATED ORAL at 21:19

## 2021-02-12 RX ADMIN — DOCUSATE SODIUM 100 MG: 100 CAPSULE, LIQUID FILLED ORAL at 21:19

## 2021-02-12 NOTE — PROGRESS NOTES
Continued Stay Note  JAYNA Ferrera     Patient Name: Baljit Kurtz  MRN: [id number]  Today's Date: 2/12/2021    Admit Date: 2/9/2021    Discharge Plan     Row Name 02/12/21 1229       Plan    Plan Comments  Notified by SW that pt prefers Home Health care. Attempted to call for choices, busy signal. Requested RN make sure pt phone in reach and available at her next rounding     Phone communication or documentation only - no physical contact with patient or family.          Winifred Garcia, RN

## 2021-02-12 NOTE — PLAN OF CARE
Objective:   Bed mobility - N/A or Not attempted.  Transfers - CGA  Ambulation - 100 feet CGA with RWx       Assessment: Baljit Kurtz presents with functional mobility impairments which indicate the need for skilled intervention. Tolerating session today without incident. Pt sitting in chair when PT entered, having unhooked all wires and rolled them together. Pt demonstrated good functional mobility this date, ambulating with RWx for 100 feet but had one significant LOB, requiring assistance to recover. Pt with continued confusion, saying some nonsensical statements and requiring redirection at times. Will continue to follow and progress as tolerated.

## 2021-02-12 NOTE — PLAN OF CARE
Goal Outcome Evaluation:  Plan of Care Reviewed With: patient     Outcome Summary: Pt had quiet morning, became more active shortly after noon. Up and about in room. No acute events.

## 2021-02-12 NOTE — PLAN OF CARE
Goal Outcome Evaluation:  Plan of Care Reviewed With: patient  Progress: no change  Outcome Summary: pt has been stable during shift with no complaints of pain. pt has been getting out of bed without staff in the room, bed alarm has been on all shift. possible sitter for pt? will continue to monitor

## 2021-02-12 NOTE — THERAPY EVALUATION
Patient Name: Baljit Kurtz  : 1965    MRN: [id number]                              Today's Date: 2021       Admit Date: 2021    Visit Dx:     ICD-10-CM ICD-9-CM   1. Weakness  R53.1 780.79   2. Chronic renal failure, unspecified CKD stage  N18.9 585.9   3. Hyperglycemia  R73.9 790.29   4. Uncontrolled insulin dependent type 1 diabetes mellitus (CMS/[facility])  E10.65 250.03     Patient Active Problem List   Diagnosis   • Acute otitis externa of right ear   • AV fistula (CMS/[facility])   • Compression fracture of vertebra (CMS/[facility])   • Depression   • Diabetes mellitus, type II (CMS/[facility])   • Diabetic peripheral neuropathy associated with type 2 diabetes mellitus (CMS/[facility])   • Diabetic retinopathy (CMS/[facility])   • Difficult or painful urination   • Discitis of thoracic region   • Dysphagia   • Encounter for general adult medical examination without abnormal findings   • Hyperlipidemia   • Hypertension   • Insomnia   • Onychomycosis of toenail   • Osteomyelitis of spine (CMS/[facility])   • Peripheral neuropathy   • ESRD (end stage renal disease) (CMS/[facility])   • Renal failure   • Seizure disorder (CMS/[facility])   • Speech and language deficits, late effect of cerebrovascular disease   • Shoulder pain, left   • Stage 5 chronic kidney disease (CMS/[facility])   • Tinea pedis   • Transient cerebral ischemia   • Weakness   • Status epilepticus (CMS/[facility])   • Sepsis (CMS/[facility])   • Aspiration pneumonia due to vomitus (CMS/[facility])   • Acute respiratory failure with hypoxia (CMS/[facility])   • DKA (diabetic ketoacidoses) (CMS/[facility])   • Essential hypertension   • ESRD (end stage renal disease) (CMS/[facility])   • Depression   • History of CVA (cerebrovascular accident)     Past Medical History:   Diagnosis Date   • Anemia    • Cataract    • Constipation    • CVA (cerebral vascular accident) (CMS/[facility])     x 8   • CVA (cerebral vascular accident) (CMS/[facility])    • Depression    • Diabetes mellitus (CMS/[facility])    • ESRD (end stage renal disease) on dialysis (CMS/[facility])     • H/O colonoscopy 2016   • History of noncompliance with medical treatment    • HLD (hyperlipidemia) .   • Hyperlipidemia    • Hypertension    • Insomnia    • Neuropathy    • Renal failure     Stage IV - AI -- on dialysis -- [facility]   • Retinopathy    • TIA (transient ischemic attack)    • Type 2 diabetes mellitus (CMS/[facility])      Past Surgical History:   Procedure Laterality Date   • ANKLE SURGERY Left    • ANKLE SURGERY     • APPENDECTOMY     • ARTERIOVENOUS FISTULA     • CATARACT EXTRACTION, BILATERAL  2018   • COLON RESECTION  2016   • OTHER SURGICAL HISTORY Left 02/23/2017    Left Distula Placement Dr Guan     General Information     Row Name 02/12/21 1216          OT Time and Intention    Document Type  evaluation  -     Mode of Treatment  occupational therapy  -     Row Name 02/12/21 1216          General Information    Patient Profile Reviewed  yes  -     Prior Level of Function  independent:;ADL's;all household mobility  -     Existing Precautions/Restrictions  fall  -     Barriers to Rehab  cognitive status  -     Row Name 02/12/21 1216          Living Environment    Lives With  alone  -     Row Name 02/12/21 1216          Home Main Entrance    Number of Stairs, Main Entrance  none  -     Row Name 02/12/21 1216          Stairs Within Home, Primary    Number of Stairs, Within Home, Primary  none  -     Row Name 02/12/21 1216          Cognition    Orientation Status (Cognition)  oriented to;person;place  -     Row Name 02/12/21 1216          Safety Issues, Functional Mobility    Impairments Affecting Function (Mobility)  balance;cognition;endurance/activity tolerance  -     Cognitive Impairments, Mobility Safety/Performance  awareness, need for assistance;insight into deficits/self-awareness;judgment;problem-solving/reasoning;safety precaution follow-through  -       User Key  (r) = Recorded By, (t) = Taken By, (c) = Cosigned By    Initials Name Provider Type    Rakel Clayton,  OT Occupational Therapist          Mobility/ADL's     Row Name 02/12/21 1217          Bed Mobility    Bed Mobility  sit-supine  -DR     Sit-Supine [place] (Bed Mobility)  standby assist;verbal cues  -DR     Assistive Device (Bed Mobility)  bed rails  -DR     Row Name 02/12/21 1217          Transfers    Transfers  bed-chair transfer;sit-stand transfer;toilet transfer  -DR     Bed-Chair [place] (Transfers)  contact guard  -DR     Assistive Device (Bed-Chair Transfers)  walker, front-wheeled  -DR     Sit-Stand [place] (Transfers)  contact guard  -DR     [place] Level (Toilet Transfer)  contact guard  -DR     Assistive Device (Toilet Transfer)  commode;walker, front-wheeled;grab bars/safety frame  -DR     Row Name 02/12/21 1217          Sit-Stand Transfer    Assistive Device (Sit-Stand Transfers)  walker, front-wheeled  -DR     Row Name 02/12/21 1217          Toilet Transfer    Type (Toilet Transfer)  -- amb level using rwx  -     Row Name 02/12/21 1217          Activities of Daily Living    BADL Assessment/Intervention  upper body dressing;lower body dressing;toileting  -DR     Row Name 02/12/21 1217          Upper Body Dressing Assessment/Training    [place] Level (Upper Body Dressing)  doff;don;pajama/robe;set up;minimum assist (75% patient effort)  -DR     Position (Upper Body Dressing)  edge of bed sitting  -DR     Row Name 02/12/21 1217          Lower Body Dressing Assessment/Training    [place] Level (Lower Body Dressing)  doff;don;socks;set up;standby assist  -DR     Position (Lower Body Dressing)  edge of bed sitting  -DR     Row Name 02/12/21 1217          Toileting Assessment/Training    [place] Level (Toileting)  toileting skills;contact guard assist  -DR     Position (Toileting)  unsupported sitting;supported standing  -DR       User Key  (r) = Recorded By, (t) = Taken By, (c) = Cosigned By    Initials Name Provider Type    Rakel Clayton, OT Occupational Therapist         Obj/Interventions     [facility] Name 02/12/21 1219          Sensory Assessment (Somatosensory)    Sensory Assessment (Somatosensory)  UE sensation intact  -     Jonathan Name 02/12/21 1219          Vision Assessment/Intervention    Visual Impairment/Limitations  WFL;corrective lenses full-time  -     Jonathan Name 02/12/21 1219          Range of Motion Comprehensive    General Range of Motion  bilateral upper extremity ROM WFL  -     Jonathan Name 02/12/21 1219          Strength Comprehensive (MMT)    General Manual Muscle Testing (MMT) Assessment  no strength deficits identified  -     Comment, General Manual Muscle Testing (MMT) Assessment  BUE strength 4/5  -     Jonathan Name 02/12/21 1219          Balance    Static Sitting Balance  WFL;sitting, edge of bed  -     Static Standing Balance  WFL;supported;standing  -DR     Dynamic Standing Balance  mild impairment;supported;standing  -DR     Balance Interventions  occupation based/functional task  -       User Key  (r) = Recorded By, (t) = Taken By, (c) = Cosigned By    Initials Name Provider Type    Rakel Clayton, OT Occupational Therapist        Goals/Plan     Row Name 02/12/21 1226          Transfer Goal 1 (OT)    Activity/Assistive Device (Transfer Goal 1, OT)  transfers, all  -DR     [place] Level/Cues Needed (Transfer Goal 1, OT)  modified independence  -DR     Time Frame (Transfer Goal 1, OT)  2 weeks  -DR Castillo Name 02/12/21 1226          Bathing Goal 1 (OT)    Activity/Device (Bathing Goal 1, OT)  bathing skills, all  -DR     [place] Level/Cues Needed (Bathing Goal 1, OT)  set-up required  -DR     Time Frame (Bathing Goal 1, OT)  2 weeks  -DR Castillo Name 02/12/21 1226          Dressing Goal 1 (OT)    Activity/Device (Dressing Goal 1, OT)  dressing skills, all  -DR     [place]/Cues Needed (Dressing Goal 1, OT)  set-up required  -DR     Time Frame (Dressing Goal 1, OT)  2 weeks  -DR Castillo Name 02/12/21 1226          Toileting Goal 1  (OT)    Activity/Device (Toileting Goal 1, OT)  toileting skills, all  -DR     [place] Level/Cues Needed (Toileting Goal 1, OT)  modified independence  -DR     Time Frame (Toileting Goal 1, OT)  2 weeks  -DR Castillo Name 02/12/21 1226          Therapy Assessment/Plan (OT)    Planned Therapy Interventions (OT)  BADL retraining;occupation/activity based interventions;patient/caregiver education/training;transfer/mobility retraining  -       User Key  (r) = Recorded By, (t) = Taken By, (c) = Cosigned By    Initials Name Provider Type    Rakel Clayton, OT Occupational Therapist        Clinical Impression     Jonathan Name 02/12/21 1218          Pain Assessment    Additional Documentation  Pain Scale: Numbers Pre/Post-Treatment (Group)  -DR Castillo Name 02/12/21 3432          Pain Scale: Numbers Pre/Post-Treatment    Pretreatment Pain Rating  6/10  -DR     Posttreatment Pain Rating  6/10  -DR     Pain Location - Side  Bilateral thigh  -DR Castillo Name 02/12/21 6582          Plan of Care Review    Plan of Care Reviewed With  patient  -DR     Outcome Summary  Pt is a 54 yo male who presented with weakness.  Covid + with h/o ESRD on dialysis, DM, HTN, CVA's, seizure.  Pt lives alone in 1st floor apartment and stated wife/children are able to help intermittently at dc.  Pt oriented to self/place only this date.  Confusion noted with impaired deficict awarenss, safety/judegment, and reasoning impacting ability to retun home Nat.  Pt is SBA for bed mobility, CGA for CTS/transfers using rwx, and CGA for amb level xfer using rwx to commode.  Pt is SBA/franklin for socks, CGA toileting, and min A for UBD.  OT recommending inpt rehab at this time.  If cognition improves pt may be safe to dc home with assist and HH services (if assist available). OT to see 3x/wk while at [facility].  PPE worn per Covid protocol.  -DR Castillo Name 02/12/21 9575          Therapy Assessment/Plan (OT)    Rehab Potential (OT)  good, to achieve stated  therapy goals  -     Criteria for Skilled Therapeutic Interventions Met (OT)  yes;meets criteria;skilled treatment is necessary  -     Therapy Frequency (OT)  3 times/wk  -     Row Name 02/12/21 1219          Therapy Plan Review/Discharge Plan (OT)    Anticipated Discharge Disposition (OT)  inpatient rehabilitation facility;other (see comments) Inpt rehab vs home with HH if cognition improves and assist available at discharge  -     Row Name 02/12/21 1219          Positioning and Restraints    Pre-Treatment Position  in bed  -     Post Treatment Position  chair  -DR     In Chair  reclined;call light within reach;encouraged to call for assist;with nsg sitter present outside of room  -       User Key  (r) = Recorded By, (t) = Taken By, (c) = Cosigned By    Initials Name Provider Type    Rakel Clayton, OT Occupational Therapist        Outcome Measures     Row Name 02/12/21 1227          How much help from another is currently needed...    Putting on and taking off regular lower body clothing?  2  -DR     Bathing (including washing, rinsing, and drying)  2  -DR     Toileting (which includes using toilet bed pan or urinal)  3  -DR     Putting on and taking off regular upper body clothing  3  -DR     Taking care of personal grooming (such as brushing teeth)  3  -DR     Eating meals  3  -DR     AM-PAC 6 Clicks Score (OT)  16  -     Row Name 02/12/21 1227          Functional Assessment    Outcome Measure Options  AM-PAC 6 Clicks Daily Activity (OT)  -       User Key  (r) = Recorded By, (t) = Taken By, (c) = Cosigned By    Initials Name Provider Type    Rakel Clayton, OT Occupational Therapist        Occupational Therapy Education                 Title: PT OT SLP Therapies (In Progress)     Topic: Occupational Therapy (In Progress)     Point: ADL training (Done)     Description:   Instruct learner(s) on proper safety adaptation and remediation techniques during self care or transfers.   Instruct  in proper use of assistive devices.              Learning Progress Summary           Patient Acceptance, E,TB, VU,NR by  at 2/12/2021 1227                   Point: Home exercise program (Not Started)     Description:   Instruct learner(s) on appropriate technique for monitoring, assisting and/or progressing therapeutic exercises/activities.              Learner Progress:  Not documented in this visit.          Point: Precautions (Done)     Description:   Instruct learner(s) on prescribed precautions during self-care and functional transfers.              Learning Progress Summary           Patient Acceptance, E,TB, VU,NR by  at 2/12/2021 1227                   Point: Body mechanics (Done)     Description:   Instruct learner(s) on proper positioning and spine alignment during self-care, functional mobility activities and/or exercises.              Learning Progress Summary           Patient Acceptance, E,TB, VU,NR by  at 2/12/2021 1227                               User Key     Initials Effective Dates Name Provider Type Discipline     08/24/20 -  Rakel Santana, MARCIO Occupational Therapist OT              OT Recommendation and Plan  Planned Therapy Interventions (OT): BADL retraining, occupation/activity based interventions, patient/caregiver education/training, transfer/mobility retraining  Therapy Frequency (OT): 3 times/wk  Plan of Care Review  Plan of Care Reviewed With: patient  Outcome Summary: Pt is a 56 yo male who presented with weakness.  Covid + with h/o ESRD on dialysis, DM, HTN, CVA's, seizure.  Pt lives alone in 1st floor apartment and stated wife/children are able to help intermittently at dc.  Pt oriented to self/place only this date.  Confusion noted with impaired deficict awarenss, safety/judegment, and reasoning impacting ability to retun home Nat.  Pt is SBA for bed mobility, CGA for CTS/transfers using rwx, and CGA for amb level xfer using rwx to commode.  Pt is SBA/franklin for socks, CGA  toileting, and min A for UBD.  OT recommending inpt rehab at this time.  If cognition improves pt may be safe to dc home with assist and  services (if assist available). OT to see 3x/wk while at [facility].  PPE worn per Covid protocol.     Time Calculation:   Time Calculation- OT     Row Name 02/12/21 1228             Time Calculation- OT    OT Start Time  1055  -DR      OT Stop Time  1118  -      OT Time Calculation (min)  23 min  -      OT Received On  02/12/21  -      OT - Next Appointment  02/15/21  -      OT Goal Re-Cert Due Date  02/26/21  -        User Key  (r) = Recorded By, (t) = Taken By, (c) = Cosigned By    Initials Name Provider Type    Rakel Clayton OT Occupational Therapist        Therapy Charges for Today     Code Description Service Date Service Provider Modifiers Qty    83337232804 HC OT EVAL MOD COMPLEXITY 4 2/12/2021 Rakel Santana OT GO 1               Rakel Santana OT  2/12/2021

## 2021-02-12 NOTE — THERAPY TREATMENT NOTE
Subjective: Pt agreeable to therapeutic plan of care.    Objective:     Bed mobility - N/A or Not attempted.  Transfers - CGA  Ambulation - 100 feet CGA with RWx    Pain: 0 VAS  Education: Provided education on importance of mobility and skilled verbal / tactile cueing throughout intervention.     Assessment: Baljit Kurtz presents with functional mobility impairments which indicate the need for skilled intervention. Tolerating session today without incident. Pt sitting in chair when PT entered, having unhooked all wires and rolled them together. Pt demonstrated good functional mobility this date, ambulating with RWx for 100 feet but had one significant LOB, requiring assistance to recover. Pt with continued confusion, saying some nonsensical statements and requiring redirection at times. Will continue to follow and progress as tolerated.     Plan/Recommendations:   Pt would benefit from Inpatient Rehabilitation placement at discharge from facility and requires no DME at discharge.   Pt desires Home at discharge, but concerned for pt safety awareness when d/c. Pt cooperative; agreeable to therapeutic recommendations and plan of care.     Basic Mobility 6-click:  Rollin = Total, A lot = 2, A little = 3; 4 = None  Supine>Sit:   1 = Total, A lot = 2, A little = 3; 4 = None   Sit>Stand with arms:  1 = Total, A lot = 2, A little = 3; 4 = None  Bed>Chair:   1 = Total, A lot = 2, A little = 3; 4 = None  Ambulate in room:  1 = Total, A lot = 2, A little = 3; 4 = None  3-5 Steps with railin = Total, A lot = 2, A little = 3; 4 = None  Score: 20      Post-Tx Position: Up in Chair and Call light and personal items within reach  PPE: gloves, surgical mask, eyewear protection

## 2021-02-12 NOTE — PROGRESS NOTES
Continued Stay Note  JAYNA Ferrera     Patient Name: Baljit Kurtz  MRN: [id number]  Today's Date: 2/12/2021    Admit Date: 2/9/2021    Discharge Plan     Row Name 02/12/21 1143       Plan    Plan Comments  SW spoke with pt by phone due to covid precautions. Pt declined inpatient rehab and stated that he would like to go home with home health services. RN CM notifed via text.        Discharge Codes    No documentation.       Elvia ROMAN   Cell: [phone]  Office: [phone]  Fax: [phone]

## 2021-02-12 NOTE — PROGRESS NOTES
Nutrition Services    Patient Name: Baljit Kurtz  YOB: 1965  MRN: [id number]  Admission date: 2/9/2021      PPE Documentation        PPE Worn By Provider N/A, due to pt COVID19 status   PPE Worn By Patient  N/A     Nutrition Counseling & Education       Reason for Visit: Education for A1c 9.4  S/w pt's RN regarding education packet- provided to RN to give to patient. RD attempted to reach pt via room telephone without answer.     Session topic: Type 2 Diabetes Nutrition Therapy      Session comments: Provided nutrition education regarding carbohydrate counting along with additional information on label reading and diabetes basics- handouts given to pt's RN for delivery. RD contact info provided.     Provided print material via: Nutrition Care Manual     Goals: Increase knowledge on diet/nutrition effects on condition/status      Monitoring/Follow Up: Biochemical data     Patient to follow up PRN on outpatient basis       Electronically signed by:  Jocelyne Billingsley RD  02/12/21 16:48 EST

## 2021-02-12 NOTE — PROGRESS NOTES
LOS: 0 days   Patient Care Team:  Kerri Jones MD as PCP - General (Family Medicine)  Kerri Jones MD (Family Medicine)    Subjective     Interval History:    Patient Complaints: Pt denies complaints.    When I walked into the room, patient appeared to be talking to someone (nobody in the room).  Once he noticed me, he was oriented and answered questions appropriately.  He adamantly refuses rehab.    History taken from: patient    Review of Systems   Constitutional: Negative for activity change, appetite change and fatigue.   HENT: Negative for facial swelling.    Eyes: Negative for visual disturbance.   Respiratory: Negative for cough, shortness of breath, wheezing and stridor.    Cardiovascular: Negative for chest pain, palpitations and leg swelling.   Gastrointestinal: Negative for constipation, diarrhea, nausea and vomiting.   Genitourinary: Negative for urgency.   Musculoskeletal: Negative for myalgias.   Neurological: Positive for weakness. Negative for tremors.   Psychiatric/Behavioral: Positive for confusion.           Objective     Vital Signs  Temp:  [97.5 °F (36.4 °C)-97.8 °F (36.6 °C)] 97.7 °F (36.5 °C)  Heart Rate:  [] 98  Resp:  [18-20] 19  BP: (117-170)/(49-95) 147/84    Physical Exam:     General Appearance:    Alert, cooperative, in no acute distress,   Head:    Normocephalic, without obvious abnormality, atraumatic   Eyes:            Lids and lashes normal, conjunctivae and sclerae normal, no   icterus, no pallor, corneas clear, PERRLA   Ears:    Ears appear intact with no abnormalities noted   Throat:   No oral lesions, no thrush, oral mucosa moist   Neck:   No adenopathy, supple, trachea midline, no thyromegaly, no   carotid bruit, no JVD   Lungs:     Clear to auscultation,respirations regular, even and                  unlabored    Heart:    Regular rhythm and normal rate, normal S1 and S2, no            murmur, no gallop, no rub, no click   Chest Wall:    No abnormalities  observed   Abdomen:     Normal bowel sounds, no masses, no organomegaly, soft        Non-tender non-distended, no guarding,   Extremities:   Moves all extremities well, no edema, no cyanosis, no             Redness   Pulses:   Pulses palpable and equal bilaterally   Skin:   No bleeding, bruising or rash   Lymph nodes:   No palpable adenopathy   Neurologic:   Cranial nerves 2 - 12 grossly intact, sensation intact, DTR       present and equal bilaterally        Results Review:    Lab Results (last 24 hours)     Procedure Component Value Units Date/Time    POC Glucose Once [[id number]]  (Abnormal) Collected: 02/12/21 0801    Specimen: Blood Updated: 02/12/21 0802     Glucose 186 mg/dL      Comment: Serial Number: [id number]:  [id number]       Renal Function Panel [[id number]]  (Abnormal) Collected: 02/12/21 0355    Specimen: Blood Updated: 02/12/21 0514     Glucose 207 mg/dL      BUN 36 mg/dL      Creatinine 7.57 mg/dL      Sodium 133 mmol/L      Potassium 4.4 mmol/L      Chloride 97 mmol/L      CO2 26.0 mmol/L      Calcium 9.4 mg/dL      Albumin 3.80 g/dL      Phosphorus 3.5 mg/dL      Anion Gap 10.0 mmol/L      BUN/Creatinine Ratio 4.8     eGFR Non  Amer --     Comment: <15 Indicative of kidney failure.        eGFR  African Amer 9 mL/min/1.73      Comment: <15 Indicative of kidney failure.       Narrative:      GFR Normal >60  Chronic Kidney Disease <60  Kidney Failure <15      D-dimer, Quantitative [[id number]]  (Abnormal) Collected: 02/12/21 0355    Specimen: Blood Updated: 02/12/21 0425     D-Dimer, Quantitative 0.93 mg/L (FEU)     Narrative:      Reference Range  --------------------------------------------------------------------     < 0.50   Negative Predictive Value  0.50-0.59   Indeterminate    >= 0.60   Probable VTE             A very low percentage of patients with DVT may yield D-Dimer results   below the cut-off of 0.50 mg/L FEU.  This is known to be more   prevalent in patients with distal  DVT.             Results of this test should always be interpreted in conjunction with   the patient's medical history, clinical presentation and other   findings.  Clinical diagnosis should not be based on the result of   INNOVANCE D-Dimer alone.    CBC (No Diff) [[id number]]  (Abnormal) Collected: 02/12/21 0355    Specimen: Blood Updated: 02/12/21 0409     WBC 8.70 10*3/mm3      RBC 3.53 10*6/mm3      Hemoglobin 10.6 g/dL      Hematocrit 32.1 %      MCV 90.9 fL      MCH 30.0 pg      MCHC 33.0 g/dL      RDW 17.9 %      RDW-SD 57.3 fl      MPV 8.9 fL      Platelets 169 10*3/mm3     POC Glucose Once [[id number]]  (Abnormal) Collected: 02/11/21 2007    Specimen: Blood Updated: 02/11/21 2026     Glucose 281 mg/dL      Comment: Serial Number: [id number]:  [id number]       POC Glucose Once [[id number]]  (Abnormal) Collected: 02/11/21 1629    Specimen: Blood Updated: 02/11/21 1632     Glucose 260 mg/dL      Comment: Serial Number: [id number]:  [id number]              Imaging Results (Last 24 Hours)     ** No results found for the last 24 hours. **               I reviewed the patient's new clinical results.    Medication Review:   Scheduled Meds:amitriptyline, 25 mg, Oral, Nightly  ascorbic acid, 500 mg, Oral, Daily  atorvastatin, 40 mg, Oral, Nightly  docusate sodium, 100 mg, Oral, BID  gabapentin, 300 mg, Oral, Nightly  heparin (porcine), 5,000 Units, Subcutaneous, Q12H  insulin glargine, 20 Units, Subcutaneous, Nightly  insulin lispro, 0-14 Units, Subcutaneous, TID AC  levETIRAcetam XR, 500 mg, Oral, Daily  losartan, 50 mg, Oral, QAM  metoprolol tartrate, 50 mg, Oral, BID  sennosides-docusate, 1 tablet, Oral, BID  sevelamer, 2,400 mg, Oral, TID With Meals  sodium chloride, 3 mL, Intravenous, Q12H  zinc sulfate, 220 mg, Oral, Daily      Continuous Infusions:   PRN Meds:.•  acetaminophen  •  dextrose  •  dextrose  •  glucagon (human recombinant)  •  insulin lispro **AND** insulin lispro  •  ondansetron  •   [COMPLETED] Insert peripheral IV **AND** sodium chloride  •  sodium chloride     Assessment/Plan       Weakness  COVID-19 infection - zinc, vitamin c, vitamin d, heparin  Acute encephalopathy, likely related to COVID  DM 1.5 - increase dose of Lantus; continue SSI  ESRD - continue 3 x week dialysis  Prior CVA - chronic balance deficits; continue statin  Essential hypertension - controlled with home meds  Seizure disorder - Keppra        Plan for disposition:Pt understands my recommendation for skilled rehab.  If his encephalopathy improves, it may be reasonable to consider home with home health.    Karine Campos MD  02/12/21  13:20 EST

## 2021-02-12 NOTE — PROGRESS NOTES
"Continued Stay Note   [facility]     Patient Name: Baljit Kurtz  MRN: [id number]  Today's Date: 2/12/2021    Admit Date: 2/9/2021    Discharge Plan     Row Name 02/12/21 1539       Plan    Plan Comments  Cm contacted pt for home health choice, but pt declined to provide choice at this time. States he will have an answer \"before tomorrow afternoon.\"        Phone communication or documentation only - no physical contact with patient or family.          Winifred Garcia RN    "

## 2021-02-12 NOTE — PLAN OF CARE
Goal Outcome Evaluation:  Plan of Care Reviewed With: patient     Outcome Summary: Pt is a 54 yo male who presented with weakness.  Covid + with h/o ESRD on dialysis, DM, HTN, CVA's, seizure.  Pt lives alone in 1st floor apartment and stated wife/children are able to help intermittently at dc.  Pt oriented to self/place only this date.  Confusion noted with impaired deficict awarenss, safety/judegment, and reasoning impacting ability to retun home Nat.  Pt is SBA for bed mobility, CGA for CTS/transfers using rwx, and CGA for amb level xfer using rwx to commode.  Pt is SBA/franklin for socks, CGA toileting, and min A for UBD.  OT recommending inpt rehab at this time.  If cognition improves pt may be safe to dc home with assist and HH services (if assist available). OT to see 3x/wk while at [facility].  PPE worn per Covid protocol.

## 2021-02-12 NOTE — PROGRESS NOTES
"   LOS: 0 days    Patient Care Team:  Kerri Jones MD as PCP - General (Family Medicine)  Kerri Jones MD (Family Medicine)      Subjective     Patient feeling better.  More awake and alert  Denied any chest pain, shortness of breath, nausea or vomiting    Objective     Vital Sign Min/Max for last 24 hours  Temp:  [97.5 °F (36.4 °C)-97.8 °F (36.6 °C)] 97.7 °F (36.5 °C)  Heart Rate:  [] 98  Resp:  [18-20] 19  BP: (117-170)/(49-95) 147/84                       Flowsheet Rows      First Filed Value   Admission Height  175.3 cm (69\") Documented at 02/09/2021 2337   Admission Weight  90.7 kg (200 lb) Documented at 02/09/2021 2337          I/O this shift:  In: 240 [P.O.:240]  Out: 200 [Urine:200]  I/O last 3 completed shifts:  In: 1320 [P.O.:1320]  Out: 0     Physical Exam:  Physical Exam    General.  Awake, alert  Head.  Atraumatic, normocephalic  Neck.  Supple  ENT floor mucosa moist  Respiratory.  Clear to auscultation anteriorly  Cardiovascular.  Regular rhythm  Abdominal.  Obese, soft, nontender  Extremities.  No edema       LABS:  Lab Results   Component Value Date    CALCIUM 9.4 02/12/2021    PHOS 3.5 02/12/2021     Results from last 7 days   Lab Units 02/12/21  0355 02/11/21  0534 02/10/21  0034   MAGNESIUM mg/dL  --   --  2.2   SODIUM mmol/L 133* 135* 132*   POTASSIUM mmol/L 4.4 4.1 4.6   CHLORIDE mmol/L 97* 97* 93*   CO2 mmol/L 26.0 26.0 28.0   BUN mg/dL 36* 18 25*   CREATININE mg/dL 7.57* 5.61* 5.96*   GLUCOSE mg/dL 207* 219* 548*   CALCIUM mg/dL 9.4 9.8 9.9   WBC 10*3/mm3 8.70 7.60 8.30   HEMOGLOBIN g/dL 10.6* 10.7* 10.3*   PLATELETS 10*3/mm3 169 149 168   ALT (SGPT) U/L  --   --  13   AST (SGOT) U/L  --   --  31     Lab Results   Component Value Date    CKTOTAL 3,038 (H) 08/12/2020    TROPONINT 0.109 (C) 02/10/2021     Estimated Creatinine Clearance: 11.3 mL/min (A) (by C-G formula based on SCr of 7.57 mg/dL (H)).  Results from last 7 days   Lab Units 02/10/21  1130   PH, ARTERIAL pH units 7.375 "   PO2 ART mm Hg 77.1*   PCO2, ARTERIAL mm Hg 49.4*   HCO3 ART mmol/L 28.9*     Brief Urine Lab Results  (Last result in the past 365 days)      Color   Clarity   Blood   Leuk Est   Nitrite   Protein   CREAT   Urine HCG        08/11/20 2049 Yellow Cloudy  Comment:  Result checked  Large (3+) Small (1+) Negative >=300 mg/dL (3+)             WEIGHTS:     Wt Readings from Last 1 Encounters:   02/12/21 0214 87.8 kg (193 lb 9 oz)   02/11/21 0507 87.9 kg (193 lb 12.6 oz)   02/10/21 1730 89.3 kg (196 lb 13.9 oz)   02/09/21 2337 90.7 kg (200 lb)       amitriptyline, 25 mg, Oral, Nightly  ascorbic acid, 500 mg, Oral, Daily  atorvastatin, 40 mg, Oral, Nightly  docusate sodium, 100 mg, Oral, BID  gabapentin, 300 mg, Oral, Nightly  heparin (porcine), 5,000 Units, Subcutaneous, Q12H  insulin glargine, 20 Units, Subcutaneous, Nightly  insulin lispro, 0-14 Units, Subcutaneous, TID AC  levETIRAcetam XR, 500 mg, Oral, Daily  losartan, 50 mg, Oral, QAM  metoprolol tartrate, 50 mg, Oral, BID  sennosides-docusate, 1 tablet, Oral, BID  sevelamer, 2,400 mg, Oral, TID With Meals  sodium chloride, 3 mL, Intravenous, Q12H  zinc sulfate, 220 mg, Oral, Daily           Assessment/Plan     1.  ESRD  Electrolytes, volume status okay  2.  Hypertension with ESRD  Blood pressure better.  Amlodipine was stopped due to hypotension  3.  Anemia with ESRD  4.  Hyperglycemia  5.  COVID-19 infection     Resc:  Dialysis Today  Hemoglobin acceptable.  No need for BEST at this time      Salinas Ayala MD  02/12/21  10:51 EST

## 2021-02-12 NOTE — PROGRESS NOTES
Continued Stay Note   Iban     Patient Name: Baljit Kurtz  MRN: [id number]  Today's Date: 2/12/2021    Admit Date: 2/9/2021    Discharge Plan     Row Name 02/12/21 1318       Plan    Plan  DC Plan: Declined IP Rehab - [facility] choice pending[facility]    Plan Comments  RN called CM from pt room to assist with DC planning. CM s/w pt and discussed HH list provided by RN. Highlighted available options for HH agency. Pt states that he wishes to look over list and discuss with ex wife before deciding. Provided CM number for call back. Will f/u later today     Phone communication or documentation only - no physical contact with patient or family.      Winifred Garcia RN

## 2021-02-12 NOTE — CONSULTS
Diabetes Education    Patient Name:  Baljit Kurtz  YOB: 1965  MRN: [id number]  Admit Date:  2/9/2021        Follow-up with patient on insulin administration.  Appropriate PPE worn for COVID + room.  Patient able to state name and birth date and stated that he has had diabetes for 33 years. Asked patient to demonstrate how he gives his insulin injections.  Insulin pen, pen needle, syringe, vial, alcohol wipe, and injection pad given to patient for demonstration.  Patient did not know to inject air into the vial and stated that he gives himself 30 units of Humalog prior to meals.  Yesterday patient stated that he was on sliding scale before meals.  Patient able to draw back to the 30 units valerie on the syringe.  Patient had difficulty in removing paper off of pen needle and screwing pen needle onto insulin pen.  Asked patient how much long acting insulin he gives himself and he stated 40 units at bedtime. When patient was asked to dial pen to 40 he stated he could not see the numbers to dial to dose. Patient was wearing his glasses.  Asked patient how he is able to dial his dose at home and he stated he has his daughter help him. Patient stated yesterday that he takes Lantus 30 units at bedtime. Discussed with nurse about the inconsistencies with patient and nurse sending secure chat to doctor.      Electronically signed by:  Lian Lloyd RN  02/12/21 16:07 EST

## 2021-02-13 LAB
ALBUMIN SERPL-MCNC: 4.4 G/DL (ref 3.5–5.2)
ANION GAP SERPL CALCULATED.3IONS-SCNC: 12 MMOL/L (ref 5–15)
BUN SERPL-MCNC: 19 MG/DL (ref 6–20)
BUN/CREAT SERPL: 3.8 (ref 7–25)
CALCIUM SPEC-SCNC: 9.9 MG/DL (ref 8.6–10.5)
CHLORIDE SERPL-SCNC: 98 MMOL/L (ref 98–107)
CO2 SERPL-SCNC: 27 MMOL/L (ref 22–29)
CREAT SERPL-MCNC: 4.97 MG/DL (ref 0.76–1.27)
DEPRECATED RDW RBC AUTO: 57.3 FL (ref 37–54)
ERYTHROCYTE [DISTWIDTH] IN BLOOD BY AUTOMATED COUNT: 18.1 % (ref 12.3–15.4)
GFR SERPL CREATININE-BSD FRML MDRD: 15 ML/MIN/1.73
GLUCOSE BLDC GLUCOMTR-MCNC: 112 MG/DL (ref 70–105)
GLUCOSE BLDC GLUCOMTR-MCNC: 169 MG/DL (ref 70–105)
GLUCOSE BLDC GLUCOMTR-MCNC: 172 MG/DL (ref 70–105)
GLUCOSE BLDC GLUCOMTR-MCNC: 201 MG/DL (ref 70–105)
GLUCOSE BLDC GLUCOMTR-MCNC: 208 MG/DL (ref 70–105)
GLUCOSE SERPL-MCNC: 108 MG/DL (ref 65–99)
HBV SURFACE AB SER RIA-ACNC: REACTIVE
HCT VFR BLD AUTO: 33.6 % (ref 37.5–51)
HGB BLD-MCNC: 11.3 G/DL (ref 13–17.7)
MCH RBC QN AUTO: 30.1 PG (ref 26.6–33)
MCHC RBC AUTO-ENTMCNC: 33.6 G/DL (ref 31.5–35.7)
MCV RBC AUTO: 89.8 FL (ref 79–97)
PHOSPHATE SERPL-MCNC: 2.5 MG/DL (ref 2.5–4.5)
PLATELET # BLD AUTO: 146 10*3/MM3 (ref 140–450)
PMV BLD AUTO: 8.8 FL (ref 6–12)
POTASSIUM SERPL-SCNC: 4.2 MMOL/L (ref 3.5–5.2)
RBC # BLD AUTO: 3.75 10*6/MM3 (ref 4.14–5.8)
SODIUM SERPL-SCNC: 137 MMOL/L (ref 136–145)
WBC # BLD AUTO: 8.9 10*3/MM3 (ref 3.4–10.8)

## 2021-02-13 PROCEDURE — 85027 COMPLETE CBC AUTOMATED: CPT | Performed by: INTERNAL MEDICINE

## 2021-02-13 PROCEDURE — 63710000001 INSULIN LISPRO (HUMAN) PER 5 UNITS: Performed by: INTERNAL MEDICINE

## 2021-02-13 PROCEDURE — 80069 RENAL FUNCTION PANEL: CPT | Performed by: INTERNAL MEDICINE

## 2021-02-13 PROCEDURE — 82962 GLUCOSE BLOOD TEST: CPT

## 2021-02-13 PROCEDURE — 63710000001 INSULIN GLARGINE PER 5 UNITS: Performed by: INTERNAL MEDICINE

## 2021-02-13 PROCEDURE — 25010000002 HEPARIN (PORCINE) PER 1000 UNITS: Performed by: INTERNAL MEDICINE

## 2021-02-13 RX ORDER — RISPERIDONE 1 MG/1
1 TABLET ORAL EVERY 12 HOURS SCHEDULED
Status: DISCONTINUED | OUTPATIENT
Start: 2021-02-13 | End: 2021-02-16 | Stop reason: HOSPADM

## 2021-02-13 RX ORDER — HYDROXYZINE HYDROCHLORIDE 25 MG/1
25 TABLET, FILM COATED ORAL EVERY 6 HOURS PRN
Status: DISCONTINUED | OUTPATIENT
Start: 2021-02-13 | End: 2021-02-16 | Stop reason: HOSPADM

## 2021-02-13 RX ADMIN — HYDROXYZINE HYDROCHLORIDE 25 MG: 25 TABLET, FILM COATED ORAL at 21:58

## 2021-02-13 RX ADMIN — ATORVASTATIN CALCIUM 40 MG: 40 TABLET, FILM COATED ORAL at 21:59

## 2021-02-13 RX ADMIN — RISPERIDONE 1 MG: 1 TABLET ORAL at 21:58

## 2021-02-13 RX ADMIN — LEVETIRACETAM 500 MG: 500 TABLET, FILM COATED, EXTENDED RELEASE ORAL at 09:12

## 2021-02-13 RX ADMIN — HEPARIN SODIUM 5000 UNITS: 5000 INJECTION INTRAVENOUS; SUBCUTANEOUS at 09:13

## 2021-02-13 RX ADMIN — LOSARTAN POTASSIUM 50 MG: 50 TABLET, FILM COATED ORAL at 09:12

## 2021-02-13 RX ADMIN — Medication 1000 UNITS: at 09:12

## 2021-02-13 RX ADMIN — DOCUSATE SODIUM 100 MG: 100 CAPSULE, LIQUID FILLED ORAL at 09:12

## 2021-02-13 RX ADMIN — HEPARIN SODIUM 5000 UNITS: 5000 INJECTION INTRAVENOUS; SUBCUTANEOUS at 22:00

## 2021-02-13 RX ADMIN — GABAPENTIN 300 MG: 300 CAPSULE ORAL at 21:59

## 2021-02-13 RX ADMIN — DOCUSATE SODIUM 100 MG: 100 CAPSULE, LIQUID FILLED ORAL at 21:59

## 2021-02-13 RX ADMIN — SEVELAMER CARBONATE 2400 MG: 800 TABLET, FILM COATED ORAL at 09:12

## 2021-02-13 RX ADMIN — AMITRIPTYLINE HYDROCHLORIDE 25 MG: 25 TABLET, FILM COATED ORAL at 21:58

## 2021-02-13 RX ADMIN — ZINC SULFATE 220 MG (50 MG) CAPSULE 220 MG: CAPSULE at 09:12

## 2021-02-13 RX ADMIN — OXYCODONE HYDROCHLORIDE AND ACETAMINOPHEN 500 MG: 500 TABLET ORAL at 09:12

## 2021-02-13 RX ADMIN — METOPROLOL TARTRATE 50 MG: 50 TABLET, FILM COATED ORAL at 21:59

## 2021-02-13 RX ADMIN — SEVELAMER CARBONATE 2400 MG: 800 TABLET, FILM COATED ORAL at 11:48

## 2021-02-13 RX ADMIN — METOPROLOL TARTRATE 50 MG: 50 TABLET, FILM COATED ORAL at 09:12

## 2021-02-13 RX ADMIN — DOCUSATE SODIUM 50 MG AND SENNOSIDES 8.6 MG 1 TABLET: 8.6; 5 TABLET, FILM COATED ORAL at 09:12

## 2021-02-13 RX ADMIN — INSULIN GLARGINE 25 UNITS: 100 INJECTION, SOLUTION SUBCUTANEOUS at 22:01

## 2021-02-13 RX ADMIN — INSULIN LISPRO 5 UNITS: 100 INJECTION, SOLUTION INTRAVENOUS; SUBCUTANEOUS at 11:48

## 2021-02-13 RX ADMIN — DOCUSATE SODIUM 50 MG AND SENNOSIDES 8.6 MG 1 TABLET: 8.6; 5 TABLET, FILM COATED ORAL at 21:58

## 2021-02-13 NOTE — PROGRESS NOTES
"   LOS: 1 day    Patient Care Team:  Kerri Jones MD as PCP - General (Family Medicine)  Kerri Jones MD (Family Medicine)      Subjective       More awake and alert  Denied any chest pain, shortness of breath, nausea or vomiting    Objective     Vital Sign Min/Max for last 24 hours  Temp:  [97.2 °F (36.2 °C)-98.2 °F (36.8 °C)] 97.4 °F (36.3 °C)  Heart Rate:  [91-99] 92  Resp:  [16-19] 18  BP: (105-185)/(58-91) 105/70                       Flowsheet Rows      First Filed Value   Admission Height  175.3 cm (69\") Documented at 02/09/2021 2337   Admission Weight  90.7 kg (200 lb) Documented at 02/09/2021 2337          I/O this shift:  In: 240 [P.O.:240]  Out: -   I/O last 3 completed shifts:  In: 1680 [P.O.:1680]  Out: 2900 [Urine:200; Other:2700]    Physical Exam:  Physical Exam    General.  Awake, alert  Head.  Atraumatic, normocephalic  Neck.  Supple  ENT floor mucosa moist  Respiratory.  Clear to auscultation anteriorly  Cardiovascular.  Regular rhythm  Abdominal.  Obese, soft, nontender  Extremities.  No edema       LABS:  Lab Results   Component Value Date    CALCIUM 9.9 02/13/2021    PHOS 2.5 02/13/2021     Results from last 7 days   Lab Units 02/13/21  0441 02/12/21  0355 02/11/21  0534 02/10/21  0034   MAGNESIUM mg/dL  --   --   --  2.2   SODIUM mmol/L 137 133* 135* 132*   POTASSIUM mmol/L 4.2 4.4 4.1 4.6   CHLORIDE mmol/L 98 97* 97* 93*   CO2 mmol/L 27.0 26.0 26.0 28.0   BUN mg/dL 19 36* 18 25*   CREATININE mg/dL 4.97* 7.57* 5.61* 5.96*   GLUCOSE mg/dL 108* 207* 219* 548*   CALCIUM mg/dL 9.9 9.4 9.8 9.9   WBC 10*3/mm3 8.90 8.70 7.60 8.30   HEMOGLOBIN g/dL 11.3* 10.6* 10.7* 10.3*   PLATELETS 10*3/mm3 146 169 149 168   ALT (SGPT) U/L  --   --   --  13   AST (SGOT) U/L  --   --   --  31     Lab Results   Component Value Date    CKTOTAL 3,038 (H) 08/12/2020    TROPONINT 0.109 (C) 02/10/2021     Estimated Creatinine Clearance: 16.9 mL/min (A) (by C-G formula based on SCr of 4.97 mg/dL (H)).  Results from " last 7 days   Lab Units 02/10/21  1130   PH, ARTERIAL pH units 7.375   PO2 ART mm Hg 77.1*   PCO2, ARTERIAL mm Hg 49.4*   HCO3 ART mmol/L 28.9*     Brief Urine Lab Results  (Last result in the past 365 days)      Color   Clarity   Blood   Leuk Est   Nitrite   Protein   CREAT   Urine HCG        08/11/20 2049 Yellow Cloudy  Comment:  Result checked  Large (3+) Small (1+) Negative >=300 mg/dL (3+)             WEIGHTS:     Wt Readings from Last 1 Encounters:   02/13/21 0141 84.6 kg (186 lb 8.2 oz)   02/12/21 0214 87.8 kg (193 lb 9 oz)   02/11/21 0507 87.9 kg (193 lb 12.6 oz)   02/10/21 1730 89.3 kg (196 lb 13.9 oz)   02/09/21 2337 90.7 kg (200 lb)       amitriptyline, 25 mg, Oral, Nightly  ascorbic acid, 500 mg, Oral, Daily  atorvastatin, 40 mg, Oral, Nightly  cholecalciferol, 1,000 Units, Oral, Daily  docusate sodium, 100 mg, Oral, BID  gabapentin, 300 mg, Oral, Nightly  heparin (porcine), 5,000 Units, Subcutaneous, Q12H  insulin glargine, 25 Units, Subcutaneous, Nightly  insulin lispro, 0-14 Units, Subcutaneous, TID AC  levETIRAcetam XR, 500 mg, Oral, Daily  losartan, 50 mg, Oral, QAM  metoprolol tartrate, 50 mg, Oral, BID  sennosides-docusate, 1 tablet, Oral, BID  sevelamer, 2,400 mg, Oral, TID With Meals  sodium chloride, 3 mL, Intravenous, Q12H  zinc sulfate, 220 mg, Oral, Daily           Assessment/Plan     1.  ESRD  Electrolytes, volume status okay  2.  Hypertension with ESRD    Amlodipine was stopped due to hypotension  3.  Anemia with ESRD  4.  Hyperglycemia  5.  COVID-19 infection     Resc:  Dialysis MWF        Jim Sargent MD  02/13/21  16:14 EST

## 2021-02-13 NOTE — NURSING NOTE
Pt completed dialysis - 2.7 L removed. Pt reports feeling better and shows cognitive improvement. Up to the bathroom with standby assistance. Resting comfortably. Vitals stable but BP slightly elevated. Will continue to monitor.

## 2021-02-13 NOTE — PLAN OF CARE
Goal Outcome Evaluation:  Pt awake and appears calm. Vitals stable with no complaints. Pt alert and oriented x2 (previously x1) to person and place. He's had no outbursts or episodes of agitation and seems to be progressing cognitively. Pt's IV was absent at start of shift - four different nurses attempted to start new IV access and none were successful. Will reach out to IV team in am for further attempts at initiating access. Will continue to monitor.

## 2021-02-13 NOTE — NURSING NOTE
Pt's PIV located in LAURA absent during initial med pass/assessment. Two nurses made attempts to start new access but were unsuccessful. Waiting for ER nurse to come up with doppler to help locate vein to try and begin new IV access site.

## 2021-02-13 NOTE — PROGRESS NOTES
LOS: 1 day   Patient Care Team:  Kerri Jones MD as PCP - General (Family Medicine)  Kerri Jones MD (Family Medicine)    Subjective     Interval History:    Patient Complaints: Patient denies complaints.  Per nursing staff he is steady on his feet.  He is somewhat impulsive and forgetful.     History taken from: patient    Review of Systems   Constitutional: Negative for activity change, appetite change, fatigue and fever.   HENT: Negative for facial swelling.    Eyes: Negative for visual disturbance.   Respiratory: Negative for cough and shortness of breath.    Cardiovascular: Negative for palpitations and leg swelling.   Gastrointestinal: Negative for constipation, diarrhea, nausea and vomiting.   Genitourinary: Negative for dysuria.   Musculoskeletal: Negative for arthralgias and gait problem.   Neurological: Negative for dizziness.   Psychiatric/Behavioral: Positive for confusion. Negative for agitation and behavioral problems.           Objective     Vital Signs  Temp:  [97.2 °F (36.2 °C)-98.2 °F (36.8 °C)] 98.2 °F (36.8 °C)  Heart Rate:  [89-99] 98  Resp:  [16-19] 16  BP: (107-185)/(58-91) 109/58    Physical Exam:     General Appearance:    Alert, cooperative, in no acute distress,   Head:    Normocephalic, without obvious abnormality, atraumatic   Eyes:            Lids and lashes normal, conjunctivae and sclerae normal, no   icterus, no pallor, corneas clear, PERRLA   Ears:    Ears appear intact with no abnormalities noted   Throat:   No oral lesions, no thrush, oral mucosa moist   Neck:   No adenopathy, supple, trachea midline, no thyromegaly, no   carotid bruit, no JVD   Lungs:     Clear to auscultation,respirations regular, even and                  unlabored    Heart:    Regular rhythm and normal rate, normal S1 and S2, no            murmur, no gallop, no rub, no click   Chest Wall:    No abnormalities observed   Abdomen:     Normal bowel sounds, no masses, no organomegaly, soft         Non-tender non-distended, no guarding,   Extremities:   Moves all extremities well, no edema, no cyanosis, no             Redness   Pulses:   Pulses palpable and equal bilaterally   Skin:   No bleeding, bruising or rash   Lymph nodes:   No palpable adenopathy   Neurologic:   Cranial nerves 2 - 12 grossly intact, sensation intact, DTR       present and equal bilaterally        Results Review:    Lab Results (last 24 hours)     Procedure Component Value Units Date/Time    POC Glucose Once [[id number]]  (Abnormal) Collected: 02/12/21 1654    Specimen: Blood Updated: 02/13/21 0816     Glucose 172 mg/dL      Comment: Serial Number: [id number]:  [id number]       POC Glucose Once [[id number]]  (Abnormal) Collected: 02/12/21 1124    Specimen: Blood Updated: 02/13/21 0815     Glucose 208 mg/dL      Comment: Serial Number: [id number]:  [id number]       POC Glucose Once [[id number]]  (Abnormal) Collected: 02/13/21 0743    Specimen: Blood Updated: 02/13/21 0811     Glucose 112 mg/dL      Comment: Serial Number: [id number]:  [id number]       Renal Function Panel [[id number]]  (Abnormal) Collected: 02/13/21 0441    Specimen: Blood Updated: 02/13/21 0514     Glucose 108 mg/dL      BUN 19 mg/dL      Creatinine 4.97 mg/dL      Sodium 137 mmol/L      Potassium 4.2 mmol/L      Chloride 98 mmol/L      CO2 27.0 mmol/L      Calcium 9.9 mg/dL      Albumin 4.40 g/dL      Phosphorus 2.5 mg/dL      Comment: Result checked         Anion Gap 12.0 mmol/L      BUN/Creatinine Ratio 3.8     eGFR  African Amer 15 mL/min/1.73     Narrative:      GFR Normal >60  Chronic Kidney Disease <60  Kidney Failure <15      CBC (No Diff) [[id number]]  (Abnormal) Collected: 02/13/21 0441    Specimen: Blood Updated: 02/13/21 0452     WBC 8.90 10*3/mm3      RBC 3.75 10*6/mm3      Hemoglobin 11.3 g/dL      Hematocrit 33.6 %      MCV 89.8 fL      MCH 30.1 pg      MCHC 33.6 g/dL      RDW 18.1 %      RDW-SD 57.3 fl      MPV 8.8 fL      Platelets 146  10*3/mm3     Hepatitis B Surface Antigen [[id number]]  (Normal) Collected: 02/12/21 2300    Specimen: Blood Updated: 02/12/21 2334     Hepatitis B Surface Ag Non-Reactive    Narrative:      Results may be falsely decreased if patient taking Biotin.      Hepatitis B Surface Antibody [[id number]] Collected: 02/12/21 2300    Specimen: Blood Updated: 02/12/21 2305    POC Glucose Once [[id number]]  (Abnormal) Collected: 02/12/21 2107    Specimen: Blood Updated: 02/12/21 2108     Glucose 239 mg/dL      Comment: Serial Number: [id number]:  [id number]              Imaging Results (Last 24 Hours)     ** No results found for the last 24 hours. **               I reviewed the patient's new clinical results.    Medication Review:   Scheduled Meds:amitriptyline, 25 mg, Oral, Nightly  ascorbic acid, 500 mg, Oral, Daily  atorvastatin, 40 mg, Oral, Nightly  cholecalciferol, 1,000 Units, Oral, Daily  docusate sodium, 100 mg, Oral, BID  gabapentin, 300 mg, Oral, Nightly  heparin (porcine), 5,000 Units, Subcutaneous, Q12H  insulin glargine, 25 Units, Subcutaneous, Nightly  insulin lispro, 0-14 Units, Subcutaneous, TID AC  levETIRAcetam XR, 500 mg, Oral, Daily  losartan, 50 mg, Oral, QAM  metoprolol tartrate, 50 mg, Oral, BID  sennosides-docusate, 1 tablet, Oral, BID  sevelamer, 2,400 mg, Oral, TID With Meals  sodium chloride, 3 mL, Intravenous, Q12H  zinc sulfate, 220 mg, Oral, Daily      Continuous Infusions:   PRN Meds:.•  acetaminophen  •  dextrose  •  dextrose  •  glucagon (human recombinant)  •  insulin lispro **AND** insulin lispro  •  ondansetron  •  [COMPLETED] Insert peripheral IV **AND** sodium chloride  •  sodium chloride     Assessment/Plan       Weakness  Acute Covid infection -no respiratory symptoms; encephalopathy appears to be the primary symptom; continue zinc, Vitamin C, vitamin D, heparin.  Diabetes with complication of nephropathy -blood sugars well controlled on current regimen  End-stage renal disease with  dependence on dialysis -continue regular dialysis schedule  Seizure disorder -continue Keppra  Essential hypertension -continue metoprolol  Secondary hyperparathyroidism    I have discussed patient's care with Aliza, his ex-wife.  She feels strongly that he needs to go to skilled rehab with likely transition to assisted living.   Patient continues to refuse skilled rehab or consideration of assisted living. He appears competent to make his own decisions.      Plan for disposition:TBD.    Karine Campos MD  02/13/21  10:53 EST

## 2021-02-13 NOTE — PLAN OF CARE
Problem: Adult Inpatient Plan of Care  Goal: Plan of Care Review  Outcome: Ongoing, Progressing  Goal: Patient-Specific Goal (Individualized)  Outcome: Ongoing, Progressing  Goal: Absence of Hospital-Acquired Illness or Injury  Outcome: Ongoing, Progressing  Intervention: Identify and Manage Fall Risk  Description: Perform standard risk assessment with a validated tool or comprehensive approach appropriate to the patient on admission; reassess fall risk frequently, with change in status or transfer to another level of care.  Communicate fall injury risk to interprofessional healthcare team.  Determine need for increased observation, equipment and environmental modification, such as low bed and signage, as well as supportive, nonskid footwear.  Adjust safety measures to individual developmental age, stage and identified risk factors.  Reinforce the importance of safety and physical activity with patient and family.  Perform regular intentional rounding to assess need for position change, pain assessment, personal needs, including assistance with toileting.  Recent Flowsheet Documentation  Taken 2/13/2021 1632 by Brielle Souza, RN  Safety Promotion/Fall Prevention:   nonskid shoes/slippers when out of bed   room organization consistent   safety round/check completed   toileting scheduled  Taken 2/13/2021 1400 by Brielle Souza, RN  Safety Promotion/Fall Prevention: safety round/check completed  Taken 2/13/2021 1230 by Brielle Souza, RN  Safety Promotion/Fall Prevention:   activity supervised   nonskid shoes/slippers when out of bed   room organization consistent   safety round/check completed   toileting scheduled  Taken 2/13/2021 0815 by Brielle Souza, RN  Safety Promotion/Fall Prevention:   nonskid shoes/slippers when out of bed   room organization consistent   safety round/check completed   toileting scheduled  Intervention: Prevent Skin Injury  Description: Assess skin risk on admission and at regular  intervals throughout hospital stay.  Keep all areas of skin (especially folds) clean and dry.  Maintain adequate skin hydration.  Relieve and redistribute pressure and protect bony prominences; implement measures based on patient-specific risk factors.  Match turning and repositioning schedule to clinical condition.  Encourage weight shift frequently; assist with reposition if unable to complete independently.  Float heels off bed. Avoid pressure on the Achilles tendon.  Keep skin free from extended contact with medical devices.  Use aids (e.g., slide boards, mechanical lift) during transfer.  Recent Flowsheet Documentation  Taken 2/13/2021 1632 by Brielle Souza RN  Body Position: supine  Taken 2/13/2021 1230 by Brielle Souza RN  Body Position: sitting up in bed  Taken 2/13/2021 0815 by Brielle Souza RN  Body Position: sitting up in bed  Intervention: Prevent Infection  Description: Maintain skin and mucous membrane integrity; promote hand, oral and pulmonary hygiene.  Optimize fluid balance, nutrition, sleep and glycemic control to maximize infection resistance.  Identify potential sources of infection early to prevent or mitigate progression of infection (e.g., wound, lines, devices).  Evaluate ongoing need for invasive devices; remove promptly when no longer indicated.  Recent Flowsheet Documentation  Taken 2/13/2021 1632 by Brielle Souza RN  Infection Prevention: personal protective equipment utilized  Taken 2/13/2021 1230 by Brielle Souza RN  Infection Prevention: hand hygiene promoted  Taken 2/13/2021 0815 by Brielle Souza RN  Infection Prevention: hand hygiene promoted  Goal: Optimal Comfort and Wellbeing  Outcome: Ongoing, Progressing  Goal: Readiness for Transition of Care  Outcome: Ongoing, Progressing     Problem: Fall Injury Risk  Goal: Absence of Fall and Fall-Related Injury  Outcome: Ongoing, Progressing  Intervention: Identify and Manage Contributors to Fall Injury  Risk  Description: Reassess fall risk frequently and with change in status or transfer to another level of care.  Communicate fall injury risk to all healthcare team members (e.g., rounds, change of shift/provider, patient transport).  Anticipate needs; perform regular intentional rounding to assess need for position change, pain assessment, personal needs (e.g., toileting) and placement of necessary items.  Provide reorientation, appropriate sensory stimulation and routines with changes in mental status to decrease risk of fall.  Promote use of personal vision and auditory aids (e.g., glasses, hearing aids).  Assess assistance level required for safe and effective care; provide support as needed (e.g., toileting, bathing, mobilization).  Define behavior and activity limits to patient and family.  If fall occurs, assess for and treat injury; determine cause; revise fall injury prevention plan.  Regularly review medication contribution to fall risk; adjust medication administration times to minimize risk of falling.  Consider risk related to polypharmacy and age.  Balance adequate pain management with potential for oversedation.  Recent Flowsheet Documentation  Taken 2/13/2021 1632 by Brielle Souza, RN  Medication Review/Management: medications reviewed  Taken 2/13/2021 1230 by Brielle Souza, RN  Medication Review/Management: medications reviewed  Taken 2/13/2021 0815 by Brielle Souza, RN  Medication Review/Management: medications reviewed  Intervention: Promote Injury-Free Environment  Description: Provide a safe, barrier-free environment that encourages independent activity.  Keep care area uncluttered and well-lighted.  Determine need for increased observation or auditory alerts (e.g., bed or chair alarm).  Assess equipment and environmental modification needs (e.g., low bed, signage, nonskid footwear, grab bars).  Avoid use of restraints.  Recent Flowsheet Documentation  Taken 2/13/2021 1632 by Harley  Brielle SCHROEDER RN  Safety Promotion/Fall Prevention:   nonskid shoes/slippers when out of bed   room organization consistent   safety round/check completed   toileting scheduled  Taken 2/13/2021 1400 by Brielle Souza RN  Safety Promotion/Fall Prevention: safety round/check completed  Taken 2/13/2021 1230 by Brielle Souza RN  Safety Promotion/Fall Prevention:   activity supervised   nonskid shoes/slippers when out of bed   room organization consistent   safety round/check completed   toileting scheduled  Taken 2/13/2021 0815 by Brielle Souza RN  Safety Promotion/Fall Prevention:   nonskid shoes/slippers when out of bed   room organization consistent   safety round/check completed   toileting scheduled     Problem: Glycemic Control Impaired  Goal: Blood Glucose Level Within Desired Range  Outcome: Ongoing, Progressing  Intervention: Optimize Glycemic Control  Description: Establish target blood glucose levels based on patient-specific factors (e.g., illness severity, comorbidity).  Document blood glucose levels and monitor trend; advocate for treatment if not within desired range.  Provide pharmacologic therapy to maintain glycemic control.  Advocate for correctional dose if blood glucose level remains elevated.  If change in mental or cognitive status, check blood glucose level.  Adjust insulin dose to change in condition to avoid hypoglycemic episodes [e.g., illness severity, decreased oral intake, missed meals and snacks, medication change (steroid taper)].  Recent Flowsheet Documentation  Taken 2/13/2021 1632 by Brielle Souza RN  Glycemic Management: blood glucose monitoring  Taken 2/13/2021 1230 by Brielle Souza RN  Glycemic Management: blood glucose monitoring  Taken 2/13/2021 0815 by Brielle Souza RN  Glycemic Management: blood glucose monitoring     Problem: Skin Injury Risk Increased  Goal: Skin Health and Integrity  Outcome: Ongoing, Progressing  Intervention: Optimize Skin  Protection  Description: Reassess skin injury risk and inspect skin frequently (e.g., scheduled interval, with turning, with change in condition) to provide optimal prevention.  Maintain adequate tissue perfusion (e.g., encourage fluid balance, avoid crossing legs, constrictive clothing or devices) to promote tissue oxygenation.  Maintain head of bed at lowest degree of elevation tolerated, considering medical condition and other restrictions. Limit amount of time head of bed is elevated greater than 30 degrees to prevent friction/shear injury.  Avoid positioning onto an area that remains reddened.  Minimize incontinence and moisture (e.g., toileting schedule; moisture-wicking pad, diaper or incontinence collection device, skin moisture barrier).  Cleanse skin promptly and gently when soiled utilizing a pH-balanced cleanser.  Relieve and redistribute pressure (e.g., schedule position changes; utilize higher specification foam mattress, chair cushion, constant low-pressure or alternating-pressure support surface; medical device repositioning; protective dressing applicatio  Support increased progressive functional activity (e.g., therapeutic exercise) to decrease risk associated with immobility. Balance rest with activity.  Recent Flowsheet Documentation  Taken 2/13/2021 1632 by Brielle Souza, RN  Pressure Reduction Techniques: frequent weight shift encouraged  Head of Bed (HOB): HOB at 60 degrees  Pressure Reduction Devices: pressure-redistributing mattress utilized  Skin Protection: adhesive use limited  Taken 2/13/2021 1230 by Brielle Souza, RN  Head of Bed (HOB): HOB at 60 degrees  Taken 2/13/2021 0815 by Brielle Souza, RN  Pressure Reduction Techniques: frequent weight shift encouraged  Head of Bed (HOB):   HOB elevated   HOB at 30-45 degrees  Pressure Reduction Devices: pressure-redistributing mattress utilized  Skin Protection: adhesive use limited  Intervention: Promote and Optimize Oral  Intake  Description: Assess need and provide assistance with meal set-up and feeding.  Adjust diet or meal schedule based on preferences and tolerance.  Minimize unnecessary dietary restrictions to increase oral intake.  Offer oral supplemental foods or drinks to enhance calorie and protein intake.  Establish bowel elimination program to increase comfort and appetite.  Provide and encourage oral hygiene to enhance desire to eat.  Consider nutrition support if oral intake remains inadequate.  Recent Flowsheet Documentation  Taken 2/13/2021 1632 by Brielle Souza, RN  Oral Nutrition Promotion: rest periods promoted  Taken 2/13/2021 1230 by Brielle Souza, RN  Oral Nutrition Promotion: rest periods promoted  Taken 2/13/2021 0815 by Brielle Souza, RN  Oral Nutrition Promotion: rest periods promoted   Goal Outcome Evaluation: Cont to monitor, waiting for possible placement on discharge.

## 2021-02-14 LAB
ANION GAP SERPL CALCULATED.3IONS-SCNC: 13 MMOL/L (ref 5–15)
BUN SERPL-MCNC: 34 MG/DL (ref 6–20)
BUN/CREAT SERPL: 4.4 (ref 7–25)
CALCIUM SPEC-SCNC: 10 MG/DL (ref 8.6–10.5)
CHLORIDE SERPL-SCNC: 97 MMOL/L (ref 98–107)
CO2 SERPL-SCNC: 25 MMOL/L (ref 22–29)
CREAT SERPL-MCNC: 7.66 MG/DL (ref 0.76–1.27)
DEPRECATED RDW RBC AUTO: 56.4 FL (ref 37–54)
ERYTHROCYTE [DISTWIDTH] IN BLOOD BY AUTOMATED COUNT: 17.6 % (ref 12.3–15.4)
GFR SERPL CREATININE-BSD FRML MDRD: 9 ML/MIN/1.73
GFR SERPL CREATININE-BSD FRML MDRD: ABNORMAL ML/MIN/{1.73_M2}
GLUCOSE BLDC GLUCOMTR-MCNC: 179 MG/DL (ref 70–105)
GLUCOSE BLDC GLUCOMTR-MCNC: 340 MG/DL (ref 70–105)
GLUCOSE SERPL-MCNC: 312 MG/DL (ref 65–99)
HCT VFR BLD AUTO: 34.4 % (ref 37.5–51)
HGB BLD-MCNC: 11.3 G/DL (ref 13–17.7)
MCH RBC QN AUTO: 29.6 PG (ref 26.6–33)
MCHC RBC AUTO-ENTMCNC: 32.9 G/DL (ref 31.5–35.7)
MCV RBC AUTO: 90.2 FL (ref 79–97)
PLATELET # BLD AUTO: 144 10*3/MM3 (ref 140–450)
PMV BLD AUTO: 9.7 FL (ref 6–12)
POTASSIUM SERPL-SCNC: 5 MMOL/L (ref 3.5–5.2)
RBC # BLD AUTO: 3.82 10*6/MM3 (ref 4.14–5.8)
SODIUM SERPL-SCNC: 135 MMOL/L (ref 136–145)
WBC # BLD AUTO: 8.2 10*3/MM3 (ref 3.4–10.8)

## 2021-02-14 PROCEDURE — 80048 BASIC METABOLIC PNL TOTAL CA: CPT | Performed by: INTERNAL MEDICINE

## 2021-02-14 PROCEDURE — 63710000001 INSULIN LISPRO (HUMAN) PER 5 UNITS: Performed by: INTERNAL MEDICINE

## 2021-02-14 PROCEDURE — 85027 COMPLETE CBC AUTOMATED: CPT | Performed by: INTERNAL MEDICINE

## 2021-02-14 PROCEDURE — 82962 GLUCOSE BLOOD TEST: CPT

## 2021-02-14 PROCEDURE — 63710000001 INSULIN GLARGINE PER 5 UNITS: Performed by: INTERNAL MEDICINE

## 2021-02-14 PROCEDURE — 25010000002 HEPARIN (PORCINE) PER 1000 UNITS: Performed by: INTERNAL MEDICINE

## 2021-02-14 RX ADMIN — RISPERIDONE 1 MG: 1 TABLET ORAL at 20:18

## 2021-02-14 RX ADMIN — GABAPENTIN 300 MG: 300 CAPSULE ORAL at 20:18

## 2021-02-14 RX ADMIN — HEPARIN SODIUM 5000 UNITS: 5000 INJECTION INTRAVENOUS; SUBCUTANEOUS at 20:19

## 2021-02-14 RX ADMIN — INSULIN LISPRO 10 UNITS: 100 INJECTION, SOLUTION INTRAVENOUS; SUBCUTANEOUS at 08:58

## 2021-02-14 RX ADMIN — SEVELAMER CARBONATE 2400 MG: 800 TABLET, FILM COATED ORAL at 12:48

## 2021-02-14 RX ADMIN — INSULIN GLARGINE 25 UNITS: 100 INJECTION, SOLUTION SUBCUTANEOUS at 20:22

## 2021-02-14 RX ADMIN — DOCUSATE SODIUM 100 MG: 100 CAPSULE, LIQUID FILLED ORAL at 20:19

## 2021-02-14 RX ADMIN — DOCUSATE SODIUM 50 MG AND SENNOSIDES 8.6 MG 1 TABLET: 8.6; 5 TABLET, FILM COATED ORAL at 08:57

## 2021-02-14 RX ADMIN — ATORVASTATIN CALCIUM 40 MG: 40 TABLET, FILM COATED ORAL at 20:19

## 2021-02-14 RX ADMIN — METOPROLOL TARTRATE 50 MG: 50 TABLET, FILM COATED ORAL at 20:19

## 2021-02-14 RX ADMIN — OXYCODONE HYDROCHLORIDE AND ACETAMINOPHEN 500 MG: 500 TABLET ORAL at 08:57

## 2021-02-14 RX ADMIN — HEPARIN SODIUM 5000 UNITS: 5000 INJECTION INTRAVENOUS; SUBCUTANEOUS at 08:57

## 2021-02-14 RX ADMIN — INSULIN LISPRO 3 UNITS: 100 INJECTION, SOLUTION INTRAVENOUS; SUBCUTANEOUS at 12:48

## 2021-02-14 RX ADMIN — ZINC SULFATE 220 MG (50 MG) CAPSULE 220 MG: CAPSULE at 08:57

## 2021-02-14 RX ADMIN — AMITRIPTYLINE HYDROCHLORIDE 25 MG: 25 TABLET, FILM COATED ORAL at 20:18

## 2021-02-14 RX ADMIN — RISPERIDONE 1 MG: 1 TABLET ORAL at 08:57

## 2021-02-14 RX ADMIN — DOCUSATE SODIUM 50 MG AND SENNOSIDES 8.6 MG 1 TABLET: 8.6; 5 TABLET, FILM COATED ORAL at 20:19

## 2021-02-14 RX ADMIN — Medication 1000 UNITS: at 08:57

## 2021-02-14 RX ADMIN — METOPROLOL TARTRATE 50 MG: 50 TABLET, FILM COATED ORAL at 08:57

## 2021-02-14 RX ADMIN — LEVETIRACETAM 500 MG: 500 TABLET, FILM COATED, EXTENDED RELEASE ORAL at 08:57

## 2021-02-14 RX ADMIN — DOCUSATE SODIUM 100 MG: 100 CAPSULE, LIQUID FILLED ORAL at 08:57

## 2021-02-14 RX ADMIN — SEVELAMER CARBONATE 2400 MG: 800 TABLET, FILM COATED ORAL at 08:56

## 2021-02-14 RX ADMIN — LOSARTAN POTASSIUM 50 MG: 50 TABLET, FILM COATED ORAL at 08:57

## 2021-02-14 NOTE — PLAN OF CARE
Problem: Adult Inpatient Plan of Care  Goal: Plan of Care Review  Outcome: Ongoing, Progressing  Goal: Patient-Specific Goal (Individualized)  Outcome: Ongoing, Progressing  Goal: Absence of Hospital-Acquired Illness or Injury  Outcome: Ongoing, Progressing  Intervention: Identify and Manage Fall Risk  Description: Perform standard risk assessment with a validated tool or comprehensive approach appropriate to the patient on admission; reassess fall risk frequently, with change in status or transfer to another level of care.  Communicate fall injury risk to interprofessional healthcare team.  Determine need for increased observation, equipment and environmental modification, such as low bed and signage, as well as supportive, nonskid footwear.  Adjust safety measures to individual developmental age, stage and identified risk factors.  Reinforce the importance of safety and physical activity with patient and family.  Perform regular intentional rounding to assess need for position change, pain assessment, personal needs, including assistance with toileting.  Recent Flowsheet Documentation  Taken 2/14/2021 1210 by Brielle Souza, RN  Safety Promotion/Fall Prevention:   activity supervised   assistive device/personal items within reach   clutter free environment maintained   fall prevention program maintained   lighting adjusted   muscle strengthening facilitated   nonskid shoes/slippers when out of bed   room organization consistent   safety round/check completed   toileting scheduled  Taken 2/14/2021 0805 by Brielle Souza, RN  Safety Promotion/Fall Prevention:   clutter free environment maintained   lighting adjusted   muscle strengthening facilitated   nonskid shoes/slippers when out of bed   room organization consistent   safety round/check completed   toileting scheduled  Intervention: Prevent Skin Injury  Description: Assess skin risk on admission and at regular intervals throughout hospital stay.  Keep all  areas of skin (especially folds) clean and dry.  Maintain adequate skin hydration.  Relieve and redistribute pressure and protect bony prominences; implement measures based on patient-specific risk factors.  Match turning and repositioning schedule to clinical condition.  Encourage weight shift frequently; assist with reposition if unable to complete independently.  Float heels off bed. Avoid pressure on the Achilles tendon.  Keep skin free from extended contact with medical devices.  Use aids (e.g., slide boards, mechanical lift) during transfer.  Recent Flowsheet Documentation  Taken 2/14/2021 1210 by Brielle Souza RN  Body Position: side-lying, left  Taken 2/14/2021 0805 by Brielle Souza RN  Body Position: supine  Intervention: Prevent Infection  Description: Maintain skin and mucous membrane integrity; promote hand, oral and pulmonary hygiene.  Optimize fluid balance, nutrition, sleep and glycemic control to maximize infection resistance.  Identify potential sources of infection early to prevent or mitigate progression of infection (e.g., wound, lines, devices).  Evaluate ongoing need for invasive devices; remove promptly when no longer indicated.  Recent Flowsheet Documentation  Taken 2/14/2021 1210 by Brielle Souza RN  Infection Prevention: hand hygiene promoted  Taken 2/14/2021 0805 by Brielle Souza RN  Infection Prevention:   hand hygiene promoted   rest/sleep promoted  Goal: Optimal Comfort and Wellbeing  Outcome: Ongoing, Progressing  Goal: Readiness for Transition of Care  Outcome: Ongoing, Progressing     Problem: Fall Injury Risk  Goal: Absence of Fall and Fall-Related Injury  Outcome: Ongoing, Progressing  Intervention: Identify and Manage Contributors to Fall Injury Risk  Description: Reassess fall risk frequently and with change in status or transfer to another level of care.  Communicate fall injury risk to all healthcare team members (e.g., rounds, change of shift/provider, patient  transport).  Anticipate needs; perform regular intentional rounding to assess need for position change, pain assessment, personal needs (e.g., toileting) and placement of necessary items.  Provide reorientation, appropriate sensory stimulation and routines with changes in mental status to decrease risk of fall.  Promote use of personal vision and auditory aids (e.g., glasses, hearing aids).  Assess assistance level required for safe and effective care; provide support as needed (e.g., toileting, bathing, mobilization).  Define behavior and activity limits to patient and family.  If fall occurs, assess for and treat injury; determine cause; revise fall injury prevention plan.  Regularly review medication contribution to fall risk; adjust medication administration times to minimize risk of falling.  Consider risk related to polypharmacy and age.  Balance adequate pain management with potential for oversedation.  Recent Flowsheet Documentation  Taken 2/14/2021 1210 by Brielle Souza, RN  Medication Review/Management: medications reviewed  Taken 2/14/2021 0805 by Brielle Souza RN  Medication Review/Management: medications reviewed  Intervention: Promote Injury-Free Environment  Description: Provide a safe, barrier-free environment that encourages independent activity.  Keep care area uncluttered and well-lighted.  Determine need for increased observation or auditory alerts (e.g., bed or chair alarm).  Assess equipment and environmental modification needs (e.g., low bed, signage, nonskid footwear, grab bars).  Avoid use of restraints.  Recent Flowsheet Documentation  Taken 2/14/2021 1210 by Brielle Souza, RN  Safety Promotion/Fall Prevention:   activity supervised   assistive device/personal items within reach   clutter free environment maintained   fall prevention program maintained   lighting adjusted   muscle strengthening facilitated   nonskid shoes/slippers when out of bed   room organization consistent    safety round/check completed   toileting scheduled  Taken 2/14/2021 0805 by Brielle Souza, RN  Safety Promotion/Fall Prevention:   clutter free environment maintained   lighting adjusted   muscle strengthening facilitated   nonskid shoes/slippers when out of bed   room organization consistent   safety round/check completed   toileting scheduled     Problem: Glycemic Control Impaired  Goal: Blood Glucose Level Within Desired Range  Outcome: Ongoing, Progressing  Intervention: Optimize Glycemic Control  Description: Establish target blood glucose levels based on patient-specific factors (e.g., illness severity, comorbidity).  Document blood glucose levels and monitor trend; advocate for treatment if not within desired range.  Provide pharmacologic therapy to maintain glycemic control.  Advocate for correctional dose if blood glucose level remains elevated.  If change in mental or cognitive status, check blood glucose level.  Adjust insulin dose to change in condition to avoid hypoglycemic episodes [e.g., illness severity, decreased oral intake, missed meals and snacks, medication change (steroid taper)].  Recent Flowsheet Documentation  Taken 2/14/2021 1210 by Brielle Souza, RN  Glycemic Management: blood glucose monitoring  Taken 2/14/2021 0805 by Brielle Souza RN  Glycemic Management: blood glucose monitoring     Problem: Skin Injury Risk Increased  Goal: Skin Health and Integrity  Outcome: Ongoing, Progressing  Intervention: Optimize Skin Protection  Description: Reassess skin injury risk and inspect skin frequently (e.g., scheduled interval, with turning, with change in condition) to provide optimal prevention.  Maintain adequate tissue perfusion (e.g., encourage fluid balance, avoid crossing legs, constrictive clothing or devices) to promote tissue oxygenation.  Maintain head of bed at lowest degree of elevation tolerated, considering medical condition and other restrictions. Limit amount of time head  of bed is elevated greater than 30 degrees to prevent friction/shear injury.  Avoid positioning onto an area that remains reddened.  Minimize incontinence and moisture (e.g., toileting schedule; moisture-wicking pad, diaper or incontinence collection device, skin moisture barrier).  Cleanse skin promptly and gently when soiled utilizing a pH-balanced cleanser.  Relieve and redistribute pressure (e.g., schedule position changes; utilize higher specification foam mattress, chair cushion, constant low-pressure or alternating-pressure support surface; medical device repositioning; protective dressing applicatio  Support increased progressive functional activity (e.g., therapeutic exercise) to decrease risk associated with immobility. Balance rest with activity.  Recent Flowsheet Documentation  Taken 2/14/2021 1210 by Brielle Souza RN  Pressure Reduction Techniques: frequent weight shift encouraged  Head of Bed (HOB): HOB at 30-45 degrees  Pressure Reduction Devices: pressure-redistributing mattress utilized  Skin Protection: adhesive use limited  Taken 2/14/2021 0805 by Brielle Souza RN  Pressure Reduction Techniques: frequent weight shift encouraged  Head of Bed (HOB): HOB at 30 degrees  Pressure Reduction Devices: pressure-redistributing mattress utilized  Skin Protection: adhesive use limited  Intervention: Promote and Optimize Oral Intake  Description: Assess need and provide assistance with meal set-up and feeding.  Adjust diet or meal schedule based on preferences and tolerance.  Minimize unnecessary dietary restrictions to increase oral intake.  Offer oral supplemental foods or drinks to enhance calorie and protein intake.  Establish bowel elimination program to increase comfort and appetite.  Provide and encourage oral hygiene to enhance desire to eat.  Consider nutrition support if oral intake remains inadequate.  Recent Flowsheet Documentation  Taken 2/14/2021 1210 by Brielle Souza RN  Oral  Nutrition Promotion: rest periods promoted  Taken 2/14/2021 0805 by Brielle Souza RN  Oral Nutrition Promotion: rest periods promoted   Goal Outcome Evaluation:

## 2021-02-14 NOTE — PLAN OF CARE
Goal Outcome Evaluation:      Pt remains confused, and impulsive. Pt is easily angered for example when he lost his phone that was found sitting on his bed, or needed a  for his phone. Dr Campos allowed risperidone, and hydroxyzine to be restarted. Pt remains on room air with no complaints of pain.

## 2021-02-14 NOTE — PROGRESS NOTES
LOS: 2 days   Patient Care Team:  Kerri Jones MD as PCP - General (Family Medicine)  Kerri Jones MD (Family Medicine)    Subjective     Interval History:    Patient Complaints: Patient was agitated and impulsive last night.  He is calm this morning.  He denies any complaints to me.  He states he has not talked to his family members.  He remains adamant that he is not going to rehab and wants to go home.     History taken from: patient    Review of Systems   Constitutional: Positive for activity change. Negative for appetite change, diaphoresis, fatigue and fever.   HENT: Negative for facial swelling.    Eyes: Negative for visual disturbance.   Respiratory: Negative for cough, shortness of breath, wheezing and stridor.    Cardiovascular: Negative for chest pain, palpitations and leg swelling.   Gastrointestinal: Negative for abdominal pain, diarrhea, nausea and vomiting.   Endocrine: Negative for polyuria.   Genitourinary: Negative for dysuria.   Musculoskeletal: Negative for gait problem.   Skin: Negative for rash.   Neurological: Negative for seizures, weakness, light-headedness, numbness and headaches.   Psychiatric/Behavioral: Positive for confusion.           Objective     Vital Signs  Temp:  [97.3 °F (36.3 °C)-97.8 °F (36.6 °C)] 97.3 °F (36.3 °C)  Heart Rate:  [] 86  Resp:  [17-22] 22  BP: (105-171)/() 143/73    Physical Exam:     General Appearance:    Alert, cooperative, in no acute distress, oriented x2 -person and place, chronically ill appearing   Head:    Normocephalic, without obvious abnormality, atraumatic   Eyes:            Lids and lashes normal, conjunctivae and sclerae normal, no   icterus, no pallor, corneas clear, PERRLA   Ears:    Ears appear intact with no abnormalities noted   Throat:   No oral lesions, no thrush, oral mucosa moist   Neck:   No adenopathy, supple, trachea midline, no thyromegaly, no   carotid bruit, no JVD   Lungs:     Clear to  auscultation,respirations regular, even and                  unlabored    Heart:    Regular rhythm and normal rate, normal S1 and S2, no            murmur, no gallop, no rub, no click   Chest Wall:    No abnormalities observed   Abdomen:     Normal bowel sounds, no masses, no organomegaly, soft        Non-tender non-distended, no guarding,   Extremities:   Moves all extremities well, no edema, no cyanosis, no             Redness   Pulses:   Pulses palpable and equal bilaterally   Skin:   No bleeding, bruising or rash   Lymph nodes:   No palpable adenopathy   Neurologic:   Cranial nerves 2 - 12 grossly intact, sensation intact, DTR       present and equal bilaterally        Results Review:    Lab Results (last 24 hours)     Procedure Component Value Units Date/Time    POC Glucose Once [[id number]]  (Abnormal) Collected: 02/14/21 0721    Specimen: Blood Updated: 02/14/21 0722     Glucose 340 mg/dL      Comment: Serial Number: [id number]:  [id number]       Basic Metabolic Panel [[id number]]  (Abnormal) Collected: 02/14/21 0446    Specimen: Blood Updated: 02/14/21 0618     Glucose 312 mg/dL      BUN 34 mg/dL      Creatinine 7.66 mg/dL      Sodium 135 mmol/L      Potassium 5.0 mmol/L      Comment: Slight hemolysis detected by analyzer. Results may be affected.        Chloride 97 mmol/L      CO2 25.0 mmol/L      Calcium 10.0 mg/dL      eGFR  African Amer 9 mL/min/1.73      Comment: <15 Indicative of kidney failure.        eGFR Non  Amer --     Comment: <15 Indicative of kidney failure.        BUN/Creatinine Ratio 4.4     Anion Gap 13.0 mmol/L     Narrative:      GFR Normal >60  Chronic Kidney Disease <60  Kidney Failure <15      CBC (No Diff) [[id number]]  (Abnormal) Collected: 02/14/21 0446    Specimen: Blood Updated: 02/14/21 0600     WBC 8.20 10*3/mm3      RBC 3.82 10*6/mm3      Hemoglobin 11.3 g/dL      Hematocrit 34.4 %      MCV 90.2 fL      MCH 29.6 pg      MCHC 32.9 g/dL      RDW 17.6 %      RDW-SD 56.4  fl      MPV 9.7 fL      Platelets 144 10*3/mm3     POC Glucose Once [[id number]]  (Abnormal) Collected: 02/13/21 2029    Specimen: Blood Updated: 02/13/21 2030     Glucose 169 mg/dL      Comment: Serial Number: [id number]:  [id number]              Imaging Results (Last 24 Hours)     ** No results found for the last 24 hours. **               I reviewed the patient's new clinical results.    Medication Review:   Scheduled Meds:amitriptyline, 25 mg, Oral, Nightly  ascorbic acid, 500 mg, Oral, Daily  atorvastatin, 40 mg, Oral, Nightly  cholecalciferol, 1,000 Units, Oral, Daily  docusate sodium, 100 mg, Oral, BID  gabapentin, 300 mg, Oral, Nightly  heparin (porcine), 5,000 Units, Subcutaneous, Q12H  insulin glargine, 25 Units, Subcutaneous, Nightly  insulin lispro, 0-14 Units, Subcutaneous, TID AC  levETIRAcetam XR, 500 mg, Oral, Daily  losartan, 50 mg, Oral, QAM  metoprolol tartrate, 50 mg, Oral, BID  risperiDONE, 1 mg, Oral, Q12H  sennosides-docusate, 1 tablet, Oral, BID  sevelamer, 2,400 mg, Oral, TID With Meals  sodium chloride, 3 mL, Intravenous, Q12H  zinc sulfate, 220 mg, Oral, Daily      Continuous Infusions:   PRN Meds:.•  acetaminophen  •  dextrose  •  dextrose  •  glucagon (human recombinant)  •  hydrOXYzine  •  insulin lispro **AND** insulin lispro  •  ondansetron  •  [COMPLETED] Insert peripheral IV **AND** sodium chloride  •  sodium chloride     Assessment/Plan       Weakness  COVID 19 infection  Encephalopathy - improved  DM-I with complication of vascular disease  Sequelae of CVA  Essential hypertension  Seizure disorder  Mood disorder  Secondary hyperparathyroidism    If pt continues to refuse rehab with discharge home soon with home [facility].        Plan for disposition:HAZEL Campos MD  02/14/21  13:01 EST

## 2021-02-14 NOTE — PROGRESS NOTES
"   LOS: 2 days    Patient Care Team:  Kerri Jones MD as PCP - General (Family Medicine)  Kerri Jones MD (Family Medicine)      Subjective       More awake and alert  Denied any chest pain, shortness of breath, nausea or vomiting    Objective     Vital Sign Min/Max for last 24 hours  Temp:  [97.3 °F (36.3 °C)-97.8 °F (36.6 °C)] 97.3 °F (36.3 °C)  Heart Rate:  [] 86  Resp:  [17-22] 22  BP: (105-171)/() 143/73                       Flowsheet Rows      First Filed Value   Admission Height  175.3 cm (69\") Documented at 02/09/2021 2337   Admission Weight  90.7 kg (200 lb) Documented at 02/09/2021 2337          I/O this shift:  In: 120 [P.O.:120]  Out: -   I/O last 3 completed shifts:  In: 960 [P.O.:960]  Out: 2700 [Other:2700]    Physical Exam:  Physical Exam    General.  Awake, alert  Head.  Atraumatic, normocephalic  Neck.  Supple  ENT floor mucosa moist  Respiratory.  Clear to auscultation anteriorly  Cardiovascular.  Regular rhythm  Abdominal.  Obese, soft, nontender  Extremities.  No edema       LABS:  Lab Results   Component Value Date    CALCIUM 10.0 02/14/2021    PHOS 2.5 02/13/2021     Results from last 7 days   Lab Units 02/14/21  0446 02/13/21  0441 02/12/21  0355  02/10/21  0034   MAGNESIUM mg/dL  --   --   --   --  2.2   SODIUM mmol/L 135* 137 133*   < > 132*   POTASSIUM mmol/L 5.0 4.2 4.4   < > 4.6   CHLORIDE mmol/L 97* 98 97*   < > 93*   CO2 mmol/L 25.0 27.0 26.0   < > 28.0   BUN mg/dL 34* 19 36*   < > 25*   CREATININE mg/dL 7.66* 4.97* 7.57*   < > 5.96*   GLUCOSE mg/dL 312* 108* 207*   < > 548*   CALCIUM mg/dL 10.0 9.9 9.4   < > 9.9   WBC 10*3/mm3 8.20 8.90 8.70   < > 8.30   HEMOGLOBIN g/dL 11.3* 11.3* 10.6*   < > 10.3*   PLATELETS 10*3/mm3 144 146 169   < > 168   ALT (SGPT) U/L  --   --   --   --  13   AST (SGOT) U/L  --   --   --   --  31    < > = values in this interval not displayed.     Lab Results   Component Value Date    CKTOTAL 3,038 (H) 08/12/2020    TROPONINT 0.109 (C) " 02/10/2021     Estimated Creatinine Clearance: 11 mL/min (A) (by C-G formula based on SCr of 7.66 mg/dL (H)).  Results from last 7 days   Lab Units 02/10/21  1130   PH, ARTERIAL pH units 7.375   PO2 ART mm Hg 77.1*   PCO2, ARTERIAL mm Hg 49.4*   HCO3 ART mmol/L 28.9*     Brief Urine Lab Results  (Last result in the past 365 days)      Color   Clarity   Blood   Leuk Est   Nitrite   Protein   CREAT   Urine HCG        08/11/20 2049 Yellow Cloudy  Comment:  Result checked  Large (3+) Small (1+) Negative >=300 mg/dL (3+)             WEIGHTS:     Wt Readings from Last 1 Encounters:   02/14/21 0500 85.1 kg (187 lb 9.8 oz)   02/13/21 0141 84.6 kg (186 lb 8.2 oz)   02/12/21 0214 87.8 kg (193 lb 9 oz)   02/11/21 0507 87.9 kg (193 lb 12.6 oz)   02/10/21 1730 89.3 kg (196 lb 13.9 oz)   02/09/21 2337 90.7 kg (200 lb)       amitriptyline, 25 mg, Oral, Nightly  ascorbic acid, 500 mg, Oral, Daily  atorvastatin, 40 mg, Oral, Nightly  cholecalciferol, 1,000 Units, Oral, Daily  docusate sodium, 100 mg, Oral, BID  gabapentin, 300 mg, Oral, Nightly  heparin (porcine), 5,000 Units, Subcutaneous, Q12H  insulin glargine, 25 Units, Subcutaneous, Nightly  insulin lispro, 0-14 Units, Subcutaneous, TID AC  levETIRAcetam XR, 500 mg, Oral, Daily  losartan, 50 mg, Oral, QAM  metoprolol tartrate, 50 mg, Oral, BID  risperiDONE, 1 mg, Oral, Q12H  sennosides-docusate, 1 tablet, Oral, BID  sevelamer, 2,400 mg, Oral, TID With Meals  sodium chloride, 3 mL, Intravenous, Q12H  zinc sulfate, 220 mg, Oral, Daily           Assessment/Plan     1.  ESRD  Electrolytes, volume status okay  2.  Hypertension with ESRD    Amlodipine was stopped due to hypotension  3.  Anemia with ESRD  4.  Hyperglycemia  5.  COVID-19 infection     Resc:  Dialysis MWF        Jim Sargent MD  02/14/21  14:34 EST

## 2021-02-15 LAB
ANION GAP SERPL CALCULATED.3IONS-SCNC: 14 MMOL/L (ref 5–15)
BUN SERPL-MCNC: 52 MG/DL (ref 6–20)
BUN/CREAT SERPL: 5.4 (ref 7–25)
CALCIUM SPEC-SCNC: 9.9 MG/DL (ref 8.6–10.5)
CHLORIDE SERPL-SCNC: 98 MMOL/L (ref 98–107)
CO2 SERPL-SCNC: 22 MMOL/L (ref 22–29)
CREAT SERPL-MCNC: 9.69 MG/DL (ref 0.76–1.27)
DEPRECATED RDW RBC AUTO: 59.1 FL (ref 37–54)
ERYTHROCYTE [DISTWIDTH] IN BLOOD BY AUTOMATED COUNT: 18.5 % (ref 12.3–15.4)
GFR SERPL CREATININE-BSD FRML MDRD: 7 ML/MIN/1.73
GFR SERPL CREATININE-BSD FRML MDRD: ABNORMAL ML/MIN/{1.73_M2}
GLUCOSE BLDC GLUCOMTR-MCNC: 165 MG/DL (ref 70–105)
GLUCOSE BLDC GLUCOMTR-MCNC: 178 MG/DL (ref 70–105)
GLUCOSE BLDC GLUCOMTR-MCNC: 201 MG/DL (ref 70–105)
GLUCOSE BLDC GLUCOMTR-MCNC: 232 MG/DL (ref 70–105)
GLUCOSE BLDC GLUCOMTR-MCNC: 235 MG/DL (ref 70–105)
GLUCOSE BLDC GLUCOMTR-MCNC: 241 MG/DL (ref 70–105)
GLUCOSE BLDC GLUCOMTR-MCNC: 273 MG/DL (ref 70–105)
GLUCOSE SERPL-MCNC: 261 MG/DL (ref 65–99)
HCT VFR BLD AUTO: 35.9 % (ref 37.5–51)
HGB BLD-MCNC: 11.6 G/DL (ref 13–17.7)
MCH RBC QN AUTO: 30.1 PG (ref 26.6–33)
MCHC RBC AUTO-ENTMCNC: 32.3 G/DL (ref 31.5–35.7)
MCV RBC AUTO: 93.1 FL (ref 79–97)
PLATELET # BLD AUTO: 153 10*3/MM3 (ref 140–450)
PMV BLD AUTO: 8.9 FL (ref 6–12)
POTASSIUM SERPL-SCNC: 5.4 MMOL/L (ref 3.5–5.2)
RBC # BLD AUTO: 3.85 10*6/MM3 (ref 4.14–5.8)
SODIUM SERPL-SCNC: 134 MMOL/L (ref 136–145)
WBC # BLD AUTO: 8.4 10*3/MM3 (ref 3.4–10.8)

## 2021-02-15 PROCEDURE — 85027 COMPLETE CBC AUTOMATED: CPT | Performed by: INTERNAL MEDICINE

## 2021-02-15 PROCEDURE — 82962 GLUCOSE BLOOD TEST: CPT

## 2021-02-15 PROCEDURE — 97116 GAIT TRAINING THERAPY: CPT

## 2021-02-15 PROCEDURE — 63710000001 INSULIN LISPRO (HUMAN) PER 5 UNITS: Performed by: INTERNAL MEDICINE

## 2021-02-15 PROCEDURE — 80048 BASIC METABOLIC PNL TOTAL CA: CPT | Performed by: INTERNAL MEDICINE

## 2021-02-15 PROCEDURE — 25010000002 HEPARIN (PORCINE) PER 1000 UNITS: Performed by: INTERNAL MEDICINE

## 2021-02-15 PROCEDURE — 63710000001 INSULIN GLARGINE PER 5 UNITS: Performed by: INTERNAL MEDICINE

## 2021-02-15 RX ORDER — ALBUMIN (HUMAN) 12.5 G/50ML
12.5 SOLUTION INTRAVENOUS AS NEEDED
Status: DISCONTINUED | OUTPATIENT
Start: 2021-02-15 | End: 2021-02-16 | Stop reason: HOSPADM

## 2021-02-15 RX ORDER — INSULIN GLARGINE 100 [IU]/ML
30 INJECTION, SOLUTION SUBCUTANEOUS NIGHTLY
Status: DISCONTINUED | OUTPATIENT
Start: 2021-02-15 | End: 2021-02-16 | Stop reason: HOSPADM

## 2021-02-15 RX ADMIN — INSULIN LISPRO 8 UNITS: 100 INJECTION, SOLUTION INTRAVENOUS; SUBCUTANEOUS at 14:15

## 2021-02-15 RX ADMIN — METOPROLOL TARTRATE 50 MG: 50 TABLET, FILM COATED ORAL at 09:12

## 2021-02-15 RX ADMIN — SEVELAMER CARBONATE 2400 MG: 800 TABLET, FILM COATED ORAL at 18:05

## 2021-02-15 RX ADMIN — METOPROLOL TARTRATE 50 MG: 50 TABLET, FILM COATED ORAL at 19:41

## 2021-02-15 RX ADMIN — HEPARIN SODIUM 5000 UNITS: 5000 INJECTION INTRAVENOUS; SUBCUTANEOUS at 19:40

## 2021-02-15 RX ADMIN — LOSARTAN POTASSIUM 50 MG: 50 TABLET, FILM COATED ORAL at 09:11

## 2021-02-15 RX ADMIN — AMITRIPTYLINE HYDROCHLORIDE 25 MG: 25 TABLET, FILM COATED ORAL at 19:41

## 2021-02-15 RX ADMIN — Medication 1000 UNITS: at 09:11

## 2021-02-15 RX ADMIN — INSULIN LISPRO 3 UNITS: 100 INJECTION, SOLUTION INTRAVENOUS; SUBCUTANEOUS at 09:10

## 2021-02-15 RX ADMIN — SEVELAMER CARBONATE 2400 MG: 800 TABLET, FILM COATED ORAL at 09:10

## 2021-02-15 RX ADMIN — ATORVASTATIN CALCIUM 40 MG: 40 TABLET, FILM COATED ORAL at 19:40

## 2021-02-15 RX ADMIN — OXYCODONE HYDROCHLORIDE AND ACETAMINOPHEN 500 MG: 500 TABLET ORAL at 09:12

## 2021-02-15 RX ADMIN — HEPARIN SODIUM 5000 UNITS: 5000 INJECTION INTRAVENOUS; SUBCUTANEOUS at 09:12

## 2021-02-15 RX ADMIN — LEVETIRACETAM 500 MG: 500 TABLET, FILM COATED, EXTENDED RELEASE ORAL at 09:10

## 2021-02-15 RX ADMIN — SEVELAMER CARBONATE 2400 MG: 800 TABLET, FILM COATED ORAL at 14:15

## 2021-02-15 RX ADMIN — GABAPENTIN 300 MG: 300 CAPSULE ORAL at 19:40

## 2021-02-15 RX ADMIN — DOCUSATE SODIUM 100 MG: 100 CAPSULE, LIQUID FILLED ORAL at 19:40

## 2021-02-15 RX ADMIN — RISPERIDONE 1 MG: 1 TABLET ORAL at 19:41

## 2021-02-15 RX ADMIN — DOCUSATE SODIUM 50 MG AND SENNOSIDES 8.6 MG 1 TABLET: 8.6; 5 TABLET, FILM COATED ORAL at 09:11

## 2021-02-15 RX ADMIN — DOCUSATE SODIUM 100 MG: 100 CAPSULE, LIQUID FILLED ORAL at 09:11

## 2021-02-15 RX ADMIN — ZINC SULFATE 220 MG (50 MG) CAPSULE 220 MG: CAPSULE at 09:11

## 2021-02-15 RX ADMIN — DOCUSATE SODIUM 50 MG AND SENNOSIDES 8.6 MG 1 TABLET: 8.6; 5 TABLET, FILM COATED ORAL at 19:40

## 2021-02-15 RX ADMIN — INSULIN LISPRO 3 UNITS: 100 INJECTION, SOLUTION INTRAVENOUS; SUBCUTANEOUS at 18:05

## 2021-02-15 RX ADMIN — RISPERIDONE 1 MG: 1 TABLET ORAL at 09:11

## 2021-02-15 RX ADMIN — INSULIN GLARGINE 30 UNITS: 100 INJECTION, SOLUTION SUBCUTANEOUS at 19:44

## 2021-02-15 NOTE — PROGRESS NOTES
"   LOS: 3 days    Patient Care Team:  Kerri Jones MD as PCP - General (Family Medicine)  Kerri Jones MD (Family Medicine)      Subjective     Patient resting comfortably  No acute distress overnight    Objective     Vital Sign Min/Max for last 24 hours  Temp:  [97.3 °F (36.3 °C)-97.4 °F (36.3 °C)] 97.4 °F (36.3 °C)  Heart Rate:  [86-97] 97  Resp:  [12-22] 16  BP: (114-161)/(72-85) 161/85                       Flowsheet Rows      First Filed Value   Admission Height  175.3 cm (69\") Documented at 02/09/2021 2337   Admission Weight  90.7 kg (200 lb) Documented at 02/09/2021 2337          No intake/output data recorded.  I/O last 3 completed shifts:  In: 1080 [P.O.:1080]  Out: 0     Physical Exam:  Physical Exam    General.  Awake, alert  Head.  Atraumatic, normocephalic  Neck.  Supple  ENT floor mucosa moist  Respiratory.  Clear to auscultation anteriorly  Cardiovascular.  Regular rhythm  Abdominal.  Obese, soft, nontender  Extremities.  No edema       LABS:  Lab Results   Component Value Date    CALCIUM 10.0 02/14/2021    PHOS 2.5 02/13/2021     Results from last 7 days   Lab Units 02/15/21  0850 02/14/21  0446 02/13/21  0441 02/12/21  0355  02/10/21  0034   MAGNESIUM mg/dL  --   --   --   --   --  2.2   SODIUM mmol/L  --  135* 137 133*   < > 132*   POTASSIUM mmol/L  --  5.0 4.2 4.4   < > 4.6   CHLORIDE mmol/L  --  97* 98 97*   < > 93*   CO2 mmol/L  --  25.0 27.0 26.0   < > 28.0   BUN mg/dL  --  34* 19 36*   < > 25*   CREATININE mg/dL  --  7.66* 4.97* 7.57*   < > 5.96*   GLUCOSE mg/dL  --  312* 108* 207*   < > 548*   CALCIUM mg/dL  --  10.0 9.9 9.4   < > 9.9   WBC 10*3/mm3 8.40 8.20 8.90 8.70   < > 8.30   HEMOGLOBIN g/dL 11.6* 11.3* 11.3* 10.6*   < > 10.3*   PLATELETS 10*3/mm3 153 144 146 169   < > 168   ALT (SGPT) U/L  --   --   --   --   --  13   AST (SGOT) U/L  --   --   --   --   --  31    < > = values in this interval not displayed.     Lab Results   Component Value Date    CKTOTAL 3,038 (H) 08/12/2020 "    TROPONINT 0.109 (C) 02/10/2021     Estimated Creatinine Clearance: 11.1 mL/min (A) (by C-G formula based on SCr of 7.66 mg/dL (H)).  Results from last 7 days   Lab Units 02/10/21  1130   PH, ARTERIAL pH units 7.375   PO2 ART mm Hg 77.1*   PCO2, ARTERIAL mm Hg 49.4*   HCO3 ART mmol/L 28.9*     Brief Urine Lab Results  (Last result in the past 365 days)      Color   Clarity   Blood   Leuk Est   Nitrite   Protein   CREAT   Urine HCG        08/11/20 2049 Yellow Cloudy  Comment:  Result checked  Large (3+) Small (1+) Negative >=300 mg/dL (3+)             WEIGHTS:     Wt Readings from Last 1 Encounters:   02/15/21 0500 87.4 kg (192 lb 10.9 oz)   02/14/21 0500 85.1 kg (187 lb 9.8 oz)   02/13/21 0141 84.6 kg (186 lb 8.2 oz)   02/12/21 0214 87.8 kg (193 lb 9 oz)   02/11/21 0507 87.9 kg (193 lb 12.6 oz)   02/10/21 1730 89.3 kg (196 lb 13.9 oz)   02/09/21 2337 90.7 kg (200 lb)       amitriptyline, 25 mg, Oral, Nightly  ascorbic acid, 500 mg, Oral, Daily  atorvastatin, 40 mg, Oral, Nightly  cholecalciferol, 1,000 Units, Oral, Daily  docusate sodium, 100 mg, Oral, BID  gabapentin, 300 mg, Oral, Nightly  heparin (porcine), 5,000 Units, Subcutaneous, Q12H  insulin glargine, 25 Units, Subcutaneous, Nightly  insulin lispro, 0-14 Units, Subcutaneous, TID AC  levETIRAcetam XR, 500 mg, Oral, Daily  losartan, 50 mg, Oral, QAM  metoprolol tartrate, 50 mg, Oral, BID  risperiDONE, 1 mg, Oral, Q12H  sennosides-docusate, 1 tablet, Oral, BID  sevelamer, 2,400 mg, Oral, TID With Meals  sodium chloride, 3 mL, Intravenous, Q12H  zinc sulfate, 220 mg, Oral, Daily           Assessment/Plan     1.  ESRD  Electrolytes, volume status okay  Patient to get hemodialysis today    2.  Hypertension with ESRD  Blood pressure acceptable.  Continue current antihypertensives    3.  Anemia with ESRD  Hemoglobin resectable.  No need for BEST  4.  Hyperglycemia  5.  COVID-19 infection       Salinas Ayala MD  02/15/21  09:20 EST

## 2021-02-15 NOTE — PROGRESS NOTES
"     LOS: 3 days   Patient Care Team:  Kerri Jones MD as PCP - General (Family Medicine)  Kerri Jones MD (Family Medicine)    Subjective     Interval History:    Patient Complaints: Much more alert and conversational today.  Asking for breakfast.  Agreeable to \"think about\" rehab after discussing with his ex-wife and daughter.    History taken from: patient    Review of Systems   Constitutional: Negative for activity change, appetite change, diaphoresis, fatigue and fever.   HENT: Negative for facial swelling.    Eyes: Negative for visual disturbance.   Respiratory: Negative for cough and shortness of breath.    Cardiovascular: Negative for chest pain, palpitations and leg swelling.   Gastrointestinal: Negative for constipation, diarrhea, nausea and vomiting.   Genitourinary: Negative for urgency.   Musculoskeletal: Negative for back pain.   Neurological: Positive for weakness. Negative for tremors.   Psychiatric/Behavioral: Negative for confusion.           Objective     Vital Signs  Temp:  [97.3 °F (36.3 °C)-97.4 °F (36.3 °C)] 97.4 °F (36.3 °C)  Heart Rate:  [86-97] 97  Resp:  [12-22] 16  BP: (114-161)/(72-85) 161/85    Physical Exam:     General Appearance:    Alert, cooperative, in no acute distress,   Head:    Normocephalic, without obvious abnormality, atraumatic   Eyes:            Lids and lashes normal, conjunctivae and sclerae normal, no   icterus, no pallor, corneas clear, PERRLA   Ears:    Ears appear intact with no abnormalities noted   Throat:   No oral lesions, no thrush, oral mucosa moist   Neck:   No adenopathy, supple, trachea midline, no thyromegaly, no   carotid bruit, no JVD   Lungs:     Clear to auscultation,respirations regular, even and                  unlabored    Heart:    Regular rhythm and normal rate, normal S1 and S2, no            murmur, no gallop, no rub, no click   Chest Wall:    No abnormalities observed   Abdomen:     Normal bowel sounds, no masses, no organomegaly, soft "        Non-tender non-distended, no guarding,   Extremities:   Moves all extremities well, no edema, no cyanosis, no             Redness; CVS changes   Pulses:   Pulses palpable and equal bilaterally   Skin:   No bleeding, bruising or rash   Lymph nodes:   No palpable adenopathy   Neurologic:   Cranial nerves 2 - 12 grossly intact, sensation intact, DTR       present and equal bilaterally        Results Review:    Lab Results (last 24 hours)     Procedure Component Value Units Date/Time    POC Glucose Once [[id number]]  (Abnormal) Collected: 02/14/21 1706    Specimen: Blood Updated: 02/14/21 1712     Glucose 179 mg/dL      Comment: Serial Number: [id number]:  [id number]              Imaging Results (Last 24 Hours)     ** No results found for the last 24 hours. **               I reviewed the patient's new clinical results.    Medication Review:   Scheduled Meds:amitriptyline, 25 mg, Oral, Nightly  ascorbic acid, 500 mg, Oral, Daily  atorvastatin, 40 mg, Oral, Nightly  cholecalciferol, 1,000 Units, Oral, Daily  docusate sodium, 100 mg, Oral, BID  gabapentin, 300 mg, Oral, Nightly  heparin (porcine), 5,000 Units, Subcutaneous, Q12H  insulin glargine, 25 Units, Subcutaneous, Nightly  insulin lispro, 0-14 Units, Subcutaneous, TID AC  levETIRAcetam XR, 500 mg, Oral, Daily  losartan, 50 mg, Oral, QAM  metoprolol tartrate, 50 mg, Oral, BID  risperiDONE, 1 mg, Oral, Q12H  sennosides-docusate, 1 tablet, Oral, BID  sevelamer, 2,400 mg, Oral, TID With Meals  sodium chloride, 3 mL, Intravenous, Q12H  zinc sulfate, 220 mg, Oral, Daily      Continuous Infusions:   PRN Meds:.•  acetaminophen  •  dextrose  •  dextrose  •  glucagon (human recombinant)  •  hydrOXYzine  •  insulin lispro **AND** insulin lispro  •  ondansetron  •  [COMPLETED] Insert peripheral IV **AND** sodium chloride  •  sodium chloride     Assessment/Plan       Weakness  Acute COVID infection - encephalopathy improving; pt still shows poor judgment and  impulsivity, but I suspect this is chronic.  ESRD with dependence on dialysis  Diabetes - poorly controlled with complication of nephropathy  Essential hypertension  Prior CVA - statin, Plavix, ASA  Seizure disorder - Keppra  Mood disorder - risperidone          Plan for disposition:Awaiting placement options for skilled rehab for strengthening and conditioning    Karine Campos MD  02/15/21  07:23 EST

## 2021-02-15 NOTE — THERAPY TREATMENT NOTE
Subjective: Pt agreeable to therapeutic plan of care.    Objective:     Bed mobility - SBA  Transfers - SBA  Ambulation - 100 feet Supervision    Pain: 0 VAS  Education: Provided education on importance of mobility and skilled verbal / tactile cueing throughout intervention.     Assessment: Baljit Kurtz presents with functional mobility impairments which indicate the need for skilled intervention. Tolerating session today without incident. Pt with less confusion this date, and demonstrates improved functional mobility. Pt ambulates without AD with few minimal LOB but able to self correct. PT to change recommendation this date to return home following d/c, pt may require potential long term option such as [facility]. Pt appears to be near baseline, PT will not continue to follow at this time.     Plan/Recommendations:   Pt would benefit from Home with family assist at discharge from facility and requires no DME at discharge.   Pt desires Home at discharge. Pt cooperative; agreeable to therapeutic recommendations and plan of care.     Basic Mobility 6-click:  Rollin = Total, A lot = 2, A little = 3; 4 = None  Supine>Sit:   1 = Total, A lot = 2, A little = 3; 4 = None   Sit>Stand with arms:  1 = Total, A lot = 2, A little = 3; 4 = None  Bed>Chair:   1 = Total, A lot = 2, A little = 3; 4 = None  Ambulate in room:  1 = Total, A lot = 2, A little = 3; 4 = None  3-5 Steps with railin = Total, A lot = 2, A little = 3; 4 = None  Score: 23    Modified Ann: 1 = No significant disability (Able to carry out all usual activities, despite some symptoms)    Post-Tx Position: Supine with HOB Elevated  PPE: gloves, surgical mask, eyewear protection

## 2021-02-15 NOTE — DISCHARGE PLACEMENT REQUEST
"Baljit Kurtz W (55 y.o. Male)     Date of Birth Social Security Number Address Home Phone MRN    1965  [id number]  [address]  [place] [address] [phone] [id number]    Islam Marital Status          None        Admission Date Admission Type Admitting Provider Attending Provider Department, Room/Bed    2/9/21 Emergency Kerri Jones MD Lowney, Kay, MD [facility], 2126/1    Discharge Date Discharge Disposition Discharge Destination                       Attending Provider: Karine Campos MD    Allergies: Latex, Other, Penicillins, Latex, Natural Rubber, Penicillins, Amoxicillin    Isolation: Enh Drop/Con   Infection: COVID (confirmed) (02/10/21)   Code Status: CPR    Ht: 167.6 cm (66\")   Wt: 87.4 kg (192 lb 10.9 oz)    Admission Cmt: None   Principal Problem: None                Active Insurance as of 2/9/2021     Primary Coverage     Payor Plan Insurance Group Employer/Plan Group    ANTHEM MEDICARE REPLACEMENT ANTHEM MEDICARE ADVANTAGE [id number]     Payor Plan Address Payor Plan Phone Number Payor Plan Fax Number Effective Dates    [address] [phone]  1/1/2021 - None Entered    [facility] [id number]       Subscriber Name Subscriber Birth Date Member ID       BALJIT KURTZ W 1965 [id number]           Secondary Coverage     Payor Plan Insurance Group Employer/Plan Group    [place]      Payor Plan Address Payor Plan Phone Number Payor Plan Fax Number Effective Dates    [address]   7/6/2020 - None Entered    [place] [address]       Subscriber Name Subscriber Birth Date Member ID       BALJIT KURTZ W 1965 [id number]                 Emergency Contacts      (Rel.) Home Phone Work Phone Mobile Phone    DESIREE SEBASTIAN (Spouse) -- -- [phone]              "

## 2021-02-15 NOTE — NURSING NOTE
Pt waiting on dialysis he said it will run high called dialysis they still had a stat to do before getting to pt

## 2021-02-15 NOTE — PLAN OF CARE
Goal Outcome Evaluation:      Spoke with Aliza Houston, and pt's daughter Adilene Kurtz who is 18 years old. Both would like pt to [facility], and possibly assisted living. Both women state that they are concerned that pt may not be safe to live on his own, and question if he is competent to make his own decisions. They say for now they have convinced him to go to [facility], but want to find out about daughter becoming POA or healthcare surrogate in order to ensure best and safest treatment for pt in future. Meanwhile pt remains stable on room air with no complaints or pain and appears to be resting comfortably at this time.

## 2021-02-15 NOTE — PLAN OF CARE
Objective:   Bed mobility - SBA  Transfers - SBA  Ambulation - 100 feet Supervision    Assessment: Baljit Kurtz presents with functional mobility impairments which indicate the need for skilled intervention. Tolerating session today without incident. Pt with less confusion this date, and demonstrates improved functional mobility. Pt ambulates without AD with few minimal LOB but able to self correct. PT to change recommendation this date to return home following d/c, pt may require potential long term option such as senior living. Pt appears to be near baseline, PT will not continue to follow at this time.

## 2021-02-15 NOTE — PROGRESS NOTES
Continued Stay Note  JAYNA Ferrera     Patient Name: Baljit Kurtz  MRN: [id number]  Today's Date: 2/15/2021    Admit Date: 2/9/2021    Discharge Plan     Row Name 02/15/21 1451       Plan    Plan  DC Plan: [facility] referral pending (need to talk to pt about location). [facility] approved. Pre-cert anticipated (floor to SNF?). Current HD [facility].    Patient/Family in Agreement with Plan  yes    Plan Comments  SW contacted pt via room phone to discuss need for inpt rehab. Pt agreeable to [facility] referral after reviewing list of facilities that accept COVID positive. [facility] denied d/t no beds anticipated. SW made referral to [facility] d/t accepting COVID+ and o/p HD - pending, will need discussed with pt. Reached out to [facility] to see if they can accomodate as well - pending response. MD aware of current discharge planning.     Phone communication or documentation only - no physical contact with patient or family.  LYDIA Lo    Phone: [phone]  Cell: [phone]  Fax: [phone]  [email or web address]

## 2021-02-16 ENCOUNTER — READMISSION MANAGEMENT (OUTPATIENT)
Dept: CALL CENTER | Facility: HOSPITAL | Age: 56
End: 2021-02-16

## 2021-02-16 VITALS
WEIGHT: 195.99 LBS | BODY MASS INDEX: 31.5 KG/M2 | RESPIRATION RATE: 16 BRPM | HEIGHT: 66 IN | HEART RATE: 102 BPM | OXYGEN SATURATION: 96 % | SYSTOLIC BLOOD PRESSURE: 131 MMHG | TEMPERATURE: 98.5 F | DIASTOLIC BLOOD PRESSURE: 85 MMHG

## 2021-02-16 PROBLEM — IMO0002 UNCONTROLLED INSULIN DEPENDENT TYPE 1 DIABETES MELLITUS: Status: ACTIVE | Noted: 2021-02-16

## 2021-02-16 PROBLEM — U07.1 COVID-19 VIRUS INFECTION: Status: ACTIVE | Noted: 2021-02-16

## 2021-02-16 PROBLEM — G93.41 METABOLIC ENCEPHALOPATHY: Status: ACTIVE | Noted: 2021-02-16

## 2021-02-16 PROBLEM — D89.831 CYTOKINE RELEASE SYNDROME, GRADE 1: Status: ACTIVE | Noted: 2021-02-16

## 2021-02-16 PROBLEM — R73.9 HYPERGLYCEMIA: Status: ACTIVE | Noted: 2021-02-16

## 2021-02-16 LAB
ALBUMIN SERPL-MCNC: 3.9 G/DL (ref 3.5–5.2)
ANION GAP SERPL CALCULATED.3IONS-SCNC: 15 MMOL/L (ref 5–15)
BUN SERPL-MCNC: 62 MG/DL (ref 6–20)
BUN/CREAT SERPL: 6.2 (ref 7–25)
CALCIUM SPEC-SCNC: 9.4 MG/DL (ref 8.6–10.5)
CHLORIDE SERPL-SCNC: 98 MMOL/L (ref 98–107)
CO2 SERPL-SCNC: 20 MMOL/L (ref 22–29)
CREAT SERPL-MCNC: 10.06 MG/DL (ref 0.76–1.27)
DEPRECATED RDW RBC AUTO: 55.6 FL (ref 37–54)
ERYTHROCYTE [DISTWIDTH] IN BLOOD BY AUTOMATED COUNT: 17.8 % (ref 12.3–15.4)
GFR SERPL CREATININE-BSD FRML MDRD: 7 ML/MIN/1.73
GFR SERPL CREATININE-BSD FRML MDRD: ABNORMAL ML/MIN/{1.73_M2}
GLUCOSE SERPL-MCNC: 263 MG/DL (ref 65–99)
HCT VFR BLD AUTO: 30.9 % (ref 37.5–51)
HGB BLD-MCNC: 10 G/DL (ref 13–17.7)
MCH RBC QN AUTO: 29.1 PG (ref 26.6–33)
MCHC RBC AUTO-ENTMCNC: 32.5 G/DL (ref 31.5–35.7)
MCV RBC AUTO: 89.7 FL (ref 79–97)
PHOSPHATE SERPL-MCNC: 3.3 MG/DL (ref 2.5–4.5)
PLATELET # BLD AUTO: 171 10*3/MM3 (ref 140–450)
PMV BLD AUTO: 9.7 FL (ref 6–12)
POTASSIUM SERPL-SCNC: 5.7 MMOL/L (ref 3.5–5.2)
RBC # BLD AUTO: 3.45 10*6/MM3 (ref 4.14–5.8)
SODIUM SERPL-SCNC: 133 MMOL/L (ref 136–145)
WBC # BLD AUTO: 8.5 10*3/MM3 (ref 3.4–10.8)

## 2021-02-16 PROCEDURE — 63710000001 INSULIN LISPRO (HUMAN) PER 5 UNITS: Performed by: INTERNAL MEDICINE

## 2021-02-16 PROCEDURE — 85027 COMPLETE CBC AUTOMATED: CPT | Performed by: INTERNAL MEDICINE

## 2021-02-16 PROCEDURE — 25010000002 HEPARIN (PORCINE) PER 1000 UNITS: Performed by: INTERNAL MEDICINE

## 2021-02-16 PROCEDURE — 82962 GLUCOSE BLOOD TEST: CPT

## 2021-02-16 PROCEDURE — 97530 THERAPEUTIC ACTIVITIES: CPT

## 2021-02-16 PROCEDURE — 80069 RENAL FUNCTION PANEL: CPT | Performed by: INTERNAL MEDICINE

## 2021-02-16 RX ORDER — AMITRIPTYLINE HYDROCHLORIDE 25 MG/1
25 TABLET, FILM COATED ORAL NIGHTLY
Qty: 30 TABLET | Refills: 5 | Status: SHIPPED | OUTPATIENT
Start: 2021-02-16 | End: 2023-01-31 | Stop reason: SDUPTHER

## 2021-02-16 RX ORDER — ACETAMINOPHEN 325 MG/1
650 TABLET ORAL EVERY 4 HOURS PRN
Status: ON HOLD
Start: 2021-02-16 | End: 2022-09-20

## 2021-02-16 RX ORDER — GABAPENTIN 300 MG/1
300 CAPSULE ORAL NIGHTLY
Qty: 60 CAPSULE | Refills: 5 | Status: ON HOLD | OUTPATIENT
Start: 2021-02-16 | End: 2022-09-20

## 2021-02-16 RX ORDER — ASCORBIC ACID 500 MG
500 TABLET ORAL DAILY
Qty: 30 TABLET | Refills: 0 | Status: ON HOLD | OUTPATIENT
Start: 2021-02-17 | End: 2022-09-20

## 2021-02-16 RX ORDER — ZINC SULFATE 50(220)MG
220 CAPSULE ORAL DAILY
Qty: 30 CAPSULE | Refills: 0 | Status: ON HOLD | OUTPATIENT
Start: 2021-02-17 | End: 2022-09-20

## 2021-02-16 RX ADMIN — RISPERIDONE 1 MG: 1 TABLET ORAL at 09:05

## 2021-02-16 RX ADMIN — LEVETIRACETAM 500 MG: 500 TABLET, FILM COATED, EXTENDED RELEASE ORAL at 09:05

## 2021-02-16 RX ADMIN — DOCUSATE SODIUM 100 MG: 100 CAPSULE, LIQUID FILLED ORAL at 09:05

## 2021-02-16 RX ADMIN — INSULIN LISPRO 3 UNITS: 100 INJECTION, SOLUTION INTRAVENOUS; SUBCUTANEOUS at 12:30

## 2021-02-16 RX ADMIN — Medication 1000 UNITS: at 09:05

## 2021-02-16 RX ADMIN — OXYCODONE HYDROCHLORIDE AND ACETAMINOPHEN 500 MG: 500 TABLET ORAL at 09:05

## 2021-02-16 RX ADMIN — LOSARTAN POTASSIUM 50 MG: 50 TABLET, FILM COATED ORAL at 05:45

## 2021-02-16 RX ADMIN — SEVELAMER CARBONATE 2400 MG: 800 TABLET, FILM COATED ORAL at 09:04

## 2021-02-16 RX ADMIN — DOCUSATE SODIUM 50 MG AND SENNOSIDES 8.6 MG 1 TABLET: 8.6; 5 TABLET, FILM COATED ORAL at 09:05

## 2021-02-16 RX ADMIN — ZINC SULFATE 220 MG (50 MG) CAPSULE 220 MG: CAPSULE at 09:04

## 2021-02-16 RX ADMIN — SEVELAMER CARBONATE 2400 MG: 800 TABLET, FILM COATED ORAL at 12:30

## 2021-02-16 RX ADMIN — HEPARIN SODIUM 5000 UNITS: 5000 INJECTION INTRAVENOUS; SUBCUTANEOUS at 09:05

## 2021-02-16 RX ADMIN — METOPROLOL TARTRATE 50 MG: 50 TABLET, FILM COATED ORAL at 09:05

## 2021-02-16 RX ADMIN — INSULIN LISPRO 5 UNITS: 100 INJECTION, SOLUTION INTRAVENOUS; SUBCUTANEOUS at 09:05

## 2021-02-16 NOTE — PLAN OF CARE
Goal Outcome Evaluation:         Pt. Demonstrates improved functional mobility this date w/ supervision for safety ambulatory transfer to and from bathroom and setup assist for LB ADLs. Pt. Progress limited secondary to decreased activity tolerance albeit anticipate d/c home w/ family support/assist. Will continue to follow up w/ pt. 1-3x per week at [facility].

## 2021-02-16 NOTE — PROGRESS NOTES
Continued Stay Note   Iban     Patient Name: Baljit Kurtz  MRN: [id number]  Today's Date: 2/16/2021    Admit Date: 2/9/2021    Discharge Plan                      Row Name 02/16/21 1515       Plan    Plan  DC Plan: Safe for Routine Home per PT/OT. Current HD MWF [facility]. [facility].    Plan Comments   notified CM of pt now refusing IP rehab. PT/OT saw today and have affirmed that pt safe for routine dc home at this time. See [facility] notes for details regarding pt/family conversations.        Discharge Codes    No documentation.         Chart Review Only - No patient contact      Winifred Garcia RN

## 2021-02-16 NOTE — THERAPY TREATMENT NOTE
Subjective: Pt agreeable to therapeutic plan of care.  Cognition: oriented to Person, Place, Time and Situation    Objective:     Bed Mobility: Modified-Independent  Functional Transfers: Supervision  Functional Ambulation: Supervision    Lower Body Dressing: Supervision  ADL Position: edge of bed sitting  ADL Comments:     Toileting: Supervision  ADL Position: AD  ADL Comments:     Pain: 0 VAS  Education: Provided education on importance of mobility and skilled verbal / tactile cueing throughout intervention.     Assessment: Baljit Kurtz presents with ADL impairments below baseline abilities which indicate the need for continued skilled intervention while inpatient. Tolerating session today without incident. Will continue to follow and progress as tolerated.     Plan/Recommendations:   Pt would benefit from Home with family assist at discharge from facility.   Pt desires Home with family assist at discharge. Pt cooperative; agreeable to therapeutic recommendations and plan of care.     Modified [place]: N/A = No pre-op stroke/TIA    Post-Tx Position: Supine with HOB Elevated  PPE: gloves, surgical mask, eyewear protection

## 2021-02-16 NOTE — DISCHARGE SUMMARY
Date of Discharge:  2/16/2021    Discharge Diagnosis:   **Weakness [R53.1]   Hyperglycemia [R73.9]   Uncontrolled insulin dependent type 1 diabetes mellitus (CMS/HCC) [E10.65]   Metabolic encephalopathy [G93.41]   COVID-19 virus infection [U07.1]   Cytokine release syndrome, grade 1 [D89.831]   History of CVA (cerebrovascular accident) [Z86.73]   Seizure disorder (CMS/HCC) [G40.909]   Diabetic peripheral neuropathy associated with type 2 diabetes mellitus (CMS/HCC) [E11.42]   Stage 5 chronic kidney disease (CMS/HCC) [N18.5]   Chronic renal failure [N18.9]   Depression [F32.9]   Hypertension [I10]   Speech and language deficits, late effect of cerebrovascular disease [I69.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]       Presenting Problem/History of Present Illness  Active Hospital Problems    Diagnosis  POA   • **Weakness [R53.1]  Yes   • Hyperglycemia [R73.9]  Yes   • Uncontrolled insulin dependent type 1 diabetes mellitus (CMS/HCC) [E10.65]  Yes   • Metabolic encephalopathy [G93.41]  Yes   • COVID-19 virus infection [U07.1]  Yes   • Cytokine release syndrome, grade 1 [D89.831]  Unknown   • History of CVA (cerebrovascular accident) [Z86.73]  Not Applicable   • Seizure disorder (CMS/HCC) [G40.909]  Yes   • Diabetic peripheral neuropathy associated with type 2 diabetes mellitus (CMS/HCC) [E11.42]  Yes   • Stage 5 chronic kidney disease (CMS/HCC) [N18.5]  Yes   • Chronic renal failure [N18.9]  Yes   • Depression [F32.9]  Yes   • Hypertension [I10]  Yes   • Speech and language deficits, late effect of cerebrovascular disease [I69.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          Hospital Course  Patient is a 55 y.o. male with h/o poorly controlled DM, ESRD on dialysis, multiple previous strokes presented with generalized weakness and increased confusion.  He was COVID positive.  He was initially hypoxic and had acute respiratory acidosis, but this resolved promptly and he had no furtherrespiratory symptoms.   Imaging did not reveal any acute neurologic events.  He was quite weak and confused, which improved with supportive care and is thought to have been due to COVID encephalopathy.  He continued 3 x weekly dialysis.  At the time of discharge he is significantly improved and appears to be at baseline.  He likely would benefit from assisted living but he adamantly refuses and is competent to make his own decisions.  He will resume his previous outpatient dialysis schedule and follow up with Dr. Jones in 1 week.    Doses of gabapentin and amitriptyline were reduced.      Procedures Performed         Consults:   Consults     Date and Time Order Name Status Description    2/10/2021 0143 Nephrology (on -call MD unless specified) Completed     2/10/2021 0131 Family Medicine Consult Completed           Pertinent Test Results:    Lab Results (most recent)     Procedure Component Value Units Date/Time    Renal Function Panel [[id number]]  (Abnormal) Collected: 02/16/21 0755    Specimen: Blood Updated: 02/16/21 0852     Glucose 263 mg/dL      BUN 62 mg/dL      Creatinine 10.06 mg/dL      Sodium 133 mmol/L      Potassium 5.7 mmol/L      Chloride 98 mmol/L      CO2 20.0 mmol/L      Calcium 9.4 mg/dL      Albumin 3.90 g/dL      Phosphorus 3.3 mg/dL      Anion Gap 15.0 mmol/L      BUN/Creatinine Ratio 6.2     eGFR Non  Amer --     Comment: <15 Indicative of kidney failure.        eGFR   Amer 7 mL/min/1.73      Comment: <15 Indicative of kidney failure.       Narrative:      GFR Normal >60  Chronic Kidney Disease <60  Kidney Failure <15      CBC (No Diff) [[id number]]  (Abnormal) Collected: 02/16/21 0755    Specimen: Blood Updated: 02/16/21 0824     WBC 8.50 10*3/mm3      RBC 3.45 10*6/mm3      Hemoglobin 10.0 g/dL      Hematocrit 30.9 %      MCV 89.7 fL      MCH 29.1 pg      MCHC 32.5 g/dL      RDW 17.8 %      RDW-SD 55.6 fl      MPV 9.7 fL      Platelets 171 10*3/mm3     POC Glucose Once [[id number]]  (Abnormal)  Collected: 02/15/21 2116    Specimen: Blood Updated: 02/15/21 2119     Glucose 232 mg/dL      Comment: Serial Number: [id number]:  [id number]       POC Glucose Once [[id number]]  (Abnormal) Collected: 02/15/21 1937    Specimen: Blood Updated: 02/15/21 1937     Glucose 201 mg/dL      Comment: Serial Number: [id number]:  [id number]       Basic Metabolic Panel [[id number]]  (Abnormal) Collected: 02/15/21 0850    Specimen: Blood Updated: 02/15/21 0935     Glucose 261 mg/dL      BUN 52 mg/dL      Creatinine 9.69 mg/dL      Sodium 134 mmol/L      Potassium 5.4 mmol/L      Comment: Slight hemolysis detected by analyzer. Results may be affected.        Chloride 98 mmol/L      CO2 22.0 mmol/L      Calcium 9.9 mg/dL      eGFR  African Amer 7 mL/min/1.73      Comment: <15 Indicative of kidney failure.        eGFR Non  Amer --     Comment: <15 Indicative of kidney failure.        BUN/Creatinine Ratio 5.4     Anion Gap 14.0 mmol/L     Narrative:      GFR Normal >60  Chronic Kidney Disease <60  Kidney Failure <15      CBC (No Diff) [[id number]]  (Abnormal) Collected: 02/15/21 0850    Specimen: Blood Updated: 02/15/21 0916     WBC 8.40 10*3/mm3      RBC 3.85 10*6/mm3      Hemoglobin 11.6 g/dL      Hematocrit 35.9 %      MCV 93.1 fL      MCH 30.1 pg      MCHC 32.3 g/dL      RDW 18.5 %      RDW-SD 59.1 fl      MPV 8.9 fL      Platelets 153 10*3/mm3     Basic Metabolic Panel [[id number]]  (Abnormal) Collected: 02/14/21 0446    Specimen: Blood Updated: 02/14/21 0618     Glucose 312 mg/dL      BUN 34 mg/dL      Creatinine 7.66 mg/dL      Sodium 135 mmol/L      Potassium 5.0 mmol/L      Comment: Slight hemolysis detected by analyzer. Results may be affected.        Chloride 97 mmol/L      CO2 25.0 mmol/L      Calcium 10.0 mg/dL      eGFR  African Amer 9 mL/min/1.73      Comment: <15 Indicative of kidney failure.        eGFR Non  Amer --     Comment: <15 Indicative of kidney failure.        BUN/Creatinine Ratio 4.4      Anion Gap 13.0 mmol/L     Narrative:      GFR Normal >60  Chronic Kidney Disease <60  Kidney Failure <15      Hepatitis B Surface Antibody [[id number]]  (Abnormal) Collected: 02/12/21 2300    Specimen: Blood Updated: 02/13/21 1146     Hep B S Ab Reactive    Narrative:      Results may be falsely decreased if patient taking Biotin.      Renal Function Panel [[id number]]  (Abnormal) Collected: 02/13/21 0441    Specimen: Blood Updated: 02/13/21 0514     Glucose 108 mg/dL      BUN 19 mg/dL      Creatinine 4.97 mg/dL      Sodium 137 mmol/L      Potassium 4.2 mmol/L      Chloride 98 mmol/L      CO2 27.0 mmol/L      Calcium 9.9 mg/dL      Albumin 4.40 g/dL      Phosphorus 2.5 mg/dL      Comment: Result checked         Anion Gap 12.0 mmol/L      BUN/Creatinine Ratio 3.8     eGFR  African Amer 15 mL/min/1.73     Narrative:      GFR Normal >60  Chronic Kidney Disease <60  Kidney Failure <15      Hepatitis B Surface Antigen [[id number]]  (Normal) Collected: 02/12/21 2300    Specimen: Blood Updated: 02/12/21 2334     Hepatitis B Surface Ag Non-Reactive    Narrative:      Results may be falsely decreased if patient taking Biotin.      D-dimer, Quantitative [[id number]]  (Abnormal) Collected: 02/12/21 0355    Specimen: Blood Updated: 02/12/21 0425     D-Dimer, Quantitative 0.93 mg/L (FEU)     Narrative:      Reference Range  --------------------------------------------------------------------     < 0.50   Negative Predictive Value  0.50-0.59   Indeterminate    >= 0.60   Probable VTE             A very low percentage of patients with DVT may yield D-Dimer results   below the cut-off of 0.50 mg/L FEU.  This is known to be more   prevalent in patients with distal DVT.             Results of this test should always be interpreted in conjunction with   the patient's medical history, clinical presentation and other   findings.  Clinical diagnosis should not be based on the result of   INNOVANCE D-Dimer alone.    Blood Gas, Arterial -  [[id number]]  (Abnormal) Collected: 02/10/21 1120    Specimen: Arterial Blood Updated: 02/11/21 0037     Site Left Radial     Joe's Test Positive     pH, Arterial 7.328 pH units      pCO2, Arterial 58.8 mm Hg      pO2, Arterial 27.2 mm Hg      HCO3, Arterial 30.8 mmol/L      Base Excess, Arterial 3.8 mmol/L      Comment: Serial Number: [id number]:  [id number]        O2 Saturation, Arterial 44.4 %      CO2 Content 32.6 mmol/L      Barometric Pressure for Blood Gas --     Comment: N/A        Modality Room Air     Hemodilution No    Troponin [[id number]]  (Abnormal) Collected: 02/10/21 1400    Specimen: Blood Updated: 02/10/21 1535     Troponin T 0.109 ng/mL     Narrative:      Troponin T Reference Range:  <= 0.03 ng/mL-   Negative for AMI  >0.03 ng/mL-     Abnormal for myocardial necrosis.  Clinicians would have to utilize clinical acumen, EKG, Troponin and serial changes to determine if it is an Acute Myocardial Infarction or myocardial injury due to an underlying chronic condition.       Results may be falsely decreased if patient taking Biotin.      [employer] Draw [[id number]] Collected: 02/10/21 1400    Specimen: Blood Updated: 02/10/21 1501    Narrative:      The following orders were created for panel order [employer] Draw.  Procedure                               Abnormality         Status                     ---------                               -----------         ------                     Green Top (Gel)[[id number]]                                  Final result                 Please view results for these tests on the individual orders.    Green Top (Gel) [[id number]] Collected: 02/10/21 1400    Specimen: Blood Updated: 02/10/21 1501     Extra Tube Hold for add-ons.     Comment: Auto resulted.       COVID PRE-OP / PRE-PROCEDURE SCREENING ORDER (NO ISOLATION) - Swab, Nasopharynx [[id number]]  (Abnormal) Collected: 02/10/21 0153    Specimen: Swab from Nasopharynx Updated: 02/10/21 1432    Narrative:      The  following orders were created for panel order COVID PRE-OP / PRE-PROCEDURE SCREENING ORDER (NO ISOLATION) - Swab, Nasopharynx.  Procedure                               Abnormality         Status                     ---------                               -----------         ------                     COVID-19,APTIMA PANTHER,...[[id number]]  Abnormal            Final result                 Please view results for these tests on the individual orders.    COVID-19,APTIMA PANTHER,[facility], NP/OP SWAB IN UTM/VTM/[facility] TRANSPORT MEDIA,24 HR TAT - Swab, Nasopharynx [[id number]]  (Abnormal) Collected: 02/10/21 0153    Specimen: Swab from Nasopharynx Updated: 02/10/21 1432     COVID19 Detected    Narrative:      Fact sheet for providers: https://www.fda.gov/media/169516/download     Fact sheet for patients: https://www.fda.gov/media/864603/download    Test performed by RT PCR.    Blood Gas, Arterial - [[id number]]  (Abnormal) Collected: 02/10/21 1130    Specimen: Arterial Blood Updated: 02/10/21 1141     Site Right Brachial     Joe's Test Positive     pH, Arterial 7.375 pH units      pCO2, Arterial 49.4 mm Hg      pO2, Arterial 77.1 mm Hg      HCO3, Arterial 28.9 mmol/L      Base Excess, Arterial 3.0 mmol/L      Comment: Serial Number: [id number]:  [id number]        O2 Saturation, Arterial 94.7 %      CO2 Content 30.4 mmol/L      Barometric Pressure for Blood Gas --     Comment: N/A        Modality Room Air     FIO2 21 %      Hemodilution No    Hemoglobin A1c [[id number]]  (Abnormal) Collected: 02/10/21 0034    Specimen: Blood Updated: 02/10/21 1137     Hemoglobin A1C 9.4 %     Narrative:      Hemoglobin A1C Reference Range:    <5.7 %        Normal  5.7-6.4 %     Increased risk for diabetes  > 6.4 %        Diabetes       These guidelines have been recommended by the American Diabetic Association for Hgb A1c.      The following 2010 guidelines have been recommended by the American Diabetes Association for Hemoglobin  A1c.    HBA1c 5.7-6.4% Increased risk for future diabetes (pre-diabetes)  HBA1c     >6.4% Diabetes      Extra Tubes [[id number]] Collected: 02/10/21 0034    Specimen: Blood, Venous Line Updated: 02/10/21 0146    Narrative:      The following orders were created for panel order Extra Tubes.  Procedure                               Abnormality         Status                     ---------                               -----------         ------                     Gold Top - SST[[id number]]                                   Final result                 Please view results for these tests on the individual orders.    Gold Top - SST [[id number]] Collected: 02/10/21 0034    Specimen: Blood Updated: 02/10/21 0146     Extra Tube Hold for add-ons.     Comment: Auto resulted.       Troponin [[id number]]  (Abnormal) Collected: 02/10/21 0034    Specimen: Blood Updated: 02/10/21 0128     Troponin T 0.132 ng/mL     Narrative:      Troponin T Reference Range:  <= 0.03 ng/mL-   Negative for AMI  >0.03 ng/mL-     Abnormal for myocardial necrosis.  Clinicians would have to utilize clinical acumen, EKG, Troponin and serial changes to determine if it is an Acute Myocardial Infarction or myocardial injury due to an underlying chronic condition.       Results may be falsely decreased if patient taking Biotin.      Comprehensive Metabolic Panel [[id number]]  (Abnormal) Collected: 02/10/21 0034    Specimen: Blood Updated: 02/10/21 0128     Glucose 548 mg/dL      BUN 25 mg/dL      Creatinine 5.96 mg/dL      Sodium 132 mmol/L      Potassium 4.6 mmol/L      Comment: Slight hemolysis detected by analyzer. Results may be affected.        Chloride 93 mmol/L      CO2 28.0 mmol/L      Calcium 9.9 mg/dL      Total Protein 6.9 g/dL      Albumin 3.80 g/dL      ALT (SGPT) 13 U/L      AST (SGOT) 31 U/L      Comment: Slight hemolysis detected by analyzer. Results may be affected.        Alkaline Phosphatase 141 U/L      Total Bilirubin 0.3 mg/dL      eGFR  Non  Amer --     Comment: <15 Indicative of kidney failure.        eGFR  African Amer 12 mL/min/1.73      Comment: <15 Indicative of kidney failure.        Globulin 3.1 gm/dL      A/G Ratio 1.2 g/dL      BUN/Creatinine Ratio 4.2     Anion Gap 11.0 mmol/L     Narrative:      GFR Normal >60  Chronic Kidney Disease <60  Kidney Failure <15      TSH [[id number]]  (Normal) Collected: 02/10/21 0034    Specimen: Blood Updated: 02/10/21 0126     TSH 2.030 uIU/mL     Magnesium [[id number]]  (Normal) Collected: 02/10/21 0034    Specimen: Blood Updated: 02/10/21 0120     Magnesium 2.2 mg/dL     Phosphorus [[id number]]  (Normal) Collected: 02/10/21 0034    Specimen: Blood Updated: 02/10/21 0120     Phosphorus 2.8 mg/dL     Protime-INR [[id number]]  (Normal) Collected: 02/10/21 0034    Specimen: Blood Updated: 02/10/21 0056     Protime 11.4 Seconds      INR 1.04    aPTT [[id number]]  (Normal) Collected: 02/10/21 0034    Specimen: Blood Updated: 02/10/21 0056     PTT 24.9 seconds     Blood Gas, Venous - [[id number]]  (Abnormal) Collected: 02/10/21 0034    Specimen: Venous Blood Updated: 02/10/21 0050     Site RA     pH, Venous 7.306 pH Units      pCO2, Venous 67.9 mm Hg      pO2, Venous 22.1 mm Hg      HCO3, Venous 33.8 mmol/L      Base Excess, Venous 5.6 mmol/L      Comment: Serial Number: [id number]:  [id number]        O2 Saturation, Venous 30.3 %      CO2 Content 35.9 mmol/L      Barometric Pressure for Blood Gas --     Comment: N/A        Modality Room Air     FIO2 21 %     CBC & Differential [[id number]]  (Abnormal) Collected: 02/10/21 0034    Specimen: Blood Updated: 02/10/21 0046    Narrative:      The following orders were created for panel order CBC & Differential.  Procedure                               Abnormality         Status                     ---------                               -----------         ------                     CBC Auto Differential[[id number]]        Abnormal            Final result                  Please view results for these tests on the individual orders.    CBC Auto Differential [[id number]]  (Abnormal) Collected: 02/10/21 0034    Specimen: Blood Updated: 02/10/21 0046     WBC 8.30 10*3/mm3      RBC 3.48 10*6/mm3      Hemoglobin 10.3 g/dL      Hematocrit 31.8 %      MCV 91.3 fL      MCH 29.6 pg      MCHC 32.4 g/dL      RDW 18.8 %      RDW-SD 60.4 fl      MPV 9.9 fL      Platelets 168 10*3/mm3      Neutrophil % 74.8 %      Lymphocyte % 11.4 %      Monocyte % 9.2 %      Eosinophil % 3.8 %      Basophil % 0.8 %      Neutrophils, Absolute 6.20 10*3/mm3      Lymphocytes, Absolute 0.90 10*3/mm3      Monocytes, Absolute 0.80 10*3/mm3      Eosinophils, Absolute 0.30 10*3/mm3      Basophils, Absolute 0.10 10*3/mm3      nRBC 0.1 /100 WBC            Results for orders placed during the hospital encounter of 08/11/20   Adult Transthoracic Echo Complete W/ Cont if Necessary Per Protocol    Narrative · Left ventricular systolic function is mildly decreased.  · Estimated EF appears to be in the range of 46 - 50%.  · Left ventricular wall thickness is consistent with moderate-to-severe   concentric hypertrophy.  · Left ventricular diastolic dysfunction (grade I) consistent with   impaired relaxation.  · Right ventricular cavity is mildly dilated.                  Condition on Discharge:      Vital Signs  Temp:  [96.5 °F (35.8 °C)-98.6 °F (37 °C)] 98.5 °F (36.9 °C)  Heart Rate:  [] 102  Resp:  [16-18] 16  BP: (104-182)/() 131/85    Physical Exam:     General Appearance:    Alert, cooperative, in no acute distress   Head:    Normocephalic, without obvious abnormality, atraumatic   Eyes:            Lids and lashes normal, conjunctivae and sclerae normal, no   icterus, no pallor, corneas clear, PERRLA   Ears:    Ears appear intact with no abnormalities noted   Throat:   No oral lesions, no thrush, oral mucosa moist   Neck:   No adenopathy, supple, trachea midline, no thyromegaly, no   carotid bruit, no JVD    Lungs:     Clear to auscultation,respirations regular, even and                  unlabored    Heart:    Regular rhythm and normal rate, normal S1 and S2, no            murmur, no gallop, no rub, no click   Chest Wall:    No abnormalities observed   Abdomen:     Normal bowel sounds, no masses, no organomegaly, soft        non-tender, non-distended, no guarding, no rebound                tenderness   Extremities:  CVS changes bilaterally; muscle wasting in lower extremities   Pulses:   Pulses palpable and equal bilaterally   Skin:   No bleeding, bruising or rash   Lymph nodes:   No palpable adenopathy   Neurologic:   Cranial nerves 2 - 12 grossly intact, sensation intact, DTR       present and equal bilaterally       Discharge Disposition  Home or Self Care    Discharge Medications     Discharge Medications      New Medications      Instructions Start Date   acetaminophen 325 MG tablet  Commonly known as: TYLENOL   650 mg, Oral, Every 4 Hours PRN      ascorbic acid 500 MG tablet  Commonly known as: VITAMIN C   500 mg, Oral, Daily   Start Date: February 17, 2021     gabapentin 300 MG capsule  Commonly known as: NEURONTIN  Replaces: gabapentin 600 MG tablet   300 mg, Oral, Nightly      zinc sulfate 220 (50 Zn) MG capsule  Commonly known as: ZINCATE   220 mg, Oral, Daily   Start Date: February 17, 2021        Changes to Medications      Instructions Start Date   amitriptyline 25 MG tablet  Commonly known as: ELAVIL  What changed:   · medication strength  · how much to take   25 mg, Oral, Nightly         Continue These Medications      Instructions Start Date   amLODIPine 10 MG tablet  Commonly known as: NORVASC   10 mg, Oral, Daily      atorvastatin 40 MG tablet  Commonly known as: LIPITOR   40 mg, Oral, Nightly      docusate sodium 100 MG capsule  Commonly known as: COLACE   100 mg, Oral, 2 Times Daily      glucagon 1 MG injection  Commonly known as: GLUCAGEN   GLUCAGON EMERGENCY 1 MG KIT      HumaLOG KwikPen 100  UNIT/ML solution pen-injector  Generic drug: Insulin Lispro   6 Units, Subcutaneous, 3 Times Daily, Per SSI      hydrOXYzine 25 MG tablet  Commonly known as: ATARAX   25 mg, Oral, Every 6 Hours PRN      Lantus 100 UNIT/ML injection  Generic drug: insulin glargine   40 Units, Subcutaneous, Nightly      levETIRAcetam  MG 24 hr tablet  Commonly known as: KEPPRA XR   500 mg, Oral, Daily      losartan 50 MG tablet  Commonly known as: COZAAR   50 mg, Oral, Every Morning      metoprolol tartrate 50 MG tablet  Commonly known as: LOPRESSOR   50 mg, Oral, 2 Times Daily      risperiDONE 1 MG tablet  Commonly known as: risperDAL   1 mg, Oral, 2 Times Daily      sennosides-docusate 8.6-50 MG per tablet  Commonly known as: PERICOLACE   1 tablet, Oral, 2 times daily      sevelamer 800 MG tablet  Commonly known as: RENVELA   2,400 mg, Oral, 3 Times Daily With Meals         Stop These Medications    gabapentin 600 MG tablet  Commonly known as: NEURONTIN  Replaced by: gabapentin 300 MG capsule     olmesartan 40 MG tablet  Commonly known as: BENICAR            Discharge Diet: Low concentrated sweets    Activity at Discharge: No driving    Follow-up Appointments  No future appointments.  Additional Instructions for the Follow-ups that You Need to Schedule     Discharge Follow-up with PCP   As directed       Currently Documented PCP:    Kerri Jones MD    PCP Phone Number:    [phone]     Follow Up Details: One week               Test Results Pending at Discharge       Karine Campos MD  02/16/21  16:33 EST    Time: Discharge 35 min

## 2021-02-16 NOTE — PROGRESS NOTES
"   LOS: 4 days    Patient Care Team:  Kerri Jones MD as PCP - General (Family Medicine)  Kerri Jones MD (Family Medicine)      Subjective     Patient resting comfortably  No acute distress overnight  On room air    Objective     Vital Sign Min/Max for last 24 hours  Temp:  [96.5 °F (35.8 °C)-98.6 °F (37 °C)] 98.5 °F (36.9 °C)  Heart Rate:  [90-99] 99  Resp:  [16-20] 18  BP: (141-182)/() 180/100                       Flowsheet Rows      First Filed Value   Admission Height  175.3 cm (69\") Documented at 02/09/2021 2337   Admission Weight  90.7 kg (200 lb) Documented at 02/09/2021 2337          No intake/output data recorded.  I/O last 3 completed shifts:  In: 1320 [P.O.:1320]  Out: 300 [Urine:300]    Physical Exam:  Physical Exam    General.  Awake, alert  Head.  Atraumatic, normocephalic  Neck.  Supple  ENT floor mucosa moist  Respiratory.  Clear to auscultation anteriorly  Cardiovascular.  Regular rhythm  Abdominal.  Obese, soft, nontender  Extremities.  No edema       LABS:  Lab Results   Component Value Date    CALCIUM 9.4 02/16/2021    PHOS 3.3 02/16/2021     Results from last 7 days   Lab Units 02/16/21  0755 02/15/21  0850 02/14/21  0446  02/10/21  0034   MAGNESIUM mg/dL  --   --   --   --  2.2   SODIUM mmol/L 133* 134* 135*   < > 132*   POTASSIUM mmol/L 5.7* 5.4* 5.0   < > 4.6   CHLORIDE mmol/L 98 98 97*   < > 93*   CO2 mmol/L 20.0* 22.0 25.0   < > 28.0   BUN mg/dL 62* 52* 34*   < > 25*   CREATININE mg/dL 10.06* 9.69* 7.66*   < > 5.96*   GLUCOSE mg/dL 263* 261* 312*   < > 548*   CALCIUM mg/dL 9.4 9.9 10.0   < > 9.9   WBC 10*3/mm3 8.50 8.40 8.20   < > 8.30   HEMOGLOBIN g/dL 10.0* 11.6* 11.3*   < > 10.3*   PLATELETS 10*3/mm3 171 153 144   < > 168   ALT (SGPT) U/L  --   --   --   --  13   AST (SGOT) U/L  --   --   --   --  31    < > = values in this interval not displayed.     Lab Results   Component Value Date    CKTOTAL 3,038 (H) 08/12/2020    TROPONINT 0.109 (C) 02/10/2021     Estimated " Creatinine Clearance: 8.6 mL/min (A) (by C-G formula based on SCr of 10.06 mg/dL (H)).  Results from last 7 days   Lab Units 02/10/21  1130   PH, ARTERIAL pH units 7.375   PO2 ART mm Hg 77.1*   PCO2, ARTERIAL mm Hg 49.4*   HCO3 ART mmol/L 28.9*     Brief Urine Lab Results  (Last result in the past 365 days)      Color   Clarity   Blood   Leuk Est   Nitrite   Protein   CREAT   Urine HCG        08/11/20 2049 Yellow Cloudy  Comment:  Result checked  Large (3+) Small (1+) Negative >=300 mg/dL (3+)             WEIGHTS:     Wt Readings from Last 1 Encounters:   02/16/21 0530 88.9 kg (195 lb 15.8 oz)   02/15/21 0500 87.4 kg (192 lb 10.9 oz)   02/14/21 0500 85.1 kg (187 lb 9.8 oz)   02/13/21 0141 84.6 kg (186 lb 8.2 oz)   02/12/21 0214 87.8 kg (193 lb 9 oz)   02/11/21 0507 87.9 kg (193 lb 12.6 oz)   02/10/21 1730 89.3 kg (196 lb 13.9 oz)   02/09/21 2337 90.7 kg (200 lb)       amitriptyline, 25 mg, Oral, Nightly  ascorbic acid, 500 mg, Oral, Daily  atorvastatin, 40 mg, Oral, Nightly  cholecalciferol, 1,000 Units, Oral, Daily  docusate sodium, 100 mg, Oral, BID  gabapentin, 300 mg, Oral, Nightly  heparin (porcine), 5,000 Units, Subcutaneous, Q12H  insulin glargine, 30 Units, Subcutaneous, Nightly  insulin lispro, 0-14 Units, Subcutaneous, TID AC  levETIRAcetam XR, 500 mg, Oral, Daily  losartan, 50 mg, Oral, QAM  metoprolol tartrate, 50 mg, Oral, BID  risperiDONE, 1 mg, Oral, Q12H  sennosides-docusate, 1 tablet, Oral, BID  sevelamer, 2,400 mg, Oral, TID With Meals  sodium chloride, 3 mL, Intravenous, Q12H  zinc sulfate, 220 mg, Oral, Daily           Assessment/Plan     1.  ESRD  Patient getting dialysis this morning  Hyperkalemia noticed, should be corrected with dialysis    2.  Hypertension with ESRD  Blood pressure acceptable.  Continue current antihypertensives    3.  Anemia with ESRD  Hemoglobin acceptable.  No need for BEST  4.  Hyperglycemia  5.  COVID-19 infection    Okay to discharge from renal standpoint       [place]  MD Jamie  02/16/21  09:38 EST

## 2021-02-16 NOTE — PROGRESS NOTES
Continued Stay Note   [facility]     Patient Name: Baljit Kurtz  MRN: [id number]  Today's Date: 2/16/2021    Admit Date: 2/9/2021    Discharge Plan     Row Name 02/16/21 1517       Plan    Plan  DC Plan: Routine home (safe per PT/OT evals 2/16). Current HD [facility]. [facility].    Patient/Family in Agreement with Plan  yes    Plan Comments  SW worked with PT/OT on discharge planning. It was determined that pt is appropriate for routine home and that pt was not in need of skilled services. Pt declining rehab as well. Pt reports having a caregiver 2 days per week for 8 hours at a time. Pt plans to return home. Updated ex-wife, who was in agreement with plan. Secure chat to MD/RN with discharge plan.       Phone communication or documentation only - no physical contact with patient or family.    LYDIA Lo    Phone: [phone]  Cell: [phone]  Fax: [phone]  [email or web address]

## 2021-02-16 NOTE — NURSING NOTE
Call placed requesting update on dialysis time. Dialysis RN states that patient will be done this morning due to high volume and weather constraints.

## 2021-02-16 NOTE — PROGRESS NOTES
LOS: 4 days   Patient Care Team:  Kerri Jones MD as PCP - General (Family Medicine)  Kerri Jones MD (Family Medicine)    Subjective     Interval History:    Patient Complaints: No specific complaints; denies chest pain, shortness of breath.  Tolerated dialysis without problems this morning.     History taken from: patient    Review of Systems   Constitutional: Negative for activity change, appetite change, fatigue and fever.   HENT: Negative for facial swelling.    Eyes: Negative for visual disturbance.   Respiratory: Negative for cough, shortness of breath, wheezing and stridor.    Cardiovascular: Negative for chest pain, palpitations and leg swelling.   Gastrointestinal: Negative for constipation, diarrhea, nausea and vomiting.   Endocrine: Negative for polyuria.   Genitourinary: Negative for dysuria and urgency.   Musculoskeletal: Negative for myalgias.   Skin: Negative for rash.   Neurological: Negative for tremors, syncope, weakness and headaches.   Psychiatric/Behavioral: Negative for confusion.           Objective     Vital Signs  Temp:  [96.5 °F (35.8 °C)-98.6 °F (37 °C)] 98.5 °F (36.9 °C)  Heart Rate:  [90-99] 91  Resp:  [16-18] 18  BP: (104-182)/() 104/68    Physical Exam:     General Appearance:    Alert, cooperative, in no acute distress,   Head:    Normocephalic, without obvious abnormality, atraumatic   Eyes:            Lids and lashes normal, conjunctivae and sclerae normal, no   icterus, no pallor, corneas clear, PERRLA   Ears:    Ears appear intact with no abnormalities noted   Throat:   No oral lesions, no thrush, oral mucosa moist   Neck:   No adenopathy, supple, trachea midline, no thyromegaly, no   carotid bruit, no JVD   Lungs:     Clear to auscultation,respirations regular, even and                  unlabored    Heart:    Regular rhythm and normal rate, normal S1 and S2, no            murmur, no gallop, no rub, no click   Chest Wall:    No abnormalities observed   Abdomen:      Normal bowel sounds, no masses, no organomegaly, soft        Non-tender non-distended, no guarding,   Extremities:  Chronic venous stasis changes   Pulses:   Pulses palpable and equal bilaterally   Skin:   No bleeding, bruising or rash   Lymph nodes:   No palpable adenopathy   Neurologic:   Cranial nerves 2 - 12 grossly intact, sensation intact, DTR       present and equal bilaterally        Results Review:    Lab Results (last 24 hours)     Procedure Component Value Units Date/Time    Renal Function Panel [[id number]]  (Abnormal) Collected: 02/16/21 0755    Specimen: Blood Updated: 02/16/21 0852     Glucose 263 mg/dL      BUN 62 mg/dL      Creatinine 10.06 mg/dL      Sodium 133 mmol/L      Potassium 5.7 mmol/L      Chloride 98 mmol/L      CO2 20.0 mmol/L      Calcium 9.4 mg/dL      Albumin 3.90 g/dL      Phosphorus 3.3 mg/dL      Anion Gap 15.0 mmol/L      BUN/Creatinine Ratio 6.2     eGFR Non  Amer --     Comment: <15 Indicative of kidney failure.        eGFR   Amer 7 mL/min/1.73      Comment: <15 Indicative of kidney failure.       Narrative:      GFR Normal >60  Chronic Kidney Disease <60  Kidney Failure <15      CBC (No Diff) [[id number]]  (Abnormal) Collected: 02/16/21 0755    Specimen: Blood Updated: 02/16/21 0824     WBC 8.50 10*3/mm3      RBC 3.45 10*6/mm3      Hemoglobin 10.0 g/dL      Hematocrit 30.9 %      MCV 89.7 fL      MCH 29.1 pg      MCHC 32.5 g/dL      RDW 17.8 %      RDW-SD 55.6 fl      MPV 9.7 fL      Platelets 171 10*3/mm3     POC Glucose Once [[id number]]  (Abnormal) Collected: 02/15/21 2116    Specimen: Blood Updated: 02/15/21 2119     Glucose 232 mg/dL      Comment: Serial Number: [id number]:  [id number]       POC Glucose Once [[id number]]  (Abnormal) Collected: 02/15/21 1937    Specimen: Blood Updated: 02/15/21 1937     Glucose 201 mg/dL      Comment: Serial Number: [id number]:  [id number]       POC Glucose Once [[id number]]  (Abnormal) Collected: 02/15/21 1639     Specimen: Blood Updated: 02/15/21 1924     Glucose 178 mg/dL      Comment: Serial Number: [id number]:  [id number]       POC Glucose Once [[id number]]  (Abnormal) Collected: 02/15/21 0748    Specimen: Blood Updated: 02/15/21 1922     Glucose 241 mg/dL      Comment: Serial Number: [id number]:  [id number]       POC Glucose Once [[id number]]  (Abnormal) Collected: 02/15/21 1137    Specimen: Blood Updated: 02/15/21 1922     Glucose 235 mg/dL      Comment: Serial Number: [id number]:  [id number]       POC Glucose Once [[id number]]  (Abnormal) Collected: 02/14/21 2018    Specimen: Blood Updated: 02/15/21 1921     Glucose 273 mg/dL      Comment: Serial Number: [id number]:  [id number]       POC Glucose Once [[id number]]  (Abnormal) Collected: 02/14/21 1137    Specimen: Blood Updated: 02/15/21 1920     Glucose 165 mg/dL      Comment: Serial Number: [id number]:  [id number]              Imaging Results (Last 24 Hours)     ** No results found for the last 24 hours. **               I reviewed the patient's new clinical results.    Medication Review:   Scheduled Meds:amitriptyline, 25 mg, Oral, Nightly  ascorbic acid, 500 mg, Oral, Daily  atorvastatin, 40 mg, Oral, Nightly  cholecalciferol, 1,000 Units, Oral, Daily  docusate sodium, 100 mg, Oral, BID  gabapentin, 300 mg, Oral, Nightly  heparin (porcine), 5,000 Units, Subcutaneous, Q12H  insulin glargine, 30 Units, Subcutaneous, Nightly  insulin lispro, 0-14 Units, Subcutaneous, TID AC  levETIRAcetam XR, 500 mg, Oral, Daily  losartan, 50 mg, Oral, QAM  metoprolol tartrate, 50 mg, Oral, BID  risperiDONE, 1 mg, Oral, Q12H  sennosides-docusate, 1 tablet, Oral, BID  sevelamer, 2,400 mg, Oral, TID With Meals  sodium chloride, 3 mL, Intravenous, Q12H  zinc sulfate, 220 mg, Oral, Daily      Continuous Infusions:   PRN Meds:.•  acetaminophen  •  albumin human  •  dextrose  •  dextrose  •  glucagon (human recombinant)  •  hydrOXYzine  •  insulin lispro **AND**  insulin lispro  •  ondansetron  •  [COMPLETED] Insert peripheral IV **AND** sodium chloride  •  sodium chloride     Assessment/Plan       Weakness  -Improving  Acute COVID infection - encephalopathy improving; pt still shows poor judgment and impulsivity, but I suspect this is chronic.  ESRD with dependence on dialysis -continue 3 times a week dialysis  Hyperkalemia -should correct with today's dialysis  Diabetes - poorly controlled with complication of nephropathy -blood sugar control is improving  Essential hypertension -controlled  Prior CVA - statin, Plavix, ASA  Seizure disorder - Keppra  Mood disorder - risperidone  Secondary hyperparathyroidism -treated        Plan for disposition: Awaiting skilled rehab options for a Covid positive dialysis patient.     Karine Campos MD  02/16/21  12:15 EST

## 2021-02-16 NOTE — PLAN OF CARE
Goal Outcome Evaluation:  Plan of Care Reviewed With: patient  Progress: no change  Outcome Summary: Patient remains on room air throughout this shift. HD delayed; to be done during day shift. Patient denies pain or distress. Awaiting on DC planning to be confirmed.

## 2021-02-16 NOTE — NURSING NOTE
Pt is leaving has d/c instructions and e scripts has clothes from home and a watch , 3 bracelets, phone and a necklace going home with family and IV was already out

## 2021-02-17 ENCOUNTER — READMISSION MANAGEMENT (OUTPATIENT)
Dept: CALL CENTER | Facility: HOSPITAL | Age: 56
End: 2021-02-17

## 2021-02-17 LAB
GLUCOSE BLDC GLUCOMTR-MCNC: 155 MG/DL (ref 70–105)
GLUCOSE BLDC GLUCOMTR-MCNC: 247 MG/DL (ref 70–105)

## 2021-02-17 NOTE — OUTREACH NOTE
Prep Survey      Responses   [facility]?  [facility]   Is LACE score < 7 ?  No   Emergency Room discharge w/ pulse ox?  No   Eligibility  Readm Mgmt   Discharge diagnosis  COVID-19 virus infection    Does the patient have one of the following disease processes/diagnoses(primary or secondary)?  COVID-19   Does the patient have Home health ordered?  No [Pt reports having a caregiver 2 days per week for 8 hours at a time. ]   Is there a DME ordered?  No   Prep survey completed?  Yes          Bhavana Contreras RN

## 2021-02-17 NOTE — OUTREACH NOTE
COVID-19 Week 1 Survey      Responses   [facility] patient discharged from?  [facility]   Does the patient have one of the following disease processes/diagnoses(primary or secondary)?  COVID-19   COVID-19 underlying condition?  None   Call Number  Call 1   Week 1 Call successful?  Yes   Call start time  0809   Call end time  0828   Discharge diagnosis  COVID-19 virus infection    Meds reviewed with patient/caregiver?  Yes   Is the patient having any side effects they believe may be caused by any medication additions or changes?  No   Does the patient have all medications ordered at discharge?  Yes   Is the patient taking all medications as directed (includes completed medication regime)?  Yes   Does the patient have a primary care provider?   Yes   Does the patient have an appointment with their PCP or specialist within 7 days of discharge?  No   What is preventing the patient from scheduling follow up appointments within 7 days of discharge?  Haven't had time   Nursing Interventions  Educated patient on importance of making appointment, Advised patient to make appointment   Has the patient kept scheduled appointments due by today?  N/A   Has home health visited the patient within 72 hours of discharge?  N/A   Psychosocial issues?  No   Did the patient receive a copy of their discharge instructions?  Yes   Did the patient receive a copy of COVID-19 specific instructions?  Yes   Nursing interventions  Reviewed instructions with patient   What is the patient's perception of their health status since discharge?  Improving   Does the patient have any of the following symptoms?  None   Pulse Ox monitoring  Intermittent   Pulse Ox device source  Patient   O2 Sat: education provided  When to seek care, Sat levels   O2 Sat education comments  States they have been 100%   Is the patient/caregiver able to teach back steps to recovery at home?  Set small, achievable goals for return to baseline health, Rest and rebuild  strength, gradually increase activity, Eat a well-balance diet, Make a list of questions for provider's appointment   If the patient is a current smoker, are they able to teach back resources for cessation?  Not a smoker   Is the patient/caregiver able to teach back the hierarchy of who to call/visit for symptoms/problems? PCP, Specialist, Home health nurse, Urgent Care, ED, 911  Yes   COVID-19 call completed?  Yes   Wrap up additional comments  Pt states he is unsure of his chair time for dialysis.  Called [facility] and they gave me phone # for [facility].  Called [facility] and due to pt being Covid19 positive he has to go to another site.  Called [facility] [phone] and pt's chair time is 10:40 tomorrow.  Pt notified of this.          Ella Poole LPN

## 2021-02-18 ENCOUNTER — READMISSION MANAGEMENT (OUTPATIENT)
Dept: CALL CENTER | Facility: HOSPITAL | Age: 56
End: 2021-02-18

## 2021-02-18 NOTE — OUTREACH NOTE
COVID-19 Week 1 Survey      Responses   [facility] patient discharged from?  [facility]   Does the patient have one of the following disease processes/diagnoses(primary or secondary)?  COVID-19   COVID-19 underlying condition?  None   Call Number  Call 2   Week 1 Call successful?  No   Discharge diagnosis  COVID-19 virus infection           Alisa Jacob RN

## 2021-02-19 ENCOUNTER — READMISSION MANAGEMENT (OUTPATIENT)
Dept: CALL CENTER | Facility: HOSPITAL | Age: 56
End: 2021-02-19

## 2021-02-19 NOTE — OUTREACH NOTE
COVID-19 Week 1 Survey      Responses   [facility] patient discharged from?  [facility]   Does the patient have one of the following disease processes/diagnoses(primary or secondary)?  COVID-19   COVID-19 underlying condition?  None   Call Number  Call 3   Week 1 Call successful?  Yes   Call start time  0915   Call end time  0919   Discharge diagnosis  COVID-19 virus infection    Is patient permission given to speak with other caregiver?  Yes   List who call center can speak with  spouse   Meds reviewed with patient/caregiver?  Yes   Is the patient having any side effects they believe may be caused by any medication additions or changes?  No   Does the patient have all medications ordered at discharge?  No   What is keeping the patient from filling the prescriptions?  -- [Pt reports pharmacy will have the meds today. ]   Nursing Interventions  No intervention needed   Is the patient taking all medications as directed (includes completed medication regime)?  No   What is preventing the patient from taking all medications as directed?  -- [pharmacy did not have all of the meds at [place], pt will get those today, he reports. ]   Nursing Interventions  Nurse provided patient education   Does the patient have an appointment with their PCP or specialist within 7 days of discharge?  No   What is preventing the patient from scheduling follow up appointments within 7 days of discharge?  Haven't had time   Nursing Interventions  Advised patient to make appointment   Has the patient kept scheduled appointments due by today?  N/A   Psychosocial issues?  No   Comments  Pt reports that he has no symptoms of COVID. No further weakness or confusion.    What is the patient's perception of their health status since discharge?  Returned to baseline/stable   Does the patient have any of the following symptoms?  None   Nursing Interventions  Nurse provided patient education   Pulse Ox monitoring  None   Is the patient/caregiver able to  teach back steps to recovery at home?  Set small, achievable goals for return to baseline health   If the patient is a current smoker, are they able to teach back resources for cessation?  Not a smoker   Is the patient/caregiver able to teach back the hierarchy of who to call/visit for symptoms/problems? PCP, Specialist, Home health nurse, Urgent Care, ED, 911  Yes   COVID-19 call completed?  Yes          Louisa Hahn RN

## 2021-02-22 ENCOUNTER — READMISSION MANAGEMENT (OUTPATIENT)
Dept: CALL CENTER | Facility: HOSPITAL | Age: 56
End: 2021-02-22

## 2021-02-22 NOTE — OUTREACH NOTE
COVID-19 Week 2 Survey      Responses   [facility] patient discharged from?  [facility]   Does the patient have one of the following disease processes/diagnoses(primary or secondary)?  COVID-19   COVID-19 underlying condition?  None   Call Number  Call 1   COVID-19 Week 2: Call 1 attempt successful?  Yes   Call start time  1114   Call end time  1118   Discharge diagnosis  COVID-19 virus infection    Meds reviewed with patient/caregiver?  Yes   Is the patient having any side effects they believe may be caused by any medication additions or changes?  No   Does the patient have all medications ordered at discharge?  N/A [Aliza, ex-wife, does not know about his medications]   Is the patient taking all medications as directed (includes completed medication regime)?  N/A [Aliza, ex-wife did not know about medications]   Does the patient have a primary care provider?   Yes   Comments regarding PCP  Aliza, states pt has a PCP fu appt   Does the patient have an appointment with their PCP or specialist within 7 days of discharge?  No   What is preventing the patient from scheduling follow up appointments within 7 days of discharge?  Haven't had time   Nursing Interventions  Educated patient on importance of making appointment, Advised patient to make appointment   Has the patient kept scheduled appointments due by today?  N/A   Psychosocial issues?  No   What is the patient's perception of their health status since discharge?  Improving   Does the patient have any of the following symptoms?  Cough   Nursing Interventions  Nurse provided patient education   Pulse Ox monitoring  None   Is the patient/caregiver able to teach back steps to recovery at home?  Set small, achievable goals for return to baseline health, Rest and rebuild strength, gradually increase activity, Eat a well-balance diet   If the patient is a current smoker, are they able to teach back resources for cessation?  Not a smoker   Is the patient/caregiver able  to teach back the hierarchy of who to call/visit for symptoms/problems? PCP, Specialist, Home health nurse, Urgent Care, ED, 911  Yes   COVID-19 call completed?  Yes   Wrap up additional comments  -- [Aliza, ex-wife now, does not live with pt. Aliza, states pt missed dialysis but will go tomorrow. States pt has some coughing, and she does not know anything about his medciations.]          Ligia Alfred RN

## 2021-02-25 ENCOUNTER — READMISSION MANAGEMENT (OUTPATIENT)
Dept: CALL CENTER | Facility: HOSPITAL | Age: 56
End: 2021-02-25

## 2021-02-25 NOTE — OUTREACH NOTE
COVID-19 Week 2 Survey      Responses   [facility] patient discharged from?  [facility]   Does the patient have one of the following disease processes/diagnoses(primary or secondary)?  COVID-19   COVID-19 underlying condition?  None   Call Number  Call 2   COVID-19 Week 2: Call 1 attempt successful?  No   Discharge diagnosis  COVID-19 virus infection           Ligia Alfred RN

## 2021-03-04 ENCOUNTER — READMISSION MANAGEMENT (OUTPATIENT)
Dept: CALL CENTER | Facility: HOSPITAL | Age: 56
End: 2021-03-04

## 2021-03-04 NOTE — OUTREACH NOTE
COVID-19 Week 3 Survey      Responses   [facility] patient discharged from?  [facility]   Does the patient have one of the following disease processes/diagnoses(primary or secondary)?  COVID-19   COVID-19 underlying condition?  None   Call Number  Call 1   COVID-19 Week 3: Call 1 attempt successful?  No   Discharge diagnosis  COVID-19 virus infection           Alisa Jacob RN

## 2021-03-11 ENCOUNTER — READMISSION MANAGEMENT (OUTPATIENT)
Dept: CALL CENTER | Facility: HOSPITAL | Age: 56
End: 2021-03-11

## 2021-03-11 NOTE — OUTREACH NOTE
COVID-19 Week 4 Survey      Responses   [facility] [place] patient discharged from?  [facility]   Does the patient have one of the following disease processes/diagnoses(primary or secondary)?  COVID-19   COVID-19 underlying condition?  None   Call Number  Call 1   COVID-19 Week 4: Call 1 attempt successful?  No          Mari Butler RN

## 2021-08-13 ENCOUNTER — TRANSCRIBE ORDERS (OUTPATIENT)
Dept: ADMINISTRATIVE | Facility: HOSPITAL | Age: 56
End: 2021-08-13

## 2021-08-13 DIAGNOSIS — Z99.2 ENCOUNTER FOR EXTRACORPOREAL DIALYSIS (HCC): Primary | ICD-10-CM

## 2021-08-13 DIAGNOSIS — N18.6 END STAGE RENAL DISEASE (HCC): ICD-10-CM

## 2021-09-14 ENCOUNTER — HOSPITAL ENCOUNTER (OUTPATIENT)
Dept: CARDIOLOGY | Facility: HOSPITAL | Age: 56
Discharge: HOME | End: 2021-09-14
Admitting: SURGERY

## 2021-09-14 DIAGNOSIS — Z99.2 ENCOUNTER FOR EXTRACORPOREAL DIALYSIS (HCC): ICD-10-CM

## 2021-09-14 DIAGNOSIS — N18.6 END STAGE RENAL DISEASE (HCC): ICD-10-CM

## 2021-09-14 LAB
BH CV VAS DIALYSIS ARTERIAL ANASTOMOSIS EDV: 349 CM/SEC
BH CV VAS DIALYSIS ARTERIAL ANASTOMOSIS PSV: 642 CM/SEC
BH CV VAS DIALYSIS CONDUIT DIST DEPTH: 0.89 CM
BH CV VAS DIALYSIS CONDUIT DIST DIAMETER: 1.15 CM
BH CV VAS DIALYSIS CONDUIT DIST EDV: 116 CM/SEC
BH CV VAS DIALYSIS CONDUIT DIST FLOW VOL: 3312 ML/MIN
BH CV VAS DIALYSIS CONDUIT DIST PSV: 241 CM/SEC
BH CV VAS DIALYSIS CONDUIT MID DEPTH: 0.82 CM
BH CV VAS DIALYSIS CONDUIT MID DIAMETER: 1.39 CM
BH CV VAS DIALYSIS CONDUIT MID EDV: 59 CM/SEC
BH CV VAS DIALYSIS CONDUIT MID FLOW VOL: 2479 ML/MIN
BH CV VAS DIALYSIS CONDUIT MID PSV: 117 CM/SEC
BH CV VAS DIALYSIS CONDUIT MID/DIST DEPTH: 1.24 CM
BH CV VAS DIALYSIS CONDUIT MID/DIST DIAMETER: 0.7 CM
BH CV VAS DIALYSIS CONDUIT MID/DIST EDV: 235 CM/SEC
BH CV VAS DIALYSIS CONDUIT MID/DIST FLOW VOL: 4107 ML/MIN
BH CV VAS DIALYSIS CONDUIT MID/DIST PSV: 374 CM/SEC
BH CV VAS DIALYSIS CONDUIT PROX DEPTH: 0.6 CM
BH CV VAS DIALYSIS CONDUIT PROX DIAMETER: 1.14 CM
BH CV VAS DIALYSIS CONDUIT PROX EDV: 143 CM/SEC
BH CV VAS DIALYSIS CONDUIT PROX FLOW VOL: 4369 ML/MIN
BH CV VAS DIALYSIS CONDUIT PROX PSV: 347 CM/SEC
BH CV VAS DIALYSIS CONDUIT PROX/MID DEPTH: 0.42 CM
BH CV VAS DIALYSIS CONDUIT PROX/MID DIAMETER: 1.24 CM
BH CV VAS DIALYSIS CONDUIT PROX/MID EDV: 24 CM/SEC
BH CV VAS DIALYSIS CONDUIT PROX/MID FLOW VOL: 1067 ML/MIN
BH CV VAS DIALYSIS CONDUIT PROX/MID PSV: 85 CM/SEC
BH CV VAS DIALYSIS PRE-INFLOW BRACHIAL DIAMETER: 0.57 CM
BH CV VAS DIALYSIS PRE-INFLOW BRACHIAL EDV: 165 CM/SEC
BH CV VAS DIALYSIS PRE-INFLOW BRACHIAL FLOW VOL: 1529 ML/MIN
BH CV VAS DIALYSIS PRE-INFLOW BRACHIAL PSV: 380 CM/SEC
BH CV VAS DIALYSIS VENOUS OUTFLOW CEPHALIC ARCH DIAMETE: 1.2 CM
BH CV VAS DIALYSIS VENOUS OUTFLOW CEPHALIC ARCH EDV: 109 CM/SEC
BH CV VAS DIALYSIS VENOUS OUTFLOW CEPHALIC ARCH PSV: 175 CM/SEC
MAXIMAL PREDICTED HEART RATE: 163 BPM
STRESS TARGET HR: 139 BPM

## 2021-09-14 PROCEDURE — 93990 DOPPLER FLOW TESTING: CPT

## 2021-09-21 ENCOUNTER — APPOINTMENT (OUTPATIENT)
Dept: VASCULAR SURGERY | Facility: HOSPITAL | Age: 56
End: 2021-09-21

## 2021-09-21 PROCEDURE — G0463 HOSPITAL OUTPT CLINIC VISIT: HCPCS

## 2021-10-09 ENCOUNTER — HOSPITAL ENCOUNTER (EMERGENCY)
Facility: HOSPITAL | Age: 56
Discharge: HOME | End: 2021-10-09
Attending: EMERGENCY MEDICINE | Admitting: EMERGENCY MEDICINE

## 2021-10-09 ENCOUNTER — APPOINTMENT (OUTPATIENT)
Dept: GENERAL RADIOLOGY | Facility: HOSPITAL | Age: 56
End: 2021-10-09

## 2021-10-09 VITALS
HEIGHT: 69 IN | TEMPERATURE: 98.1 F | OXYGEN SATURATION: 100 % | WEIGHT: 205.91 LBS | SYSTOLIC BLOOD PRESSURE: 158 MMHG | HEART RATE: 93 BPM | BODY MASS INDEX: 30.5 KG/M2 | DIASTOLIC BLOOD PRESSURE: 97 MMHG | RESPIRATION RATE: 20 BRPM

## 2021-10-09 DIAGNOSIS — D64.9 CHRONIC ANEMIA: ICD-10-CM

## 2021-10-09 DIAGNOSIS — N18.9 CHRONIC RENAL FAILURE, UNSPECIFIED CKD STAGE: ICD-10-CM

## 2021-10-09 DIAGNOSIS — J20.8 VIRAL BRONCHITIS: Primary | ICD-10-CM

## 2021-10-09 DIAGNOSIS — Z20.822 COVID-19 RULED OUT BY LABORATORY TESTING: ICD-10-CM

## 2021-10-09 LAB
ANION GAP SERPL CALCULATED.3IONS-SCNC: 15 MMOL/L (ref 5–15)
BASOPHILS # BLD AUTO: 0.1 10*3/MM3 (ref 0–0.2)
BASOPHILS NFR BLD AUTO: 0.8 % (ref 0–1.5)
BUN SERPL-MCNC: 24 MG/DL (ref 6–20)
BUN/CREAT SERPL: 4.5 (ref 7–25)
CALCIUM SPEC-SCNC: 9.4 MG/DL (ref 8.6–10.5)
CHLORIDE SERPL-SCNC: 92 MMOL/L (ref 98–107)
CO2 SERPL-SCNC: 25 MMOL/L (ref 22–29)
CREAT SERPL-MCNC: 5.3 MG/DL (ref 0.76–1.27)
DEPRECATED RDW RBC AUTO: 51.6 FL (ref 37–54)
EOSINOPHIL # BLD AUTO: 0.9 10*3/MM3 (ref 0–0.4)
EOSINOPHIL NFR BLD AUTO: 11.4 % (ref 0.3–6.2)
ERYTHROCYTE [DISTWIDTH] IN BLOOD BY AUTOMATED COUNT: 16 % (ref 12.3–15.4)
GFR SERPL CREATININE-BSD FRML MDRD: 14 ML/MIN/1.73
GFR SERPL CREATININE-BSD FRML MDRD: ABNORMAL ML/MIN/{1.73_M2}
GLUCOSE SERPL-MCNC: 223 MG/DL (ref 65–99)
HCT VFR BLD AUTO: 27.8 % (ref 37.5–51)
HGB BLD-MCNC: 9.3 G/DL (ref 13–17.7)
LYMPHOCYTES # BLD AUTO: 1.5 10*3/MM3 (ref 0.7–3.1)
LYMPHOCYTES NFR BLD AUTO: 19.3 % (ref 19.6–45.3)
MCH RBC QN AUTO: 30.7 PG (ref 26.6–33)
MCHC RBC AUTO-ENTMCNC: 33.5 G/DL (ref 31.5–35.7)
MCV RBC AUTO: 91.7 FL (ref 79–97)
MONOCYTES # BLD AUTO: 0.7 10*3/MM3 (ref 0.1–0.9)
MONOCYTES NFR BLD AUTO: 9.7 % (ref 5–12)
NEUTROPHILS NFR BLD AUTO: 4.5 10*3/MM3 (ref 1.7–7)
NEUTROPHILS NFR BLD AUTO: 58.8 % (ref 42.7–76)
NRBC BLD AUTO-RTO: 0 /100 WBC (ref 0–0.2)
PLATELET # BLD AUTO: 150 10*3/MM3 (ref 140–450)
PMV BLD AUTO: 9 FL (ref 6–12)
POTASSIUM SERPL-SCNC: 4.1 MMOL/L (ref 3.5–5.2)
RBC # BLD AUTO: 3.03 10*6/MM3 (ref 4.14–5.8)
SARS-COV-2 RNA PNL SPEC NAA+PROBE: NOT DETECTED
SODIUM SERPL-SCNC: 132 MMOL/L (ref 136–145)
WBC # BLD AUTO: 7.7 10*3/MM3 (ref 3.4–10.8)

## 2021-10-09 PROCEDURE — 94799 UNLISTED PULMONARY SVC/PX: CPT

## 2021-10-09 PROCEDURE — 93005 ELECTROCARDIOGRAM TRACING: CPT | Performed by: EMERGENCY MEDICINE

## 2021-10-09 PROCEDURE — 85025 COMPLETE CBC W/AUTO DIFF WBC: CPT | Performed by: EMERGENCY MEDICINE

## 2021-10-09 PROCEDURE — 93005 ELECTROCARDIOGRAM TRACING: CPT

## 2021-10-09 PROCEDURE — 99283 EMERGENCY DEPT VISIT LOW MDM: CPT

## 2021-10-09 PROCEDURE — 80048 BASIC METABOLIC PNL TOTAL CA: CPT | Performed by: EMERGENCY MEDICINE

## 2021-10-09 PROCEDURE — 71045 X-RAY EXAM CHEST 1 VIEW: CPT

## 2021-10-09 PROCEDURE — 87635 SARS-COV-2 COVID-19 AMP PRB: CPT | Performed by: EMERGENCY MEDICINE

## 2021-10-09 PROCEDURE — 94640 AIRWAY INHALATION TREATMENT: CPT

## 2021-10-09 RX ORDER — ALBUTEROL SULFATE 90 UG/1
2 AEROSOL, METERED RESPIRATORY (INHALATION) ONCE
Status: COMPLETED | OUTPATIENT
Start: 2021-10-09 | End: 2021-10-09

## 2021-10-09 RX ORDER — SODIUM CHLORIDE 0.9 % (FLUSH) 0.9 %
10 SYRINGE (ML) INJECTION AS NEEDED
Status: DISCONTINUED | OUTPATIENT
Start: 2021-10-09 | End: 2021-10-09 | Stop reason: HOSPADM

## 2021-10-09 RX ORDER — ALBUTEROL SULFATE 90 UG/1
2 AEROSOL, METERED RESPIRATORY (INHALATION) EVERY 4 HOURS PRN
Qty: 8 G | Refills: 0 | Status: ON HOLD | OUTPATIENT
Start: 2021-10-09 | End: 2022-09-20

## 2021-10-09 RX ADMIN — ALBUTEROL SULFATE 2 PUFF: 108 INHALANT RESPIRATORY (INHALATION) at 12:17

## 2021-10-09 NOTE — DISCHARGE INSTRUCTIONS
Rest, dialysis on Monday as scheduled.  Albuterol as needed for cough and wheeze.  Return for fever, vomiting, increased shortness of breath or any other concerns

## 2021-10-09 NOTE — ED PROVIDER NOTES
Subjective   History of Present Illness  Cough  56-year-old male states he has had cough and congestion over the last 2 days.  States he had some slight nasal congestion and some increased cough where he is coughing up some small amounts of clear phlegm at times.  He reports no chest pain.  He states he felt mildly short of breath this morning.  He reports no known ill contacts and denies fevers or chills.  Review of Systems   Constitutional: Negative for fever.   HENT: Positive for congestion.    Eyes: Negative.    Respiratory: Positive for cough and shortness of breath.    Cardiovascular: Negative.    Gastrointestinal: Negative.    Genitourinary: Negative.    Musculoskeletal: Negative.    Skin: Negative.    Neurological: Negative.    Psychiatric/Behavioral: Negative.        Past Medical History:   Diagnosis Date   • Anemia    • Cataract    • Constipation    • CVA (cerebral vascular accident) (CMS/[facility])     x 8   • CVA (cerebral vascular accident) (CMS/[facility])    • Depression    • Diabetes mellitus (CMS/[facility])    • ESRD (end stage renal disease) on dialysis (CMS/[facility])    • H/O colonoscopy 2016   • History of noncompliance with medical treatment    • HLD (hyperlipidemia) .   • Hyperlipidemia    • Hypertension    • Insomnia    • Neuropathy    • Renal failure     Stage IV - AI -- on dialysis -- mwf   • Retinopathy    • TIA (transient ischemic attack)    • Type 2 diabetes mellitus (CMS/[facility])        Allergies   Allergen Reactions   • Latex Unknown (See Comments)   • Other Unknown (See Comments)   • Penicillins Unknown (See Comments)   • Latex, Natural Rubber Unknown - High Severity   • Penicillins Unknown - High Severity   • Amoxicillin Rash       Past Surgical History:   Procedure Laterality Date   • ANKLE SURGERY Left    • ANKLE SURGERY     • APPENDECTOMY     • ARTERIOVENOUS FISTULA     • CATARACT EXTRACTION, BILATERAL  2018   • COLON RESECTION  2016   • OTHER SURGICAL HISTORY Left 02/23/2017    Left Distula Placement   Freida       Family History   Problem Relation Age of Onset   • Heart disease Mother    • Heart attack Mother    • Hypertension Mother    • Breast cancer Sister    • Hyperlipidemia Mother    • Stroke Mother    • Cancer Sister         Breast Cancer   • Diabetes Sister    • Heart disease Maternal Grandmother    • Hyperlipidemia Maternal Grandmother    • Hypertension Maternal Grandmother        Social History     Socioeconomic History   • Marital status:    Tobacco Use   • Smoking status: Never Smoker   • Smokeless tobacco: Current User     Types: Chew   • Tobacco comment: Passive Smoke exposure: no   Substance and Sexual Activity   • Alcohol use: Not Currently   • Drug use: Defer   • Sexual activity: Defer     Prior to Admission medications    Medication Sig Start Date End Date Taking? Authorizing Provider   acetaminophen (TYLENOL) 325 MG tablet Take 2 tablets by mouth Every 4 (Four) Hours As Needed for Mild Pain . 2/16/21   Karine Campos MD   amitriptyline (ELAVIL) 25 MG tablet Take 1 tablet by mouth Every Night. 2/16/21   Karine Campos MD   amLODIPine (NORVASC) 10 MG tablet Take 10 mg by mouth Daily.    Andrea Lock MD   ascorbic acid (VITAMIN C) 500 MG tablet Take 1 tablet by mouth Daily. 2/17/21   Karine Campos MD   atorvastatin (LIPITOR) 40 MG tablet Take 40 mg by mouth Every Night. 5/13/19   Andrea Lock MD   docusate sodium (COLACE) 100 MG capsule Take 100 mg by mouth 2 (Two) Times a Day.    Andrea Lock MD   gabapentin (NEURONTIN) 300 MG capsule Take 1 capsule by mouth Every Night. 2/16/21   Karine Campos MD   glucagon (GLUCAGON EMERGENCY) 1 MG injection GLUCAGON EMERGENCY 1 MG KIT 7/9/15   Andrea Lock MD   hydrOXYzine (ATARAX) 25 MG tablet Take 1 tablet by mouth Every 6 (Six) Hours As Needed for Itching (Discomfort from tongue irritation). 12/20/20   Baljit Abdullahi MD   Insulin Lispro (HUMALOG KWIKPEN) 100 UNIT/ML solution pen-injector Inject 6 Units under the skin  "into the appropriate area as directed 3 (Three) Times a Day. Per SSI 2/8/18   Andrea Lock MD   LANTUS 100 UNIT/ML injection Inject 40 Units under the skin into the appropriate area as directed Every Night. 5/13/19   Andrea Lock MD   levETIRAcetam XR (KEPPRA XR) 500 MG 24 hr tablet Take 500 mg by mouth Daily.    Andrea Lock MD   losartan (COZAAR) 50 MG tablet Take 50 mg by mouth Every Morning. 2/26/20   Andrea Lock MD   metoprolol tartrate (LOPRESSOR) 50 MG tablet Take 50 mg by mouth 2 (Two) Times a Day. 5/13/19   Andrea Lock MD   risperiDONE (risperDAL) 1 MG tablet Take 1 mg by mouth 2 (Two) Times a Day. 5/13/19   Andrea Lock MD   sennosides-docusate (senna-docusate sodium) 8.6-50 MG per tablet Take 1 tablet by mouth 2 (two) times a day.    Andrea Lock MD   sevelamer (RENVELA) 800 MG tablet Take 2,400 mg by mouth 3 (Three) Times a Day With Meals. 6/4/19   Andrea Lock MD   zinc sulfate (ZINCATE) 220 (50 Zn) MG capsule Take 1 capsule by mouth Daily. 2/17/21   Karine Campos MD     /75   Pulse 95   Temp 98.3 °F (36.8 °C) (Oral)   Resp 18   Ht 175.3 cm (69\")   Wt 93.4 kg (205 lb 14.6 oz)   SpO2 96%   BMI 30.41 kg/m²   I examined the patient using the appropriate personal protective equipment.          Objective   Physical Exam  General: Well-developed well-appearing, no acute distress, alert and appropriate  Eyes: sclera nonicteric  HEENT: Mucous membranes moist, no mucosal swelling  Neck: Supple, no nuchal rigidity, no soft tissue swelling  Respirations: Respirations nonlabored, equal breath sounds bilaterally, clear lungs  Heart regular rate and rhythm, no murmurs rubs or gallops,   Abdomen soft nontender nondistended, no hepatosplenomegaly,  Extremities no clubbing cyanosis or edema, calves are symmetric and nontender  Neuro cranial nerves grossly intact, no focal limb deficits  Psych oriented, pleasant affect  Skin no rash, " brisk cap refill  Procedures           ED Course      My EKG interpretation sinus rhythm first-degree AV block, rate of 90     Results for orders placed or performed during the hospital encounter of 10/09/21   COVID-19,CEPHEID/VISHAL/BDMAX,COR/DIONICIO/PAD/DEBBI IN-HOUSE(OR EMERGENT/ADD-ON),NP SWAB IN TRANSPORT MEDIA 3-4 HR TAT, RT-PCR - Swab, Nasopharynx    Specimen: Nasopharynx; Swab   Result Value Ref Range    COVID19 Not Detected Not Detected - Ref. Range   Basic Metabolic Panel    Specimen: Blood   Result Value Ref Range    Glucose 223 (H) 65 - 99 mg/dL    BUN 24 (H) 6 - 20 mg/dL    Creatinine 5.30 (H) 0.76 - 1.27 mg/dL    Sodium 132 (L) 136 - 145 mmol/L    Potassium 4.1 3.5 - 5.2 mmol/L    Chloride 92 (L) 98 - 107 mmol/L    CO2 25.0 22.0 - 29.0 mmol/L    Calcium 9.4 8.6 - 10.5 mg/dL    eGFR  African Amer 14 (L) >60 mL/min/1.73    eGFR Non African Amer      BUN/Creatinine Ratio 4.5 (L) 7.0 - 25.0    Anion Gap 15.0 5.0 - 15.0 mmol/L   CBC Auto Differential    Specimen: Blood   Result Value Ref Range    WBC 7.70 3.40 - 10.80 10*3/mm3    RBC 3.03 (L) 4.14 - 5.80 10*6/mm3    Hemoglobin 9.3 (L) 13.0 - 17.7 g/dL    Hematocrit 27.8 (L) 37.5 - 51.0 %    MCV 91.7 79.0 - 97.0 fL    MCH 30.7 26.6 - 33.0 pg    MCHC 33.5 31.5 - 35.7 g/dL    RDW 16.0 (H) 12.3 - 15.4 %    RDW-SD 51.6 37.0 - 54.0 fl    MPV 9.0 6.0 - 12.0 fL    Platelets 150 140 - 450 10*3/mm3    Neutrophil % 58.8 42.7 - 76.0 %    Lymphocyte % 19.3 (L) 19.6 - 45.3 %    Monocyte % 9.7 5.0 - 12.0 %    Eosinophil % 11.4 (H) 0.3 - 6.2 %    Basophil % 0.8 0.0 - 1.5 %    Neutrophils, Absolute 4.50 1.70 - 7.00 10*3/mm3    Lymphocytes, Absolute 1.50 0.70 - 3.10 10*3/mm3    Monocytes, Absolute 0.70 0.10 - 0.90 10*3/mm3    Eosinophils, Absolute 0.90 (H) 0.00 - 0.40 10*3/mm3    Basophils, Absolute 0.10 0.00 - 0.20 10*3/mm3    nRBC 0.0 0.0 - 0.2 /100 WBC   ECG 12 Lead   Result Value Ref Range    QT Interval 398 ms     XR Chest 1 View    Result Date: 10/9/2021  Cardiomegaly with  pulmonary vascular congestion.  Electronically Signed By-Donaldo Sales MD On:10/9/2021 12:49 PM This report was finalized on [id number] by  Donaldo Sales MD.                                    MDM  Patient resting comfortably with oxygen saturations of 100% on room air.  He is not in respiratory distress.  He received dialysis yesterday and is due on Monday for his next dialysis treatment.  His cough and congestion is suggestive of viral upper respiratory infection and mild bronchitis.  He does not have findings of pneumonia.  His Covid screen was negative.  He was advised the findings he does have some chronic anemia previous lab records reviewed.  He is prescribed albuterol for his bronchospasm and discharged in good condition was given warning signs for return.  He does voice agreement with discharge plan.  Final diagnoses:   Viral bronchitis   COVID-19 ruled out by laboratory testing   Chronic renal failure, unspecified CKD stage   Chronic anemia       ED Disposition  ED Disposition     ED Disposition Condition Comment    Discharge Stable           Kerri Jones MD  [address] [facility] [place] [address] [phone]    Schedule an appointment as soon as possible for a visit in 3 days           Medication List      New Prescriptions    albuterol sulfate  (90 Base) MCG/ACT inhaler  Commonly known as: PROVENTIL HFA;VENTOLIN HFA;PROAIR HFA  Inhale 2 puffs Every 4 (Four) Hours As Needed for Wheezing.           Where to Get Your Medications      These medications were sent to [facility] - [place], [place] - [address] [place] [address] - [phone]  - [phone] FX  [address] [place] [address] [place] [address]    Phone: [phone]   · albuterol sulfate  (90 Base) MCG/ACT inhaler          Jer Townsend MD  10/09/21 1852

## 2021-10-09 NOTE — ED NOTES
Pt reports to the ED for c/o cough that started two days ago. Pt reports he goes to dialysis M-W-Fri with his last tx yesterday. Pt reports cough and SOA worsened yesterday after getting home from dialysis.     Sonia Mendieta, RN  10/09/21 6598

## 2021-10-10 LAB — QT INTERVAL: 398 MS

## 2021-10-12 ENCOUNTER — APPOINTMENT (OUTPATIENT)
Dept: CT IMAGING | Facility: HOSPITAL | Age: 56
End: 2021-10-12

## 2021-10-12 ENCOUNTER — HOSPITAL ENCOUNTER (EMERGENCY)
Facility: HOSPITAL | Age: 56
Discharge: HOME | End: 2021-10-13
Attending: EMERGENCY MEDICINE | Admitting: EMERGENCY MEDICINE

## 2021-10-12 DIAGNOSIS — R10.9 ABDOMINAL PAIN, UNSPECIFIED ABDOMINAL LOCATION: Primary | ICD-10-CM

## 2021-10-12 DIAGNOSIS — R19.7 DIARRHEA, UNSPECIFIED TYPE: ICD-10-CM

## 2021-10-12 DIAGNOSIS — Z20.822 LAB TEST NEGATIVE FOR COVID-19 VIRUS: ICD-10-CM

## 2021-10-12 PROCEDURE — 74176 CT ABD & PELVIS W/O CONTRAST: CPT

## 2021-10-12 PROCEDURE — 99283 EMERGENCY DEPT VISIT LOW MDM: CPT

## 2021-10-13 ENCOUNTER — HOSPITAL ENCOUNTER (EMERGENCY)
Facility: HOSPITAL | Age: 56
Discharge: HOME | End: 2021-10-14
Attending: EMERGENCY MEDICINE | Admitting: EMERGENCY MEDICINE

## 2021-10-13 VITALS
HEIGHT: 69 IN | SYSTOLIC BLOOD PRESSURE: 130 MMHG | RESPIRATION RATE: 18 BRPM | DIASTOLIC BLOOD PRESSURE: 73 MMHG | TEMPERATURE: 98.5 F | BODY MASS INDEX: 30.33 KG/M2 | OXYGEN SATURATION: 97 % | HEART RATE: 87 BPM | WEIGHT: 204.81 LBS

## 2021-10-13 VITALS
RESPIRATION RATE: 14 BRPM | TEMPERATURE: 98.6 F | OXYGEN SATURATION: 99 % | SYSTOLIC BLOOD PRESSURE: 155 MMHG | DIASTOLIC BLOOD PRESSURE: 80 MMHG | HEIGHT: 69 IN | WEIGHT: 205.03 LBS | BODY MASS INDEX: 30.37 KG/M2 | HEART RATE: 98 BPM

## 2021-10-13 DIAGNOSIS — T78.3XXA ANGIOEDEMA, INITIAL ENCOUNTER: Primary | ICD-10-CM

## 2021-10-13 LAB
ALBUMIN SERPL-MCNC: 4.1 G/DL (ref 3.5–5.2)
ALBUMIN/GLOB SERPL: 1.2 G/DL
ALP SERPL-CCNC: 177 U/L (ref 39–117)
ALT SERPL W P-5'-P-CCNC: 26 U/L (ref 1–41)
ANION GAP SERPL CALCULATED.3IONS-SCNC: 15 MMOL/L (ref 5–15)
AST SERPL-CCNC: 18 U/L (ref 1–40)
BASOPHILS # BLD AUTO: 0.1 10*3/MM3 (ref 0–0.2)
BASOPHILS NFR BLD AUTO: 0.7 % (ref 0–1.5)
BILIRUB SERPL-MCNC: 0.3 MG/DL (ref 0–1.2)
BUN SERPL-MCNC: 35 MG/DL (ref 6–20)
BUN/CREAT SERPL: 5.2 (ref 7–25)
CALCIUM SPEC-SCNC: 10 MG/DL (ref 8.6–10.5)
CHLORIDE SERPL-SCNC: 95 MMOL/L (ref 98–107)
CO2 SERPL-SCNC: 24 MMOL/L (ref 22–29)
CREAT SERPL-MCNC: 6.72 MG/DL (ref 0.76–1.27)
DEPRECATED RDW RBC AUTO: 53.4 FL (ref 37–54)
EOSINOPHIL # BLD AUTO: 0.7 10*3/MM3 (ref 0–0.4)
EOSINOPHIL NFR BLD AUTO: 6.8 % (ref 0.3–6.2)
ERYTHROCYTE [DISTWIDTH] IN BLOOD BY AUTOMATED COUNT: 16.6 % (ref 12.3–15.4)
GFR SERPL CREATININE-BSD FRML MDRD: 10 ML/MIN/1.73
GFR SERPL CREATININE-BSD FRML MDRD: ABNORMAL ML/MIN/{1.73_M2}
GLOBULIN UR ELPH-MCNC: 3.4 GM/DL
GLUCOSE SERPL-MCNC: 188 MG/DL (ref 65–99)
HCT VFR BLD AUTO: 29.7 % (ref 37.5–51)
HGB BLD-MCNC: 9.9 G/DL (ref 13–17.7)
LIPASE SERPL-CCNC: 22 U/L (ref 13–60)
LYMPHOCYTES # BLD AUTO: 1.5 10*3/MM3 (ref 0.7–3.1)
LYMPHOCYTES NFR BLD AUTO: 14 % (ref 19.6–45.3)
MCH RBC QN AUTO: 30.7 PG (ref 26.6–33)
MCHC RBC AUTO-ENTMCNC: 33.4 G/DL (ref 31.5–35.7)
MCV RBC AUTO: 92 FL (ref 79–97)
MONOCYTES # BLD AUTO: 0.9 10*3/MM3 (ref 0.1–0.9)
MONOCYTES NFR BLD AUTO: 8.1 % (ref 5–12)
NEUTROPHILS NFR BLD AUTO: 7.6 10*3/MM3 (ref 1.7–7)
NEUTROPHILS NFR BLD AUTO: 70.4 % (ref 42.7–76)
NRBC BLD AUTO-RTO: 0.1 /100 WBC (ref 0–0.2)
PLATELET # BLD AUTO: 203 10*3/MM3 (ref 140–450)
PMV BLD AUTO: 8.3 FL (ref 6–12)
POTASSIUM SERPL-SCNC: 4 MMOL/L (ref 3.5–5.2)
PROT SERPL-MCNC: 7.5 G/DL (ref 6–8.5)
RBC # BLD AUTO: 3.22 10*6/MM3 (ref 4.14–5.8)
SARS-COV-2 RNA PNL SPEC NAA+PROBE: NOT DETECTED
SODIUM SERPL-SCNC: 134 MMOL/L (ref 136–145)
WBC # BLD AUTO: 10.7 10*3/MM3 (ref 3.4–10.8)

## 2021-10-13 PROCEDURE — 87635 SARS-COV-2 COVID-19 AMP PRB: CPT | Performed by: EMERGENCY MEDICINE

## 2021-10-13 PROCEDURE — 83690 ASSAY OF LIPASE: CPT | Performed by: EMERGENCY MEDICINE

## 2021-10-13 PROCEDURE — 80053 COMPREHEN METABOLIC PANEL: CPT | Performed by: EMERGENCY MEDICINE

## 2021-10-13 PROCEDURE — 99283 EMERGENCY DEPT VISIT LOW MDM: CPT

## 2021-10-13 PROCEDURE — 85025 COMPLETE CBC W/AUTO DIFF WBC: CPT | Performed by: EMERGENCY MEDICINE

## 2021-10-13 RX ORDER — HYDROCODONE BITARTRATE AND ACETAMINOPHEN 5; 325 MG/1; MG/1
1 TABLET ORAL ONCE AS NEEDED
Status: COMPLETED | OUTPATIENT
Start: 2021-10-13 | End: 2021-10-13

## 2021-10-13 RX ADMIN — HYDROCODONE BITARTRATE AND ACETAMINOPHEN 1 TABLET: 5; 325 TABLET ORAL at 01:44

## 2021-10-13 NOTE — ED PROVIDER NOTES
Subjective   56-year-old male complains of moderate diffuse abdominal pain with distention, nausea, diarrhea, nonbloody onset this morning.  Patient denies any sick contacts, patient has been vaccinated for Covid.  Patient had a mild cough several days ago was seen for this and states it is improving denies any chest pain or shortness of air or fever.  Patient is and is on dialysis and is due for it tomorrow.          Review of Systems   Respiratory: Positive for cough.    Gastrointestinal: Positive for abdominal distention, abdominal pain, diarrhea and nausea.   All other systems reviewed and are negative.      Past Medical History:   Diagnosis Date   • Anemia    • Cataract    • Constipation    • CVA (cerebral vascular accident) (HCC)     x 8   • CVA (cerebral vascular accident) (HCC)    • Depression    • Diabetes mellitus (HCC)    • ESRD (end stage renal disease) on dialysis (HCC)    • H/O colonoscopy 2016   • History of noncompliance with medical treatment    • HLD (hyperlipidemia) .   • Hyperlipidemia    • Hypertension    • Insomnia    • Neuropathy    • Renal failure     Stage IV - AI -- on dialysis -- mwf   • Retinopathy    • TIA (transient ischemic attack)    • Type 2 diabetes mellitus ([facility])        Allergies   Allergen Reactions   • Latex Unknown (See Comments)   • Other Unknown (See Comments)   • Penicillins Unknown (See Comments)   • Latex, Natural Rubber Unknown - High Severity   • Penicillins Unknown - High Severity   • Amoxicillin Rash       Past Surgical History:   Procedure Laterality Date   • ANKLE SURGERY Left    • ANKLE SURGERY     • APPENDECTOMY     • ARTERIOVENOUS FISTULA     • CATARACT EXTRACTION, BILATERAL  2018   • COLON RESECTION  2016   • OTHER SURGICAL HISTORY Left 02/23/2017    Left Distula Placement Dr Guan       Family History   Problem Relation Age of Onset   • Heart disease Mother    • Heart attack Mother    • Hypertension Mother    • Breast cancer Sister    • Hyperlipidemia Mother     • Stroke Mother    • Cancer Sister         Breast Cancer   • Diabetes Sister    • Heart disease Maternal Grandmother    • Hyperlipidemia Maternal Grandmother    • Hypertension Maternal Grandmother        Social History     Socioeconomic History   • Marital status:    Tobacco Use   • Smoking status: Never Smoker   • Smokeless tobacco: Current User     Types: Chew   • Tobacco comment: Passive Smoke exposure: no   Substance and Sexual Activity   • Alcohol use: Not Currently   • Drug use: Defer   • Sexual activity: Defer           Objective   Physical Exam  Constitutional:       Appearance: Normal appearance. He is well-developed.   HENT:      Head: Normocephalic and atraumatic.      Mouth/Throat:      Mouth: Mucous membranes are moist.      Pharynx: Oropharynx is clear.   Eyes:      Conjunctiva/sclera: Conjunctivae normal.      Pupils: Pupils are equal, round, and reactive to light.   Cardiovascular:      Rate and Rhythm: Normal rate and regular rhythm.      Heart sounds: Normal heart sounds.   Pulmonary:      Effort: Pulmonary effort is normal.      Breath sounds: Normal breath sounds.   Abdominal:      Comments: Mild distention, mild diffuse tenderness to palpation, no rebound or guarding   Musculoskeletal:      Comments: Fistula left upper extremity, positive thrill   Skin:     General: Skin is warm and dry.      Capillary Refill: Capillary refill takes less than 2 seconds.   Neurological:      General: No focal deficit present.      Mental Status: He is alert and oriented to person, place, and time.   Psychiatric:         Mood and Affect: Mood normal.         Behavior: Behavior normal.         Procedures           ED Course                                           MDM  Number of Diagnoses or Management Options  Abdominal pain, unspecified abdominal location  Diarrhea, unspecified type  Lab test negative for COVID-19 virus  Diagnosis management comments: Results for orders placed or performed during the  hospital encounter of 10/12/21  -COVID-19,CEPHEID/VISHAL/BDMAX,COR/DIONICIO/PAD/DEBBI IN-HOUSE(OR EMERGENT/ADD-ON),NP SWAB IN TRANSPORT MEDIA 3-4 HR TAT, RT-PCR - Swab, Nasopharynx:   Specimen: Nasopharynx; Swab       Result                      Value             Ref Range           COVID19                     Not Detected      Not Detected*  -Comprehensive Metabolic Panel:   Specimen: Blood       Result                      Value             Ref Range           Glucose                     188 (H)           65 - 99 mg/dL       BUN                         35 (H)            6 - 20 mg/dL        Creatinine                  6.72 (H)          0.76 - 1.27 *       Sodium                      134 (L)           136 - 145 mm*       Potassium                   4.0               3.5 - 5.2 mm*       Chloride                    95 (L)            98 - 107 mmo*       CO2                         24.0              22.0 - 29.0 *       Calcium                     10.0              8.6 - 10.5 m*       Total Protein               7.5               6.0 - 8.5 g/*       Albumin                     4.10              3.50 - 5.20 *       ALT (SGPT)                  26                1 - 41 U/L          AST (SGOT)                  18                1 - 40 U/L          Alkaline Phosphatase        177 (H)           39 - 117 U/L        Total Bilirubin             0.3               0.0 - 1.2 mg*       eGFR Non  Amer                                             eGFR   Amer          10 (L)            >60 mL/min/1*       Globulin                    3.4               gm/dL               A/G Ratio                   1.2               g/dL                BUN/Creatinine Ratio        5.2 (L)           7.0 - 25.0          Anion Gap                   15.0              5.0 - 15.0 m*  -Lipase:   Specimen: Blood       Result                      Value             Ref Range           Lipase                      22                13 - 60 U/L    -CBC Auto  Differential:   Specimen: Blood       Result                      Value             Ref Range           WBC                         10.70             3.40 - 10.80*       RBC                         3.22 (L)          4.14 - 5.80 *       Hemoglobin                  9.9 (L)           13.0 - 17.7 *       Hematocrit                  29.7 (L)          37.5 - 51.0 %       MCV                         92.0              79.0 - 97.0 *       MCH                         30.7              26.6 - 33.0 *       MCHC                        33.4              31.5 - 35.7 *       RDW                         16.6 (H)          12.3 - 15.4 %       RDW-SD                      53.4              37.0 - 54.0 *       MPV                         8.3               6.0 - 12.0 fL       Platelets                   203               140 - 450 10*       Neutrophil %                70.4              42.7 - 76.0 %       Lymphocyte %                14.0 (L)          19.6 - 45.3 %       Monocyte %                  8.1               5.0 - 12.0 %        Eosinophil %                6.8 (H)           0.3 - 6.2 %         Basophil %                  0.7               0.0 - 1.5 %         Neutrophils, Absolute       7.60 (H)          1.70 - 7.00 *       Lymphocytes, Absolute       1.50              0.70 - 3.10 *       Monocytes, Absolute         0.90              0.10 - 0.90 *       Eosinophils, Absolute       0.70 (H)          0.00 - 0.40 *       Basophils, Absolute         0.10              0.00 - 0.20 *       nRBC                        0.1               0.0 - 0.2 /1*  CT Abdomen Pelvis Without Contrast   Final Result    Impression:    1. No urolithiasis or hydronephrosis. No acute abnormality seen in the abdomen or pelvis.    2. Small hiatal hernia.        Electronically signed by:  Felix Haile M.D.      10/12/2021 10:03 PM       Patient appears well, unremarkable abad, return precautions reviewed.       Amount and/or Complexity of Data Reviewed  Clinical lab  tests: ordered and reviewed  Tests in the radiology section of CPT®: ordered and reviewed  Tests in the medicine section of CPT®: reviewed and ordered  Decide to obtain previous medical records or to obtain history from someone other than the patient: yes        Final diagnoses:   Abdominal pain, unspecified abdominal location   Diarrhea, unspecified type   Lab test negative for COVID-19 virus       ED Disposition  ED Disposition     ED Disposition Condition Comment    Discharge Stable           Kerri Jones MD  [address] [id number] [phone]    Schedule an appointment as soon as possible for a visit            Medication List      No changes were made to your prescriptions during this visit.          Colin Alvarez MD  10/13/21 0120

## 2021-10-14 LAB
ANION GAP SERPL CALCULATED.3IONS-SCNC: 18 MMOL/L (ref 5–15)
BUN SERPL-MCNC: 44 MG/DL (ref 6–20)
BUN/CREAT SERPL: 5.4 (ref 7–25)
CALCIUM SPEC-SCNC: 10.1 MG/DL (ref 8.6–10.5)
CHLORIDE SERPL-SCNC: 95 MMOL/L (ref 98–107)
CO2 SERPL-SCNC: 24 MMOL/L (ref 22–29)
CREAT SERPL-MCNC: 8.2 MG/DL (ref 0.76–1.27)
GFR SERPL CREATININE-BSD FRML MDRD: 8 ML/MIN/1.73
GFR SERPL CREATININE-BSD FRML MDRD: ABNORMAL ML/MIN/{1.73_M2}
GLUCOSE SERPL-MCNC: 186 MG/DL (ref 65–99)
POTASSIUM SERPL-SCNC: 4.3 MMOL/L (ref 3.5–5.2)
SODIUM SERPL-SCNC: 137 MMOL/L (ref 136–145)
WHOLE BLOOD HOLD SPECIMEN: NORMAL

## 2021-10-14 PROCEDURE — 36415 COLL VENOUS BLD VENIPUNCTURE: CPT | Performed by: EMERGENCY MEDICINE

## 2021-10-14 PROCEDURE — 25010000002 METHYLPREDNISOLONE PER 125 MG: Performed by: EMERGENCY MEDICINE

## 2021-10-14 PROCEDURE — 96372 THER/PROPH/DIAG INJ SC/IM: CPT

## 2021-10-14 PROCEDURE — 80048 BASIC METABOLIC PNL TOTAL CA: CPT | Performed by: EMERGENCY MEDICINE

## 2021-10-14 PROCEDURE — 63710000001 DIPHENHYDRAMINE PER 50 MG: Performed by: EMERGENCY MEDICINE

## 2021-10-14 RX ORDER — FAMOTIDINE 20 MG/1
20 TABLET, FILM COATED ORAL ONCE
Status: COMPLETED | OUTPATIENT
Start: 2021-10-14 | End: 2021-10-14

## 2021-10-14 RX ORDER — DIPHENHYDRAMINE HCL 25 MG
25 CAPSULE ORAL ONCE
Status: COMPLETED | OUTPATIENT
Start: 2021-10-14 | End: 2021-10-14

## 2021-10-14 RX ORDER — PREDNISONE 20 MG/1
20 TABLET ORAL 2 TIMES DAILY
Qty: 10 TABLET | Refills: 0 | Status: SHIPPED | OUTPATIENT
Start: 2021-10-14 | End: 2021-12-28

## 2021-10-14 RX ORDER — METHYLPREDNISOLONE SODIUM SUCCINATE 125 MG/2ML
80 INJECTION, POWDER, LYOPHILIZED, FOR SOLUTION INTRAMUSCULAR; INTRAVENOUS ONCE
Status: COMPLETED | OUTPATIENT
Start: 2021-10-14 | End: 2021-10-14

## 2021-10-14 RX ORDER — EPINEPHRINE 0.3 MG/.3ML
0.3 INJECTION SUBCUTANEOUS ONCE
Qty: 1 EACH | Refills: 0 | Status: SHIPPED | OUTPATIENT
Start: 2021-10-14 | End: 2021-10-14

## 2021-10-14 RX ADMIN — METHYLPREDNISOLONE SODIUM SUCCINATE 80 MG: 125 INJECTION, POWDER, FOR SOLUTION INTRAMUSCULAR; INTRAVENOUS at 00:33

## 2021-10-14 RX ADMIN — DIPHENHYDRAMINE HYDROCHLORIDE 25 MG: 25 CAPSULE ORAL at 00:33

## 2021-10-14 RX ADMIN — FAMOTIDINE 20 MG: 20 TABLET, FILM COATED ORAL at 00:33

## 2021-10-14 NOTE — ED PROVIDER NOTES
Subjective   56-year-old male seen by me last night for abdominal pain and diarrhea.  Lortab 5 mg was given p.o.  Patient states diarrhea persisted until the morning and for that reason unfortunately, patient skipped dialysis.  Patient states his abdomen feels much better and the diarrhea is resolved.  At 5 PM today, patient woke up from nap with left facial swelling involving the cheek and upper and lower lips.  Patient denies any rash or difficulty swallowing or difficulty speaking or any swelling of the tongue or the posterior pharynx.  Patient is on losartan, unsure for how long.  No prior history of angioedema.          Review of Systems   HENT:        As per HPI   Gastrointestinal: Positive for abdominal pain and diarrhea.   All other systems reviewed and are negative.      Past Medical History:   Diagnosis Date   • Anemia    • Cataract    • Constipation    • CVA (cerebral vascular accident) (HCC)     x 8   • CVA (cerebral vascular accident) (HCC)    • Depression    • Diabetes mellitus (HCC)    • ESRD (end stage renal disease) on dialysis ([facility])    • H/O colonoscopy 2016   • History of noncompliance with medical treatment    • HLD (hyperlipidemia) .   • Hyperlipidemia    • Hypertension    • Insomnia    • Neuropathy    • Renal failure     Stage IV - AI -- on dialysis -- [facility]   • Retinopathy    • TIA (transient ischemic attack)    • Type 2 diabetes mellitus ([facility])        Allergies   Allergen Reactions   • Latex Unknown (See Comments)   • Other Unknown (See Comments)   • Penicillins Unknown (See Comments)   • Latex, Natural Rubber Unknown - High Severity   • Penicillins Unknown - High Severity   • Amoxicillin Rash       Past Surgical History:   Procedure Laterality Date   • ANKLE SURGERY Left    • ANKLE SURGERY     • APPENDECTOMY     • ARTERIOVENOUS FISTULA     • CATARACT EXTRACTION, BILATERAL  2018   • COLON RESECTION  2016   • OTHER SURGICAL HISTORY Left 02/23/2017    Left Distula Placement Dr Guan       [facility]  History   Problem Relation Age of Onset   • Heart disease Mother    • Heart attack Mother    • Hypertension Mother    • Breast cancer Sister    • Hyperlipidemia Mother    • Stroke Mother    • Cancer Sister         Breast Cancer   • Diabetes Sister    • Heart disease Maternal Grandmother    • Hyperlipidemia Maternal Grandmother    • Hypertension Maternal Grandmother        Social History     Socioeconomic History   • Marital status:    Tobacco Use   • Smoking status: Never Smoker   • Smokeless tobacco: Current User     Types: Chew   • Tobacco comment: Passive Smoke exposure: no   Substance and Sexual Activity   • Alcohol use: Not Currently   • Drug use: Defer   • Sexual activity: Defer           Objective   Physical Exam  Constitutional:       Appearance: Normal appearance.   HENT:      Head:      Comments: Mild swelling of the upper and lower lips, oropharynx clear, no tongue or posterior pharyngeal swelling, voice is clear, no trismus, no stridor     Mouth/Throat:      Mouth: Mucous membranes are moist.      Pharynx: Oropharynx is clear.   Eyes:      Conjunctiva/sclera: Conjunctivae normal.      Pupils: Pupils are equal, round, and reactive to light.   Cardiovascular:      Rate and Rhythm: Normal rate and regular rhythm.      Heart sounds: Normal heart sounds.   Pulmonary:      Effort: Pulmonary effort is normal.      Breath sounds: Normal breath sounds.   Abdominal:      General: Bowel sounds are normal. There is no distension.      Palpations: Abdomen is soft.      Tenderness: There is no abdominal tenderness.   Musculoskeletal:      Comments: Left upper extremity fistula, positive thrill, no edema   Skin:     General: Skin is warm and dry.      Capillary Refill: Capillary refill takes less than 2 seconds.   Neurological:      General: No focal deficit present.      Mental Status: He is alert and oriented to person, place, and time.   Psychiatric:         Mood and Affect: Mood normal.         Behavior:  Behavior normal.         Procedures           ED Course                                           MDM  Number of Diagnoses or Management Options  Angioedema, initial encounter  Diagnosis management comments: Results for orders placed or performed during the hospital encounter of 10/13/21  -Basic Metabolic Panel:   Specimen: Blood       Result                      Value             Ref Range           Glucose                     186 (H)           65 - 99 mg/dL       BUN                         44 (H)            6 - 20 mg/dL        Creatinine                  8.20 (H)          0.76 - 1.27 *       Sodium                      137               136 - 145 mm*       Potassium                   4.3               3.5 - 5.2 mm*       Chloride                    95 (L)            98 - 107 mmo*       CO2                         24.0              22.0 - 29.0 *       Calcium                     10.1              8.6 - 10.5 m*       eGFR   Amer          8 (L)             >60 mL/min/1*       eGFR Non African Amer                                             BUN/Creatinine Ratio        5.4 (L)           7.0 - 25.0          Anion Gap                   18.0 (H)          5.0 - 15.0 m*  -Lavender Top:        Result                      Value             Ref Range           Extra Tube                                                    hold for add-on    Patient tells me he feels little bit better.  He also states he can call his dialysis center and get dialysis in the morning.  No emergent indications for dialysis.  Will stop losartan.  Patient instructed that if his blood pressure elevates and gets out of control he can take an extra dose of his metoprolol.  Patient will be prescribed prednisone as the etiology of this is not clear at this time in addition to an EpiPen if needed.  Patient understands to monitor his blood sugar very closely and if it gets out of control then to stop the prednisone and take over-the-counter Benadryl  in addition as needed.  Patient reexamined, no tongue or or pharyngeal swelling outside of mild upper and lower lip swelling in the left.       Amount and/or Complexity of Data Reviewed  Clinical lab tests: ordered and reviewed        Final diagnoses:   Angioedema, initial encounter       ED Disposition  ED Disposition     ED Disposition Condition Comment    Discharge Stable           Kerri Jones MD  [address] [facility] [place] [address] [phone]    Schedule an appointment as soon as possible for a visit            Medication List      New Prescriptions    EPINEPHrine 0.3 MG/0.3ML solution auto-injector injection  Commonly known as: EPIPEN  Inject 0.3 mL under the skin into the appropriate area as directed 1 (One) Time for 1 dose.     predniSONE 20 MG tablet  Commonly known as: DELTASONE  Take 1 tablet by mouth 2 (Two) Times a Day.        Stop    losartan 50 MG tablet  Commonly known as: COZAAR           Where to Get Your Medications      These medications were sent to [facility] - [facility] [place] - [address] - [phone]  - [phone] [place] [address], [place] [address]    Phone: [phone]   · EPINEPHrine 0.3 MG/0.3ML solution auto-injector injection  · predniSONE 20 MG tablet          Colin Alvarez MD  10/14/21 8100

## 2021-11-29 ENCOUNTER — HOSPITAL ENCOUNTER (EMERGENCY)
Facility: HOSPITAL | Age: 56
Discharge: HOME | End: 2021-11-29
Attending: EMERGENCY MEDICINE | Admitting: EMERGENCY MEDICINE

## 2021-11-29 ENCOUNTER — APPOINTMENT (OUTPATIENT)
Dept: CT IMAGING | Facility: HOSPITAL | Age: 56
End: 2021-11-29

## 2021-11-29 VITALS
WEIGHT: 212.74 LBS | TEMPERATURE: 98 F | RESPIRATION RATE: 16 BRPM | HEIGHT: 69 IN | DIASTOLIC BLOOD PRESSURE: 81 MMHG | HEART RATE: 79 BPM | BODY MASS INDEX: 31.51 KG/M2 | SYSTOLIC BLOOD PRESSURE: 145 MMHG | OXYGEN SATURATION: 98 %

## 2021-11-29 DIAGNOSIS — R19.7 DIARRHEA, UNSPECIFIED TYPE: Primary | ICD-10-CM

## 2021-11-29 DIAGNOSIS — R10.84 GENERALIZED ABDOMINAL PAIN: ICD-10-CM

## 2021-11-29 LAB
ALBUMIN SERPL-MCNC: 4.7 G/DL (ref 3.5–5.2)
ALBUMIN/GLOB SERPL: 1.3 G/DL
ALP SERPL-CCNC: 244 U/L (ref 39–117)
ALT SERPL W P-5'-P-CCNC: 48 U/L (ref 1–41)
ANION GAP SERPL CALCULATED.3IONS-SCNC: 26 MMOL/L (ref 5–15)
AST SERPL-CCNC: 29 U/L (ref 1–40)
BASOPHILS # BLD AUTO: 0.1 10*3/MM3 (ref 0–0.2)
BASOPHILS NFR BLD AUTO: 0.9 % (ref 0–1.5)
BILIRUB SERPL-MCNC: 0.3 MG/DL (ref 0–1.2)
BUN SERPL-MCNC: 59 MG/DL (ref 6–20)
BUN/CREAT SERPL: 5.2 (ref 7–25)
CALCIUM SPEC-SCNC: 9.6 MG/DL (ref 8.6–10.5)
CHLORIDE SERPL-SCNC: 90 MMOL/L (ref 98–107)
CO2 SERPL-SCNC: 17 MMOL/L (ref 22–29)
CREAT SERPL-MCNC: 11.25 MG/DL (ref 0.76–1.27)
DEPRECATED RDW RBC AUTO: 57.3 FL (ref 37–54)
EOSINOPHIL # BLD AUTO: 0.5 10*3/MM3 (ref 0–0.4)
EOSINOPHIL NFR BLD AUTO: 5.9 % (ref 0.3–6.2)
ERYTHROCYTE [DISTWIDTH] IN BLOOD BY AUTOMATED COUNT: 18.1 % (ref 12.3–15.4)
GFR SERPL CREATININE-BSD FRML MDRD: 6 ML/MIN/1.73
GFR SERPL CREATININE-BSD FRML MDRD: ABNORMAL ML/MIN/{1.73_M2}
GLOBULIN UR ELPH-MCNC: 3.7 GM/DL
GLUCOSE SERPL-MCNC: 155 MG/DL (ref 65–99)
HCT VFR BLD AUTO: 39 % (ref 37.5–51)
HGB BLD-MCNC: 13 G/DL (ref 13–17.7)
LYMPHOCYTES # BLD AUTO: 1 10*3/MM3 (ref 0.7–3.1)
LYMPHOCYTES NFR BLD AUTO: 11.1 % (ref 19.6–45.3)
MCH RBC QN AUTO: 31.1 PG (ref 26.6–33)
MCHC RBC AUTO-ENTMCNC: 33.4 G/DL (ref 31.5–35.7)
MCV RBC AUTO: 93.2 FL (ref 79–97)
MONOCYTES # BLD AUTO: 0.5 10*3/MM3 (ref 0.1–0.9)
MONOCYTES NFR BLD AUTO: 6.3 % (ref 5–12)
NEUTROPHILS NFR BLD AUTO: 6.6 10*3/MM3 (ref 1.7–7)
NEUTROPHILS NFR BLD AUTO: 75.8 % (ref 42.7–76)
NRBC BLD AUTO-RTO: 0.1 /100 WBC (ref 0–0.2)
PLATELET # BLD AUTO: 164 10*3/MM3 (ref 140–450)
PMV BLD AUTO: 8.4 FL (ref 6–12)
POTASSIUM SERPL-SCNC: 5 MMOL/L (ref 3.5–5.2)
PROT SERPL-MCNC: 8.4 G/DL (ref 6–8.5)
RBC # BLD AUTO: 4.18 10*6/MM3 (ref 4.14–5.8)
SODIUM SERPL-SCNC: 133 MMOL/L (ref 136–145)
WBC NRBC COR # BLD: 8.7 10*3/MM3 (ref 3.4–10.8)

## 2021-11-29 PROCEDURE — 74176 CT ABD & PELVIS W/O CONTRAST: CPT

## 2021-11-29 PROCEDURE — 85025 COMPLETE CBC W/AUTO DIFF WBC: CPT | Performed by: EMERGENCY MEDICINE

## 2021-11-29 PROCEDURE — 80053 COMPREHEN METABOLIC PANEL: CPT | Performed by: EMERGENCY MEDICINE

## 2021-11-29 PROCEDURE — 99283 EMERGENCY DEPT VISIT LOW MDM: CPT

## 2021-11-29 PROCEDURE — 36415 COLL VENOUS BLD VENIPUNCTURE: CPT | Performed by: EMERGENCY MEDICINE

## 2021-11-29 RX ORDER — SODIUM CHLORIDE 0.9 % (FLUSH) 0.9 %
10 SYRINGE (ML) INJECTION AS NEEDED
Status: DISCONTINUED | OUTPATIENT
Start: 2021-11-29 | End: 2021-11-29 | Stop reason: HOSPADM

## 2021-12-28 ENCOUNTER — HOSPITAL ENCOUNTER (EMERGENCY)
Facility: HOSPITAL | Age: 56
Discharge: HOME | End: 2021-12-28
Admitting: EMERGENCY MEDICINE

## 2021-12-28 ENCOUNTER — APPOINTMENT (OUTPATIENT)
Dept: CT IMAGING | Facility: HOSPITAL | Age: 56
End: 2021-12-28

## 2021-12-28 VITALS
OXYGEN SATURATION: 100 % | SYSTOLIC BLOOD PRESSURE: 211 MMHG | WEIGHT: 195 LBS | RESPIRATION RATE: 20 BRPM | HEART RATE: 93 BPM | BODY MASS INDEX: 28.88 KG/M2 | HEIGHT: 69 IN | DIASTOLIC BLOOD PRESSURE: 108 MMHG | TEMPERATURE: 98 F

## 2021-12-28 DIAGNOSIS — K12.2 UVULITIS: Primary | ICD-10-CM

## 2021-12-28 DIAGNOSIS — D72.10 EOSINOPHILIA, UNSPECIFIED TYPE: ICD-10-CM

## 2021-12-28 DIAGNOSIS — J02.9 SORE THROAT: ICD-10-CM

## 2021-12-28 LAB
ALBUMIN SERPL-MCNC: 3.9 G/DL (ref 3.5–5.2)
ALBUMIN/GLOB SERPL: 1.3 G/DL
ALP SERPL-CCNC: 161 U/L (ref 39–117)
ALT SERPL W P-5'-P-CCNC: 22 U/L (ref 1–41)
ANION GAP SERPL CALCULATED.3IONS-SCNC: 12 MMOL/L (ref 5–15)
AST SERPL-CCNC: 13 U/L (ref 1–40)
B PARAPERT DNA SPEC QL NAA+PROBE: NOT DETECTED
B PERT DNA SPEC QL NAA+PROBE: NOT DETECTED
BASOPHILS # BLD AUTO: 0.1 10*3/MM3 (ref 0–0.2)
BASOPHILS NFR BLD AUTO: 0.9 % (ref 0–1.5)
BILIRUB SERPL-MCNC: 0.3 MG/DL (ref 0–1.2)
BUN SERPL-MCNC: 28 MG/DL (ref 6–20)
BUN/CREAT SERPL: 3.6 (ref 7–25)
C PNEUM DNA NPH QL NAA+NON-PROBE: NOT DETECTED
CALCIUM SPEC-SCNC: 9.5 MG/DL (ref 8.6–10.5)
CHLORIDE SERPL-SCNC: 92 MMOL/L (ref 98–107)
CO2 SERPL-SCNC: 27 MMOL/L (ref 22–29)
CREAT SERPL-MCNC: 7.73 MG/DL (ref 0.76–1.27)
CRP SERPL-MCNC: 0.53 MG/DL (ref 0–0.5)
D-LACTATE SERPL-SCNC: 0.3 MMOL/L (ref 0.5–2)
DEPRECATED RDW RBC AUTO: 53.8 FL (ref 37–54)
EOSINOPHIL # BLD AUTO: 0.6 10*3/MM3 (ref 0–0.4)
EOSINOPHIL NFR BLD AUTO: 8 % (ref 0.3–6.2)
ERYTHROCYTE [DISTWIDTH] IN BLOOD BY AUTOMATED COUNT: 17 % (ref 12.3–15.4)
ERYTHROCYTE [SEDIMENTATION RATE] IN BLOOD: 47 MM/HR (ref 0–20)
FLUAV SUBTYP SPEC NAA+PROBE: NOT DETECTED
FLUBV RNA ISLT QL NAA+PROBE: NOT DETECTED
GFR SERPL CREATININE-BSD FRML MDRD: 9 ML/MIN/1.73
GFR SERPL CREATININE-BSD FRML MDRD: ABNORMAL ML/MIN/{1.73_M2}
GLOBULIN UR ELPH-MCNC: 2.9 GM/DL
GLUCOSE BLDC GLUCOMTR-MCNC: 128 MG/DL (ref 70–105)
GLUCOSE SERPL-MCNC: 82 MG/DL (ref 65–99)
HADV DNA SPEC NAA+PROBE: NOT DETECTED
HCOV 229E RNA SPEC QL NAA+PROBE: NOT DETECTED
HCOV HKU1 RNA SPEC QL NAA+PROBE: NOT DETECTED
HCOV NL63 RNA SPEC QL NAA+PROBE: NOT DETECTED
HCOV OC43 RNA SPEC QL NAA+PROBE: NOT DETECTED
HCT VFR BLD AUTO: 30.6 % (ref 37.5–51)
HGB BLD-MCNC: 10.1 G/DL (ref 13–17.7)
HMPV RNA NPH QL NAA+NON-PROBE: NOT DETECTED
HPIV1 RNA ISLT QL NAA+PROBE: NOT DETECTED
HPIV2 RNA SPEC QL NAA+PROBE: NOT DETECTED
HPIV3 RNA NPH QL NAA+PROBE: NOT DETECTED
HPIV4 P GENE NPH QL NAA+PROBE: NOT DETECTED
LYMPHOCYTES # BLD AUTO: 1.2 10*3/MM3 (ref 0.7–3.1)
LYMPHOCYTES NFR BLD AUTO: 15.7 % (ref 19.6–45.3)
M PNEUMO IGG SER IA-ACNC: NOT DETECTED
MCH RBC QN AUTO: 29.9 PG (ref 26.6–33)
MCHC RBC AUTO-ENTMCNC: 32.9 G/DL (ref 31.5–35.7)
MCV RBC AUTO: 90.9 FL (ref 79–97)
MONOCYTES # BLD AUTO: 1 10*3/MM3 (ref 0.1–0.9)
MONOCYTES NFR BLD AUTO: 12.3 % (ref 5–12)
NEUTROPHILS NFR BLD AUTO: 5 10*3/MM3 (ref 1.7–7)
NEUTROPHILS NFR BLD AUTO: 63.1 % (ref 42.7–76)
NRBC BLD AUTO-RTO: 0.1 /100 WBC (ref 0–0.2)
PLATELET # BLD AUTO: 143 10*3/MM3 (ref 140–450)
PMV BLD AUTO: 7.9 FL (ref 6–12)
POTASSIUM SERPL-SCNC: 4.3 MMOL/L (ref 3.5–5.2)
PROCALCITONIN SERPL-MCNC: 0.46 NG/ML (ref 0–0.25)
PROT SERPL-MCNC: 6.8 G/DL (ref 6–8.5)
RBC # BLD AUTO: 3.36 10*6/MM3 (ref 4.14–5.8)
RHINOVIRUS RNA SPEC NAA+PROBE: NOT DETECTED
RSV RNA NPH QL NAA+NON-PROBE: NOT DETECTED
S PYO AG THROAT QL: NEGATIVE
SARS-COV-2 RNA NPH QL NAA+NON-PROBE: NOT DETECTED
SODIUM SERPL-SCNC: 131 MMOL/L (ref 136–145)
WBC NRBC COR # BLD: 7.9 10*3/MM3 (ref 3.4–10.8)

## 2021-12-28 PROCEDURE — 99283 EMERGENCY DEPT VISIT LOW MDM: CPT

## 2021-12-28 PROCEDURE — 25010000002 DIPHENHYDRAMINE PER 50 MG: Performed by: NURSE PRACTITIONER

## 2021-12-28 PROCEDURE — 87040 BLOOD CULTURE FOR BACTERIA: CPT | Performed by: NURSE PRACTITIONER

## 2021-12-28 PROCEDURE — 70490 CT SOFT TISSUE NECK W/O DYE: CPT

## 2021-12-28 PROCEDURE — 82962 GLUCOSE BLOOD TEST: CPT

## 2021-12-28 PROCEDURE — 25010000002 DEXAMETHASONE PER 1 MG: Performed by: NURSE PRACTITIONER

## 2021-12-28 PROCEDURE — 96375 TX/PRO/DX INJ NEW DRUG ADDON: CPT

## 2021-12-28 PROCEDURE — 85025 COMPLETE CBC W/AUTO DIFF WBC: CPT | Performed by: NURSE PRACTITIONER

## 2021-12-28 PROCEDURE — 87150 DNA/RNA AMPLIFIED PROBE: CPT | Performed by: NURSE PRACTITIONER

## 2021-12-28 PROCEDURE — 84145 PROCALCITONIN (PCT): CPT | Performed by: NURSE PRACTITIONER

## 2021-12-28 PROCEDURE — 36415 COLL VENOUS BLD VENIPUNCTURE: CPT

## 2021-12-28 PROCEDURE — C1751 CATH, INF, PER/CENT/MIDLINE: HCPCS

## 2021-12-28 PROCEDURE — 87651 STREP A DNA AMP PROBE: CPT | Performed by: NURSE PRACTITIONER

## 2021-12-28 PROCEDURE — 96374 THER/PROPH/DIAG INJ IV PUSH: CPT

## 2021-12-28 PROCEDURE — 83605 ASSAY OF LACTIC ACID: CPT

## 2021-12-28 PROCEDURE — 80053 COMPREHEN METABOLIC PANEL: CPT | Performed by: NURSE PRACTITIONER

## 2021-12-28 PROCEDURE — 0202U NFCT DS 22 TRGT SARS-COV-2: CPT | Performed by: NURSE PRACTITIONER

## 2021-12-28 PROCEDURE — 85652 RBC SED RATE AUTOMATED: CPT | Performed by: NURSE PRACTITIONER

## 2021-12-28 PROCEDURE — 87147 CULTURE TYPE IMMUNOLOGIC: CPT | Performed by: NURSE PRACTITIONER

## 2021-12-28 PROCEDURE — 36410 VNPNXR 3YR/> PHY/QHP DX/THER: CPT

## 2021-12-28 PROCEDURE — 86140 C-REACTIVE PROTEIN: CPT | Performed by: NURSE PRACTITIONER

## 2021-12-28 RX ORDER — DEXAMETHASONE SODIUM PHOSPHATE 4 MG/ML
10 INJECTION, SOLUTION INTRA-ARTICULAR; INTRALESIONAL; INTRAMUSCULAR; INTRAVENOUS; SOFT TISSUE ONCE
Status: COMPLETED | OUTPATIENT
Start: 2021-12-28 | End: 2021-12-28

## 2021-12-28 RX ORDER — DEXAMETHASONE 4 MG/1
4 TABLET ORAL 3 TIMES DAILY
Qty: 15 TABLET | Refills: 0 | Status: SHIPPED | OUTPATIENT
Start: 2021-12-28 | End: 2022-01-02

## 2021-12-28 RX ORDER — DIPHENHYDRAMINE HYDROCHLORIDE 50 MG/ML
25 INJECTION INTRAMUSCULAR; INTRAVENOUS ONCE
Status: COMPLETED | OUTPATIENT
Start: 2021-12-28 | End: 2021-12-28

## 2021-12-28 RX ORDER — SODIUM CHLORIDE 0.9 % (FLUSH) 0.9 %
10 SYRINGE (ML) INJECTION AS NEEDED
Status: DISCONTINUED | OUTPATIENT
Start: 2021-12-28 | End: 2021-12-28 | Stop reason: HOSPADM

## 2021-12-28 RX ADMIN — DIPHENHYDRAMINE HYDROCHLORIDE 25 MG: 50 INJECTION, SOLUTION INTRAMUSCULAR; INTRAVENOUS at 15:50

## 2021-12-28 RX ADMIN — DEXAMETHASONE SODIUM PHOSPHATE 10 MG: 4 INJECTION, SOLUTION INTRAMUSCULAR; INTRAVENOUS at 15:47

## 2021-12-29 LAB
BACTERIA BLD CULT: ABNORMAL
BOTTLE TYPE: ABNORMAL

## 2021-12-29 RX ORDER — PEN NEEDLE, DIABETIC 29 G X1/2"
NEEDLE, DISPOSABLE MISCELLANEOUS
Qty: 100 EACH | Refills: 6 | OUTPATIENT
Start: 2021-12-29

## 2021-12-31 LAB
BACTERIA SPEC AEROBE CULT: ABNORMAL
GRAM STN SPEC: ABNORMAL
ISOLATED FROM: ABNORMAL

## 2022-01-02 LAB — BACTERIA SPEC AEROBE CULT: NORMAL

## 2022-01-28 ENCOUNTER — HOSPITAL ENCOUNTER (EMERGENCY)
Facility: HOSPITAL | Age: 57
Discharge: HOME | End: 2022-01-28
Attending: EMERGENCY MEDICINE | Admitting: EMERGENCY MEDICINE

## 2022-01-28 VITALS
HEART RATE: 89 BPM | DIASTOLIC BLOOD PRESSURE: 74 MMHG | RESPIRATION RATE: 16 BRPM | HEIGHT: 69 IN | OXYGEN SATURATION: 99 % | WEIGHT: 195 LBS | BODY MASS INDEX: 28.88 KG/M2 | SYSTOLIC BLOOD PRESSURE: 138 MMHG | TEMPERATURE: 98.4 F

## 2022-01-28 DIAGNOSIS — Z99.2 CHRONIC KIDNEY DISEASE ON CHRONIC DIALYSIS: ICD-10-CM

## 2022-01-28 DIAGNOSIS — N18.6 CHRONIC KIDNEY DISEASE ON CHRONIC DIALYSIS: ICD-10-CM

## 2022-01-28 DIAGNOSIS — E11.649 HYPOGLYCEMIC EPISODE IN PATIENT WITH DIABETES MELLITUS: Primary | ICD-10-CM

## 2022-01-28 LAB
ALBUMIN SERPL-MCNC: 3.6 G/DL (ref 3.5–5.2)
ALBUMIN/GLOB SERPL: 1.2 G/DL
ALP SERPL-CCNC: 117 U/L (ref 39–117)
ALT SERPL W P-5'-P-CCNC: 11 U/L (ref 1–41)
ANION GAP SERPL CALCULATED.3IONS-SCNC: 13 MMOL/L (ref 5–15)
AST SERPL-CCNC: 10 U/L (ref 1–40)
BASOPHILS # BLD AUTO: 0 10*3/MM3 (ref 0–0.2)
BASOPHILS NFR BLD AUTO: 0.4 % (ref 0–1.5)
BILIRUB SERPL-MCNC: 0.2 MG/DL (ref 0–1.2)
BUN SERPL-MCNC: 26 MG/DL (ref 6–20)
BUN/CREAT SERPL: 3.9 (ref 7–25)
CALCIUM SPEC-SCNC: 9.1 MG/DL (ref 8.6–10.5)
CHLORIDE SERPL-SCNC: 97 MMOL/L (ref 98–107)
CO2 SERPL-SCNC: 26 MMOL/L (ref 22–29)
CREAT SERPL-MCNC: 6.73 MG/DL (ref 0.76–1.27)
DEPRECATED RDW RBC AUTO: 52.9 FL (ref 37–54)
EOSINOPHIL # BLD AUTO: 0.1 10*3/MM3 (ref 0–0.4)
EOSINOPHIL NFR BLD AUTO: 1.5 % (ref 0.3–6.2)
ERYTHROCYTE [DISTWIDTH] IN BLOOD BY AUTOMATED COUNT: 16.7 % (ref 12.3–15.4)
GFR SERPL CREATININE-BSD FRML MDRD: 10 ML/MIN/1.73
GFR SERPL CREATININE-BSD FRML MDRD: ABNORMAL ML/MIN/{1.73_M2}
GLOBULIN UR ELPH-MCNC: 2.9 GM/DL
GLUCOSE BLDC GLUCOMTR-MCNC: 106 MG/DL (ref 70–105)
GLUCOSE BLDC GLUCOMTR-MCNC: 16 MG/DL (ref 70–105)
GLUCOSE BLDC GLUCOMTR-MCNC: 77 MG/DL (ref 70–105)
GLUCOSE SERPL-MCNC: 191 MG/DL (ref 65–99)
HCT VFR BLD AUTO: 37.8 % (ref 37.5–51)
HGB BLD-MCNC: 12.6 G/DL (ref 13–17.7)
LYMPHOCYTES # BLD AUTO: 0.3 10*3/MM3 (ref 0.7–3.1)
LYMPHOCYTES NFR BLD AUTO: 5.4 % (ref 19.6–45.3)
MCH RBC QN AUTO: 29.9 PG (ref 26.6–33)
MCHC RBC AUTO-ENTMCNC: 33.3 G/DL (ref 31.5–35.7)
MCV RBC AUTO: 90 FL (ref 79–97)
MONOCYTES # BLD AUTO: 0.3 10*3/MM3 (ref 0.1–0.9)
MONOCYTES NFR BLD AUTO: 5.2 % (ref 5–12)
NEUTROPHILS NFR BLD AUTO: 4.9 10*3/MM3 (ref 1.7–7)
NEUTROPHILS NFR BLD AUTO: 87.5 % (ref 42.7–76)
NRBC BLD AUTO-RTO: 0 /100 WBC (ref 0–0.2)
PLATELET # BLD AUTO: 173 10*3/MM3 (ref 140–450)
PMV BLD AUTO: 7.7 FL (ref 6–12)
POTASSIUM SERPL-SCNC: 3.5 MMOL/L (ref 3.5–5.2)
PROT SERPL-MCNC: 6.5 G/DL (ref 6–8.5)
RBC # BLD AUTO: 4.2 10*6/MM3 (ref 4.14–5.8)
SODIUM SERPL-SCNC: 136 MMOL/L (ref 136–145)
TROPONIN T SERPL-MCNC: 0.12 NG/ML (ref 0–0.03)
WBC NRBC COR # BLD: 5.7 10*3/MM3 (ref 3.4–10.8)

## 2022-01-28 PROCEDURE — 80053 COMPREHEN METABOLIC PANEL: CPT | Performed by: EMERGENCY MEDICINE

## 2022-01-28 PROCEDURE — 93005 ELECTROCARDIOGRAM TRACING: CPT | Performed by: EMERGENCY MEDICINE

## 2022-01-28 PROCEDURE — 99284 EMERGENCY DEPT VISIT MOD MDM: CPT

## 2022-01-28 PROCEDURE — 82962 GLUCOSE BLOOD TEST: CPT

## 2022-01-28 PROCEDURE — 84484 ASSAY OF TROPONIN QUANT: CPT | Performed by: EMERGENCY MEDICINE

## 2022-01-28 PROCEDURE — 96366 THER/PROPH/DIAG IV INF ADDON: CPT

## 2022-01-28 PROCEDURE — 96365 THER/PROPH/DIAG IV INF INIT: CPT

## 2022-01-28 PROCEDURE — 85025 COMPLETE CBC W/AUTO DIFF WBC: CPT | Performed by: EMERGENCY MEDICINE

## 2022-01-28 PROCEDURE — 96376 TX/PRO/DX INJ SAME DRUG ADON: CPT

## 2022-01-28 RX ORDER — DEXTROSE MONOHYDRATE 25 G/50ML
50 INJECTION, SOLUTION INTRAVENOUS ONCE
Status: COMPLETED | OUTPATIENT
Start: 2022-01-28 | End: 2022-01-28

## 2022-01-28 RX ORDER — DEXTROSE MONOHYDRATE 25 G/50ML
25 INJECTION, SOLUTION INTRAVENOUS ONCE
Status: COMPLETED | OUTPATIENT
Start: 2022-01-28 | End: 2022-01-28

## 2022-01-28 RX ORDER — DEXTROSE MONOHYDRATE 50 MG/ML
100 INJECTION, SOLUTION INTRAVENOUS CONTINUOUS
Status: DISCONTINUED | OUTPATIENT
Start: 2022-01-28 | End: 2022-01-28 | Stop reason: HOSPADM

## 2022-01-28 RX ADMIN — DEXTROSE MONOHYDRATE 50 ML: 25 INJECTION, SOLUTION INTRAVENOUS at 11:10

## 2022-01-28 RX ADMIN — DEXTROSE MONOHYDRATE 25 G: 25 INJECTION, SOLUTION INTRAVENOUS at 13:44

## 2022-01-28 RX ADMIN — DEXTROSE MONOHYDRATE 100 ML/HR: 50 INJECTION, SOLUTION INTRAVENOUS at 11:12

## 2022-01-30 LAB — QT INTERVAL: 414 MS

## 2022-01-31 ENCOUNTER — HOSPITAL ENCOUNTER (EMERGENCY)
Facility: HOSPITAL | Age: 57
Discharge: HOME | End: 2022-01-31
Attending: EMERGENCY MEDICINE | Admitting: EMERGENCY MEDICINE

## 2022-01-31 VITALS
HEIGHT: 69 IN | WEIGHT: 195 LBS | OXYGEN SATURATION: 100 % | TEMPERATURE: 97.9 F | RESPIRATION RATE: 16 BRPM | SYSTOLIC BLOOD PRESSURE: 192 MMHG | DIASTOLIC BLOOD PRESSURE: 99 MMHG | HEART RATE: 93 BPM | BODY MASS INDEX: 28.88 KG/M2

## 2022-01-31 DIAGNOSIS — E16.2 HYPOGLYCEMIA: Primary | ICD-10-CM

## 2022-01-31 DIAGNOSIS — Z20.822 LAB TEST NEGATIVE FOR COVID-19 VIRUS: ICD-10-CM

## 2022-01-31 LAB
GLUCOSE BLDC GLUCOMTR-MCNC: 114 MG/DL (ref 70–105)
GLUCOSE BLDC GLUCOMTR-MCNC: 78 MG/DL (ref 70–105)
GLUCOSE BLDC GLUCOMTR-MCNC: 98 MG/DL (ref 70–105)
SARS-COV-2 RNA PNL SPEC NAA+PROBE: NOT DETECTED

## 2022-01-31 PROCEDURE — 87635 SARS-COV-2 COVID-19 AMP PRB: CPT | Performed by: EMERGENCY MEDICINE

## 2022-01-31 PROCEDURE — 99283 EMERGENCY DEPT VISIT LOW MDM: CPT

## 2022-01-31 PROCEDURE — 36415 COLL VENOUS BLD VENIPUNCTURE: CPT

## 2022-01-31 PROCEDURE — C9803 HOPD COVID-19 SPEC COLLECT: HCPCS

## 2022-01-31 PROCEDURE — 82962 GLUCOSE BLOOD TEST: CPT

## 2022-02-26 ENCOUNTER — HOSPITAL ENCOUNTER (EMERGENCY)
Facility: HOSPITAL | Age: 57
Discharge: HOME | End: 2022-02-26
Attending: EMERGENCY MEDICINE | Admitting: EMERGENCY MEDICINE

## 2022-02-26 VITALS
HEIGHT: 69 IN | OXYGEN SATURATION: 100 % | SYSTOLIC BLOOD PRESSURE: 193 MMHG | HEART RATE: 84 BPM | DIASTOLIC BLOOD PRESSURE: 105 MMHG | RESPIRATION RATE: 18 BRPM | TEMPERATURE: 98.6 F | BODY MASS INDEX: 31.64 KG/M2 | WEIGHT: 213.63 LBS

## 2022-02-26 DIAGNOSIS — T78.3XXA ANGIO-EDEMA, INITIAL ENCOUNTER: Primary | ICD-10-CM

## 2022-02-26 PROCEDURE — 99283 EMERGENCY DEPT VISIT LOW MDM: CPT

## 2022-02-26 PROCEDURE — 96372 THER/PROPH/DIAG INJ SC/IM: CPT

## 2022-02-26 PROCEDURE — 63710000001 DIPHENHYDRAMINE PER 50 MG: Performed by: NURSE PRACTITIONER

## 2022-02-26 PROCEDURE — 25010000002 DEXAMETHASONE SODIUM PHOSPHATE 10 MG/ML SOLUTION: Performed by: NURSE PRACTITIONER

## 2022-02-26 RX ORDER — METHYLPREDNISOLONE 4 MG/1
TABLET ORAL
Qty: 21 TABLET | Refills: 0 | Status: SHIPPED | OUTPATIENT
Start: 2022-02-26 | End: 2022-05-28 | Stop reason: HOSPADM

## 2022-02-26 RX ORDER — CLONIDINE HYDROCHLORIDE 0.1 MG/1
0.1 TABLET ORAL 2 TIMES DAILY
Qty: 8 TABLET | Refills: 0 | Status: ON HOLD | OUTPATIENT
Start: 2022-02-26 | End: 2022-09-20

## 2022-02-26 RX ORDER — DIPHENHYDRAMINE HCL 25 MG
25 CAPSULE ORAL EVERY 6 HOURS PRN
Qty: 20 CAPSULE | Refills: 0 | Status: SHIPPED | OUTPATIENT
Start: 2022-02-26 | End: 2022-03-03

## 2022-02-26 RX ORDER — CLONIDINE HYDROCHLORIDE 0.1 MG/1
0.1 TABLET ORAL ONCE
Status: COMPLETED | OUTPATIENT
Start: 2022-02-26 | End: 2022-02-26

## 2022-02-26 RX ORDER — FAMOTIDINE 20 MG/1
20 TABLET, FILM COATED ORAL
Status: DISCONTINUED | OUTPATIENT
Start: 2022-02-26 | End: 2022-02-26 | Stop reason: HOSPADM

## 2022-02-26 RX ORDER — DEXAMETHASONE SODIUM PHOSPHATE 10 MG/ML
10 INJECTION, SOLUTION INTRAMUSCULAR; INTRAVENOUS ONCE
Status: COMPLETED | OUTPATIENT
Start: 2022-02-26 | End: 2022-02-26

## 2022-02-26 RX ORDER — DIPHENHYDRAMINE HCL 25 MG
50 CAPSULE ORAL ONCE
Status: COMPLETED | OUTPATIENT
Start: 2022-02-26 | End: 2022-02-26

## 2022-02-26 RX ADMIN — DEXAMETHASONE SODIUM PHOSPHATE 10 MG: 10 INJECTION, SOLUTION INTRAMUSCULAR; INTRAVENOUS at 14:33

## 2022-02-26 RX ADMIN — CLONIDINE HYDROCHLORIDE 0.1 MG: 0.1 TABLET ORAL at 14:32

## 2022-02-26 RX ADMIN — FAMOTIDINE 20 MG: 20 TABLET ORAL at 14:33

## 2022-02-26 RX ADMIN — DIPHENHYDRAMINE HYDROCHLORIDE 50 MG: 25 CAPSULE ORAL at 14:32

## 2022-03-30 ENCOUNTER — HOSPITAL ENCOUNTER (EMERGENCY)
Facility: HOSPITAL | Age: 57
Discharge: HOME | End: 2022-03-30
Attending: EMERGENCY MEDICINE | Admitting: EMERGENCY MEDICINE

## 2022-03-30 VITALS
WEIGHT: 203 LBS | OXYGEN SATURATION: 100 % | TEMPERATURE: 98.4 F | DIASTOLIC BLOOD PRESSURE: 111 MMHG | BODY MASS INDEX: 30.07 KG/M2 | HEIGHT: 69 IN | HEART RATE: 95 BPM | SYSTOLIC BLOOD PRESSURE: 200 MMHG | RESPIRATION RATE: 16 BRPM

## 2022-03-30 DIAGNOSIS — E87.5 HYPERKALEMIA: ICD-10-CM

## 2022-03-30 DIAGNOSIS — N18.9 CHRONIC RENAL FAILURE, UNSPECIFIED CKD STAGE: ICD-10-CM

## 2022-03-30 DIAGNOSIS — E16.2 HYPOGLYCEMIA: Primary | ICD-10-CM

## 2022-03-30 LAB
ANION GAP SERPL CALCULATED.3IONS-SCNC: 16 MMOL/L (ref 5–15)
BUN SERPL-MCNC: 64 MG/DL (ref 6–20)
BUN/CREAT SERPL: 5 (ref 7–25)
CALCIUM SPEC-SCNC: 9.5 MG/DL (ref 8.6–10.5)
CHLORIDE SERPL-SCNC: 98 MMOL/L (ref 98–107)
CO2 SERPL-SCNC: 24 MMOL/L (ref 22–29)
CREAT SERPL-MCNC: 12.84 MG/DL (ref 0.76–1.27)
EGFRCR SERPLBLD CKD-EPI 2021: 4.1 ML/MIN/1.73
GLUCOSE BLDC GLUCOMTR-MCNC: 160 MG/DL (ref 70–105)
GLUCOSE BLDC GLUCOMTR-MCNC: 161 MG/DL (ref 70–105)
GLUCOSE BLDC GLUCOMTR-MCNC: 39 MG/DL (ref 70–105)
GLUCOSE SERPL-MCNC: 45 MG/DL (ref 65–99)
POTASSIUM SERPL-SCNC: 6.2 MMOL/L (ref 3.5–5.2)
SODIUM SERPL-SCNC: 138 MMOL/L (ref 136–145)
WHOLE BLOOD HOLD SPECIMEN: NORMAL

## 2022-03-30 PROCEDURE — 96374 THER/PROPH/DIAG INJ IV PUSH: CPT

## 2022-03-30 PROCEDURE — 80048 BASIC METABOLIC PNL TOTAL CA: CPT | Performed by: EMERGENCY MEDICINE

## 2022-03-30 PROCEDURE — 25010000002 CALCIUM GLUCONATE-NACL 1-0.675 GM/50ML-% SOLUTION: Performed by: EMERGENCY MEDICINE

## 2022-03-30 PROCEDURE — 82962 GLUCOSE BLOOD TEST: CPT

## 2022-03-30 PROCEDURE — 99284 EMERGENCY DEPT VISIT MOD MDM: CPT

## 2022-03-30 PROCEDURE — 96375 TX/PRO/DX INJ NEW DRUG ADDON: CPT

## 2022-03-30 PROCEDURE — 93005 ELECTROCARDIOGRAM TRACING: CPT | Performed by: EMERGENCY MEDICINE

## 2022-03-30 RX ORDER — DEXTROSE MONOHYDRATE 25 G/50ML
25 INJECTION, SOLUTION INTRAVENOUS ONCE
Status: COMPLETED | OUTPATIENT
Start: 2022-03-30 | End: 2022-03-30

## 2022-03-30 RX ORDER — CALCIUM GLUCONATE 20 MG/ML
1 INJECTION, SOLUTION INTRAVENOUS ONCE
Status: COMPLETED | OUTPATIENT
Start: 2022-03-30 | End: 2022-03-30

## 2022-03-30 RX ORDER — SODIUM CHLORIDE 0.9 % (FLUSH) 0.9 %
10 SYRINGE (ML) INJECTION AS NEEDED
Status: DISCONTINUED | OUTPATIENT
Start: 2022-03-30 | End: 2022-03-30 | Stop reason: HOSPADM

## 2022-03-30 RX ADMIN — DEXTROSE MONOHYDRATE 25 G: 25 INJECTION, SOLUTION INTRAVENOUS at 12:34

## 2022-03-30 RX ADMIN — CALCIUM GLUCONATE 1 G: 20 INJECTION, SOLUTION INTRAVENOUS at 13:45

## 2022-03-30 RX ADMIN — SODIUM ZIRCONIUM CYCLOSILICATE 5 G: 10 POWDER, FOR SUSPENSION ORAL at 13:45

## 2022-04-01 LAB — QT INTERVAL: 376 MS

## 2022-04-04 ENCOUNTER — TRANSCRIBE ORDERS (OUTPATIENT)
Dept: ADMINISTRATIVE | Facility: HOSPITAL | Age: 57
End: 2022-04-04

## 2022-04-04 DIAGNOSIS — R06.00 DYSPNEA, UNSPECIFIED TYPE: Primary | ICD-10-CM

## 2022-04-04 DIAGNOSIS — G31.84 MILD NEUROCOGNITIVE DISORDER: ICD-10-CM

## 2022-04-15 ENCOUNTER — HOSPITAL ENCOUNTER (EMERGENCY)
Facility: HOSPITAL | Age: 57
Discharge: HOME | End: 2022-04-15
Attending: EMERGENCY MEDICINE | Admitting: EMERGENCY MEDICINE

## 2022-04-15 VITALS
SYSTOLIC BLOOD PRESSURE: 204 MMHG | RESPIRATION RATE: 20 BRPM | HEART RATE: 84 BPM | TEMPERATURE: 98.4 F | DIASTOLIC BLOOD PRESSURE: 94 MMHG | BODY MASS INDEX: 30.07 KG/M2 | OXYGEN SATURATION: 99 % | HEIGHT: 69 IN | WEIGHT: 203 LBS

## 2022-04-15 DIAGNOSIS — N18.9 CHRONIC KIDNEY DISEASE, UNSPECIFIED CKD STAGE: ICD-10-CM

## 2022-04-15 DIAGNOSIS — E16.2 HYPOGLYCEMIA: Primary | ICD-10-CM

## 2022-04-15 DIAGNOSIS — E87.5 HYPERKALEMIA: ICD-10-CM

## 2022-04-15 LAB
ALBUMIN SERPL-MCNC: 3.9 G/DL (ref 3.5–5.2)
ALBUMIN/GLOB SERPL: 1.3 G/DL
ALP SERPL-CCNC: 144 U/L (ref 39–117)
ALT SERPL W P-5'-P-CCNC: 10 U/L (ref 1–41)
ANION GAP SERPL CALCULATED.3IONS-SCNC: 16 MMOL/L (ref 5–15)
AST SERPL-CCNC: 9 U/L (ref 1–40)
BASOPHILS # BLD AUTO: 0.1 10*3/MM3 (ref 0–0.2)
BASOPHILS NFR BLD AUTO: 0.8 % (ref 0–1.5)
BILIRUB SERPL-MCNC: 0.3 MG/DL (ref 0–1.2)
BUN SERPL-MCNC: 70 MG/DL (ref 6–20)
BUN/CREAT SERPL: 6 (ref 7–25)
CALCIUM SPEC-SCNC: 9.4 MG/DL (ref 8.6–10.5)
CHLORIDE SERPL-SCNC: 96 MMOL/L (ref 98–107)
CO2 SERPL-SCNC: 24 MMOL/L (ref 22–29)
CREAT SERPL-MCNC: 11.58 MG/DL (ref 0.76–1.27)
DEPRECATED RDW RBC AUTO: 57.3 FL (ref 37–54)
EGFRCR SERPLBLD CKD-EPI 2021: 4.6 ML/MIN/1.73
EOSINOPHIL # BLD AUTO: 0.5 10*3/MM3 (ref 0–0.4)
EOSINOPHIL NFR BLD AUTO: 5.9 % (ref 0.3–6.2)
ERYTHROCYTE [DISTWIDTH] IN BLOOD BY AUTOMATED COUNT: 17.2 % (ref 12.3–15.4)
GLOBULIN UR ELPH-MCNC: 3.1 GM/DL
GLUCOSE BLDC GLUCOMTR-MCNC: 112 MG/DL (ref 70–105)
GLUCOSE BLDC GLUCOMTR-MCNC: 50 MG/DL (ref 70–105)
GLUCOSE BLDC GLUCOMTR-MCNC: 90 MG/DL (ref 70–105)
GLUCOSE SERPL-MCNC: 130 MG/DL (ref 65–99)
HCT VFR BLD AUTO: 32.4 % (ref 37.5–51)
HGB BLD-MCNC: 10.9 G/DL (ref 13–17.7)
LYMPHOCYTES # BLD AUTO: 0.9 10*3/MM3 (ref 0.7–3.1)
LYMPHOCYTES NFR BLD AUTO: 10.7 % (ref 19.6–45.3)
MCH RBC QN AUTO: 31.7 PG (ref 26.6–33)
MCHC RBC AUTO-ENTMCNC: 33.5 G/DL (ref 31.5–35.7)
MCV RBC AUTO: 94.6 FL (ref 79–97)
MONOCYTES # BLD AUTO: 0.6 10*3/MM3 (ref 0.1–0.9)
MONOCYTES NFR BLD AUTO: 6.6 % (ref 5–12)
NEUTROPHILS NFR BLD AUTO: 6.4 10*3/MM3 (ref 1.7–7)
NEUTROPHILS NFR BLD AUTO: 76 % (ref 42.7–76)
NRBC BLD AUTO-RTO: 0 /100 WBC (ref 0–0.2)
PLATELET # BLD AUTO: 187 10*3/MM3 (ref 140–450)
PMV BLD AUTO: 8.2 FL (ref 6–12)
POTASSIUM SERPL-SCNC: 7 MMOL/L (ref 3.5–5.2)
PROT SERPL-MCNC: 7 G/DL (ref 6–8.5)
RBC # BLD AUTO: 3.43 10*6/MM3 (ref 4.14–5.8)
SODIUM SERPL-SCNC: 136 MMOL/L (ref 136–145)
WBC NRBC COR # BLD: 8.4 10*3/MM3 (ref 3.4–10.8)

## 2022-04-15 PROCEDURE — 85025 COMPLETE CBC W/AUTO DIFF WBC: CPT | Performed by: PHYSICIAN ASSISTANT

## 2022-04-15 PROCEDURE — 99283 EMERGENCY DEPT VISIT LOW MDM: CPT

## 2022-04-15 PROCEDURE — 82962 GLUCOSE BLOOD TEST: CPT

## 2022-04-15 PROCEDURE — 80053 COMPREHEN METABOLIC PANEL: CPT | Performed by: PHYSICIAN ASSISTANT

## 2022-04-15 PROCEDURE — 36415 COLL VENOUS BLD VENIPUNCTURE: CPT | Performed by: PHYSICIAN ASSISTANT

## 2022-04-15 RX ORDER — DEXTROSE MONOHYDRATE 25 G/50ML
50 INJECTION, SOLUTION INTRAVENOUS ONCE
Status: DISCONTINUED | OUTPATIENT
Start: 2022-04-15 | End: 2022-04-15

## 2022-04-15 RX ORDER — SODIUM CHLORIDE 0.9 % (FLUSH) 0.9 %
10 SYRINGE (ML) INJECTION AS NEEDED
Status: DISCONTINUED | OUTPATIENT
Start: 2022-04-15 | End: 2022-04-15 | Stop reason: HOSPADM

## 2022-04-15 RX ORDER — CALCIUM GLUCONATE 20 MG/ML
1 INJECTION, SOLUTION INTRAVENOUS ONCE
Status: DISCONTINUED | OUTPATIENT
Start: 2022-04-15 | End: 2022-04-15

## 2022-04-15 RX ORDER — DEXTROSE MONOHYDRATE 25 G/50ML
25 INJECTION, SOLUTION INTRAVENOUS
Status: DISCONTINUED | OUTPATIENT
Start: 2022-04-15 | End: 2022-04-15 | Stop reason: HOSPADM

## 2022-04-15 RX ADMIN — SODIUM ZIRCONIUM CYCLOSILICATE 10 G: 10 POWDER, FOR SUSPENSION ORAL at 15:55

## 2022-04-15 NOTE — DISCHARGE INSTRUCTIONS
Go directly to dialysis center for treatment.    Continue to monitor your glucose at home.    Follow-up with your primary care provider in 3-5 days.  If you do not have a primary care provider call [phone] for help in finding one, or you may follow up with [facility] at [phone].    Return to ED for any new or worsening symptoms

## 2022-04-15 NOTE — ED PROVIDER NOTES
Subjective   Patient is a 56-year-old male who presents to the ED with complaints of hypoglycemia.  Per report patient's glucose was in the 30s.  Patient's wife did give him a peanut and jelly sandwich and blood glucose came up to 80.  Patient was placed to go to dialysis today but decided to come to the ED instead to be evaluated for his hypoglycemia.  Patient is a type II diabetic.  Patient normally does dialysis Monday Wednesday Friday.  Patient reports some dizziness earlier when his sugar was low but denies any currently.  Patient denies any chest pain, shortness of breath, abdominal pain, nausea, vomiting, diarrhea, or constipation.  Patient denies visual disturbances.  Patient really currently has no complaints.  Upon arrival to the ED patient's POC glucose was in the 50s he was given food.     Nephrologist: Dr. Ayala      History provided by:  Patient and EMS personnel      Review of Systems   Constitutional: Negative.    HENT: Negative.    Eyes: Negative.    Respiratory: Negative.    Cardiovascular: Negative.    Gastrointestinal: Negative for abdominal distention, abdominal pain, nausea and vomiting.   Musculoskeletal: Negative for back pain, neck pain and neck stiffness.   Skin: Negative.    Neurological: Negative.    Hematological: Does not bruise/bleed easily.       Past Medical History:   Diagnosis Date   • Anemia    • Cataract    • Constipation    • CVA (cerebral vascular accident) (HCC)     x 8   • CVA (cerebral vascular accident) (HCC)    • Depression    • Diabetes mellitus (HCC)    • ESRD (end stage renal disease) on dialysis (HCC)    • H/O colonoscopy 2016   • History of noncompliance with medical treatment    • HLD (hyperlipidemia) .   • Hyperlipidemia    • Hypertension    • Insomnia    • Neuropathy    • Renal failure     Stage IV - AI -- on dialysis -- [facility]   • Retinopathy    • TIA (transient ischemic attack)    • Type 2 diabetes mellitus (HCC)        Allergies   Allergen Reactions   • Latex Unknown  (See Comments)   • Other Unknown (See Comments)   • Penicillins Unknown (See Comments)   • Latex, Natural Rubber Unknown - High Severity   • Penicillins Unknown - High Severity   • Amoxicillin Rash       Past Surgical History:   Procedure Laterality Date   • ANKLE SURGERY Left    • ANKLE SURGERY     • APPENDECTOMY     • ARTERIOVENOUS FISTULA     • CATARACT EXTRACTION, BILATERAL  2018   • COLON RESECTION  2016   • OTHER SURGICAL HISTORY Left 02/23/2017    Left Distula Placement Dr Guan       Family History   Problem Relation Age of Onset   • Heart disease Mother    • Heart attack Mother    • Hypertension Mother    • Breast cancer Sister    • Hyperlipidemia Mother    • Stroke Mother    • Cancer Sister         Breast Cancer   • Diabetes Sister    • Heart disease Maternal Grandmother    • Hyperlipidemia Maternal Grandmother    • Hypertension Maternal Grandmother        Social History     Socioeconomic History   • Marital status:    Tobacco Use   • Smoking status: Never Smoker   • Smokeless tobacco: Current User     Types: Chew   • Tobacco comment: Passive Smoke exposure: no   Substance and Sexual Activity   • Alcohol use: Not Currently   • Drug use: Defer   • Sexual activity: Defer           Objective   Physical Exam  Vitals and nursing note reviewed.   Constitutional:       General: He is not in acute distress.     Appearance: Normal appearance. He is well-developed. He is not ill-appearing, toxic-appearing or diaphoretic.   HENT:      Head: Normocephalic and atraumatic.      Mouth/Throat:      Mouth: Mucous membranes are moist.      Pharynx: Oropharynx is clear.   Eyes:      General: No scleral icterus.     Extraocular Movements: Extraocular movements intact.      Pupils: Pupils are equal, round, and reactive to light.   Cardiovascular:      Rate and Rhythm: Normal rate and regular rhythm.      Pulses: Normal pulses.      Heart sounds: No murmur heard.    No friction rub. No gallop.   Pulmonary:      Effort:  "Pulmonary effort is normal. No tachypnea, accessory muscle usage or respiratory distress.      Breath sounds: Normal breath sounds. No stridor. No decreased breath sounds, wheezing, rhonchi or rales.   Chest:      Chest wall: No mass, deformity, tenderness or crepitus.   Abdominal:      Palpations: Abdomen is soft.      Tenderness: There is no abdominal tenderness. There is no guarding or rebound.   Musculoskeletal:      Cervical back: Normal range of motion and neck supple.   Skin:     General: Skin is warm.      Capillary Refill: Capillary refill takes less than 2 seconds.      Findings: No rash.   Neurological:      General: No focal deficit present.      Mental Status: He is alert and oriented to person, place, and time.      GCS: GCS eye subscore is 4. GCS verbal subscore is 5. GCS motor subscore is 6.   Psychiatric:         Mood and Affect: Mood normal.         Behavior: Behavior normal.         Procedures           ED Course    BP (!) 186/102   Pulse 98   Temp 97.8 °F (36.6 °C)   Resp 20   Ht 175.3 cm (69\")   Wt 92.1 kg (203 lb)   SpO2 95%   BMI 29.98 kg/m²   Medications   sodium chloride 0.9 % flush 10 mL (has no administration in time range)   dextrose (D50W) (25 g/50 mL) IV injection 25 mL (has no administration in time range)   sodium zirconium cyclosilicate (LOKELMA) pack 10 g (has no administration in time range)     Labs Reviewed   COMPREHENSIVE METABOLIC PANEL - Abnormal; Notable for the following components:       Result Value    Glucose 130 (*)     BUN 70 (*)     Creatinine 11.58 (*)     Potassium 7.0 (*)     Chloride 96 (*)     Alkaline Phosphatase 144 (*)     BUN/Creatinine Ratio 6.0 (*)     Anion Gap 16.0 (*)     eGFR 4.6 (*)     All other components within normal limits    Narrative:     GFR Normal >60  Chronic Kidney Disease <60  Kidney Failure <15     CBC WITH AUTO DIFFERENTIAL - Abnormal; Notable for the following components:    RBC 3.43 (*)     Hemoglobin 10.9 (*)     Hematocrit 32.4 " (*)     RDW 17.2 (*)     RDW-SD 57.3 (*)     Lymphocyte % 10.7 (*)     Eosinophils, Absolute 0.50 (*)     All other components within normal limits   POCT GLUCOSE FINGERSTICK - Abnormal; Notable for the following components:    Glucose 50 (*)     All other components within normal limits   POCT GLUCOSE FINGERSTICK - Abnormal; Notable for the following components:    Glucose 112 (*)     All other components within normal limits   POCT GLUCOSE FINGERSTICK - Normal   POCT GLUCOSE FINGERSTICK   CBC AND DIFFERENTIAL    Narrative:     The following orders were created for panel order CBC & Differential.  Procedure                               Abnormality         Status                     ---------                               -----------         ------                     CBC Auto Differential[[id number]]        Abnormal            Final result                 Please view results for these tests on the individual orders.     No radiology results for the last day                                               MDM  Number of Diagnoses or Management Options  Chronic kidney disease, unspecified CKD stage  Hyperkalemia  Hypoglycemia  Diagnosis management comments: Chart Review:  Comorbidity: As per past medical history  Labs: As above  Imaging: Was interpreted by physician and reviewed by myself: Not warranted  Disposition/Treatment:  Appropriate PPE was worn during exam and throughout all encounters with the patient.  While in the ED patient was afebrile and appeared nontoxic patient was alert and oriented throughout ED stay.  Initial glucose today in the 50s he was given food and orange juice while in the ED his glucose has remained normal since now in the 130s.  Patient's ED stay was prolonged secondary to difficulty getting blood work obtained.  Multiple IVs were attempted to be placed.  Lab results were significant for BUN of 70 creatinine 11.58 potassium 7 glucose 130.  CBC showed WBC 8.4 hemoglobin 10.9 hematocrit 32.4  platelets 187.     Case was discussed with patient's nephrologist  who recommended the patient go to the dialysis center to be treated versus being admitted as patient is otherwise stable patient was given lokelma while in the ED per Dr. Ayala request.  Additional hyperkalemia protocol was unfortunately not able to be administered at this time due to no IV access.  PICC team was called but unfortunately not able to evaluate the patient prior to being discharge as patient needs to be at the dialysis center before they close to get treatment.     Lab results and findings were discussed with the patient  at bedside who voiced understanding of discharge instructions along with signs and symptoms requiring return to the ED.  Upon discharged patient was in stable condition with followup for a revaluation.        Amount and/or Complexity of Data Reviewed  Clinical lab tests: reviewed        Final diagnoses:   Hypoglycemia   Chronic kidney disease, unspecified CKD stage   Hyperkalemia       ED Disposition  ED Disposition     ED Disposition   Discharge    Condition   Stable    Comment   --             Kerri Jones MD  [address] [facility] [place] [address] [phone]    Schedule an appointment as soon as possible for a visit in 3 days      [facility] EMERGENCY DEPARTMENT  [facility] [facility] [address] [phone]  Go to   If symptoms worsen         Medication List      No changes were made to your prescriptions during this visit.          Joseph Bales PA  04/15/22 8674

## 2022-04-15 NOTE — ED NOTES
Patient blood sugar was 32 at home this morning. Wife gave him a peanut butter and jelly sandwich and blood sugar came up to 87. Patient is suppose to be at Dialysis but decided he wanted to come here to be checked out

## 2022-04-25 ENCOUNTER — HOSPITAL ENCOUNTER (EMERGENCY)
Facility: HOSPITAL | Age: 57
Discharge: HOME | End: 2022-04-25
Attending: EMERGENCY MEDICINE | Admitting: EMERGENCY MEDICINE

## 2022-04-25 VITALS
SYSTOLIC BLOOD PRESSURE: 157 MMHG | OXYGEN SATURATION: 98 % | WEIGHT: 203 LBS | HEIGHT: 69 IN | TEMPERATURE: 98 F | BODY MASS INDEX: 30.07 KG/M2 | DIASTOLIC BLOOD PRESSURE: 70 MMHG | HEART RATE: 97 BPM | RESPIRATION RATE: 18 BRPM

## 2022-04-25 DIAGNOSIS — E16.2 HYPOGLYCEMIA: Primary | ICD-10-CM

## 2022-04-25 DIAGNOSIS — N18.6 ESRD ON DIALYSIS: ICD-10-CM

## 2022-04-25 DIAGNOSIS — E87.5 HYPERKALEMIA: ICD-10-CM

## 2022-04-25 DIAGNOSIS — Z99.2 ESRD ON DIALYSIS: ICD-10-CM

## 2022-04-25 LAB
ANION GAP SERPL CALCULATED.3IONS-SCNC: 15 MMOL/L (ref 5–15)
BASOPHILS # BLD AUTO: 0.1 10*3/MM3 (ref 0–0.2)
BASOPHILS NFR BLD AUTO: 0.6 % (ref 0–1.5)
BUN SERPL-MCNC: 56 MG/DL (ref 6–20)
BUN/CREAT SERPL: 5.5 (ref 7–25)
CALCIUM SPEC-SCNC: 9.6 MG/DL (ref 8.6–10.5)
CHLORIDE SERPL-SCNC: 96 MMOL/L (ref 98–107)
CO2 SERPL-SCNC: 26 MMOL/L (ref 22–29)
CREAT SERPL-MCNC: 10.23 MG/DL (ref 0.76–1.27)
DEPRECATED RDW RBC AUTO: 53.8 FL (ref 37–54)
EGFRCR SERPLBLD CKD-EPI 2021: 5.4 ML/MIN/1.73
EOSINOPHIL # BLD AUTO: 0.5 10*3/MM3 (ref 0–0.4)
EOSINOPHIL NFR BLD AUTO: 5.9 % (ref 0.3–6.2)
ERYTHROCYTE [DISTWIDTH] IN BLOOD BY AUTOMATED COUNT: 16.6 % (ref 12.3–15.4)
GLUCOSE BLDC GLUCOMTR-MCNC: 101 MG/DL (ref 70–105)
GLUCOSE BLDC GLUCOMTR-MCNC: 22 MG/DL (ref 70–105)
GLUCOSE BLDC GLUCOMTR-MCNC: 70 MG/DL (ref 70–105)
GLUCOSE SERPL-MCNC: 48 MG/DL (ref 65–99)
HCT VFR BLD AUTO: 29.9 % (ref 37.5–51)
HGB BLD-MCNC: 9.6 G/DL (ref 13–17.7)
HOLD SPECIMEN: NORMAL
LYMPHOCYTES # BLD AUTO: 0.6 10*3/MM3 (ref 0.7–3.1)
LYMPHOCYTES NFR BLD AUTO: 7.2 % (ref 19.6–45.3)
MCH RBC QN AUTO: 29.6 PG (ref 26.6–33)
MCHC RBC AUTO-ENTMCNC: 31.9 G/DL (ref 31.5–35.7)
MCV RBC AUTO: 92.9 FL (ref 79–97)
MONOCYTES # BLD AUTO: 0.5 10*3/MM3 (ref 0.1–0.9)
MONOCYTES NFR BLD AUTO: 6.1 % (ref 5–12)
NEUTROPHILS NFR BLD AUTO: 6.5 10*3/MM3 (ref 1.7–7)
NEUTROPHILS NFR BLD AUTO: 80.2 % (ref 42.7–76)
NRBC BLD AUTO-RTO: 0 /100 WBC (ref 0–0.2)
PLATELET # BLD AUTO: 141 10*3/MM3 (ref 140–450)
PMV BLD AUTO: 8.1 FL (ref 6–12)
POTASSIUM SERPL-SCNC: 5.3 MMOL/L (ref 3.5–5.2)
QT INTERVAL: 384 MS
RBC # BLD AUTO: 3.22 10*6/MM3 (ref 4.14–5.8)
SODIUM SERPL-SCNC: 137 MMOL/L (ref 136–145)
WBC NRBC COR # BLD: 8.1 10*3/MM3 (ref 3.4–10.8)
WHOLE BLOOD HOLD SPECIMEN: NORMAL
WHOLE BLOOD HOLD SPECIMEN: NORMAL

## 2022-04-25 PROCEDURE — 96374 THER/PROPH/DIAG INJ IV PUSH: CPT

## 2022-04-25 PROCEDURE — 85025 COMPLETE CBC W/AUTO DIFF WBC: CPT | Performed by: NURSE PRACTITIONER

## 2022-04-25 PROCEDURE — 80048 BASIC METABOLIC PNL TOTAL CA: CPT | Performed by: NURSE PRACTITIONER

## 2022-04-25 PROCEDURE — 93005 ELECTROCARDIOGRAM TRACING: CPT | Performed by: NURSE PRACTITIONER

## 2022-04-25 PROCEDURE — 99284 EMERGENCY DEPT VISIT MOD MDM: CPT

## 2022-04-25 PROCEDURE — 82962 GLUCOSE BLOOD TEST: CPT

## 2022-04-25 RX ORDER — DEXTROSE MONOHYDRATE 25 G/50ML
25 INJECTION, SOLUTION INTRAVENOUS ONCE
Status: COMPLETED | OUTPATIENT
Start: 2022-04-25 | End: 2022-04-25

## 2022-04-25 RX ORDER — SODIUM CHLORIDE 0.9 % (FLUSH) 0.9 %
10 SYRINGE (ML) INJECTION AS NEEDED
Status: DISCONTINUED | OUTPATIENT
Start: 2022-04-25 | End: 2022-04-25 | Stop reason: HOSPADM

## 2022-04-25 RX ADMIN — DEXTROSE MONOHYDRATE 25 G: 25 INJECTION, SOLUTION INTRAVENOUS at 11:19

## 2022-04-25 RX ADMIN — SODIUM ZIRCONIUM CYCLOSILICATE 5 G: 5 POWDER, FOR SUSPENSION ORAL at 15:02

## 2022-04-25 NOTE — DISCHARGE INSTRUCTIONS
Go to dialysis as scheduled.  Be sure to eat regularly.  Discuss your insulin with your PCP.  Return for any new or worsening symptoms

## 2022-04-25 NOTE — ED NOTES
Patient BS is 70.  APRN notified.  Gave patient a lunch box to eat since sugar is starting to drop again per APRN

## 2022-04-25 NOTE — ED NOTES
Unable to advance catheter for urine sample.  APRN notified.  Patient has no complaints at this time

## 2022-04-25 NOTE — ED PROVIDER NOTES
Subjective   Chief complaint: Hypoglycemia      Context: Patient is a 56-year-old male who comes in from home after his family called for hypoglycemia.  He has been at this facility at least once a month with the same complaints.  He was unable to provide any history and there is no family at bedside.  EMS reports they make several rounds on him a month for hypoglycemia.  He is a dialysis patient but schedules unknown    Location: denies  Duration: unknown  Timing:  Quality/Severity: denies  Modifying factors:  Associated symptoms:        Additional hx provided by: self    PCP: kyle lowery                 Review of Systems   Constitutional: Negative for fever.   HENT: Negative.    Eyes: Negative for visual disturbance.   Respiratory: Negative.    Cardiovascular: Negative.    Gastrointestinal: Negative.    Genitourinary: Positive for decreased urine volume.   Musculoskeletal: Negative.    Skin: Negative.    Allergic/Immunologic: Negative for immunocompromised state.   Neurological: Negative for seizures.   Hematological: Does not bruise/bleed easily.   Psychiatric/Behavioral: Positive for decreased concentration.       Past Medical History:   Diagnosis Date   • Anemia    • Cataract    • Constipation    • CVA (cerebral vascular accident) ([facility])     x 8   • CVA (cerebral vascular accident) (HCC)    • Depression    • Diabetes mellitus (HCC)    • ESRD (end stage renal disease) on dialysis ([facility])    • H/O colonoscopy 2016   • History of noncompliance with medical treatment    • HLD (hyperlipidemia) .   • Hyperlipidemia    • Hypertension    • Insomnia    • Neuropathy    • Renal failure     Stage IV - AI -- on dialysis -- [facility]   • Retinopathy    • TIA (transient ischemic attack)    • Type 2 diabetes mellitus (HCC)        Allergies   Allergen Reactions   • Latex Unknown (See Comments)   • Other Unknown (See Comments)   • Penicillins Unknown (See Comments)   • Latex, Natural Rubber Unknown - High Severity   • Penicillins Unknown  - High Severity   • Amoxicillin Rash       Past Surgical History:   Procedure Laterality Date   • ANKLE SURGERY Left    • ANKLE SURGERY     • APPENDECTOMY     • ARTERIOVENOUS FISTULA     • CATARACT EXTRACTION, BILATERAL  2018   • COLON RESECTION  2016   • OTHER SURGICAL HISTORY Left 02/23/2017    Left Distula Placement Dr Guan       Family History   Problem Relation Age of Onset   • Heart disease Mother    • Heart attack Mother    • Hypertension Mother    • Breast cancer Sister    • Hyperlipidemia Mother    • Stroke Mother    • Cancer Sister         Breast Cancer   • Diabetes Sister    • Heart disease Maternal Grandmother    • Hyperlipidemia Maternal Grandmother    • Hypertension Maternal Grandmother        Social History     Socioeconomic History   • Marital status:    Tobacco Use   • Smoking status: Never Smoker   • Smokeless tobacco: Current User     Types: Chew   • Tobacco comment: Passive Smoke exposure: no   Substance and Sexual Activity   • Alcohol use: Not Currently   • Drug use: Defer   • Sexual activity: Defer           Objective   Physical Exam     Vital signs and triage nurse note reviewed.   Constitutional:   easily awakens with tactile stimuli or loud verbal stimuli.  Obese  HEENT: Normocephalic, atraumatic; pupils are PERRL with intact EOM; oropharynx is pink and moist without exudate or erythema.   Neck: Supple, full range of motion without pain    Cardiovascular: Regular rate and rhythm, normal S1-S2.   Pulmonary: Respiratory effort regular nonlabored, breath sounds clear to auscultation all fields.   Abdomen: Soft, nontender nondistended with normoactive bowel sounds; no rebound or guarding.   Musculoskeletal: Independent range of motion of all extremities with no palpable tenderness or edema.  Dialysis fistula left lower extremity with bruit   Neuro: On initial exam when patient was hypoglycemic speech was somewhat slurred and on reexam has a significantly improved  Skin:  Fleshtone  warm, dry, intact; no erythematous or petechial rash or lesion       Procedures      EKG viewed by me and interpreted by Dr. fontaine   , sinus rhythm rate of 89 MI interval 235, first-degree AV block no acute changes  comparison: 3/30/2022: Sinus rhythm MI interval 235        ED Course  ED Course as of 04/25/22 1446   Mon Apr 25, 2022   1211 Dr beverley vicente [JW]      ED Course User Index  [JW] Mary Babcock, ROLAND                 Labs Reviewed   BASIC METABOLIC PANEL - Abnormal; Notable for the following components:       Result Value    Glucose 48 (*)     BUN 56 (*)     Creatinine 10.23 (*)     Potassium 5.3 (*)     Chloride 96 (*)     BUN/Creatinine Ratio 5.5 (*)     eGFR 5.4 (*)     All other components within normal limits    Narrative:     GFR Normal >60  Chronic Kidney Disease <60  Kidney Failure <15     CBC WITH AUTO DIFFERENTIAL - Abnormal; Notable for the following components:    RBC 3.22 (*)     Hemoglobin 9.6 (*)     Hematocrit 29.9 (*)     RDW 16.6 (*)     Neutrophil % 80.2 (*)     Lymphocyte % 7.2 (*)     Lymphocytes, Absolute 0.60 (*)     Eosinophils, Absolute 0.50 (*)     All other components within normal limits   POCT GLUCOSE FINGERSTICK - Abnormal; Notable for the following components:    Glucose 22 (*)     All other components within normal limits   POCT GLUCOSE FINGERSTICK - Normal   POCT GLUCOSE FINGERSTICK - Normal   RAINBOW DRAW    Narrative:     The following orders were created for panel order [place] Draw.  Procedure                               Abnormality         Status                     ---------                               -----------         ------                     Green Top (Gel)[[id number]]                                  Final result               Lavender Top[[id number]]                                     Final result               Gold Top - [facility][[id number]]                                   Final result               Light Blue Top[[id number]]                                    Final result                 Please view results for these tests on the individual orders.   URINALYSIS W/ CULTURE IF INDICATED   CBC AND DIFFERENTIAL    Narrative:     The following orders were created for panel order CBC & Differential.  Procedure                               Abnormality         Status                     ---------                               -----------         ------                     CBC Auto Differential[[id number]]        Abnormal            Final result                 Please view results for these tests on the individual orders.   GREEN TOP   LAVENDER TOP   GOLD TOP - SST   LIGHT BLUE TOP   EXTRA TUBES    Narrative:     The following orders were created for panel order Extra Tubes.  Procedure                               Abnormality         Status                     ---------                               -----------         ------                     Green Top (Gel)[[id number]]                                  Final result                 Please view results for these tests on the individual orders.   GREEN TOP     Medications   sodium chloride 0.9 % flush 10 mL (has no administration in time range)   sodium zirconium cyclosilicate (LOKELMA) pack 5 g (has no administration in time range)   dextrose (D50W) (25 g/50 mL) IV injection 25 g (25 g Intravenous Given 4/25/22 1119)     No radiology results for the last day  Prior to Admission medications    Medication Sig Start Date End Date Taking? Authorizing Provider   acetaminophen (TYLENOL) 325 MG tablet Take 2 tablets by mouth Every 4 (Four) Hours As Needed for Mild Pain . 2/16/21   Karine Campos MD   albuterol sulfate  (90 Base) MCG/ACT inhaler Inhale 2 puffs Every 4 (Four) Hours As Needed for Wheezing. 10/9/21   Jer Townsend MD   amitriptyline (ELAVIL) 25 MG tablet Take 1 tablet by mouth Every Night. 2/16/21   Karine Campos MD   amLODIPine (NORVASC) 10 MG tablet Take 10 mg by mouth Daily.    Provider, MD Andrea    ascorbic acid (VITAMIN C) 500 MG tablet Take 1 tablet by mouth Daily. 2/17/21   Karine Campos MD   atorvastatin (LIPITOR) 40 MG tablet Take 40 mg by mouth Every Night. 5/13/19   Andrea Lock MD   cloNIDine (CATAPRES) 0.1 MG tablet Take 1 tablet by mouth 2 (Two) Times a Day for 4 days. 2/26/22 3/2/22  Radha Smiley APRN   docusate sodium (COLACE) 100 MG capsule Take 100 mg by mouth 2 (Two) Times a Day.    Andrea Lock MD   gabapentin (NEURONTIN) 300 MG capsule Take 1 capsule by mouth Every Night. 2/16/21   Karine Campos MD   glucagon (GLUCAGON EMERGENCY) 1 MG injection GLUCAGON EMERGENCY 1 MG KIT 7/9/15   Andrea Lock MD   hydrOXYzine (ATARAX) 25 MG tablet Take 1 tablet by mouth Every 6 (Six) Hours As Needed for Itching (Discomfort from tongue irritation). 12/20/20   Baljit Abdullahi MD   Insulin Lispro (HUMALOG KWIKPEN) 100 UNIT/ML solution pen-injector Inject 6 Units under the skin into the appropriate area as directed 3 (Three) Times a Day. Per SSI 2/8/18   Andrea Lock MD   LANTUS 100 UNIT/ML injection Inject 40 Units under the skin into the appropriate area as directed Every Night. 5/13/19   Andrea Lock MD   levETIRAcetam XR (KEPPRA XR) 500 MG 24 hr tablet Take 500 mg by mouth Daily.    Andrea Lock MD   methylPREDNISolone (MEDROL) 4 MG dose pack Take as directed on package instructions. 2/26/22   Radha Smiley APRN   metoprolol tartrate (LOPRESSOR) 50 MG tablet Take 50 mg by mouth 2 (Two) Times a Day. 5/13/19   Andrea Lock MD   risperiDONE (risperDAL) 1 MG tablet Take 1 mg by mouth 2 (Two) Times a Day. 5/13/19   Andrea Lock MD   sennosides-docusate (senna-docusate sodium) 8.6-50 MG per tablet Take 1 tablet by mouth 2 (two) times a day.    Andrea Lock MD   sevelamer (RENVELA) 800 MG tablet Take 2,400 mg by mouth 3 (Three) Times a Day With Meals. 6/4/19   Provider, MD Andrea   zinc sulfate (ZINCATE) 220 (50 Zn) MG  "capsule Take 1 capsule by mouth Daily. 2/17/21   Karine Campos MD   losartan (COZAAR) 50 MG tablet Take 50 mg by mouth Every Morning. 2/26/20 10/14/21  Provider, MD Andrea                                        [facility]  Number of Diagnoses or Management Options  ESRD on dialysis (HCC)  Hyperkalemia  Hypoglycemia  Diagnosis management comments: Chart review: 1/28/22: ER  1/31/22: er hypoglycemia   2/26/22: ER hypoglycemia   3/30/22: ER hypoglycemia   4/15/22: ER hypoglycemia bun 70, creat 11.58      /69   Pulse 92   Temp 98.2 °F (36.8 °C) (Oral)   Resp 17   Ht 175.3 cm (69\")   Wt 92.1 kg (203 lb)   SpO2 96%   BMI 29.98 kg/m²           Comorbidities:  has a past medical history of Anemia, Cataract, Constipation, CVA (cerebral vascular accident) ([facility]), CVA (cerebral vascular accident) ([facility]), Depression, Diabetes mellitus ([facility]), ESRD (end stage renal disease) on dialysis ([facility]), H/O colonoscopy (2016), History of noncompliance with medical treatment, HLD (hyperlipidemia) (.), Hyperlipidemia, Hypertension, Insomnia, Neuropathy, Renal failure, Retinopathy, TIA (transient ischemic attack), and Type 2 diabetes mellitus ([facility]).  Differentials: Infection electrolyte abnormalities dehydration not all inclusive of differentials considered  Discussion with provider: Jamie  Lab interpretation:  Labs viewed by me significant for, potassium 5.3, creatinine 10.2, BUN 56; repeat glucose after D50 101    Appropriate PPE worn during exam.  Established and was given IV dextrose  He was given food to eat.  And blood sugars remained stable.  Patient states he is due for dialysis today and verbalizes understanding of going to dialysis from ER.  I discussed with his nephrologist who agrees he does not need emergent dialysis but will be given dose of Lokelma.    i discussed findings with patient who voices understanding of discharge instructions, signs and symptoms requiring return to ED; discharged improved and in stable condition " with follow up for re-evaluation.        Final diagnoses:   Hypoglycemia   Hyperkalemia   ESRD on dialysis (HCC)       ED Disposition  ED Disposition     ED Disposition   Discharge    Condition   Stable    Comment   --             Salinas Ayala MD  [address]  [place] [address]  [phone]    Schedule an appointment as soon as possible for a visit       Kerri Jones MD  [address] [place] [address]  [phone]    Schedule an appointment as soon as possible for a visit            Medication List      No changes were made to your prescriptions during this visit.          Mary Babcock, APRN  04/25/22 1447

## 2022-04-28 ENCOUNTER — HOSPITAL ENCOUNTER (EMERGENCY)
Facility: HOSPITAL | Age: 57
Discharge: HOME | End: 2022-04-28
Attending: EMERGENCY MEDICINE | Admitting: EMERGENCY MEDICINE

## 2022-04-28 VITALS
TEMPERATURE: 97.8 F | DIASTOLIC BLOOD PRESSURE: 89 MMHG | BODY MASS INDEX: 28.63 KG/M2 | WEIGHT: 200 LBS | SYSTOLIC BLOOD PRESSURE: 152 MMHG | HEART RATE: 92 BPM | RESPIRATION RATE: 18 BRPM | OXYGEN SATURATION: 100 % | HEIGHT: 70 IN

## 2022-04-28 DIAGNOSIS — N18.9 CHRONIC RENAL FAILURE, UNSPECIFIED CKD STAGE: Primary | ICD-10-CM

## 2022-04-28 DIAGNOSIS — E16.2 HYPOGLYCEMIA: ICD-10-CM

## 2022-04-28 LAB
ANION GAP SERPL CALCULATED.3IONS-SCNC: 18 MMOL/L (ref 5–15)
BUN SERPL-MCNC: 39 MG/DL (ref 6–20)
BUN/CREAT SERPL: 4.8 (ref 7–25)
CALCIUM SPEC-SCNC: 9.6 MG/DL (ref 8.6–10.5)
CHLORIDE SERPL-SCNC: 94 MMOL/L (ref 98–107)
CO2 SERPL-SCNC: 23 MMOL/L (ref 22–29)
CREAT SERPL-MCNC: 8.1 MG/DL (ref 0.76–1.27)
EGFRCR SERPLBLD CKD-EPI 2021: 7.1 ML/MIN/1.73
GLUCOSE BLDC GLUCOMTR-MCNC: 132 MG/DL (ref 70–105)
GLUCOSE BLDC GLUCOMTR-MCNC: 159 MG/DL (ref 70–105)
GLUCOSE BLDC GLUCOMTR-MCNC: 197 MG/DL (ref 70–105)
GLUCOSE BLDC GLUCOMTR-MCNC: 98 MG/DL (ref 70–105)
GLUCOSE SERPL-MCNC: 97 MG/DL (ref 65–99)
HOLD SPECIMEN: NORMAL
POTASSIUM SERPL-SCNC: 4.4 MMOL/L (ref 3.5–5.2)
SODIUM SERPL-SCNC: 135 MMOL/L (ref 136–145)
WHOLE BLOOD HOLD SPECIMEN: NORMAL

## 2022-04-28 PROCEDURE — 99283 EMERGENCY DEPT VISIT LOW MDM: CPT

## 2022-04-28 PROCEDURE — 80048 BASIC METABOLIC PNL TOTAL CA: CPT | Performed by: EMERGENCY MEDICINE

## 2022-04-28 PROCEDURE — 82962 GLUCOSE BLOOD TEST: CPT

## 2022-04-28 RX ORDER — SODIUM CHLORIDE 0.9 % (FLUSH) 0.9 %
10 SYRINGE (ML) INJECTION AS NEEDED
Status: DISCONTINUED | OUTPATIENT
Start: 2022-04-28 | End: 2022-04-28 | Stop reason: HOSPADM

## 2022-05-10 ENCOUNTER — HOSPITAL ENCOUNTER (OUTPATIENT)
Dept: CT IMAGING | Facility: HOSPITAL | Age: 57
Discharge: HOME | End: 2022-05-10

## 2022-05-10 ENCOUNTER — HOSPITAL ENCOUNTER (EMERGENCY)
Facility: HOSPITAL | Age: 57
Discharge: HOME | End: 2022-05-10
Admitting: EMERGENCY MEDICINE

## 2022-05-10 ENCOUNTER — HOSPITAL ENCOUNTER (OUTPATIENT)
Dept: MRI IMAGING | Facility: HOSPITAL | Age: 57
Discharge: HOME | End: 2022-05-10

## 2022-05-10 VITALS
HEIGHT: 70 IN | RESPIRATION RATE: 14 BRPM | BODY MASS INDEX: 28.63 KG/M2 | HEART RATE: 97 BPM | SYSTOLIC BLOOD PRESSURE: 184 MMHG | WEIGHT: 200 LBS | DIASTOLIC BLOOD PRESSURE: 102 MMHG | TEMPERATURE: 98 F | OXYGEN SATURATION: 97 %

## 2022-05-10 DIAGNOSIS — G31.84 MILD NEUROCOGNITIVE DISORDER: ICD-10-CM

## 2022-05-10 DIAGNOSIS — R06.00 DYSPNEA, UNSPECIFIED TYPE: ICD-10-CM

## 2022-05-10 DIAGNOSIS — E11.649 HYPOGLYCEMIC EPISODE IN PATIENT WITH DIABETES MELLITUS: Primary | ICD-10-CM

## 2022-05-10 LAB
ANION GAP SERPL CALCULATED.3IONS-SCNC: 15 MMOL/L (ref 10–20)
BUN BLDA-MCNC: 27 MG/DL (ref 8–26)
CA-I BLDA-SCNC: 1.19 MMOL/L (ref 1.12–1.32)
CHLORIDE BLDA-SCNC: 91 MMOL/L (ref 98–109)
CO2 BLDA-SCNC: 30 MMOL/L (ref 24–29)
CREAT BLDA-MCNC: 7.1 MG/DL (ref 0.6–1.3)
EGFRCR SERPLBLD CKD-EPI 2021: 8.4 ML/MIN/1.73
GLUCOSE BLDC GLUCOMTR-MCNC: 19 MG/DL (ref 70–105)
GLUCOSE BLDC GLUCOMTR-MCNC: 31 MG/DL (ref 70–105)
HCT VFR BLDA CALC: 25 % (ref 38–51)
HGB BLDA-MCNC: 8.5 G/DL (ref 12–17)
HOLD SPECIMEN: NORMAL
HOLD SPECIMEN: NORMAL
POTASSIUM BLDA-SCNC: 3.9 MMOL/L (ref 3.5–4.9)
SODIUM BLD-SCNC: 132 MMOL/L (ref 138–146)
WHOLE BLOOD HOLD COAG: NORMAL
WHOLE BLOOD HOLD SPECIMEN: NORMAL

## 2022-05-10 PROCEDURE — 85014 HEMATOCRIT: CPT

## 2022-05-10 PROCEDURE — 80047 BASIC METABLC PNL IONIZED CA: CPT

## 2022-05-10 PROCEDURE — 99283 EMERGENCY DEPT VISIT LOW MDM: CPT

## 2022-05-10 PROCEDURE — 82962 GLUCOSE BLOOD TEST: CPT

## 2022-05-10 PROCEDURE — 71250 CT THORAX DX C-: CPT

## 2022-05-10 PROCEDURE — 96374 THER/PROPH/DIAG INJ IV PUSH: CPT

## 2022-05-10 PROCEDURE — 70551 MRI BRAIN STEM W/O DYE: CPT

## 2022-05-10 RX ORDER — DEXTROSE MONOHYDRATE 25 G/50ML
25 INJECTION, SOLUTION INTRAVENOUS ONCE
Status: COMPLETED | OUTPATIENT
Start: 2022-05-10 | End: 2022-05-10

## 2022-05-10 RX ORDER — SODIUM CHLORIDE 0.9 % (FLUSH) 0.9 %
10 SYRINGE (ML) INJECTION AS NEEDED
Status: DISCONTINUED | OUTPATIENT
Start: 2022-05-10 | End: 2022-05-10 | Stop reason: HOSPADM

## 2022-05-10 RX ADMIN — DEXTROSE MONOHYDRATE 25 G: 25 INJECTION, SOLUTION INTRAVENOUS at 12:48

## 2022-05-10 NOTE — DISCHARGE INSTRUCTIONS
Try to eat and drink regularly  Rest today, plenty of fluids  Keep your next dialysis appointment as scheduled

## 2022-05-10 NOTE — ED PROVIDER NOTES
Subjective   56-year-old male complaining of feeling tired and lethargic after outpatient scheduled MRI and CTA.  The patient became unresponsive and has MRI staff was taking him to his vehicle.  The patient did not have a rapid response team initiated but was wheeled in an unconscious state to triage.  The patient's last meal was unknown the patient last had dialysis yesterday there have been no reports of antecedent fever chills, nausea, vomiting, diarrhea.  The patient had denied diaphoresis or chest pain prior to becoming unresponsive.  No focal logic defects were identified prior to the loss of consciousness the patient was not incontinent          Review of Systems   Unable to perform ROS: Patient unresponsive       Past Medical History:   Diagnosis Date   • Anemia    • Cataract    • Constipation    • CVA (cerebral vascular accident) (HCC)     x 8   • CVA (cerebral vascular accident) (HCC)    • Depression    • Diabetes mellitus ([facility])    • ESRD (end stage renal disease) on dialysis ([facility])    • H/O colonoscopy 2016   • History of noncompliance with medical treatment    • HLD (hyperlipidemia) .   • Hyperlipidemia    • Hypertension    • Insomnia    • Neuropathy    • Renal failure     Stage IV - AI -- on dialysis -- [facility]   • Retinopathy    • TIA (transient ischemic attack)    • Type 2 diabetes mellitus ([facility])        Allergies   Allergen Reactions   • Latex Unknown (See Comments)   • Other Unknown (See Comments)   • Penicillins Unknown (See Comments)   • Latex, Natural Rubber Unknown - High Severity   • Penicillins Unknown - High Severity   • Amoxicillin Rash       Past Surgical History:   Procedure Laterality Date   • ANKLE SURGERY Left    • ANKLE SURGERY     • APPENDECTOMY     • ARTERIOVENOUS FISTULA     • CATARACT EXTRACTION, BILATERAL  2018   • COLON RESECTION  2016   • OTHER SURGICAL HISTORY Left 02/23/2017    Left Distula Placement Dr Guan       Family History   Problem Relation Age of Onset   • Heart disease  Mother    • Heart attack Mother    • Hypertension Mother    • Breast cancer Sister    • Hyperlipidemia Mother    • Stroke Mother    • Cancer Sister         Breast Cancer   • Diabetes Sister    • Heart disease Maternal Grandmother    • Hyperlipidemia Maternal Grandmother    • Hypertension Maternal Grandmother        Social History     Socioeconomic History   • Marital status:    Tobacco Use   • Smoking status: Never Smoker   • Smokeless tobacco: Current User     Types: Chew   • Tobacco comment: Passive Smoke exposure: no   Substance and Sexual Activity   • Alcohol use: Not Currently   • Drug use: Defer   • Sexual activity: Defer     Eats irregularly and has had trouble with blood sugar control recently      Objective   Physical Exam  Unconscious unresponsive Collin Coma Scale 7 diaphoretic   HEENT: Pupils equal and reactive to light. Conjunctivae are not injected. normal tympanic membranes. Oropharynx and nares are normal.   Neck: Supple. Midline trachea. No JVD. No goiter.   Chest: Clear and equal breath sounds bilaterally regular rate and rhythm without murmur or rub.   Abdomen: Positive bowel sounds nontender nondistended. No rebound or peritoneal signs. No CVA tenderness.   Extremities no clubbing cyanosis or edema motor sensory exam is normal the full range of motion is intact initially on withdrawal from pain   skin: Warm and dry, no rashes or petechia.   Lymphatic: No regional lymphadenopathy. No calf pain, swelling or Bernie's sign      Procedures       Right neck was prepped with chlorhexidine and an 18-gauge Angiocath was inserted into the right external jugular via the standard approach blood was drawn the device was flushed the patient received an amp of D50.  The patient was then flushed again with saline.  The patient had return of normal mental status with resolution of diaphoresis    ED Course              Patient was able to eat and there is no evidence of rebound hyperglycemia.  The patient  had no focal neurologic deficits identified                                   MDM  Number of Diagnoses or Management Options     Amount and/or Complexity of Data Reviewed  Clinical lab tests: reviewed    Risk of Complications, Morbidity, and/or Mortality  Presenting problems: high  Diagnostic procedures: high  Management options: high  General comments: The patient will be discharged.  He was advised to eat and drink regularly.  He was encouraged to follow-up closely with his primary care provider and to call today for follow-up appointment.  The patient was stable at discharge and vocalized understanding of discharge instructions and warnings        Final diagnoses:   Hypoglycemic episode in patient with diabetes mellitus (HCC)       ED Disposition  ED Disposition     ED Disposition   Discharge    Condition   Stable    Comment   --             No follow-up provider specified.       Medication List      No changes were made to your prescriptions during this visit.          Baljit Abdullahi MD  05/10/22 2321

## 2022-05-13 ENCOUNTER — HOSPITAL ENCOUNTER (EMERGENCY)
Facility: HOSPITAL | Age: 57
Discharge: HOME | End: 2022-05-13
Attending: EMERGENCY MEDICINE | Admitting: EMERGENCY MEDICINE

## 2022-05-13 VITALS
SYSTOLIC BLOOD PRESSURE: 216 MMHG | TEMPERATURE: 97.8 F | DIASTOLIC BLOOD PRESSURE: 81 MMHG | RESPIRATION RATE: 18 BRPM | WEIGHT: 200 LBS | BODY MASS INDEX: 29.62 KG/M2 | OXYGEN SATURATION: 93 % | HEART RATE: 97 BPM | HEIGHT: 69 IN

## 2022-05-13 DIAGNOSIS — E16.2 HYPOGLYCEMIA: Primary | ICD-10-CM

## 2022-05-13 LAB
ANION GAP SERPL CALCULATED.3IONS-SCNC: 13 MMOL/L (ref 5–15)
BASOPHILS # BLD AUTO: 0.1 10*3/MM3 (ref 0–0.2)
BASOPHILS NFR BLD AUTO: 0.5 % (ref 0–1.5)
BUN SERPL-MCNC: 33 MG/DL (ref 6–20)
BUN/CREAT SERPL: 4.1 (ref 7–25)
CALCIUM SPEC-SCNC: 9.6 MG/DL (ref 8.6–10.5)
CHLORIDE SERPL-SCNC: 92 MMOL/L (ref 98–107)
CO2 SERPL-SCNC: 28 MMOL/L (ref 22–29)
CREAT SERPL-MCNC: 7.98 MG/DL (ref 0.76–1.27)
DEPRECATED RDW RBC AUTO: 51.6 FL (ref 37–54)
EGFRCR SERPLBLD CKD-EPI 2021: 7.3 ML/MIN/1.73
EOSINOPHIL # BLD AUTO: 0.5 10*3/MM3 (ref 0–0.4)
EOSINOPHIL NFR BLD AUTO: 4.1 % (ref 0.3–6.2)
ERYTHROCYTE [DISTWIDTH] IN BLOOD BY AUTOMATED COUNT: 16 % (ref 12.3–15.4)
GLUCOSE BLDC GLUCOMTR-MCNC: 143 MG/DL (ref 70–105)
GLUCOSE BLDC GLUCOMTR-MCNC: 159 MG/DL (ref 70–105)
GLUCOSE BLDC GLUCOMTR-MCNC: 39 MG/DL (ref 70–105)
GLUCOSE SERPL-MCNC: 88 MG/DL (ref 65–99)
HCT VFR BLD AUTO: 26.6 % (ref 37.5–51)
HGB BLD-MCNC: 8.4 G/DL (ref 13–17.7)
LYMPHOCYTES # BLD AUTO: 0.8 10*3/MM3 (ref 0.7–3.1)
LYMPHOCYTES NFR BLD AUTO: 6.5 % (ref 19.6–45.3)
MCH RBC QN AUTO: 29.1 PG (ref 26.6–33)
MCHC RBC AUTO-ENTMCNC: 31.5 G/DL (ref 31.5–35.7)
MCV RBC AUTO: 92.3 FL (ref 79–97)
MONOCYTES # BLD AUTO: 0.8 10*3/MM3 (ref 0.1–0.9)
MONOCYTES NFR BLD AUTO: 7.2 % (ref 5–12)
NEUTROPHILS NFR BLD AUTO: 81.7 % (ref 42.7–76)
NEUTROPHILS NFR BLD AUTO: 9.6 10*3/MM3 (ref 1.7–7)
NRBC BLD AUTO-RTO: 0.1 /100 WBC (ref 0–0.2)
PLATELET # BLD AUTO: 215 10*3/MM3 (ref 140–450)
PMV BLD AUTO: 7.2 FL (ref 6–12)
POTASSIUM SERPL-SCNC: 4.2 MMOL/L (ref 3.5–5.2)
RBC # BLD AUTO: 2.88 10*6/MM3 (ref 4.14–5.8)
SODIUM SERPL-SCNC: 133 MMOL/L (ref 136–145)
WBC NRBC COR # BLD: 11.7 10*3/MM3 (ref 3.4–10.8)

## 2022-05-13 PROCEDURE — 99283 EMERGENCY DEPT VISIT LOW MDM: CPT

## 2022-05-13 PROCEDURE — 36415 COLL VENOUS BLD VENIPUNCTURE: CPT

## 2022-05-13 PROCEDURE — 85025 COMPLETE CBC W/AUTO DIFF WBC: CPT | Performed by: PHYSICIAN ASSISTANT

## 2022-05-13 PROCEDURE — 80048 BASIC METABOLIC PNL TOTAL CA: CPT | Performed by: PHYSICIAN ASSISTANT

## 2022-05-13 PROCEDURE — 82962 GLUCOSE BLOOD TEST: CPT

## 2022-05-13 PROCEDURE — 96374 THER/PROPH/DIAG INJ IV PUSH: CPT

## 2022-05-13 RX ORDER — DEXTROSE MONOHYDRATE 25 G/50ML
INJECTION, SOLUTION INTRAVENOUS
Status: DISCONTINUED
Start: 2022-05-13 | End: 2022-05-13 | Stop reason: WASHOUT

## 2022-05-13 RX ORDER — DEXTROSE MONOHYDRATE 25 G/50ML
25 INJECTION, SOLUTION INTRAVENOUS ONCE
Status: COMPLETED | OUTPATIENT
Start: 2022-05-13 | End: 2022-05-13

## 2022-05-13 RX ADMIN — DEXTROSE MONOHYDRATE 25 G: 25 INJECTION, SOLUTION INTRAVENOUS at 12:28

## 2022-05-13 NOTE — DISCHARGE INSTRUCTIONS
Please make sure that you are eating throughout the day appropriately.  Please follow-up with your primary care provider within 3 days regarding your low blood sugar.  Please make sure you keep sugary food or drink with you at all times in case her blood sugar runs low in the future.  Please come to the ER if you have confusion, vomiting or severe symptoms as you will need reevaluation that time.

## 2022-05-13 NOTE — ED NOTES
Pt refusing to eat food. Pt educated on the importance of eating food to help increase blood sugar.

## 2022-05-13 NOTE — ED NOTES
PICC team consulted for access after multiple attempts to stick. Juan C from PICC team on the way.

## 2022-05-13 NOTE — ED PROVIDER NOTES
Subjective       Patient is a 56-year-old male comes in complaining of low blood sugar.  Patient had a sugar as low as 30 and altered mental status earlier this morning just prior to arrival.  Patient was given oral glucose at that time as well as an amp of D50 and sugar rechecked was in the 50s.  Upon initial evaluation here in the ER, patient is eating food and is alert and oriented times 3 out of 3 currently.  Upon chart review, patient has a history of noncompliance with medical treatment and has been seen in the ER for hypoglycemia multiple times and the past few months.  Patient denies any vomiting, nausea, diarrhea, fever, chills, chest pain, shortness of breath, urinary symptoms or swelling legs bilaterally.  Patient has a history of end-stage renal disease on dialysis and has a significant past medical history of diabetes, prior CVA, hypertension, hyperlipidemia.        Review of Systems   Constitutional: Negative for chills, fatigue and fever.   HENT: Negative for congestion, sore throat, tinnitus and trouble swallowing.    Eyes: Negative for photophobia, discharge and visual disturbance.   Respiratory: Negative for cough, shortness of breath and wheezing.    Cardiovascular: Negative for chest pain and leg swelling.   Gastrointestinal: Negative for abdominal pain, blood in stool, diarrhea, nausea and vomiting.   Genitourinary: Negative for dysuria, flank pain, frequency and urgency.   Musculoskeletal: Negative for arthralgias and myalgias.   Skin: Negative for rash.   Neurological: Negative for dizziness, syncope, speech difficulty, weakness, light-headedness, numbness and headaches.   Psychiatric/Behavioral: Negative for confusion.       Past Medical History:   Diagnosis Date   • Anemia    • Cataract    • Constipation    • CVA (cerebral vascular accident) (HCC)     x 8   • CVA (cerebral vascular accident) (HCC)    • Depression    • Diabetes mellitus (HCC)    • ESRD (end stage renal disease) on dialysis  (HCC)    • H/O colonoscopy 2016   • History of noncompliance with medical treatment    • HLD (hyperlipidemia) .   • Hyperlipidemia    • Hypertension    • Insomnia    • Neuropathy    • Renal failure     Stage IV - AI -- on dialysis -- [facility]   • Retinopathy    • TIA (transient ischemic attack)    • Type 2 diabetes mellitus (HCC)        Allergies   Allergen Reactions   • Latex Unknown (See Comments)   • Other Unknown (See Comments)   • Penicillins Unknown (See Comments)   • Latex, Natural Rubber Unknown - High Severity   • Penicillins Unknown - High Severity   • Amoxicillin Rash       Past Surgical History:   Procedure Laterality Date   • ANKLE SURGERY Left    • ANKLE SURGERY     • APPENDECTOMY     • ARTERIOVENOUS FISTULA     • CATARACT EXTRACTION, BILATERAL  2018   • COLON RESECTION  2016   • OTHER SURGICAL HISTORY Left 02/23/2017    Left Distula Placement Dr Guan       Family History   Problem Relation Age of Onset   • Heart disease Mother    • Heart attack Mother    • Hypertension Mother    • Breast cancer Sister    • Hyperlipidemia Mother    • Stroke Mother    • Cancer Sister         Breast Cancer   • Diabetes Sister    • Heart disease Maternal Grandmother    • Hyperlipidemia Maternal Grandmother    • Hypertension Maternal Grandmother        Social History     Socioeconomic History   • Marital status:    Tobacco Use   • Smoking status: Never Smoker   • Smokeless tobacco: Current User     Types: Chew   • Tobacco comment: Passive Smoke exposure: no   Substance and Sexual Activity   • Alcohol use: Not Currently   • Drug use: Defer   • Sexual activity: Defer           Objective   Physical Exam  Vitals and nursing note reviewed.   Constitutional:       General: He is not in acute distress.     Appearance: He is well-developed. He is not diaphoretic.   HENT:      Head: Normocephalic and atraumatic.      Right Ear: External ear normal.      Left Ear: External ear normal.      Nose: Nose normal.      Mouth/Throat:  "     Pharynx: No oropharyngeal exudate.   Eyes:      Extraocular Movements: Extraocular movements intact.      Conjunctiva/sclera: Conjunctivae normal.      Pupils: Pupils are equal, round, and reactive to light.   Cardiovascular:      Rate and Rhythm: Normal rate and regular rhythm.      Pulses: Normal pulses.      Heart sounds: Normal heart sounds.      Comments: S1, S2 audible.  Pulmonary:      Effort: Pulmonary effort is normal. No respiratory distress.      Breath sounds: Normal breath sounds. No wheezing, rhonchi or rales.      Comments: On room air.  Abdominal:      General: Bowel sounds are normal. There is no distension.      Palpations: Abdomen is soft.      Tenderness: There is no abdominal tenderness. There is no guarding or rebound.   Musculoskeletal:         General: No tenderness or deformity. Normal range of motion.      Cervical back: Normal range of motion.   Skin:     General: Skin is warm.      Capillary Refill: Capillary refill takes less than 2 seconds.      Findings: No erythema or rash.   Neurological:      General: No focal deficit present.      Mental Status: He is alert and oriented to person, place, and time.      Cranial Nerves: No cranial nerve deficit.      Sensory: No sensory deficit.      Motor: No weakness.   Psychiatric:         Mood and Affect: Mood normal.         Behavior: Behavior normal.         Procedures           ED Course      BP (!) 216/81   Pulse 97   Temp 97.8 °F (36.6 °C) (Oral)   Resp 18   Ht 175.3 cm (69\")   Wt 90.7 kg (200 lb)   SpO2 93%   BMI 29.53 kg/m²   Labs Reviewed   BASIC METABOLIC PANEL - Abnormal; Notable for the following components:       Result Value    BUN 33 (*)     Creatinine 7.98 (*)     Sodium 133 (*)     Chloride 92 (*)     BUN/Creatinine Ratio 4.1 (*)     eGFR 7.3 (*)     All other components within normal limits    Narrative:     GFR Normal >60  Chronic Kidney Disease <60  Kidney Failure <15     CBC WITH AUTO DIFFERENTIAL - Abnormal; " "Notable for the following components:    WBC 11.70 (*)     RBC 2.88 (*)     Hemoglobin 8.4 (*)     Hematocrit 26.6 (*)     RDW 16.0 (*)     Neutrophil % 81.7 (*)     Lymphocyte % 6.5 (*)     Neutrophils, Absolute 9.60 (*)     Eosinophils, Absolute 0.50 (*)     All other components within normal limits   POCT GLUCOSE FINGERSTICK - Abnormal; Notable for the following components:    Glucose 39 (*)     All other components within normal limits   POCT GLUCOSE FINGERSTICK - Abnormal; Notable for the following components:    Glucose 143 (*)     All other components within normal limits   POCT GLUCOSE FINGERSTICK - Abnormal; Notable for the following components:    Glucose 159 (*)     All other components within normal limits   POCT GLUCOSE FINGERSTICK   POCT GLUCOSE FINGERSTICK   POCT GLUCOSE FINGERSTICK   CBC AND DIFFERENTIAL    Narrative:     The following orders were created for panel order CBC & Differential.  Procedure                               Abnormality         Status                     ---------                               -----------         ------                     CBC Auto Differential[[id number]]        Abnormal            Final result                 Please view results for these tests on the individual orders.     No radiology results for the last day                                             MDM     Chart review: Patient has a history of noncompliance with medical treatments.  Patient has been seen for hypoglycemia several times in the past few months here in the ER.    EKG:  Not indicated  Imaging:  Not indicated  Labs: BMP shows serum creatinine 7.98 consistent with patient's end-stage renal disease on hemodialysis.  Potassium normal at 4.2.  Hemoglobin of 8.4 which is about baseline for patient otherwise largely unremarkable CBC.    Vitals:  BP (!) 216/81   Pulse 97   Temp 97.8 °F (36.6 °C) (Oral)   Resp 18   Ht 175.3 cm (69\")   Wt 90.7 kg (200 lb)   SpO2 93%   BMI 29.53 kg/m² "     Medications given:    Medications   dextrose (D50W) (25 g/50 mL) IV injection 25 g (25 g Intravenous Given 5/13/22 1228)       Procedures:    MDM: Patient is a 56-year-old male comes in complaining of hypoglycemia.  Patient's initial glucose was found to be in the 30s per EMS and was given oral glucose and an amp of D50.  Patient has a history of noncompliance with medical treatments.  Patient has been seen for hypoglycemia several times in the past few months here in the ER.  Patient was alert and oriented times 3 out of 3 upon initial evaluation.  Patient's initial glucose low at 39, patient was given an amp of D50.  Repeat glucose was 88.  Patient was allowed to eat but somewhat refused to do so.  Recheck glucose is 143.  After 1 hour of no intervention repeat glucose was 159.  Patient was resting in no acute distress upon reevaluation.  Patient was offered admission for further glucose control as he has had several visits for the same complaint over the past few months.  Patient declined this.  Patient to follow-up with nephrologist and primary care provider and voiced understanding.  Patient given strict return precautions and voiced understanding. See full discharge instructions for further details.  Results and plan discussed with patient and is agreeable with plan.    Final diagnoses:   Hypoglycemia       ED Disposition  ED Disposition     ED Disposition   Discharge    Condition   Stable    Comment   --             [facility] EMERGENCY DEPARTMENT  [address] [facility] [address] [phone]  Go in 1 day  As needed, If symptoms worsen    Kerri Jones MD  [address] [facility] [address] [phone]    Schedule an appointment as soon as possible for a visit in 3 days  for follow up regarding blood sugar         Medication List      No changes were made to your prescriptions during this visit.          [employer] PA  05/13/22 2011

## 2022-05-14 LAB
GLUCOSE BLDC GLUCOMTR-MCNC: 165 MG/DL (ref 70–105)
GLUCOSE BLDC GLUCOMTR-MCNC: 29 MG/DL (ref 70–105)

## 2022-05-20 ENCOUNTER — HOSPITAL ENCOUNTER (EMERGENCY)
Facility: HOSPITAL | Age: 57
Discharge: HOME | End: 2022-05-20
Attending: EMERGENCY MEDICINE | Admitting: EMERGENCY MEDICINE

## 2022-05-20 VITALS
SYSTOLIC BLOOD PRESSURE: 165 MMHG | TEMPERATURE: 97.8 F | WEIGHT: 200 LBS | BODY MASS INDEX: 30.31 KG/M2 | HEIGHT: 68 IN | OXYGEN SATURATION: 98 % | HEART RATE: 82 BPM | RESPIRATION RATE: 15 BRPM | DIASTOLIC BLOOD PRESSURE: 87 MMHG

## 2022-05-20 DIAGNOSIS — E16.2 HYPOGLYCEMIA: ICD-10-CM

## 2022-05-20 DIAGNOSIS — R41.82 ALTERED MENTAL STATUS, UNSPECIFIED ALTERED MENTAL STATUS TYPE: Primary | ICD-10-CM

## 2022-05-20 LAB
ANION GAP SERPL CALCULATED.3IONS-SCNC: 16 MMOL/L (ref 5–15)
BUN SERPL-MCNC: 38 MG/DL (ref 6–20)
BUN/CREAT SERPL: 4.8 (ref 7–25)
CALCIUM SPEC-SCNC: 9.8 MG/DL (ref 8.6–10.5)
CHLORIDE SERPL-SCNC: 89 MMOL/L (ref 98–107)
CO2 SERPL-SCNC: 27 MMOL/L (ref 22–29)
CREAT SERPL-MCNC: 7.9 MG/DL (ref 0.76–1.27)
EGFRCR SERPLBLD CKD-EPI 2021: 7.3 ML/MIN/1.73
GLUCOSE BLDC GLUCOMTR-MCNC: 66 MG/DL (ref 70–105)
GLUCOSE BLDC GLUCOMTR-MCNC: 87 MG/DL (ref 70–105)
GLUCOSE SERPL-MCNC: 59 MG/DL (ref 65–99)
POTASSIUM SERPL-SCNC: 4.3 MMOL/L (ref 3.5–5.2)
SODIUM SERPL-SCNC: 132 MMOL/L (ref 136–145)

## 2022-05-20 PROCEDURE — 82962 GLUCOSE BLOOD TEST: CPT

## 2022-05-20 PROCEDURE — 99284 EMERGENCY DEPT VISIT MOD MDM: CPT

## 2022-05-20 PROCEDURE — 80048 BASIC METABOLIC PNL TOTAL CA: CPT | Performed by: EMERGENCY MEDICINE

## 2022-05-20 NOTE — ED PROVIDER NOTES
Subjective   Patient is a 56-year-old male who was brought in by EMS due to decreased responsiveness.  His Accu-Chek per EMS was in the 20s.  They gave him glucagon.  Upon arrival to the ED he was mildly confused but states he felt weak and feels improved at this time.  Denies headache cough fever chest pain shortness of breath or other complaint.          Review of Systems   Constitutional: Negative for chills and fever.   HENT: Negative for congestion and sore throat.    Eyes: Negative for visual disturbance.   Respiratory: Negative for cough, chest tightness and shortness of breath.    Cardiovascular: Negative for chest pain.   Gastrointestinal: Negative for abdominal pain, diarrhea and vomiting.   Endocrine: Negative for polyuria.   Genitourinary: Negative for dysuria.   Musculoskeletal: Negative for back pain and myalgias.   Neurological: Positive for weakness. Negative for dizziness, light-headedness and headaches.   Psychiatric/Behavioral: Positive for confusion.   A complete review of system was obtained and is otherwise negative    Past Medical History:   Diagnosis Date   • Anemia    • Cataract    • Constipation    • CVA (cerebral vascular accident) ([facility])     x 8   • CVA (cerebral vascular accident) ([facility])    • Depression    • Diabetes mellitus ([facility])    • ESRD (end stage renal disease) on dialysis ([facility])    • H/O colonoscopy 2016   • History of noncompliance with medical treatment    • HLD (hyperlipidemia) .   • Hyperlipidemia    • Hypertension    • Insomnia    • Neuropathy    • Renal failure     Stage IV - AI -- on dialysis -- [facility]   • Retinopathy    • TIA (transient ischemic attack)    • Type 2 diabetes mellitus ([facility])        Allergies   Allergen Reactions   • Latex Unknown (See Comments)   • Other Unknown (See Comments)   • Penicillins Unknown (See Comments)   • Latex, Natural Rubber Unknown - High Severity   • Penicillins Unknown - High Severity   • Amoxicillin Rash       Past Surgical History:   Procedure  Laterality Date   • ANKLE SURGERY Left    • ANKLE SURGERY     • APPENDECTOMY     • ARTERIOVENOUS FISTULA     • CATARACT EXTRACTION, BILATERAL  2018   • COLON RESECTION  2016   • OTHER SURGICAL HISTORY Left 02/23/2017    Left Distula Placement Dr Guan       Family History   Problem Relation Age of Onset   • Heart disease Mother    • Heart attack Mother    • Hypertension Mother    • Breast cancer Sister    • Hyperlipidemia Mother    • Stroke Mother    • Cancer Sister         Breast Cancer   • Diabetes Sister    • Heart disease Maternal Grandmother    • Hyperlipidemia Maternal Grandmother    • Hypertension Maternal Grandmother        Social History     Socioeconomic History   • Marital status:    Tobacco Use   • Smoking status: Never Smoker   • Smokeless tobacco: Current User     Types: Chew   • Tobacco comment: Passive Smoke exposure: no   Substance and Sexual Activity   • Alcohol use: Not Currently   • Drug use: Defer   • Sexual activity: Defer           Objective   Physical Exam  Neurologic exam shows the patient be alert and oriented x3 with no focal neurologic deficits.  HEENT exam shows TMs to be clear.  Oropharynx clear moist.  Sclera is nonicteric.  Neck has no adenopathy JVD or bruits.  Lungs are clear.  Heart has a regular rate rhythm without murmur or gallop.  Chest is nontender.  Abdomen is soft nontender.  Patient has normal bowel sounds without rebound or guarding.  Back has no CVA tenderness.  Extremity exam is no cyanosis or edema.  Procedures           ED Course      Results for orders placed or performed during the hospital encounter of 05/20/22   Basic Metabolic Panel    Specimen: Blood   Result Value Ref Range    Glucose 59 (L) 65 - 99 mg/dL    BUN 38 (H) 6 - 20 mg/dL    Creatinine 7.90 (H) 0.76 - 1.27 mg/dL    Sodium 132 (L) 136 - 145 mmol/L    Potassium 4.3 3.5 - 5.2 mmol/L    Chloride 89 (L) 98 - 107 mmol/L    CO2 27.0 22.0 - 29.0 mmol/L    Calcium 9.8 8.6 - 10.5 mg/dL    BUN/Creatinine  Ratio 4.8 (L) 7.0 - 25.0    Anion Gap 16.0 (H) 5.0 - 15.0 mmol/L    eGFR 7.3 (L) >60.0 mL/min/1.73   POC Glucose Once    Specimen: Blood   Result Value Ref Range    Glucose 66 (L) 70 - 105 mg/dL   POC Glucose Once    Specimen: Blood   Result Value Ref Range    Glucose 87 70 - 105 mg/dL                                                MDM  Number of Diagnoses or Management Options  Diagnosis management comments: Metabolic panel shows the patient's BUN and creatinine to be at his baseline when compared to his old laboratory data.  Patient scheduled for dialysis today.  His initial glucose was in the 50s however he was awake and alert.  We did give him food and liquids to drink.  On reexamination his Accu-Chek is in the 80s and he feels at baseline without complaint.  Patient will be discharged.  Will follow his MD further outpatient evaluation as needed.       Amount and/or Complexity of Data Reviewed  Clinical lab tests: reviewed    Risk of Complications, Morbidity, and/or Mortality  Presenting problems: high  Diagnostic procedures: high  Management options: high    Patient Progress  Patient progress: stable      Final diagnoses:   Altered mental status, unspecified altered mental status type   Hypoglycemia       ED Disposition  ED Disposition     ED Disposition   Discharge    Condition   Stable    Comment   --             No follow-up provider specified.       Medication List      No changes were made to your prescriptions during this visit.          Gilbert Patricio MD  05/20/22 0908

## 2022-05-24 ENCOUNTER — HOSPITAL ENCOUNTER (EMERGENCY)
Facility: HOSPITAL | Age: 57
Discharge: HOME | End: 2022-05-24
Attending: EMERGENCY MEDICINE | Admitting: EMERGENCY MEDICINE

## 2022-05-24 VITALS
BODY MASS INDEX: 27.92 KG/M2 | DIASTOLIC BLOOD PRESSURE: 96 MMHG | SYSTOLIC BLOOD PRESSURE: 191 MMHG | WEIGHT: 195 LBS | TEMPERATURE: 97.6 F | RESPIRATION RATE: 19 BRPM | HEIGHT: 70 IN | HEART RATE: 78 BPM | OXYGEN SATURATION: 100 %

## 2022-05-24 DIAGNOSIS — N18.6 STAGE 5 CHRONIC KIDNEY DISEASE ON CHRONIC DIALYSIS: ICD-10-CM

## 2022-05-24 DIAGNOSIS — Z99.2 STAGE 5 CHRONIC KIDNEY DISEASE ON CHRONIC DIALYSIS: ICD-10-CM

## 2022-05-24 DIAGNOSIS — E16.2 HYPOGLYCEMIA: Primary | ICD-10-CM

## 2022-05-24 LAB
ANION GAP SERPL CALCULATED.3IONS-SCNC: 17 MMOL/L (ref 5–15)
BASOPHILS # BLD AUTO: 0.1 10*3/MM3 (ref 0–0.2)
BASOPHILS NFR BLD AUTO: 0.6 % (ref 0–1.5)
BUN SERPL-MCNC: 58 MG/DL (ref 6–20)
BUN/CREAT SERPL: 5.2 (ref 7–25)
CALCIUM SPEC-SCNC: 9.9 MG/DL (ref 8.6–10.5)
CHLORIDE SERPL-SCNC: 93 MMOL/L (ref 98–107)
CO2 SERPL-SCNC: 25 MMOL/L (ref 22–29)
CREAT SERPL-MCNC: 11.23 MG/DL (ref 0.76–1.27)
DEPRECATED RDW RBC AUTO: 58.6 FL (ref 37–54)
EGFRCR SERPLBLD CKD-EPI 2021: 4.8 ML/MIN/1.73
EOSINOPHIL # BLD AUTO: 0.6 10*3/MM3 (ref 0–0.4)
EOSINOPHIL NFR BLD AUTO: 5.3 % (ref 0.3–6.2)
ERYTHROCYTE [DISTWIDTH] IN BLOOD BY AUTOMATED COUNT: 17.6 % (ref 12.3–15.4)
GLUCOSE BLDC GLUCOMTR-MCNC: 29 MG/DL (ref 70–105)
GLUCOSE BLDC GLUCOMTR-MCNC: 41 MG/DL (ref 70–105)
GLUCOSE BLDC GLUCOMTR-MCNC: 55 MG/DL (ref 70–105)
GLUCOSE BLDC GLUCOMTR-MCNC: 75 MG/DL (ref 70–105)
GLUCOSE BLDC GLUCOMTR-MCNC: 79 MG/DL (ref 70–105)
GLUCOSE BLDC GLUCOMTR-MCNC: 90 MG/DL (ref 70–105)
GLUCOSE SERPL-MCNC: 58 MG/DL (ref 65–99)
HCT VFR BLD AUTO: 30 % (ref 37.5–51)
HGB BLD-MCNC: 9.5 G/DL (ref 13–17.7)
HOLD SPECIMEN: NORMAL
LYMPHOCYTES # BLD AUTO: 0.8 10*3/MM3 (ref 0.7–3.1)
LYMPHOCYTES NFR BLD AUTO: 7.1 % (ref 19.6–45.3)
MCH RBC QN AUTO: 30 PG (ref 26.6–33)
MCHC RBC AUTO-ENTMCNC: 31.6 G/DL (ref 31.5–35.7)
MCV RBC AUTO: 94.7 FL (ref 79–97)
MONOCYTES # BLD AUTO: 0.8 10*3/MM3 (ref 0.1–0.9)
MONOCYTES NFR BLD AUTO: 7 % (ref 5–12)
NEUTROPHILS NFR BLD AUTO: 8.9 10*3/MM3 (ref 1.7–7)
NEUTROPHILS NFR BLD AUTO: 80 % (ref 42.7–76)
NRBC BLD AUTO-RTO: 0.1 /100 WBC (ref 0–0.2)
PLATELET # BLD AUTO: 183 10*3/MM3 (ref 140–450)
PMV BLD AUTO: 7.5 FL (ref 6–12)
POTASSIUM SERPL-SCNC: 5.5 MMOL/L (ref 3.5–5.2)
RBC # BLD AUTO: 3.16 10*6/MM3 (ref 4.14–5.8)
SODIUM SERPL-SCNC: 135 MMOL/L (ref 136–145)
WBC NRBC COR # BLD: 11.1 10*3/MM3 (ref 3.4–10.8)

## 2022-05-24 PROCEDURE — 85025 COMPLETE CBC W/AUTO DIFF WBC: CPT | Performed by: NURSE PRACTITIONER

## 2022-05-24 PROCEDURE — 99284 EMERGENCY DEPT VISIT MOD MDM: CPT

## 2022-05-24 PROCEDURE — 82962 GLUCOSE BLOOD TEST: CPT

## 2022-05-24 PROCEDURE — 99283 EMERGENCY DEPT VISIT LOW MDM: CPT

## 2022-05-24 PROCEDURE — 36415 COLL VENOUS BLD VENIPUNCTURE: CPT | Performed by: NURSE PRACTITIONER

## 2022-05-24 PROCEDURE — 80048 BASIC METABOLIC PNL TOTAL CA: CPT | Performed by: NURSE PRACTITIONER

## 2022-05-24 RX ORDER — NICOTINE POLACRILEX 4 MG
15 LOZENGE BUCCAL
Status: DISCONTINUED | OUTPATIENT
Start: 2022-05-24 | End: 2022-05-24 | Stop reason: HOSPADM

## 2022-05-24 RX ORDER — NICOTINE POLACRILEX 4 MG
LOZENGE BUCCAL
Status: COMPLETED
Start: 2022-05-24 | End: 2022-05-24

## 2022-05-24 RX ORDER — NICOTINE POLACRILEX 4 MG
15 LOZENGE BUCCAL
Status: DISCONTINUED | OUTPATIENT
Start: 2022-05-24 | End: 2022-05-24

## 2022-05-24 RX ADMIN — DEXTROSE 15 G: 15 GEL ORAL at 07:00

## 2022-05-24 RX ADMIN — DEXTROSE 15 G: 15 GEL ORAL at 08:56

## 2022-05-24 RX ADMIN — Medication 15 G: at 07:00

## 2022-05-24 NOTE — ED NOTES
PT ex-wife contacted and she reports pt last received dialysis Friday. Pt normally does dialysis Mon, Wed, Friday. Pt missed yesterday because he did not feel good and was scheduled for today instead at 6am. When pt was woke up for dialysis this morning ex-wife reports he was foaming at the mouth, unresponsive, and his sugar was 42 so EMS was called. Pt last ate at 11 pm last night. Wife reports pt normally gives himself insulin at night but she is unsure if he checked his sugar beforehand.

## 2022-05-24 NOTE — ED NOTES
Pt finished entire breakfast tray. Pt refuses to eat more peanut butter crackers. Provider notified.

## 2022-05-24 NOTE — ED NOTES
Dialysis center contacted and they report they will not be able to get pt in today so they rescheduled for tomorrow. They report Dr. Ayala has already been notified by there charge.

## 2022-05-24 NOTE — DISCHARGE INSTRUCTIONS
No insulin today.  Monitor your blood sugars at home today and tomorrow.  Cut Lantus dose down to 20 units at night.  Follow-up closely with your primary care provider regarding further insulin management.  Call them today for an appointment.

## 2022-05-26 ENCOUNTER — APPOINTMENT (OUTPATIENT)
Dept: GENERAL RADIOLOGY | Facility: HOSPITAL | Age: 57
End: 2022-05-26

## 2022-05-26 ENCOUNTER — HOSPITAL ENCOUNTER (OUTPATIENT)
Facility: HOSPITAL | Age: 57
Setting detail: OBSERVATION
Discharge: HOME | End: 2022-05-28
Attending: INTERNAL MEDICINE | Admitting: EMERGENCY MEDICINE

## 2022-05-26 ENCOUNTER — APPOINTMENT (OUTPATIENT)
Dept: CT IMAGING | Facility: HOSPITAL | Age: 57
End: 2022-05-26

## 2022-05-26 DIAGNOSIS — E16.2 HYPOGLYCEMIA: Primary | ICD-10-CM

## 2022-05-26 DIAGNOSIS — Z99.2 ESRD ON DIALYSIS: ICD-10-CM

## 2022-05-26 DIAGNOSIS — N18.6 ESRD ON DIALYSIS: ICD-10-CM

## 2022-05-26 DIAGNOSIS — I61.9 NONTRAUMATIC INTRACEREBRAL HEMORRHAGE, UNSPECIFIED CEREBRAL LOCATION, UNSPECIFIED LATERALITY: ICD-10-CM

## 2022-05-26 PROBLEM — R05.9 COUGH: Status: ACTIVE | Noted: 2022-05-26

## 2022-05-26 LAB
ANION GAP SERPL CALCULATED.3IONS-SCNC: 14 MMOL/L (ref 5–15)
ARTERIAL PATENCY WRIST A: POSITIVE
ATMOSPHERIC PRESS: ABNORMAL MM[HG]
BASE EXCESS BLDA CALC-SCNC: 4.7 MMOL/L (ref 0–3)
BASOPHILS # BLD AUTO: 0.1 10*3/MM3 (ref 0–0.2)
BASOPHILS NFR BLD AUTO: 0.8 % (ref 0–1.5)
BDY SITE: ABNORMAL
BUN SERPL-MCNC: 32 MG/DL (ref 6–20)
BUN/CREAT SERPL: 4.2 (ref 7–25)
CALCIUM SPEC-SCNC: 10.1 MG/DL (ref 8.6–10.5)
CHLORIDE SERPL-SCNC: 92 MMOL/L (ref 98–107)
CO2 BLDA-SCNC: 31.9 MMOL/L (ref 22–29)
CO2 SERPL-SCNC: 29 MMOL/L (ref 22–29)
CREAT SERPL-MCNC: 7.63 MG/DL (ref 0.76–1.27)
DEPRECATED RDW RBC AUTO: 56.4 FL (ref 37–54)
EGFRCR SERPLBLD CKD-EPI 2021: 7.7 ML/MIN/1.73
EOSINOPHIL # BLD AUTO: 0.4 10*3/MM3 (ref 0–0.4)
EOSINOPHIL NFR BLD AUTO: 5.1 % (ref 0.3–6.2)
ERYTHROCYTE [DISTWIDTH] IN BLOOD BY AUTOMATED COUNT: 17.3 % (ref 12.3–15.4)
GLUCOSE BLDC GLUCOMTR-MCNC: 104 MG/DL (ref 70–105)
GLUCOSE BLDC GLUCOMTR-MCNC: 142 MG/DL (ref 70–105)
GLUCOSE BLDC GLUCOMTR-MCNC: 164 MG/DL (ref 70–105)
GLUCOSE BLDC GLUCOMTR-MCNC: 209 MG/DL (ref 70–105)
GLUCOSE BLDC GLUCOMTR-MCNC: 27 MG/DL (ref 70–105)
GLUCOSE BLDC GLUCOMTR-MCNC: 94 MG/DL (ref 70–105)
GLUCOSE BLDC GLUCOMTR-MCNC: 99 MG/DL (ref 70–105)
GLUCOSE SERPL-MCNC: 83 MG/DL (ref 65–99)
HCO3 BLDA-SCNC: 30.4 MMOL/L (ref 21–28)
HCT VFR BLD AUTO: 32 % (ref 37.5–51)
HEMODILUTION: NO
HGB BLD-MCNC: 10.1 G/DL (ref 13–17.7)
INHALED O2 CONCENTRATION: 21 %
LYMPHOCYTES # BLD AUTO: 0.6 10*3/MM3 (ref 0.7–3.1)
LYMPHOCYTES NFR BLD AUTO: 7.7 % (ref 19.6–45.3)
MCH RBC QN AUTO: 29.7 PG (ref 26.6–33)
MCHC RBC AUTO-ENTMCNC: 31.5 G/DL (ref 31.5–35.7)
MCV RBC AUTO: 94.3 FL (ref 79–97)
MODALITY: ABNORMAL
MONOCYTES # BLD AUTO: 0.7 10*3/MM3 (ref 0.1–0.9)
MONOCYTES NFR BLD AUTO: 9.3 % (ref 5–12)
NEUTROPHILS NFR BLD AUTO: 5.8 10*3/MM3 (ref 1.7–7)
NEUTROPHILS NFR BLD AUTO: 77.1 % (ref 42.7–76)
NRBC BLD AUTO-RTO: 0.1 /100 WBC (ref 0–0.2)
PCO2 BLDA: 49.5 MM HG (ref 35–48)
PH BLDA: 7.4 PH UNITS (ref 7.35–7.45)
PLATELET # BLD AUTO: 173 10*3/MM3 (ref 140–450)
PMV BLD AUTO: 8 FL (ref 6–12)
PO2 BLDA: 78 MM HG (ref 83–108)
POTASSIUM SERPL-SCNC: 4.9 MMOL/L (ref 3.5–5.2)
RBC # BLD AUTO: 3.39 10*6/MM3 (ref 4.14–5.8)
SAO2 % BLDCOA: 95.1 % (ref 94–98)
SARS-COV-2 RNA PNL SPEC NAA+PROBE: NOT DETECTED
SODIUM SERPL-SCNC: 135 MMOL/L (ref 136–145)
WBC NRBC COR # BLD: 7.6 10*3/MM3 (ref 3.4–10.8)

## 2022-05-26 PROCEDURE — 80053 COMPREHEN METABOLIC PANEL: CPT | Performed by: NURSE PRACTITIONER

## 2022-05-26 PROCEDURE — 82962 GLUCOSE BLOOD TEST: CPT

## 2022-05-26 PROCEDURE — G0378 HOSPITAL OBSERVATION PER HR: HCPCS

## 2022-05-26 PROCEDURE — 36600 WITHDRAWAL OF ARTERIAL BLOOD: CPT

## 2022-05-26 PROCEDURE — 25010000002 HYDRALAZINE PER 20 MG: Performed by: NURSE PRACTITIONER

## 2022-05-26 PROCEDURE — 84466 ASSAY OF TRANSFERRIN: CPT | Performed by: INTERNAL MEDICINE

## 2022-05-26 PROCEDURE — 99285 EMERGENCY DEPT VISIT HI MDM: CPT

## 2022-05-26 PROCEDURE — 83540 ASSAY OF IRON: CPT | Performed by: INTERNAL MEDICINE

## 2022-05-26 PROCEDURE — 96374 THER/PROPH/DIAG INJ IV PUSH: CPT

## 2022-05-26 PROCEDURE — 83036 HEMOGLOBIN GLYCOSYLATED A1C: CPT | Performed by: NURSE PRACTITIONER

## 2022-05-26 PROCEDURE — 87635 SARS-COV-2 COVID-19 AMP PRB: CPT | Performed by: INTERNAL MEDICINE

## 2022-05-26 PROCEDURE — 80061 LIPID PANEL: CPT | Performed by: NURSE PRACTITIONER

## 2022-05-26 PROCEDURE — 70450 CT HEAD/BRAIN W/O DYE: CPT

## 2022-05-26 PROCEDURE — 36415 COLL VENOUS BLD VENIPUNCTURE: CPT | Performed by: NURSE PRACTITIONER

## 2022-05-26 PROCEDURE — 82803 BLOOD GASES ANY COMBINATION: CPT

## 2022-05-26 PROCEDURE — 85025 COMPLETE CBC W/AUTO DIFF WBC: CPT | Performed by: NURSE PRACTITIONER

## 2022-05-26 PROCEDURE — 99204 OFFICE O/P NEW MOD 45 MIN: CPT | Performed by: INTERNAL MEDICINE

## 2022-05-26 PROCEDURE — 71045 X-RAY EXAM CHEST 1 VIEW: CPT

## 2022-05-26 PROCEDURE — 96375 TX/PRO/DX INJ NEW DRUG ADDON: CPT

## 2022-05-26 PROCEDURE — C9803 HOPD COVID-19 SPEC COLLECT: HCPCS

## 2022-05-26 RX ORDER — DEXTROSE MONOHYDRATE 25 G/50ML
25 INJECTION, SOLUTION INTRAVENOUS ONCE
Status: COMPLETED | OUTPATIENT
Start: 2022-05-26 | End: 2022-05-26

## 2022-05-26 RX ORDER — ACETAMINOPHEN 325 MG/1
650 TABLET ORAL EVERY 4 HOURS PRN
Status: DISCONTINUED | OUTPATIENT
Start: 2022-05-26 | End: 2022-05-28 | Stop reason: HOSPADM

## 2022-05-26 RX ORDER — INSULIN LISPRO 100 [IU]/ML
0-7 INJECTION, SOLUTION INTRAVENOUS; SUBCUTANEOUS AS NEEDED
Status: DISCONTINUED | OUTPATIENT
Start: 2022-05-26 | End: 2022-05-28 | Stop reason: HOSPADM

## 2022-05-26 RX ORDER — SODIUM CHLORIDE 0.9 % (FLUSH) 0.9 %
10 SYRINGE (ML) INJECTION AS NEEDED
Status: DISCONTINUED | OUTPATIENT
Start: 2022-05-26 | End: 2022-05-28 | Stop reason: HOSPADM

## 2022-05-26 RX ORDER — METOPROLOL TARTRATE 5 MG/5ML
5 INJECTION INTRAVENOUS ONCE
Status: COMPLETED | OUTPATIENT
Start: 2022-05-26 | End: 2022-05-26

## 2022-05-26 RX ORDER — INSULIN LISPRO 100 [IU]/ML
0-7 INJECTION, SOLUTION INTRAVENOUS; SUBCUTANEOUS
Status: DISCONTINUED | OUTPATIENT
Start: 2022-05-26 | End: 2022-05-28 | Stop reason: HOSPADM

## 2022-05-26 RX ORDER — NICOTINE POLACRILEX 4 MG
15 LOZENGE BUCCAL
Status: DISCONTINUED | OUTPATIENT
Start: 2022-05-26 | End: 2022-05-28 | Stop reason: HOSPADM

## 2022-05-26 RX ORDER — IPRATROPIUM BROMIDE AND ALBUTEROL SULFATE 2.5; .5 MG/3ML; MG/3ML
3 SOLUTION RESPIRATORY (INHALATION)
Status: DISCONTINUED | OUTPATIENT
Start: 2022-05-26 | End: 2022-05-28 | Stop reason: HOSPADM

## 2022-05-26 RX ORDER — DEXTROSE MONOHYDRATE 25 G/50ML
25 INJECTION, SOLUTION INTRAVENOUS
Status: DISCONTINUED | OUTPATIENT
Start: 2022-05-26 | End: 2022-05-28 | Stop reason: HOSPADM

## 2022-05-26 RX ORDER — HEPARIN SODIUM 5000 [USP'U]/ML
5000 INJECTION, SOLUTION INTRAVENOUS; SUBCUTANEOUS EVERY 12 HOURS SCHEDULED
Status: DISCONTINUED | OUTPATIENT
Start: 2022-05-26 | End: 2022-05-28 | Stop reason: HOSPADM

## 2022-05-26 RX ORDER — DEXTROSE MONOHYDRATE 25 G/50ML
INJECTION, SOLUTION INTRAVENOUS
Status: COMPLETED
Start: 2022-05-26 | End: 2022-05-26

## 2022-05-26 RX ORDER — SODIUM CHLORIDE 0.9 % (FLUSH) 0.9 %
10 SYRINGE (ML) INJECTION EVERY 12 HOURS SCHEDULED
Status: DISCONTINUED | OUTPATIENT
Start: 2022-05-26 | End: 2022-05-28 | Stop reason: HOSPADM

## 2022-05-26 RX ORDER — FAMOTIDINE 20 MG/1
20 TABLET, FILM COATED ORAL DAILY
Status: DISCONTINUED | OUTPATIENT
Start: 2022-05-26 | End: 2022-05-28 | Stop reason: HOSPADM

## 2022-05-26 RX ORDER — AMLODIPINE BESYLATE 5 MG/1
10 TABLET ORAL DAILY
Status: DISCONTINUED | OUTPATIENT
Start: 2022-05-26 | End: 2022-05-28 | Stop reason: HOSPADM

## 2022-05-26 RX ORDER — ATORVASTATIN CALCIUM 40 MG/1
40 TABLET, FILM COATED ORAL NIGHTLY
Status: DISCONTINUED | OUTPATIENT
Start: 2022-05-26 | End: 2022-05-28 | Stop reason: HOSPADM

## 2022-05-26 RX ORDER — HYDRALAZINE HYDROCHLORIDE 20 MG/ML
10 INJECTION INTRAMUSCULAR; INTRAVENOUS EVERY 6 HOURS PRN
Status: DISCONTINUED | OUTPATIENT
Start: 2022-05-26 | End: 2022-05-28 | Stop reason: HOSPADM

## 2022-05-26 RX ORDER — DOCUSATE SODIUM 100 MG/1
100 CAPSULE, LIQUID FILLED ORAL 2 TIMES DAILY
Status: DISCONTINUED | OUTPATIENT
Start: 2022-05-26 | End: 2022-05-28 | Stop reason: HOSPADM

## 2022-05-26 RX ORDER — ONDANSETRON 2 MG/ML
4 INJECTION INTRAMUSCULAR; INTRAVENOUS EVERY 6 HOURS PRN
Status: DISCONTINUED | OUTPATIENT
Start: 2022-05-26 | End: 2022-05-28 | Stop reason: HOSPADM

## 2022-05-26 RX ORDER — METOPROLOL TARTRATE 50 MG/1
50 TABLET, FILM COATED ORAL 2 TIMES DAILY
Status: DISCONTINUED | OUTPATIENT
Start: 2022-05-26 | End: 2022-05-28 | Stop reason: HOSPADM

## 2022-05-26 RX ORDER — AMITRIPTYLINE HYDROCHLORIDE 25 MG/1
25 TABLET, FILM COATED ORAL NIGHTLY
Status: DISCONTINUED | OUTPATIENT
Start: 2022-05-26 | End: 2022-05-28 | Stop reason: HOSPADM

## 2022-05-26 RX ORDER — ONDANSETRON 4 MG/1
4 TABLET, FILM COATED ORAL EVERY 6 HOURS PRN
Status: DISCONTINUED | OUTPATIENT
Start: 2022-05-26 | End: 2022-05-28 | Stop reason: HOSPADM

## 2022-05-26 RX ORDER — SEVELAMER CARBONATE 800 MG/1
2400 TABLET, FILM COATED ORAL
Status: DISCONTINUED | OUTPATIENT
Start: 2022-05-27 | End: 2022-05-28 | Stop reason: HOSPADM

## 2022-05-26 RX ORDER — OLANZAPINE 10 MG/2ML
1 INJECTION, POWDER, LYOPHILIZED, FOR SOLUTION INTRAMUSCULAR
Status: DISCONTINUED | OUTPATIENT
Start: 2022-05-26 | End: 2022-05-28 | Stop reason: HOSPADM

## 2022-05-26 RX ADMIN — Medication 10 ML: at 21:22

## 2022-05-26 RX ADMIN — DEXTROSE MONOHYDRATE 25 G: 25 INJECTION, SOLUTION INTRAVENOUS at 06:42

## 2022-05-26 RX ADMIN — ATORVASTATIN CALCIUM 40 MG: 40 TABLET, FILM COATED ORAL at 21:21

## 2022-05-26 RX ADMIN — HYDRALAZINE HYDROCHLORIDE 10 MG: 20 INJECTION INTRAMUSCULAR; INTRAVENOUS at 19:55

## 2022-05-26 RX ADMIN — METOPROLOL TARTRATE 5 MG: 1 INJECTION, SOLUTION INTRAVENOUS at 11:23

## 2022-05-26 RX ADMIN — METOPROLOL TARTRATE 50 MG: 50 TABLET, FILM COATED ORAL at 21:21

## 2022-05-26 RX ADMIN — AMITRIPTYLINE HYDROCHLORIDE 25 MG: 25 TABLET, FILM COATED ORAL at 21:21

## 2022-05-26 RX ADMIN — AMLODIPINE BESYLATE 10 MG: 5 TABLET ORAL at 21:21

## 2022-05-26 RX ADMIN — FAMOTIDINE 20 MG: 20 TABLET ORAL at 19:56

## 2022-05-26 RX ADMIN — DOCUSATE SODIUM 100 MG: 100 CAPSULE, LIQUID FILLED ORAL at 21:22

## 2022-05-27 LAB
ALBUMIN SERPL-MCNC: 3.5 G/DL (ref 3.5–5.2)
ALBUMIN/GLOB SERPL: 1 G/DL
ALP SERPL-CCNC: 146 U/L (ref 39–117)
ALT SERPL W P-5'-P-CCNC: 15 U/L (ref 1–41)
ANION GAP SERPL CALCULATED.3IONS-SCNC: 16 MMOL/L (ref 5–15)
AST SERPL-CCNC: 14 U/L (ref 1–40)
BASOPHILS # BLD AUTO: 0.1 10*3/MM3 (ref 0–0.2)
BASOPHILS NFR BLD AUTO: 0.8 % (ref 0–1.5)
BILIRUB SERPL-MCNC: 0.3 MG/DL (ref 0–1.2)
BUN SERPL-MCNC: 38 MG/DL (ref 6–20)
BUN/CREAT SERPL: 4.5 (ref 7–25)
CALCIUM SPEC-SCNC: 9.2 MG/DL (ref 8.6–10.5)
CHLORIDE SERPL-SCNC: 92 MMOL/L (ref 98–107)
CHOLEST SERPL-MCNC: 147 MG/DL (ref 0–200)
CO2 SERPL-SCNC: 26 MMOL/L (ref 22–29)
CREAT SERPL-MCNC: 8.5 MG/DL (ref 0.76–1.27)
DEPRECATED RDW RBC AUTO: 54.3 FL (ref 37–54)
EGFRCR SERPLBLD CKD-EPI 2021: 6.7 ML/MIN/1.73
EOSINOPHIL # BLD AUTO: 0.8 10*3/MM3 (ref 0–0.4)
EOSINOPHIL NFR BLD AUTO: 10.7 % (ref 0.3–6.2)
ERYTHROCYTE [DISTWIDTH] IN BLOOD BY AUTOMATED COUNT: 16.9 % (ref 12.3–15.4)
GLOBULIN UR ELPH-MCNC: 3.4 GM/DL
GLUCOSE BLDC GLUCOMTR-MCNC: 110 MG/DL (ref 70–105)
GLUCOSE BLDC GLUCOMTR-MCNC: 125 MG/DL (ref 70–105)
GLUCOSE BLDC GLUCOMTR-MCNC: 139 MG/DL (ref 70–105)
GLUCOSE BLDC GLUCOMTR-MCNC: 96 MG/DL (ref 70–105)
GLUCOSE SERPL-MCNC: 270 MG/DL (ref 65–99)
HBA1C MFR BLD: 5.7 % (ref 3.5–5.6)
HBV SURFACE AG SERPL QL IA: NORMAL
HCT VFR BLD AUTO: 28.7 % (ref 37.5–51)
HDLC SERPL-MCNC: 42 MG/DL (ref 40–60)
HGB BLD-MCNC: 9 G/DL (ref 13–17.7)
HOLD SPECIMEN: NORMAL
IRON 24H UR-MRATE: 101 MCG/DL (ref 59–158)
IRON SATN MFR SERPL: 46 % (ref 20–50)
LDLC SERPL CALC-MCNC: 85 MG/DL (ref 0–100)
LDLC/HDLC SERPL: 1.97 {RATIO}
LYMPHOCYTES # BLD AUTO: 0.8 10*3/MM3 (ref 0.7–3.1)
LYMPHOCYTES NFR BLD AUTO: 11.5 % (ref 19.6–45.3)
MCH RBC QN AUTO: 29.3 PG (ref 26.6–33)
MCHC RBC AUTO-ENTMCNC: 31.4 G/DL (ref 31.5–35.7)
MCV RBC AUTO: 93.4 FL (ref 79–97)
MONOCYTES # BLD AUTO: 0.7 10*3/MM3 (ref 0.1–0.9)
MONOCYTES NFR BLD AUTO: 9.7 % (ref 5–12)
NEUTROPHILS NFR BLD AUTO: 4.9 10*3/MM3 (ref 1.7–7)
NEUTROPHILS NFR BLD AUTO: 67.3 % (ref 42.7–76)
NRBC BLD AUTO-RTO: 0.1 /100 WBC (ref 0–0.2)
PLATELET # BLD AUTO: 185 10*3/MM3 (ref 140–450)
PMV BLD AUTO: 8.2 FL (ref 6–12)
POTASSIUM SERPL-SCNC: 5.2 MMOL/L (ref 3.5–5.2)
PROT SERPL-MCNC: 6.9 G/DL (ref 6–8.5)
RBC # BLD AUTO: 3.07 10*6/MM3 (ref 4.14–5.8)
SODIUM SERPL-SCNC: 134 MMOL/L (ref 136–145)
TIBC SERPL-MCNC: 218 MCG/DL (ref 298–536)
TRANSFERRIN SERPL-MCNC: 146 MG/DL (ref 200–360)
TRIGL SERPL-MCNC: 112 MG/DL (ref 0–150)
VLDLC SERPL-MCNC: 20 MG/DL (ref 5–40)
WBC NRBC COR # BLD: 7.2 10*3/MM3 (ref 3.4–10.8)

## 2022-05-27 PROCEDURE — 80053 COMPREHEN METABOLIC PANEL: CPT | Performed by: NURSE PRACTITIONER

## 2022-05-27 PROCEDURE — 82962 GLUCOSE BLOOD TEST: CPT

## 2022-05-27 PROCEDURE — 94640 AIRWAY INHALATION TREATMENT: CPT

## 2022-05-27 PROCEDURE — 94799 UNLISTED PULMONARY SVC/PX: CPT

## 2022-05-27 PROCEDURE — 87340 HEPATITIS B SURFACE AG IA: CPT | Performed by: INTERNAL MEDICINE

## 2022-05-27 PROCEDURE — G0257 UNSCHED DIALYSIS ESRD PT HOS: HCPCS

## 2022-05-27 PROCEDURE — G0378 HOSPITAL OBSERVATION PER HR: HCPCS

## 2022-05-27 PROCEDURE — 36415 COLL VENOUS BLD VENIPUNCTURE: CPT | Performed by: NURSE PRACTITIONER

## 2022-05-27 PROCEDURE — 96372 THER/PROPH/DIAG INJ SC/IM: CPT

## 2022-05-27 PROCEDURE — 25010000002 HEPARIN (PORCINE) PER 1000 UNITS: Performed by: NURSE PRACTITIONER

## 2022-05-27 PROCEDURE — 85025 COMPLETE CBC W/AUTO DIFF WBC: CPT | Performed by: NURSE PRACTITIONER

## 2022-05-27 PROCEDURE — 99231 SBSQ HOSP IP/OBS SF/LOW 25: CPT | Performed by: INTERNAL MEDICINE

## 2022-05-27 PROCEDURE — 97161 PT EVAL LOW COMPLEX 20 MIN: CPT

## 2022-05-27 RX ADMIN — DOCUSATE SODIUM 100 MG: 100 CAPSULE, LIQUID FILLED ORAL at 20:46

## 2022-05-27 RX ADMIN — HEPARIN SODIUM 5000 UNITS: 5000 INJECTION INTRAVENOUS; SUBCUTANEOUS at 08:51

## 2022-05-27 RX ADMIN — SEVELAMER CARBONATE 2400 MG: 800 TABLET, FILM COATED ORAL at 08:49

## 2022-05-27 RX ADMIN — AMITRIPTYLINE HYDROCHLORIDE 25 MG: 25 TABLET, FILM COATED ORAL at 20:46

## 2022-05-27 RX ADMIN — DOCUSATE SODIUM 100 MG: 100 CAPSULE, LIQUID FILLED ORAL at 08:49

## 2022-05-27 RX ADMIN — METOPROLOL TARTRATE 50 MG: 50 TABLET, FILM COATED ORAL at 08:49

## 2022-05-27 RX ADMIN — AMLODIPINE BESYLATE 10 MG: 5 TABLET ORAL at 08:49

## 2022-05-27 RX ADMIN — SEVELAMER CARBONATE 2400 MG: 800 TABLET, FILM COATED ORAL at 12:40

## 2022-05-27 RX ADMIN — FAMOTIDINE 20 MG: 20 TABLET ORAL at 08:49

## 2022-05-27 RX ADMIN — ATORVASTATIN CALCIUM 40 MG: 40 TABLET, FILM COATED ORAL at 20:45

## 2022-05-27 RX ADMIN — Medication 10 ML: at 08:51

## 2022-05-27 RX ADMIN — Medication 10 ML: at 20:46

## 2022-05-27 RX ADMIN — METOPROLOL TARTRATE 50 MG: 50 TABLET, FILM COATED ORAL at 20:45

## 2022-05-27 RX ADMIN — IPRATROPIUM BROMIDE AND ALBUTEROL SULFATE 3 ML: .5; 3 SOLUTION RESPIRATORY (INHALATION) at 19:16

## 2022-05-28 ENCOUNTER — READMISSION MANAGEMENT (OUTPATIENT)
Dept: CALL CENTER | Facility: HOSPITAL | Age: 57
End: 2022-05-28

## 2022-05-28 VITALS
OXYGEN SATURATION: 97 % | RESPIRATION RATE: 16 BRPM | SYSTOLIC BLOOD PRESSURE: 102 MMHG | WEIGHT: 212.96 LBS | HEART RATE: 95 BPM | BODY MASS INDEX: 30.49 KG/M2 | TEMPERATURE: 98.9 F | HEIGHT: 70 IN | DIASTOLIC BLOOD PRESSURE: 48 MMHG

## 2022-05-28 LAB
ALBUMIN SERPL-MCNC: 3.6 G/DL (ref 3.5–5.2)
ALBUMIN/GLOB SERPL: 1 G/DL
ALP SERPL-CCNC: 126 U/L (ref 39–117)
ALT SERPL W P-5'-P-CCNC: 12 U/L (ref 1–41)
ANION GAP SERPL CALCULATED.3IONS-SCNC: 12 MMOL/L (ref 5–15)
AST SERPL-CCNC: 10 U/L (ref 1–40)
BASOPHILS # BLD AUTO: 0.1 10*3/MM3 (ref 0–0.2)
BASOPHILS NFR BLD AUTO: 1 % (ref 0–1.5)
BILIRUB SERPL-MCNC: 0.3 MG/DL (ref 0–1.2)
BUN SERPL-MCNC: 18 MG/DL (ref 6–20)
BUN/CREAT SERPL: 3.3 (ref 7–25)
CALCIUM SPEC-SCNC: 9.4 MG/DL (ref 8.6–10.5)
CHLORIDE SERPL-SCNC: 95 MMOL/L (ref 98–107)
CO2 SERPL-SCNC: 25 MMOL/L (ref 22–29)
CREAT SERPL-MCNC: 5.52 MG/DL (ref 0.76–1.27)
DEPRECATED RDW RBC AUTO: 54.3 FL (ref 37–54)
EGFRCR SERPLBLD CKD-EPI 2021: 11.3 ML/MIN/1.73
EOSINOPHIL # BLD AUTO: 0.7 10*3/MM3 (ref 0–0.4)
EOSINOPHIL NFR BLD AUTO: 8.2 % (ref 0.3–6.2)
ERYTHROCYTE [DISTWIDTH] IN BLOOD BY AUTOMATED COUNT: 16.7 % (ref 12.3–15.4)
GLOBULIN UR ELPH-MCNC: 3.5 GM/DL
GLUCOSE BLDC GLUCOMTR-MCNC: 124 MG/DL (ref 70–105)
GLUCOSE BLDC GLUCOMTR-MCNC: 183 MG/DL (ref 70–105)
GLUCOSE SERPL-MCNC: 237 MG/DL (ref 65–99)
HCT VFR BLD AUTO: 30.2 % (ref 37.5–51)
HGB BLD-MCNC: 9.5 G/DL (ref 13–17.7)
LYMPHOCYTES # BLD AUTO: 1.2 10*3/MM3 (ref 0.7–3.1)
LYMPHOCYTES NFR BLD AUTO: 14.3 % (ref 19.6–45.3)
MCH RBC QN AUTO: 29.5 PG (ref 26.6–33)
MCHC RBC AUTO-ENTMCNC: 31.4 G/DL (ref 31.5–35.7)
MCV RBC AUTO: 93.9 FL (ref 79–97)
MONOCYTES # BLD AUTO: 0.8 10*3/MM3 (ref 0.1–0.9)
MONOCYTES NFR BLD AUTO: 9.7 % (ref 5–12)
NEUTROPHILS NFR BLD AUTO: 5.4 10*3/MM3 (ref 1.7–7)
NEUTROPHILS NFR BLD AUTO: 66.8 % (ref 42.7–76)
NRBC BLD AUTO-RTO: 0.1 /100 WBC (ref 0–0.2)
PLATELET # BLD AUTO: 194 10*3/MM3 (ref 140–450)
PMV BLD AUTO: 8 FL (ref 6–12)
POTASSIUM SERPL-SCNC: 4.6 MMOL/L (ref 3.5–5.2)
PROT SERPL-MCNC: 7.1 G/DL (ref 6–8.5)
RBC # BLD AUTO: 3.22 10*6/MM3 (ref 4.14–5.8)
SODIUM SERPL-SCNC: 132 MMOL/L (ref 136–145)
WBC NRBC COR # BLD: 8.1 10*3/MM3 (ref 3.4–10.8)

## 2022-05-28 PROCEDURE — G0378 HOSPITAL OBSERVATION PER HR: HCPCS

## 2022-05-28 PROCEDURE — 82962 GLUCOSE BLOOD TEST: CPT

## 2022-05-28 PROCEDURE — 94799 UNLISTED PULMONARY SVC/PX: CPT

## 2022-05-28 PROCEDURE — 96372 THER/PROPH/DIAG INJ SC/IM: CPT

## 2022-05-28 PROCEDURE — 63710000001 INSULIN LISPRO (HUMAN) PER 5 UNITS: Performed by: INTERNAL MEDICINE

## 2022-05-28 PROCEDURE — 94664 DEMO&/EVAL PT USE INHALER: CPT

## 2022-05-28 PROCEDURE — 25010000002 HEPARIN (PORCINE) PER 1000 UNITS: Performed by: NURSE PRACTITIONER

## 2022-05-28 PROCEDURE — 94761 N-INVAS EAR/PLS OXIMETRY MLT: CPT

## 2022-05-28 RX ADMIN — FAMOTIDINE 20 MG: 20 TABLET ORAL at 10:07

## 2022-05-28 RX ADMIN — Medication 10 ML: at 10:08

## 2022-05-28 RX ADMIN — SEVELAMER CARBONATE 2400 MG: 800 TABLET, FILM COATED ORAL at 10:07

## 2022-05-28 RX ADMIN — HEPARIN SODIUM 5000 UNITS: 5000 INJECTION INTRAVENOUS; SUBCUTANEOUS at 10:07

## 2022-05-28 RX ADMIN — IPRATROPIUM BROMIDE AND ALBUTEROL SULFATE 3 ML: .5; 3 SOLUTION RESPIRATORY (INHALATION) at 10:15

## 2022-05-28 RX ADMIN — DOCUSATE SODIUM 100 MG: 100 CAPSULE, LIQUID FILLED ORAL at 10:07

## 2022-05-28 RX ADMIN — AMLODIPINE BESYLATE 10 MG: 5 TABLET ORAL at 10:07

## 2022-05-28 RX ADMIN — METOPROLOL TARTRATE 50 MG: 50 TABLET, FILM COATED ORAL at 10:07

## 2022-05-28 RX ADMIN — INSULIN LISPRO 2 UNITS: 100 INJECTION, SOLUTION INTRAVENOUS; SUBCUTANEOUS at 12:32

## 2022-05-28 RX ADMIN — SEVELAMER CARBONATE 2400 MG: 800 TABLET, FILM COATED ORAL at 12:32

## 2022-05-29 NOTE — OUTREACH NOTE
Prep Survey    Flowsheet Row Responses   [facility] facility patient discharged from? [facility]   Is LACE score < 7 ? No   Emergency Room discharge w/ pulse ox? No   Eligibility Readm Mgmt   Discharge diagnosis Hypoglycemia   Does the patient have one of the following disease processes/diagnoses(primary or secondary)? Other   Does the patient have Home health ordered? No   Is there a DME ordered? No   Prep survey completed? Yes          DAVID MCNEILL - Registered Nurse

## 2022-06-02 ENCOUNTER — READMISSION MANAGEMENT (OUTPATIENT)
Dept: CALL CENTER | Facility: HOSPITAL | Age: 57
End: 2022-06-02

## 2022-06-02 NOTE — OUTREACH NOTE
Medical Week 1 Survey    Flowsheet Row Responses   [facility] facility patient discharged from? [facility]   Does the patient have one of the following disease processes/diagnoses(primary or secondary)? Other   Week 1 attempt successful? No   Unsuccessful attempts Attempt 1          ROGER TIMMONS - Registered Nurse

## 2022-06-06 ENCOUNTER — TELEPHONE (OUTPATIENT)
Dept: ENDOCRINOLOGY | Facility: CLINIC | Age: 57
End: 2022-06-06

## 2022-06-06 RX ORDER — GABAPENTIN 600 MG/1
600 TABLET ORAL 2 TIMES DAILY
COMMUNITY
Start: 2022-03-01

## 2022-06-06 RX ORDER — FERROUS SULFATE 325(65) MG
325 TABLET ORAL
COMMUNITY
Start: 2022-04-18

## 2022-06-06 RX ORDER — AMITRIPTYLINE HYDROCHLORIDE 50 MG/1
TABLET, FILM COATED ORAL
Status: ON HOLD | COMMUNITY
Start: 2022-04-06 | End: 2022-09-20

## 2022-06-06 RX ORDER — ERGOCALCIFEROL 1.25 MG/1
CAPSULE ORAL
Status: ON HOLD | COMMUNITY
Start: 2022-04-14 | End: 2022-09-20

## 2022-06-06 RX ORDER — PANTOPRAZOLE SODIUM 20 MG/1
20 TABLET, DELAYED RELEASE ORAL DAILY
COMMUNITY
Start: 2022-04-14

## 2022-06-06 RX ORDER — HYDRALAZINE HYDROCHLORIDE 50 MG/1
50 TABLET, FILM COATED ORAL 2 TIMES DAILY
COMMUNITY
Start: 2022-04-14 | End: 2022-09-30 | Stop reason: HOSPADM

## 2022-06-06 RX ORDER — HYDRALAZINE HYDROCHLORIDE 25 MG/1
TABLET, FILM COATED ORAL
Status: ON HOLD | COMMUNITY
Start: 2022-03-01 | End: 2022-09-20 | Stop reason: SDUPTHER

## 2022-06-06 NOTE — TELEPHONE ENCOUNTER
Tried to call patient regarding needing to reschedule appt for Thursday June 9th. No answer left vm asking to contact our office to get rescheduled.   appt is cx'ed.

## 2022-06-07 ENCOUNTER — READMISSION MANAGEMENT (OUTPATIENT)
Dept: CALL CENTER | Facility: HOSPITAL | Age: 57
End: 2022-06-07

## 2022-06-07 NOTE — OUTREACH NOTE
Medical Week 1 Survey    Flowsheet Row Responses   [facility] facility patient discharged from? [facility]   Does the patient have one of the following disease processes/diagnoses(primary or secondary)? Other   Week 1 attempt successful? No   Unsuccessful attempts Attempt 2          ISAURO HEDRICK - Registered Nurse

## 2022-06-14 ENCOUNTER — READMISSION MANAGEMENT (OUTPATIENT)
Dept: CALL CENTER | Facility: HOSPITAL | Age: 57
End: 2022-06-14

## 2022-06-14 NOTE — OUTREACH NOTE
Medical Week 3 Survey    Flowsheet Row Responses   [facility] facility patient discharged from? [facility]   Does the patient have one of the following disease processes/diagnoses(primary or secondary)? Other   Week 3 attempt successful? No   Unsuccessful attempts Attempt 1          HUSAM EDWARDS - Licensed Nurse

## 2022-06-16 ENCOUNTER — READMISSION MANAGEMENT (OUTPATIENT)
Dept: CALL CENTER | Facility: HOSPITAL | Age: 57
End: 2022-06-16

## 2022-06-16 NOTE — OUTREACH NOTE
Medical Week 3 Survey    Flowsheet Row Responses   [facility] patient discharged from? [facility]   Does the patient have one of the following disease processes/diagnoses(primary or secondary)? Other   Week 3 attempt successful? No   Unsuccessful attempts Attempt 2   Rescheduled Revoked   Revoke Decline to participate   Discharge diagnosis Hypoglycemia          HYACINTH MCNEILL - Registered Nurse

## 2022-09-15 ENCOUNTER — HOSPITAL ENCOUNTER (EMERGENCY)
Facility: HOSPITAL | Age: 57
Discharge: HOME | End: 2022-09-15
Attending: EMERGENCY MEDICINE | Admitting: EMERGENCY MEDICINE

## 2022-09-15 ENCOUNTER — APPOINTMENT (OUTPATIENT)
Dept: GENERAL RADIOLOGY | Facility: HOSPITAL | Age: 57
End: 2022-09-15

## 2022-09-15 VITALS
OXYGEN SATURATION: 98 % | RESPIRATION RATE: 20 BRPM | DIASTOLIC BLOOD PRESSURE: 88 MMHG | HEART RATE: 101 BPM | WEIGHT: 204 LBS | HEIGHT: 69 IN | TEMPERATURE: 97.9 F | SYSTOLIC BLOOD PRESSURE: 158 MMHG | BODY MASS INDEX: 30.21 KG/M2

## 2022-09-15 DIAGNOSIS — J98.01 BRONCHOSPASM: ICD-10-CM

## 2022-09-15 DIAGNOSIS — J40 BRONCHITIS: Primary | ICD-10-CM

## 2022-09-15 LAB
ANION GAP SERPL CALCULATED.3IONS-SCNC: 13 MMOL/L (ref 5–15)
B PARAPERT DNA SPEC QL NAA+PROBE: NOT DETECTED
B PERT DNA SPEC QL NAA+PROBE: NOT DETECTED
BASOPHILS # BLD AUTO: 0.1 10*3/MM3 (ref 0–0.2)
BASOPHILS NFR BLD AUTO: 1 % (ref 0–1.5)
BUN SERPL-MCNC: 26 MG/DL (ref 6–20)
BUN/CREAT SERPL: 3.5 (ref 7–25)
C PNEUM DNA NPH QL NAA+NON-PROBE: NOT DETECTED
CALCIUM SPEC-SCNC: 11.1 MG/DL (ref 8.6–10.5)
CHLORIDE SERPL-SCNC: 92 MMOL/L (ref 98–107)
CO2 SERPL-SCNC: 32 MMOL/L (ref 22–29)
CREAT SERPL-MCNC: 7.38 MG/DL (ref 0.76–1.27)
DEPRECATED RDW RBC AUTO: 62.6 FL (ref 37–54)
EGFRCR SERPLBLD CKD-EPI 2021: 7.9 ML/MIN/1.73
EOSINOPHIL # BLD AUTO: 0.8 10*3/MM3 (ref 0–0.4)
EOSINOPHIL NFR BLD AUTO: 11.3 % (ref 0.3–6.2)
ERYTHROCYTE [DISTWIDTH] IN BLOOD BY AUTOMATED COUNT: 18.8 % (ref 12.3–15.4)
FLUAV SUBTYP SPEC NAA+PROBE: NOT DETECTED
FLUBV RNA ISLT QL NAA+PROBE: NOT DETECTED
GLUCOSE SERPL-MCNC: 169 MG/DL (ref 65–99)
HADV DNA SPEC NAA+PROBE: NOT DETECTED
HCOV 229E RNA SPEC QL NAA+PROBE: NOT DETECTED
HCOV HKU1 RNA SPEC QL NAA+PROBE: NOT DETECTED
HCOV NL63 RNA SPEC QL NAA+PROBE: NOT DETECTED
HCOV OC43 RNA SPEC QL NAA+PROBE: NOT DETECTED
HCT VFR BLD AUTO: 39.2 % (ref 37.5–51)
HGB BLD-MCNC: 12.7 G/DL (ref 13–17.7)
HMPV RNA NPH QL NAA+NON-PROBE: NOT DETECTED
HPIV1 RNA ISLT QL NAA+PROBE: NOT DETECTED
HPIV2 RNA SPEC QL NAA+PROBE: NOT DETECTED
HPIV3 RNA NPH QL NAA+PROBE: NOT DETECTED
HPIV4 P GENE NPH QL NAA+PROBE: NOT DETECTED
LYMPHOCYTES # BLD AUTO: 1 10*3/MM3 (ref 0.7–3.1)
LYMPHOCYTES NFR BLD AUTO: 14.9 % (ref 19.6–45.3)
M PNEUMO IGG SER IA-ACNC: NOT DETECTED
MCH RBC QN AUTO: 30.7 PG (ref 26.6–33)
MCHC RBC AUTO-ENTMCNC: 32.5 G/DL (ref 31.5–35.7)
MCV RBC AUTO: 94.6 FL (ref 79–97)
MONOCYTES # BLD AUTO: 0.6 10*3/MM3 (ref 0.1–0.9)
MONOCYTES NFR BLD AUTO: 8.4 % (ref 5–12)
NEUTROPHILS NFR BLD AUTO: 4.3 10*3/MM3 (ref 1.7–7)
NEUTROPHILS NFR BLD AUTO: 64.4 % (ref 42.7–76)
NRBC BLD AUTO-RTO: 0.1 /100 WBC (ref 0–0.2)
PLATELET # BLD AUTO: 183 10*3/MM3 (ref 140–450)
PMV BLD AUTO: 8.3 FL (ref 6–12)
POTASSIUM SERPL-SCNC: 3.9 MMOL/L (ref 3.5–5.2)
RBC # BLD AUTO: 4.15 10*6/MM3 (ref 4.14–5.8)
RHINOVIRUS RNA SPEC NAA+PROBE: NOT DETECTED
RSV RNA NPH QL NAA+NON-PROBE: NOT DETECTED
SARS-COV-2 RNA NPH QL NAA+NON-PROBE: NOT DETECTED
SODIUM SERPL-SCNC: 137 MMOL/L (ref 136–145)
WBC NRBC COR # BLD: 6.7 10*3/MM3 (ref 3.4–10.8)

## 2022-09-15 PROCEDURE — 99283 EMERGENCY DEPT VISIT LOW MDM: CPT

## 2022-09-15 PROCEDURE — 71045 X-RAY EXAM CHEST 1 VIEW: CPT

## 2022-09-15 PROCEDURE — 0202U NFCT DS 22 TRGT SARS-COV-2: CPT | Performed by: PHYSICIAN ASSISTANT

## 2022-09-15 PROCEDURE — 85025 COMPLETE CBC W/AUTO DIFF WBC: CPT | Performed by: PHYSICIAN ASSISTANT

## 2022-09-15 PROCEDURE — 80048 BASIC METABOLIC PNL TOTAL CA: CPT | Performed by: PHYSICIAN ASSISTANT

## 2022-09-15 PROCEDURE — 94761 N-INVAS EAR/PLS OXIMETRY MLT: CPT

## 2022-09-15 PROCEDURE — 94799 UNLISTED PULMONARY SVC/PX: CPT

## 2022-09-15 PROCEDURE — 94640 AIRWAY INHALATION TREATMENT: CPT

## 2022-09-15 RX ORDER — BENZONATATE 200 MG/1
200 CAPSULE ORAL 3 TIMES DAILY PRN
Qty: 30 CAPSULE | Refills: 0 | Status: SHIPPED | OUTPATIENT
Start: 2022-09-15

## 2022-09-15 RX ORDER — METHYLPREDNISOLONE 4 MG/1
TABLET ORAL
Qty: 21 TABLET | Refills: 0 | Status: SHIPPED | OUTPATIENT
Start: 2022-09-15 | End: 2022-09-30 | Stop reason: HOSPADM

## 2022-09-15 RX ORDER — IPRATROPIUM BROMIDE AND ALBUTEROL SULFATE 2.5; .5 MG/3ML; MG/3ML
3 SOLUTION RESPIRATORY (INHALATION) ONCE
Status: COMPLETED | OUTPATIENT
Start: 2022-09-15 | End: 2022-09-15

## 2022-09-15 RX ORDER — SODIUM CHLORIDE 0.9 % (FLUSH) 0.9 %
10 SYRINGE (ML) INJECTION AS NEEDED
Status: DISCONTINUED | OUTPATIENT
Start: 2022-09-15 | End: 2022-09-15 | Stop reason: HOSPADM

## 2022-09-15 RX ORDER — AZITHROMYCIN 250 MG/1
TABLET, FILM COATED ORAL
Qty: 6 TABLET | Refills: 0 | Status: SHIPPED | OUTPATIENT
Start: 2022-09-15 | End: 2022-09-30 | Stop reason: HOSPADM

## 2022-09-15 RX ADMIN — IPRATROPIUM BROMIDE AND ALBUTEROL SULFATE 3 ML: .5; 3 SOLUTION RESPIRATORY (INHALATION) at 17:29

## 2022-09-15 NOTE — DISCHARGE INSTRUCTIONS
Take Z-Alex to completion.  Take Medrol Dosepak to completion.  Take Tessalon Perles as needed for cough.    Follow-up with primary care for recheck  Return to the ER for new or worsening symptoms  Go to your dialysis as scheduled tomorrow.

## 2022-09-15 NOTE — ED PROVIDER NOTES
"Subjective   History of Present Illness  Chief Complaint: Cough    Patient is a 57-year-old -American male with history of previous CVA, diabetes, ESRD on dialysis Monday Wednesday Friday, hypertension, hyperlipidemia presents to the ER complaints of cough for 2 weeks.  Patient reports a \"deep\" cough constantly for the last 2 weeks.  He denies any chest pain or shortness of breath.  He states the cough is nonproductive.  He denies any sore throat or congestion.  No headache lightheadedness dizziness or blurry vision.  He denies abdominal pain, nausea vomiting or diarrhea.  No body aches.  No fever chills.  No known exposure for COVID.  Patient states he has not tried taking any medications over-the-counter to help with his cough.  Patient reports mild fatigue but reports no other complaints besides his cough.  Patient states he did go to dialysis yesterday and plans to go to dialysis tomorrow.    PCP: Kerri Jones    History provided by:  Patient      Review of Systems   Constitutional: Positive for fatigue. Negative for chills and fever.   HENT: Negative for congestion, sore throat and trouble swallowing.    Eyes: Negative.    Respiratory: Positive for cough. Negative for chest tightness, shortness of breath and wheezing.    Cardiovascular: Negative for chest pain.   Gastrointestinal: Negative for abdominal pain, diarrhea, nausea and vomiting.   Endocrine: Negative.    Genitourinary: Negative for dysuria.   Musculoskeletal: Negative for myalgias.   Skin: Negative for rash.   Allergic/Immunologic: Negative.    Neurological: Negative for weakness and headaches.   Psychiatric/Behavioral: Negative for behavioral problems.   All other systems reviewed and are negative.      Past Medical History:   Diagnosis Date   • Anemia    • Cataract    • Constipation    • CVA (cerebral vascular accident) (HCC)     x 8   • CVA (cerebral vascular accident) (HCC)    • Depression    • Diabetes mellitus (HCC)    • ESRD (end stage " renal disease) on dialysis (HCC)    • H/O colonoscopy 2016   • History of noncompliance with medical treatment    • HLD (hyperlipidemia) .   • Hyperlipidemia    • Hypertension    • Insomnia    • Neuropathy    • Renal failure     Stage IV - AI -- on dialysis -- [facility]   • Retinopathy    • TIA (transient ischemic attack)    • Type 2 diabetes mellitus (HCC)        Allergies   Allergen Reactions   • Latex Unknown (See Comments)   • Other Unknown (See Comments)   • Penicillins Unknown (See Comments)   • Latex, Natural Rubber Unknown - High Severity   • Penicillins Unknown - High Severity   • Amoxicillin Rash       Past Surgical History:   Procedure Laterality Date   • ANKLE SURGERY Left    • ANKLE SURGERY     • APPENDECTOMY     • ARTERIOVENOUS FISTULA     • CATARACT EXTRACTION, BILATERAL  2018   • COLON RESECTION  2016   • OTHER SURGICAL HISTORY Left 02/23/2017    Left Distula Placement Dr Guan       Family History   Problem Relation Age of Onset   • Heart disease Mother    • Heart attack Mother    • Hypertension Mother    • Breast cancer Sister    • Hyperlipidemia Mother    • Stroke Mother    • Cancer Sister         Breast Cancer   • Diabetes Sister    • Heart disease Maternal Grandmother    • Hyperlipidemia Maternal Grandmother    • Hypertension Maternal Grandmother        Social History     Socioeconomic History   • Marital status:    Tobacco Use   • Smoking status: Never Smoker   • Smokeless tobacco: Current User     Types: Chew   • Tobacco comment: Passive Smoke exposure: no   Substance and Sexual Activity   • Alcohol use: Not Currently   • Drug use: Defer   • Sexual activity: Defer           Objective   Physical Exam  Vitals and nursing note reviewed.   Constitutional:       General: He is not in acute distress.     Appearance: Normal appearance. He is normal weight. He is not diaphoretic.   HENT:      Head: Normocephalic and atraumatic.      Right Ear: Tympanic membrane normal.      Left Ear: Tympanic  "membrane normal.      Nose: Nose normal. No congestion.      Mouth/Throat:      Mouth: Mucous membranes are moist.   Eyes:      Extraocular Movements: Extraocular movements intact.   Cardiovascular:      Rate and Rhythm: Normal rate and regular rhythm.      Pulses: Normal pulses.      Heart sounds: Normal heart sounds. No murmur heard.  Pulmonary:      Effort: Pulmonary effort is normal.      Breath sounds: Normal breath sounds.   Abdominal:      General: Abdomen is flat.   Musculoskeletal:      Cervical back: Normal range of motion.   Skin:     General: Skin is warm.      Capillary Refill: Capillary refill takes less than 2 seconds.   Neurological:      General: No focal deficit present.      Mental Status: He is alert and oriented to person, place, and time.   Psychiatric:         Mood and Affect: Mood normal.         Behavior: Behavior normal.         Procedures           ED Course    /88   Pulse 101   Temp 97.9 °F (36.6 °C) (Oral)   Resp 20   Ht 175.3 cm (69\")   Wt 92.5 kg (204 lb)   SpO2 98%   BMI 30.13 kg/m²   Labs Reviewed   CBC WITH AUTO DIFFERENTIAL - Abnormal; Notable for the following components:       Result Value    Hemoglobin 12.7 (*)     RDW 18.8 (*)     RDW-SD 62.6 (*)     Lymphocyte % 14.9 (*)     Eosinophil % 11.3 (*)     Eosinophils, Absolute 0.80 (*)     All other components within normal limits   BASIC METABOLIC PANEL - Abnormal; Notable for the following components:    Glucose 169 (*)     BUN 26 (*)     Creatinine 7.38 (*)     Chloride 92 (*)     CO2 32.0 (*)     Calcium 11.1 (*)     BUN/Creatinine Ratio 3.5 (*)     eGFR 7.9 (*)     All other components within normal limits    Narrative:     GFR Normal >60  Chronic Kidney Disease <60  Kidney Failure <15     RESPIRATORY PANEL PCR W/ COVID-19 (SARS-COV-2) RACHEL/AGUILA/DIONICIO/PAD/COR/MAD/[facility]ERT IN-HOUSE, NP SWAB IN UTM/VTP, 3-4 HR TAT - Normal    Narrative:     In the setting of a positive respiratory panel with a viral infection PLUS a negative " procalcitonin without other underlying concern for bacterial infection, consider observing off antibiotics or discontinuation of antibiotics and continue supportive care. If the respiratory panel is positive for atypical bacterial infection (Bordetella pertussis, Chlamydophila pneumoniae, or Mycoplasma pneumoniae), consider antibiotic de-escalation to target atypical bacterial infection.   CBC AND DIFFERENTIAL    Narrative:     The following orders were created for panel order CBC & Differential.  Procedure                               Abnormality         Status                     ---------                               -----------         ------                     CBC Auto Differential[[id number]]        Abnormal            Final result                 Please view results for these tests on the individual orders.     Medications   ipratropium-albuterol (DUO-NEB) nebulizer solution 3 mL (3 mL Nebulization Given 9/15/22 1729)     XR Chest 1 View    Result Date: 9/15/2022  1. Basilar predominant interstitial opacities which may represent bronchitis/bronchiolitis or atypical infection. 2. Stable cardiomegaly.  Electronically Signed By-Chemo Keating MD On:9/15/2022 3:55 PM This report was finalized on [id number] by  Chemo Keating MD.                                           MDM  Number of Diagnoses or Management Options  Bronchitis  Bronchospasm  Diagnosis management comments: MEDICAL DECISION  Epic Chart Review: No recent admissions.  Comorbidities: Anemia, CVA, depression, diabetes, ESRD on dialysis, renal failure  Differentials: URI, bronchitis, pneumonia, viral syndrome, COVID; this list is not all inclusive and does not constitute the entirety of considered causes  Radiology interpretation:  Images reviewed by me and interpreted by radiologist, as above  Lab interpretation:  Labs viewed by me significant for, as above    While in the ED IV was placed and labs were obtained appropriate PPE was worn  during exam and throughout all encounters with the patient.  Patient had the above evaluation.  IV established, lab work obtained.  CBC shows no leukocytosis.  CMP glucose 169, BUN 26, creatinine 7.38, potassium normal 3.9.  Patient receives dialysis Monday Wednesday Friday, he states that he did attend yesterday and plans to attend tomorrow.  Respiratory panel negative.  Chest x-ray shows basilar predominant interstitial opacities that may represent bronchitis or bronchiolitis or atypical infection.  No obvious pneumonia.  Findings discussed with patient at bedside.  Patient instructed return for Z-Alex, Tessalon Perles, steroid pack.  He again denies any chest pain shortness of breath.  He is afebrile, nontoxic appearance and in no acute distress.  SPO2 high 90s on room air.  Patient is felt to be stable for discharge.  Patient to follow-up with primary care for recheck.    Discharge plan and instructions were discussed with the patient who verbalized understanding and is in agreement with the plan, all questions were answered at this time.  Patient is aware of signs symptoms that would require immediate return to the emergency room.  Patient understands importance of following up with primary care provider for further evaluation and worsening concerns as well as blood pressure recheck in the next 4 weeks.    Patient was discharged in improved stable condition with an upright steady gait.       Amount and/or Complexity of Data Reviewed  Clinical lab tests: reviewed and ordered  Tests in the radiology section of CPT®: reviewed and ordered    Patient Progress  Patient progress: stable      Final diagnoses:   Bronchitis   Bronchospasm       ED Disposition  ED Disposition     ED Disposition   Discharge    Condition   Stable    Comment   --             Kerri Jones MD  [address] [facility] [place] [address] [phone]    Schedule an appointment as soon as possible for a visit in 2 days  As needed, If symptoms  worsen         Medication List      New Prescriptions    azithromycin 250 MG tablet  Commonly known as: Zithromax Z-Alex  Take 2 tablets the first day, then 1 tablet daily for 4 days.     benzonatate 200 MG capsule  Commonly known as: TESSALON  Take 1 capsule by mouth 3 (Three) Times a Day As Needed for Cough.     methylPREDNISolone 4 MG dose pack  Commonly known as: MEDROL  Take as directed on package instructions.           Where to Get Your Medications      These medications were sent to [facility] - [facility] [place] - [address] - [phone]  - [phone] [place] [address], [place] [address]    Phone: [phone]   · azithromycin 250 MG tablet  · benzonatate 200 MG capsule  · methylPREDNISolone 4 MG dose pack          Lian Cabrera PA  09/16/22 0106

## 2022-09-19 ENCOUNTER — APPOINTMENT (OUTPATIENT)
Dept: CT IMAGING | Facility: HOSPITAL | Age: 57
End: 2022-09-19

## 2022-09-19 ENCOUNTER — APPOINTMENT (OUTPATIENT)
Dept: MRI IMAGING | Facility: HOSPITAL | Age: 57
End: 2022-09-19

## 2022-09-19 ENCOUNTER — HOSPITAL ENCOUNTER (INPATIENT)
Facility: HOSPITAL | Age: 57
LOS: 9 days | Discharge: HOME | End: 2022-09-30
Attending: EMERGENCY MEDICINE | Admitting: FAMILY MEDICINE

## 2022-09-19 ENCOUNTER — APPOINTMENT (OUTPATIENT)
Dept: GENERAL RADIOLOGY | Facility: HOSPITAL | Age: 57
End: 2022-09-19

## 2022-09-19 DIAGNOSIS — N18.6 ESRD (END STAGE RENAL DISEASE): ICD-10-CM

## 2022-09-19 DIAGNOSIS — R53.1 WEAKNESS: Primary | ICD-10-CM

## 2022-09-19 LAB
ALBUMIN SERPL-MCNC: 4.4 G/DL (ref 3.5–5.2)
ALBUMIN/GLOB SERPL: 1.3 G/DL
ALP SERPL-CCNC: 112 U/L (ref 39–117)
ALT SERPL W P-5'-P-CCNC: 11 U/L (ref 1–41)
ANION GAP SERPL CALCULATED.3IONS-SCNC: 15 MMOL/L (ref 5–15)
APAP SERPL-MCNC: <5 MCG/ML (ref 0–30)
APTT PPP: 25.6 SECONDS (ref 61–76.5)
ARTERIAL PATENCY WRIST A: POSITIVE
AST SERPL-CCNC: 13 U/L (ref 1–40)
ATMOSPHERIC PRESS: ABNORMAL MM[HG]
BASE EXCESS BLDA CALC-SCNC: 2.9 MMOL/L (ref 0–3)
BASOPHILS # BLD AUTO: 0.1 10*3/MM3 (ref 0–0.2)
BASOPHILS NFR BLD AUTO: 1.3 % (ref 0–1.5)
BDY SITE: ABNORMAL
BILIRUB SERPL-MCNC: 0.3 MG/DL (ref 0–1.2)
BUN SERPL-MCNC: 47 MG/DL (ref 6–20)
BUN/CREAT SERPL: 4.9 (ref 7–25)
CALCIUM SPEC-SCNC: 11.8 MG/DL (ref 8.6–10.5)
CHLORIDE SERPL-SCNC: 90 MMOL/L (ref 98–107)
CO2 BLDA-SCNC: 30.4 MMOL/L (ref 22–29)
CO2 SERPL-SCNC: 29 MMOL/L (ref 22–29)
CREAT SERPL-MCNC: 9.67 MG/DL (ref 0.76–1.27)
D-LACTATE SERPL-SCNC: 0.6 MMOL/L (ref 0.5–2)
DEPRECATED RDW RBC AUTO: 59.5 FL (ref 37–54)
EGFRCR SERPLBLD CKD-EPI 2021: 5.7 ML/MIN/1.73
EOSINOPHIL # BLD AUTO: 0.6 10*3/MM3 (ref 0–0.4)
EOSINOPHIL NFR BLD AUTO: 7.1 % (ref 0.3–6.2)
ERYTHROCYTE [DISTWIDTH] IN BLOOD BY AUTOMATED COUNT: 18.1 % (ref 12.3–15.4)
ETHANOL UR QL: 0.01 %
GLOBULIN UR ELPH-MCNC: 3.3 GM/DL
GLUCOSE SERPL-MCNC: 149 MG/DL (ref 65–99)
HCO3 BLDA-SCNC: 28.9 MMOL/L (ref 21–28)
HCT VFR BLD AUTO: 37.1 % (ref 37.5–51)
HEMODILUTION: NO
HGB BLD-MCNC: 12.2 G/DL (ref 13–17.7)
HOLD SPECIMEN: NORMAL
INHALED O2 CONCENTRATION: 21 %
INR PPP: 0.97 (ref 0.93–1.1)
LYMPHOCYTES # BLD AUTO: 1.1 10*3/MM3 (ref 0.7–3.1)
LYMPHOCYTES NFR BLD AUTO: 12.7 % (ref 19.6–45.3)
MAGNESIUM SERPL-MCNC: 2.3 MG/DL (ref 1.6–2.6)
MCH RBC QN AUTO: 30.5 PG (ref 26.6–33)
MCHC RBC AUTO-ENTMCNC: 32.8 G/DL (ref 31.5–35.7)
MCV RBC AUTO: 92.9 FL (ref 79–97)
MODALITY: ABNORMAL
MONOCYTES # BLD AUTO: 0.5 10*3/MM3 (ref 0.1–0.9)
MONOCYTES NFR BLD AUTO: 6.1 % (ref 5–12)
NEUTROPHILS NFR BLD AUTO: 6.2 10*3/MM3 (ref 1.7–7)
NEUTROPHILS NFR BLD AUTO: 72.8 % (ref 42.7–76)
NRBC BLD AUTO-RTO: 0.1 /100 WBC (ref 0–0.2)
PCO2 BLDA: 49.3 MM HG (ref 35–48)
PH BLDA: 7.38 PH UNITS (ref 7.35–7.45)
PLATELET # BLD AUTO: 216 10*3/MM3 (ref 140–450)
PMV BLD AUTO: 8.4 FL (ref 6–12)
PO2 BLDA: 74.1 MM HG (ref 83–108)
POTASSIUM SERPL-SCNC: 4.4 MMOL/L (ref 3.5–5.2)
PROT SERPL-MCNC: 7.7 G/DL (ref 6–8.5)
PROTHROMBIN TIME: 10 SECONDS (ref 9.6–11.7)
PTH-INTACT SERPL-MCNC: 261.3 PG/ML (ref 15–65)
RBC # BLD AUTO: 4 10*6/MM3 (ref 4.14–5.8)
SALICYLATES SERPL-MCNC: <0.3 MG/DL
SAO2 % BLDCOA: 94.1 % (ref 94–98)
SODIUM SERPL-SCNC: 134 MMOL/L (ref 136–145)
TROPONIN T SERPL-MCNC: 0.23 NG/ML (ref 0–0.03)
WBC NRBC COR # BLD: 8.5 10*3/MM3 (ref 3.4–10.8)

## 2022-09-19 PROCEDURE — 87040 BLOOD CULTURE FOR BACTERIA: CPT | Performed by: EMERGENCY MEDICINE

## 2022-09-19 PROCEDURE — 83605 ASSAY OF LACTIC ACID: CPT

## 2022-09-19 PROCEDURE — 72148 MRI LUMBAR SPINE W/O DYE: CPT

## 2022-09-19 PROCEDURE — 82085 ASSAY OF ALDOLASE: CPT | Performed by: PSYCHIATRY & NEUROLOGY

## 2022-09-19 PROCEDURE — 80143 DRUG ASSAY ACETAMINOPHEN: CPT | Performed by: EMERGENCY MEDICINE

## 2022-09-19 PROCEDURE — 82077 ASSAY SPEC XCP UR&BREATH IA: CPT | Performed by: EMERGENCY MEDICINE

## 2022-09-19 PROCEDURE — 83970 ASSAY OF PARATHORMONE: CPT | Performed by: INTERNAL MEDICINE

## 2022-09-19 PROCEDURE — 84484 ASSAY OF TROPONIN QUANT: CPT | Performed by: EMERGENCY MEDICINE

## 2022-09-19 PROCEDURE — 99285 EMERGENCY DEPT VISIT HI MDM: CPT

## 2022-09-19 PROCEDURE — G0378 HOSPITAL OBSERVATION PER HR: HCPCS

## 2022-09-19 PROCEDURE — 86334 IMMUNOFIX E-PHORESIS SERUM: CPT | Performed by: INTERNAL MEDICINE

## 2022-09-19 PROCEDURE — 70450 CT HEAD/BRAIN W/O DYE: CPT

## 2022-09-19 PROCEDURE — 72141 MRI NECK SPINE W/O DYE: CPT

## 2022-09-19 PROCEDURE — 82803 BLOOD GASES ANY COMBINATION: CPT

## 2022-09-19 PROCEDURE — 94640 AIRWAY INHALATION TREATMENT: CPT

## 2022-09-19 PROCEDURE — 71045 X-RAY EXAM CHEST 1 VIEW: CPT

## 2022-09-19 PROCEDURE — 99223 1ST HOSP IP/OBS HIGH 75: CPT | Performed by: PSYCHIATRY & NEUROLOGY

## 2022-09-19 PROCEDURE — 72146 MRI CHEST SPINE W/O DYE: CPT

## 2022-09-19 PROCEDURE — 25010000002 METHYLPREDNISOLONE PER 125 MG: Performed by: EMERGENCY MEDICINE

## 2022-09-19 PROCEDURE — 86618 LYME DISEASE ANTIBODY: CPT | Performed by: PSYCHIATRY & NEUROLOGY

## 2022-09-19 PROCEDURE — 84446 ASSAY OF VITAMIN E: CPT | Performed by: PSYCHIATRY & NEUROLOGY

## 2022-09-19 PROCEDURE — 80053 COMPREHEN METABOLIC PANEL: CPT | Performed by: EMERGENCY MEDICINE

## 2022-09-19 PROCEDURE — 85730 THROMBOPLASTIN TIME PARTIAL: CPT | Performed by: EMERGENCY MEDICINE

## 2022-09-19 PROCEDURE — 82306 VITAMIN D 25 HYDROXY: CPT | Performed by: INTERNAL MEDICINE

## 2022-09-19 PROCEDURE — 36415 COLL VENOUS BLD VENIPUNCTURE: CPT | Performed by: EMERGENCY MEDICINE

## 2022-09-19 PROCEDURE — 36600 WITHDRAWAL OF ARTERIAL BLOOD: CPT

## 2022-09-19 PROCEDURE — 80179 DRUG ASSAY SALICYLATE: CPT | Performed by: EMERGENCY MEDICINE

## 2022-09-19 PROCEDURE — 83735 ASSAY OF MAGNESIUM: CPT | Performed by: EMERGENCY MEDICINE

## 2022-09-19 PROCEDURE — 82784 ASSAY IGA/IGD/IGG/IGM EACH: CPT | Performed by: INTERNAL MEDICINE

## 2022-09-19 PROCEDURE — 85025 COMPLETE CBC W/AUTO DIFF WBC: CPT | Performed by: EMERGENCY MEDICINE

## 2022-09-19 PROCEDURE — 87340 HEPATITIS B SURFACE AG IA: CPT | Performed by: NURSE PRACTITIONER

## 2022-09-19 PROCEDURE — 94799 UNLISTED PULMONARY SVC/PX: CPT

## 2022-09-19 PROCEDURE — 93005 ELECTROCARDIOGRAM TRACING: CPT

## 2022-09-19 PROCEDURE — 85610 PROTHROMBIN TIME: CPT | Performed by: EMERGENCY MEDICINE

## 2022-09-19 RX ORDER — FERROUS SULFATE TAB EC 324 MG (65 MG FE EQUIVALENT) 324 (65 FE) MG
324 TABLET DELAYED RESPONSE ORAL
Status: DISCONTINUED | OUTPATIENT
Start: 2022-09-20 | End: 2022-09-22

## 2022-09-19 RX ORDER — PANTOPRAZOLE SODIUM 40 MG/1
40 TABLET, DELAYED RELEASE ORAL
Status: DISCONTINUED | OUTPATIENT
Start: 2022-09-21 | End: 2022-09-30 | Stop reason: HOSPADM

## 2022-09-19 RX ORDER — IPRATROPIUM BROMIDE AND ALBUTEROL SULFATE 2.5; .5 MG/3ML; MG/3ML
3 SOLUTION RESPIRATORY (INHALATION)
Status: DISCONTINUED | OUTPATIENT
Start: 2022-09-19 | End: 2022-09-20

## 2022-09-19 RX ORDER — RISPERIDONE 1 MG/1
1 TABLET ORAL 2 TIMES DAILY
Status: DISCONTINUED | OUTPATIENT
Start: 2022-09-19 | End: 2022-09-21 | Stop reason: ALTCHOICE

## 2022-09-19 RX ORDER — METOPROLOL TARTRATE 50 MG/1
50 TABLET, FILM COATED ORAL 2 TIMES DAILY
Status: DISCONTINUED | OUTPATIENT
Start: 2022-09-21 | End: 2022-09-30 | Stop reason: HOSPADM

## 2022-09-19 RX ORDER — NICOTINE POLACRILEX 4 MG
15 LOZENGE BUCCAL
Status: DISCONTINUED | OUTPATIENT
Start: 2022-09-19 | End: 2022-09-30 | Stop reason: HOSPADM

## 2022-09-19 RX ORDER — DOCUSATE SODIUM 100 MG/1
100 CAPSULE, LIQUID FILLED ORAL 2 TIMES DAILY
Status: DISCONTINUED | OUTPATIENT
Start: 2022-09-21 | End: 2022-09-30 | Stop reason: HOSPADM

## 2022-09-19 RX ORDER — SEVELAMER CARBONATE 800 MG/1
2400 TABLET, FILM COATED ORAL
Status: DISCONTINUED | OUTPATIENT
Start: 2022-09-21 | End: 2022-09-30 | Stop reason: HOSPADM

## 2022-09-19 RX ORDER — SODIUM CHLORIDE 0.9 % (FLUSH) 0.9 %
3-10 SYRINGE (ML) INJECTION AS NEEDED
Status: DISCONTINUED | OUTPATIENT
Start: 2022-09-19 | End: 2022-09-30 | Stop reason: HOSPADM

## 2022-09-19 RX ORDER — ACETAMINOPHEN 325 MG/1
650 TABLET ORAL EVERY 6 HOURS PRN
Status: DISCONTINUED | OUTPATIENT
Start: 2022-09-19 | End: 2022-09-30 | Stop reason: HOSPADM

## 2022-09-19 RX ORDER — GABAPENTIN 300 MG/1
300 CAPSULE ORAL NIGHTLY
Status: DISCONTINUED | OUTPATIENT
Start: 2022-09-21 | End: 2022-09-22

## 2022-09-19 RX ORDER — ONDANSETRON 2 MG/ML
4 INJECTION INTRAMUSCULAR; INTRAVENOUS EVERY 6 HOURS PRN
Status: DISCONTINUED | OUTPATIENT
Start: 2022-09-19 | End: 2022-09-30 | Stop reason: HOSPADM

## 2022-09-19 RX ORDER — HEPARIN SODIUM 5000 [USP'U]/ML
5000 INJECTION, SOLUTION INTRAVENOUS; SUBCUTANEOUS EVERY 12 HOURS SCHEDULED
Status: DISCONTINUED | OUTPATIENT
Start: 2022-09-19 | End: 2022-09-30 | Stop reason: HOSPADM

## 2022-09-19 RX ORDER — AZITHROMYCIN 250 MG/1
250 TABLET, FILM COATED ORAL
Status: DISCONTINUED | OUTPATIENT
Start: 2022-09-20 | End: 2022-09-21 | Stop reason: ALTCHOICE

## 2022-09-19 RX ORDER — HYDROXYZINE HYDROCHLORIDE 25 MG/1
25 TABLET, FILM COATED ORAL EVERY 6 HOURS PRN
Status: DISCONTINUED | OUTPATIENT
Start: 2022-09-19 | End: 2022-09-22

## 2022-09-19 RX ORDER — ZINC SULFATE 50(220)MG
220 CAPSULE ORAL DAILY
Status: DISCONTINUED | OUTPATIENT
Start: 2022-09-21 | End: 2022-09-30 | Stop reason: HOSPADM

## 2022-09-19 RX ORDER — SODIUM CHLORIDE 0.9 % (FLUSH) 0.9 %
10 SYRINGE (ML) INJECTION AS NEEDED
Status: DISCONTINUED | OUTPATIENT
Start: 2022-09-19 | End: 2022-09-30 | Stop reason: HOSPADM

## 2022-09-19 RX ORDER — ATORVASTATIN CALCIUM 40 MG/1
40 TABLET, FILM COATED ORAL NIGHTLY
Status: DISCONTINUED | OUTPATIENT
Start: 2022-09-19 | End: 2022-09-21 | Stop reason: ALTCHOICE

## 2022-09-19 RX ORDER — LEVETIRACETAM 500 MG/1
500 TABLET, EXTENDED RELEASE ORAL DAILY
Status: DISCONTINUED | OUTPATIENT
Start: 2022-09-20 | End: 2022-09-30 | Stop reason: HOSPADM

## 2022-09-19 RX ORDER — AMITRIPTYLINE HYDROCHLORIDE 50 MG/1
50 TABLET, FILM COATED ORAL NIGHTLY
Status: DISCONTINUED | OUTPATIENT
Start: 2022-09-19 | End: 2022-09-21 | Stop reason: ALTCHOICE

## 2022-09-19 RX ORDER — SODIUM CHLORIDE 0.9 % (FLUSH) 0.9 %
3 SYRINGE (ML) INJECTION EVERY 12 HOURS SCHEDULED
Status: DISCONTINUED | OUTPATIENT
Start: 2022-09-19 | End: 2022-09-30 | Stop reason: HOSPADM

## 2022-09-19 RX ORDER — INSULIN LISPRO 100 [IU]/ML
0-7 INJECTION, SOLUTION INTRAVENOUS; SUBCUTANEOUS
Status: DISCONTINUED | OUTPATIENT
Start: 2022-09-20 | End: 2022-09-21

## 2022-09-19 RX ORDER — DEXTROSE MONOHYDRATE 25 G/50ML
25 INJECTION, SOLUTION INTRAVENOUS
Status: DISCONTINUED | OUTPATIENT
Start: 2022-09-19 | End: 2022-09-30 | Stop reason: HOSPADM

## 2022-09-19 RX ORDER — ONDANSETRON 4 MG/1
4 TABLET, FILM COATED ORAL EVERY 6 HOURS PRN
Status: DISCONTINUED | OUTPATIENT
Start: 2022-09-19 | End: 2022-09-30 | Stop reason: HOSPADM

## 2022-09-19 RX ORDER — HYDRALAZINE HYDROCHLORIDE 20 MG/ML
10 INJECTION INTRAMUSCULAR; INTRAVENOUS EVERY 6 HOURS PRN
Status: DISCONTINUED | OUTPATIENT
Start: 2022-09-19 | End: 2022-09-30 | Stop reason: HOSPADM

## 2022-09-19 RX ORDER — ASCORBIC ACID 500 MG
500 TABLET ORAL DAILY
Status: DISCONTINUED | OUTPATIENT
Start: 2022-09-21 | End: 2022-09-22

## 2022-09-19 RX ORDER — BENZONATATE 100 MG/1
200 CAPSULE ORAL 3 TIMES DAILY PRN
Status: DISCONTINUED | OUTPATIENT
Start: 2022-09-19 | End: 2022-09-22

## 2022-09-19 RX ORDER — OLANZAPINE 10 MG/2ML
1 INJECTION, POWDER, LYOPHILIZED, FOR SOLUTION INTRAMUSCULAR AS NEEDED
Status: DISCONTINUED | OUTPATIENT
Start: 2022-09-19 | End: 2022-09-30 | Stop reason: HOSPADM

## 2022-09-19 RX ORDER — AMLODIPINE BESYLATE 5 MG/1
10 TABLET ORAL DAILY
Status: DISCONTINUED | OUTPATIENT
Start: 2022-09-21 | End: 2022-09-23

## 2022-09-19 RX ADMIN — SODIUM CHLORIDE 500 MG: 900 INJECTION INTRAVENOUS at 15:50

## 2022-09-19 RX ADMIN — IPRATROPIUM BROMIDE AND ALBUTEROL SULFATE 3 ML: .5; 3 SOLUTION RESPIRATORY (INHALATION) at 22:06

## 2022-09-20 LAB
25(OH)D3 SERPL-MCNC: 51.9 NG/ML (ref 30–100)
ALBUMIN SERPL-MCNC: 4.6 G/DL (ref 3.5–5.2)
ALDOLASE SERPL-CCNC: 7.2 U/L (ref 3.3–10.3)
ANION GAP SERPL CALCULATED.3IONS-SCNC: 17 MMOL/L (ref 5–15)
B BURGDOR IGG+IGM SER QL IA: NEGATIVE
BUN SERPL-MCNC: 55 MG/DL (ref 6–20)
BUN/CREAT SERPL: 5.1 (ref 7–25)
CALCIUM SPEC-SCNC: 11.4 MG/DL (ref 8.6–10.5)
CHLORIDE SERPL-SCNC: 89 MMOL/L (ref 98–107)
CK SERPL-CCNC: 351 U/L (ref 20–200)
CO2 SERPL-SCNC: 27 MMOL/L (ref 22–29)
CREAT SERPL-MCNC: 10.75 MG/DL (ref 0.76–1.27)
EGFRCR SERPLBLD CKD-EPI 2021: 5 ML/MIN/1.73
FOLATE SERPL-MCNC: 7.44 NG/ML (ref 4.78–24.2)
GLUCOSE BLDC GLUCOMTR-MCNC: 169 MG/DL (ref 70–105)
GLUCOSE BLDC GLUCOMTR-MCNC: 190 MG/DL (ref 70–105)
GLUCOSE BLDC GLUCOMTR-MCNC: 210 MG/DL (ref 70–105)
GLUCOSE BLDC GLUCOMTR-MCNC: 230 MG/DL (ref 70–105)
GLUCOSE SERPL-MCNC: 240 MG/DL (ref 65–99)
HBA1C MFR BLD: 6.1 % (ref 3.5–5.6)
HBV SURFACE AG SERPL QL IA: NORMAL
PHOSPHATE SERPL-MCNC: 4.8 MG/DL (ref 2.5–4.5)
POTASSIUM SERPL-SCNC: 5.3 MMOL/L (ref 3.5–5.2)
QT INTERVAL: 376 MS
SODIUM SERPL-SCNC: 133 MMOL/L (ref 136–145)
TSH SERPL DL<=0.05 MIU/L-ACNC: 0.53 UIU/ML (ref 0.27–4.2)
VIT B12 BLD-MCNC: 826 PG/ML (ref 211–946)

## 2022-09-20 PROCEDURE — 82962 GLUCOSE BLOOD TEST: CPT

## 2022-09-20 PROCEDURE — 82607 VITAMIN B-12: CPT | Performed by: PSYCHIATRY & NEUROLOGY

## 2022-09-20 PROCEDURE — 94799 UNLISTED PULMONARY SVC/PX: CPT

## 2022-09-20 PROCEDURE — 63710000001 INSULIN LISPRO (HUMAN) PER 5 UNITS: Performed by: FAMILY MEDICINE

## 2022-09-20 PROCEDURE — 82746 ASSAY OF FOLIC ACID SERUM: CPT | Performed by: PSYCHIATRY & NEUROLOGY

## 2022-09-20 PROCEDURE — 82550 ASSAY OF CK (CPK): CPT | Performed by: PSYCHIATRY & NEUROLOGY

## 2022-09-20 PROCEDURE — 5A1D70Z PERFORMANCE OF URINARY FILTRATION, INTERMITTENT, LESS THAN 6 HOURS PER DAY: ICD-10-PCS | Performed by: INTERNAL MEDICINE

## 2022-09-20 PROCEDURE — 99233 SBSQ HOSP IP/OBS HIGH 50: CPT | Performed by: PSYCHIATRY & NEUROLOGY

## 2022-09-20 PROCEDURE — 25010000002 HEPARIN (PORCINE) PER 1000 UNITS: Performed by: FAMILY MEDICINE

## 2022-09-20 PROCEDURE — 25010000002 HYDRALAZINE PER 20 MG: Performed by: FAMILY MEDICINE

## 2022-09-20 PROCEDURE — 80069 RENAL FUNCTION PANEL: CPT | Performed by: INTERNAL MEDICINE

## 2022-09-20 PROCEDURE — 25010000002 METHYLPREDNISOLONE PER 125 MG: Performed by: EMERGENCY MEDICINE

## 2022-09-20 PROCEDURE — G0378 HOSPITAL OBSERVATION PER HR: HCPCS

## 2022-09-20 PROCEDURE — 63710000001 INSULIN LISPRO (HUMAN) PER 5 UNITS: Performed by: NURSE PRACTITIONER

## 2022-09-20 PROCEDURE — 94664 DEMO&/EVAL PT USE INHALER: CPT

## 2022-09-20 PROCEDURE — 83036 HEMOGLOBIN GLYCOSYLATED A1C: CPT | Performed by: FAMILY MEDICINE

## 2022-09-20 PROCEDURE — 84443 ASSAY THYROID STIM HORMONE: CPT | Performed by: PSYCHIATRY & NEUROLOGY

## 2022-09-20 PROCEDURE — 97162 PT EVAL MOD COMPLEX 30 MIN: CPT

## 2022-09-20 RX ORDER — LOSARTAN POTASSIUM 50 MG/1
50 TABLET ORAL EVERY EVENING
Status: DISCONTINUED | OUTPATIENT
Start: 2022-09-20 | End: 2022-09-22

## 2022-09-20 RX ORDER — INSULIN LISPRO 100 [IU]/ML
8 INJECTION, SOLUTION INTRAVENOUS; SUBCUTANEOUS ONCE
Status: COMPLETED | OUTPATIENT
Start: 2022-09-20 | End: 2022-09-20

## 2022-09-20 RX ORDER — CINACALCET 30 MG/1
30 TABLET, FILM COATED ORAL
Status: DISCONTINUED | OUTPATIENT
Start: 2022-09-20 | End: 2022-09-30 | Stop reason: HOSPADM

## 2022-09-20 RX ORDER — IPRATROPIUM BROMIDE AND ALBUTEROL SULFATE 2.5; .5 MG/3ML; MG/3ML
3 SOLUTION RESPIRATORY (INHALATION) EVERY 4 HOURS PRN
Status: DISCONTINUED | OUTPATIENT
Start: 2022-09-20 | End: 2022-09-30 | Stop reason: HOSPADM

## 2022-09-20 RX ORDER — INSULIN GLARGINE 100 [IU]/ML
40 INJECTION, SOLUTION SUBCUTANEOUS NIGHTLY
COMMUNITY
End: 2023-01-31

## 2022-09-20 RX ADMIN — INSULIN LISPRO 3 UNITS: 100 INJECTION, SOLUTION INTRAVENOUS; SUBCUTANEOUS at 07:58

## 2022-09-20 RX ADMIN — IPRATROPIUM BROMIDE AND ALBUTEROL SULFATE 3 ML: .5; 3 SOLUTION RESPIRATORY (INHALATION) at 11:29

## 2022-09-20 RX ADMIN — ACETAMINOPHEN 650 MG: 325 TABLET, FILM COATED ORAL at 20:41

## 2022-09-20 RX ADMIN — INSULIN LISPRO 8 UNITS: 100 INJECTION, SOLUTION INTRAVENOUS; SUBCUTANEOUS at 11:00

## 2022-09-20 RX ADMIN — HYDRALAZINE HYDROCHLORIDE 10 MG: 20 INJECTION INTRAMUSCULAR; INTRAVENOUS at 20:37

## 2022-09-20 RX ADMIN — Medication 10 ML: at 20:38

## 2022-09-20 RX ADMIN — INSULIN LISPRO 2 UNITS: 100 INJECTION, SOLUTION INTRAVENOUS; SUBCUTANEOUS at 12:32

## 2022-09-20 RX ADMIN — HYDRALAZINE HYDROCHLORIDE 10 MG: 20 INJECTION INTRAMUSCULAR; INTRAVENOUS at 10:18

## 2022-09-20 RX ADMIN — INSULIN LISPRO 2 UNITS: 100 INJECTION, SOLUTION INTRAVENOUS; SUBCUTANEOUS at 20:38

## 2022-09-20 RX ADMIN — FERROUS SULFATE TAB EC 324 MG (65 MG FE EQUIVALENT) 324 MG: 324 (65 FE) TABLET DELAYED RESPONSE at 20:38

## 2022-09-20 RX ADMIN — Medication 3 ML: at 07:58

## 2022-09-20 RX ADMIN — LOSARTAN POTASSIUM 50 MG: 50 TABLET, FILM COATED ORAL at 20:38

## 2022-09-20 RX ADMIN — IPRATROPIUM BROMIDE AND ALBUTEROL SULFATE 3 ML: .5; 3 SOLUTION RESPIRATORY (INHALATION) at 07:10

## 2022-09-20 RX ADMIN — HEPARIN SODIUM 5000 UNITS: 5000 INJECTION INTRAVENOUS; SUBCUTANEOUS at 20:37

## 2022-09-20 RX ADMIN — HYDRALAZINE HYDROCHLORIDE 10 MG: 20 INJECTION INTRAMUSCULAR; INTRAVENOUS at 03:39

## 2022-09-20 RX ADMIN — SODIUM CHLORIDE 500 MG: 900 INJECTION INTRAVENOUS at 12:32

## 2022-09-20 RX ADMIN — CINACALCET 30 MG: 30 TABLET, FILM COATED ORAL at 12:32

## 2022-09-20 RX ADMIN — HEPARIN SODIUM 5000 UNITS: 5000 INJECTION INTRAVENOUS; SUBCUTANEOUS at 07:59

## 2022-09-21 PROBLEM — D50.9 IRON DEFICIENCY ANEMIA: Status: ACTIVE | Noted: 2018-01-13

## 2022-09-21 PROBLEM — N25.0 RENAL OSTEODYSTROPHY: Status: ACTIVE | Noted: 2017-06-16

## 2022-09-21 PROBLEM — D63.1 ANEMIA IN CHRONIC KIDNEY DISEASE: Status: ACTIVE | Noted: 2017-06-16

## 2022-09-21 PROBLEM — I13.2 HYPERTENSIVE HEART AND CHRONIC KIDNEY DISEASE WITH HEART FAILURE AND WITH STAGE 5 CHRONIC KIDNEY DISEASE, OR END STAGE RENAL DISEASE: Status: ACTIVE | Noted: 2020-08-19

## 2022-09-21 PROBLEM — E46 UNSPECIFIED PROTEIN-CALORIE MALNUTRITION (HCC): Status: ACTIVE | Noted: 2017-07-03

## 2022-09-21 PROBLEM — N25.81 SECONDARY HYPERPARATHYROIDISM OF RENAL ORIGIN: Status: ACTIVE | Noted: 2017-06-16

## 2022-09-21 PROBLEM — N18.6 END STAGE RENAL DISEASE (HCC): Status: ACTIVE | Noted: 2017-06-16

## 2022-09-21 PROBLEM — E11.21 TYPE 2 DIABETES MELLITUS WITH DIABETIC NEPHROPATHY: Status: ACTIVE | Noted: 2017-06-16

## 2022-09-21 PROBLEM — E78.5 HYPERLIPIDEMIA, UNSPECIFIED: Status: ACTIVE | Noted: 2017-06-16

## 2022-09-21 PROBLEM — I63.9 CEREBRAL INFARCTION, UNSPECIFIED: Status: ACTIVE | Noted: 2017-06-16

## 2022-09-21 PROBLEM — N18.5 CHRONIC KIDNEY DISEASE, STAGE 5 (HCC): Status: ACTIVE | Noted: 2020-08-19

## 2022-09-21 PROBLEM — E55.9 VITAMIN D DEFICIENCY, UNSPECIFIED: Status: ACTIVE | Noted: 2017-06-16

## 2022-09-21 PROBLEM — F32.9 MAJOR DEPRESSIVE DISORDER, SINGLE EPISODE, UNSPECIFIED: Status: ACTIVE | Noted: 2017-06-16

## 2022-09-21 PROBLEM — G40.501: Status: ACTIVE | Noted: 2020-08-12

## 2022-09-21 PROBLEM — N18.9 ANEMIA IN CHRONIC KIDNEY DISEASE: Status: ACTIVE | Noted: 2017-06-16

## 2022-09-21 PROBLEM — Z99.2 DEPENDENCE ON RENAL DIALYSIS: Status: ACTIVE | Noted: 2020-08-19

## 2022-09-21 LAB
ALBUMIN SERPL-MCNC: 4.4 G/DL (ref 3.5–5.2)
ANION GAP SERPL CALCULATED.3IONS-SCNC: 15 MMOL/L (ref 5–15)
BUN SERPL-MCNC: 25 MG/DL (ref 6–20)
BUN/CREAT SERPL: 3.9 (ref 7–25)
CALCIUM SPEC-SCNC: 11.1 MG/DL (ref 8.6–10.5)
CHLORIDE SERPL-SCNC: 91 MMOL/L (ref 98–107)
CO2 SERPL-SCNC: 25 MMOL/L (ref 22–29)
CREAT SERPL-MCNC: 6.42 MG/DL (ref 0.76–1.27)
DEPRECATED RDW RBC AUTO: 60.4 FL (ref 37–54)
EGFRCR SERPLBLD CKD-EPI 2021: 9.4 ML/MIN/1.73
ERYTHROCYTE [DISTWIDTH] IN BLOOD BY AUTOMATED COUNT: 18.3 % (ref 12.3–15.4)
GLUCOSE BLDC GLUCOMTR-MCNC: 191 MG/DL (ref 70–105)
GLUCOSE BLDC GLUCOMTR-MCNC: 234 MG/DL (ref 70–105)
GLUCOSE SERPL-MCNC: 263 MG/DL (ref 65–99)
HCT VFR BLD AUTO: 39.3 % (ref 37.5–51)
HGB BLD-MCNC: 12.4 G/DL (ref 13–17.7)
IGA SERPL-MCNC: 311 MG/DL (ref 90–386)
IGG SERPL-MCNC: 1085 MG/DL (ref 603–1613)
IGM SERPL-MCNC: 104 MG/DL (ref 20–172)
MCH RBC QN AUTO: 30.1 PG (ref 26.6–33)
MCHC RBC AUTO-ENTMCNC: 31.4 G/DL (ref 31.5–35.7)
MCV RBC AUTO: 95.7 FL (ref 79–97)
PHOSPHATE SERPL-MCNC: 3.8 MG/DL (ref 2.5–4.5)
PLATELET # BLD AUTO: 269 10*3/MM3 (ref 140–450)
PMV BLD AUTO: 9.3 FL (ref 6–12)
POTASSIUM SERPL-SCNC: 4.4 MMOL/L (ref 3.5–5.2)
PROT PATTERN SERPL IFE-IMP: NORMAL
RBC # BLD AUTO: 4.11 10*6/MM3 (ref 4.14–5.8)
SODIUM SERPL-SCNC: 131 MMOL/L (ref 136–145)
WBC NRBC COR # BLD: 16 10*3/MM3 (ref 3.4–10.8)

## 2022-09-21 PROCEDURE — 25010000002 HYDRALAZINE PER 20 MG: Performed by: FAMILY MEDICINE

## 2022-09-21 PROCEDURE — 25010000002 HEPARIN (PORCINE) PER 1000 UNITS: Performed by: FAMILY MEDICINE

## 2022-09-21 PROCEDURE — 85027 COMPLETE CBC AUTOMATED: CPT | Performed by: INTERNAL MEDICINE

## 2022-09-21 PROCEDURE — 63710000001 INSULIN LISPRO (HUMAN) PER 5 UNITS: Performed by: INTERNAL MEDICINE

## 2022-09-21 PROCEDURE — 82962 GLUCOSE BLOOD TEST: CPT

## 2022-09-21 PROCEDURE — 25010000002 METHYLPREDNISOLONE PER 125 MG: Performed by: EMERGENCY MEDICINE

## 2022-09-21 PROCEDURE — 80069 RENAL FUNCTION PANEL: CPT | Performed by: INTERNAL MEDICINE

## 2022-09-21 PROCEDURE — 63710000001 INSULIN LISPRO (HUMAN) PER 5 UNITS: Performed by: FAMILY MEDICINE

## 2022-09-21 RX ORDER — AMITRIPTYLINE HYDROCHLORIDE 25 MG/1
25 TABLET, FILM COATED ORAL NIGHTLY
Status: DISCONTINUED | OUTPATIENT
Start: 2022-09-21 | End: 2022-09-23

## 2022-09-21 RX ORDER — INSULIN LISPRO 100 [IU]/ML
0-14 INJECTION, SOLUTION INTRAVENOUS; SUBCUTANEOUS
Status: DISCONTINUED | OUTPATIENT
Start: 2022-09-21 | End: 2022-09-30 | Stop reason: HOSPADM

## 2022-09-21 RX ADMIN — HEPARIN SODIUM 5000 UNITS: 5000 INJECTION INTRAVENOUS; SUBCUTANEOUS at 07:54

## 2022-09-21 RX ADMIN — Medication 3 ML: at 07:58

## 2022-09-21 RX ADMIN — HYDRALAZINE HYDROCHLORIDE 10 MG: 20 INJECTION INTRAMUSCULAR; INTRAVENOUS at 05:30

## 2022-09-21 RX ADMIN — METOPROLOL TARTRATE 50 MG: 50 TABLET, FILM COATED ORAL at 21:05

## 2022-09-21 RX ADMIN — PANTOPRAZOLE SODIUM 40 MG: 40 TABLET, DELAYED RELEASE ORAL at 05:30

## 2022-09-21 RX ADMIN — SODIUM CHLORIDE 500 MG: 900 INJECTION INTRAVENOUS at 07:55

## 2022-09-21 RX ADMIN — SEVELAMER CARBONATE 2400 MG: 800 TABLET, FILM COATED ORAL at 15:51

## 2022-09-21 RX ADMIN — DOCUSATE SODIUM 100 MG: 100 CAPSULE, LIQUID FILLED ORAL at 08:21

## 2022-09-21 RX ADMIN — LEVETIRACETAM 500 MG: 500 TABLET, EXTENDED RELEASE ORAL at 10:19

## 2022-09-21 RX ADMIN — AMLODIPINE BESYLATE 10 MG: 5 TABLET ORAL at 08:21

## 2022-09-21 RX ADMIN — GABAPENTIN 300 MG: 300 CAPSULE ORAL at 21:14

## 2022-09-21 RX ADMIN — CINACALCET 30 MG: 30 TABLET, FILM COATED ORAL at 07:54

## 2022-09-21 RX ADMIN — DOCUSATE SODIUM 100 MG: 100 CAPSULE, LIQUID FILLED ORAL at 21:05

## 2022-09-21 RX ADMIN — HEPARIN SODIUM 5000 UNITS: 5000 INJECTION INTRAVENOUS; SUBCUTANEOUS at 21:05

## 2022-09-21 RX ADMIN — METOPROLOL TARTRATE 50 MG: 50 TABLET, FILM COATED ORAL at 08:20

## 2022-09-21 RX ADMIN — INSULIN LISPRO 3 UNITS: 100 INJECTION, SOLUTION INTRAVENOUS; SUBCUTANEOUS at 07:56

## 2022-09-21 RX ADMIN — LEVETIRACETAM 500 MG: 500 TABLET, EXTENDED RELEASE ORAL at 01:34

## 2022-09-21 RX ADMIN — OXYCODONE HYDROCHLORIDE AND ACETAMINOPHEN 500 MG: 500 TABLET ORAL at 08:21

## 2022-09-21 RX ADMIN — Medication 220 MG: at 08:21

## 2022-09-21 RX ADMIN — FERROUS SULFATE TAB EC 324 MG (65 MG FE EQUIVALENT) 324 MG: 324 (65 FE) TABLET DELAYED RESPONSE at 07:54

## 2022-09-21 RX ADMIN — INSULIN LISPRO 3 UNITS: 100 INJECTION, SOLUTION INTRAVENOUS; SUBCUTANEOUS at 15:50

## 2022-09-21 RX ADMIN — AMITRIPTYLINE HYDROCHLORIDE 25 MG: 25 TABLET, FILM COATED ORAL at 21:05

## 2022-09-21 RX ADMIN — LOSARTAN POTASSIUM 50 MG: 50 TABLET, FILM COATED ORAL at 15:51

## 2022-09-21 RX ADMIN — Medication 3 ML: at 21:12

## 2022-09-22 LAB
ALBUMIN SERPL-MCNC: 4.5 G/DL (ref 3.5–5.2)
ANION GAP SERPL CALCULATED.3IONS-SCNC: 15 MMOL/L (ref 5–15)
BUN SERPL-MCNC: 41 MG/DL (ref 6–20)
BUN/CREAT SERPL: 6.3 (ref 7–25)
CALCIUM SPEC-SCNC: 10.6 MG/DL (ref 8.6–10.5)
CHLORIDE SERPL-SCNC: 90 MMOL/L (ref 98–107)
CO2 SERPL-SCNC: 28 MMOL/L (ref 22–29)
CREAT SERPL-MCNC: 6.53 MG/DL (ref 0.76–1.27)
DEPRECATED RDW RBC AUTO: 61.3 FL (ref 37–54)
EGFRCR SERPLBLD CKD-EPI 2021: 9.2 ML/MIN/1.73
ERYTHROCYTE [DISTWIDTH] IN BLOOD BY AUTOMATED COUNT: 18.6 % (ref 12.3–15.4)
GLUCOSE BLDC GLUCOMTR-MCNC: 110 MG/DL (ref 70–105)
GLUCOSE BLDC GLUCOMTR-MCNC: 146 MG/DL (ref 70–105)
GLUCOSE BLDC GLUCOMTR-MCNC: 203 MG/DL (ref 70–105)
GLUCOSE BLDC GLUCOMTR-MCNC: 221 MG/DL (ref 70–105)
GLUCOSE SERPL-MCNC: 233 MG/DL (ref 65–99)
HCT VFR BLD AUTO: 41 % (ref 37.5–51)
HGB BLD-MCNC: 13.3 G/DL (ref 13–17.7)
MAGNESIUM SERPL-MCNC: 2.3 MG/DL (ref 1.6–2.6)
MCH RBC QN AUTO: 30.4 PG (ref 26.6–33)
MCHC RBC AUTO-ENTMCNC: 32.4 G/DL (ref 31.5–35.7)
MCV RBC AUTO: 93.7 FL (ref 79–97)
PHOSPHATE SERPL-MCNC: 5 MG/DL (ref 2.5–4.5)
PLATELET # BLD AUTO: 221 10*3/MM3 (ref 140–450)
PMV BLD AUTO: 8.9 FL (ref 6–12)
POTASSIUM SERPL-SCNC: 4.1 MMOL/L (ref 3.5–5.2)
RBC # BLD AUTO: 4.37 10*6/MM3 (ref 4.14–5.8)
SODIUM SERPL-SCNC: 133 MMOL/L (ref 136–145)
WBC NRBC COR # BLD: 15.2 10*3/MM3 (ref 3.4–10.8)

## 2022-09-22 PROCEDURE — 99223 1ST HOSP IP/OBS HIGH 75: CPT

## 2022-09-22 PROCEDURE — 82962 GLUCOSE BLOOD TEST: CPT

## 2022-09-22 PROCEDURE — 83735 ASSAY OF MAGNESIUM: CPT | Performed by: FAMILY MEDICINE

## 2022-09-22 PROCEDURE — 25010000002 HEPARIN (PORCINE) PER 1000 UNITS: Performed by: FAMILY MEDICINE

## 2022-09-22 PROCEDURE — 63710000001 INSULIN LISPRO (HUMAN) PER 5 UNITS: Performed by: INTERNAL MEDICINE

## 2022-09-22 PROCEDURE — 80069 RENAL FUNCTION PANEL: CPT | Performed by: INTERNAL MEDICINE

## 2022-09-22 PROCEDURE — 85027 COMPLETE CBC AUTOMATED: CPT | Performed by: FAMILY MEDICINE

## 2022-09-22 RX ORDER — RISPERIDONE 0.5 MG/1
1 TABLET, ORALLY DISINTEGRATING ORAL NIGHTLY
Status: DISCONTINUED | OUTPATIENT
Start: 2022-09-22 | End: 2022-09-24

## 2022-09-22 RX ORDER — GABAPENTIN 100 MG/1
100 CAPSULE ORAL 3 TIMES DAILY
Status: DISCONTINUED | OUTPATIENT
Start: 2022-09-22 | End: 2022-09-30 | Stop reason: HOSPADM

## 2022-09-22 RX ORDER — LOSARTAN POTASSIUM 50 MG/1
100 TABLET ORAL EVERY EVENING
Status: DISCONTINUED | OUTPATIENT
Start: 2022-09-22 | End: 2022-09-30 | Stop reason: HOSPADM

## 2022-09-22 RX ADMIN — Medication 220 MG: at 08:41

## 2022-09-22 RX ADMIN — HEPARIN SODIUM 5000 UNITS: 5000 INJECTION INTRAVENOUS; SUBCUTANEOUS at 20:00

## 2022-09-22 RX ADMIN — HEPARIN SODIUM 5000 UNITS: 5000 INJECTION INTRAVENOUS; SUBCUTANEOUS at 08:35

## 2022-09-22 RX ADMIN — CINACALCET 30 MG: 30 TABLET, FILM COATED ORAL at 08:34

## 2022-09-22 RX ADMIN — Medication 3 ML: at 08:35

## 2022-09-22 RX ADMIN — DOCUSATE SODIUM 100 MG: 100 CAPSULE, LIQUID FILLED ORAL at 08:35

## 2022-09-22 RX ADMIN — SEVELAMER CARBONATE 2400 MG: 800 TABLET, FILM COATED ORAL at 17:47

## 2022-09-22 RX ADMIN — LOSARTAN POTASSIUM 100 MG: 50 TABLET, FILM COATED ORAL at 17:47

## 2022-09-22 RX ADMIN — SEVELAMER CARBONATE 2400 MG: 800 TABLET, FILM COATED ORAL at 08:35

## 2022-09-22 RX ADMIN — METOPROLOL TARTRATE 50 MG: 50 TABLET, FILM COATED ORAL at 20:01

## 2022-09-22 RX ADMIN — FERROUS SULFATE TAB EC 324 MG (65 MG FE EQUIVALENT) 324 MG: 324 (65 FE) TABLET DELAYED RESPONSE at 08:35

## 2022-09-22 RX ADMIN — METOPROLOL TARTRATE 50 MG: 50 TABLET, FILM COATED ORAL at 08:41

## 2022-09-22 RX ADMIN — GABAPENTIN 100 MG: 100 CAPSULE ORAL at 20:01

## 2022-09-22 RX ADMIN — RISPERIDONE 1 MG: 0.5 TABLET, ORALLY DISINTEGRATING ORAL at 20:00

## 2022-09-22 RX ADMIN — SEVELAMER CARBONATE 2400 MG: 800 TABLET, FILM COATED ORAL at 12:00

## 2022-09-22 RX ADMIN — AMITRIPTYLINE HYDROCHLORIDE 25 MG: 25 TABLET, FILM COATED ORAL at 20:00

## 2022-09-22 RX ADMIN — DOCUSATE SODIUM 100 MG: 100 CAPSULE, LIQUID FILLED ORAL at 20:01

## 2022-09-22 RX ADMIN — HYDROXYZINE HYDROCHLORIDE 25 MG: 25 TABLET, FILM COATED ORAL at 02:26

## 2022-09-22 RX ADMIN — INSULIN LISPRO 5 UNITS: 100 INJECTION, SOLUTION INTRAVENOUS; SUBCUTANEOUS at 08:34

## 2022-09-22 RX ADMIN — INSULIN LISPRO 5 UNITS: 100 INJECTION, SOLUTION INTRAVENOUS; SUBCUTANEOUS at 12:00

## 2022-09-22 RX ADMIN — AMLODIPINE BESYLATE 10 MG: 5 TABLET ORAL at 08:35

## 2022-09-22 RX ADMIN — OXYCODONE HYDROCHLORIDE AND ACETAMINOPHEN 500 MG: 500 TABLET ORAL at 08:35

## 2022-09-22 RX ADMIN — Medication 3 ML: at 20:03

## 2022-09-22 RX ADMIN — LEVETIRACETAM 500 MG: 500 TABLET, EXTENDED RELEASE ORAL at 08:41

## 2022-09-23 PROBLEM — F32.A DEPRESSION: Status: RESOLVED | Noted: 2020-08-12 | Resolved: 2022-09-23

## 2022-09-23 PROBLEM — F01.518 VASCULAR DEMENTIA WITH BEHAVIOR DISTURBANCE (HCC): Chronic | Status: ACTIVE | Noted: 2022-09-23

## 2022-09-23 LAB
ALBUMIN SERPL-MCNC: 3.9 G/DL (ref 3.5–5.2)
ANION GAP SERPL CALCULATED.3IONS-SCNC: 15 MMOL/L (ref 5–15)
BUN SERPL-MCNC: 56 MG/DL (ref 6–20)
BUN/CREAT SERPL: 6.7 (ref 7–25)
CALCIUM SPEC-SCNC: 9.5 MG/DL (ref 8.6–10.5)
CHLORIDE SERPL-SCNC: 93 MMOL/L (ref 98–107)
CO2 SERPL-SCNC: 25 MMOL/L (ref 22–29)
CREAT SERPL-MCNC: 8.32 MG/DL (ref 0.76–1.27)
DEPRECATED RDW RBC AUTO: 59.9 FL (ref 37–54)
EGFRCR SERPLBLD CKD-EPI 2021: 6.9 ML/MIN/1.73
ERYTHROCYTE [DISTWIDTH] IN BLOOD BY AUTOMATED COUNT: 18.2 % (ref 12.3–15.4)
GLUCOSE BLDC GLUCOMTR-MCNC: 210 MG/DL (ref 70–105)
GLUCOSE BLDC GLUCOMTR-MCNC: 284 MG/DL (ref 70–105)
GLUCOSE BLDC GLUCOMTR-MCNC: 85 MG/DL (ref 70–105)
GLUCOSE SERPL-MCNC: 195 MG/DL (ref 65–99)
HCT VFR BLD AUTO: 39.1 % (ref 37.5–51)
HGB BLD-MCNC: 12.7 G/DL (ref 13–17.7)
MCH RBC QN AUTO: 30.3 PG (ref 26.6–33)
MCHC RBC AUTO-ENTMCNC: 32.4 G/DL (ref 31.5–35.7)
MCV RBC AUTO: 93.7 FL (ref 79–97)
PHOSPHATE SERPL-MCNC: 5.7 MG/DL (ref 2.5–4.5)
PLATELET # BLD AUTO: 154 10*3/MM3 (ref 140–450)
PMV BLD AUTO: 8.8 FL (ref 6–12)
POTASSIUM SERPL-SCNC: 4.1 MMOL/L (ref 3.5–5.2)
PROCALCITONIN SERPL-MCNC: 0.42 NG/ML (ref 0–0.25)
QT INTERVAL: 415 MS
RBC # BLD AUTO: 4.17 10*6/MM3 (ref 4.14–5.8)
SODIUM SERPL-SCNC: 133 MMOL/L (ref 136–145)
WBC NRBC COR # BLD: 8.5 10*3/MM3 (ref 3.4–10.8)

## 2022-09-23 PROCEDURE — 93010 ELECTROCARDIOGRAM REPORT: CPT | Performed by: INTERNAL MEDICINE

## 2022-09-23 PROCEDURE — 82962 GLUCOSE BLOOD TEST: CPT

## 2022-09-23 PROCEDURE — 97530 THERAPEUTIC ACTIVITIES: CPT

## 2022-09-23 PROCEDURE — 25010000002 HEPARIN (PORCINE) PER 1000 UNITS: Performed by: FAMILY MEDICINE

## 2022-09-23 PROCEDURE — 99232 SBSQ HOSP IP/OBS MODERATE 35: CPT | Performed by: PSYCHIATRY & NEUROLOGY

## 2022-09-23 PROCEDURE — 80069 RENAL FUNCTION PANEL: CPT | Performed by: INTERNAL MEDICINE

## 2022-09-23 PROCEDURE — 85027 COMPLETE CBC AUTOMATED: CPT | Performed by: INTERNAL MEDICINE

## 2022-09-23 PROCEDURE — 84145 PROCALCITONIN (PCT): CPT | Performed by: FAMILY MEDICINE

## 2022-09-23 PROCEDURE — 93005 ELECTROCARDIOGRAM TRACING: CPT

## 2022-09-23 PROCEDURE — 97116 GAIT TRAINING THERAPY: CPT

## 2022-09-23 PROCEDURE — 63710000001 INSULIN LISPRO (HUMAN) PER 5 UNITS: Performed by: INTERNAL MEDICINE

## 2022-09-23 RX ORDER — RISPERIDONE 0.5 MG/1
1 TABLET, ORALLY DISINTEGRATING ORAL 2 TIMES DAILY PRN
Status: DISCONTINUED | OUTPATIENT
Start: 2022-09-23 | End: 2022-09-24

## 2022-09-23 RX ORDER — AMLODIPINE BESYLATE 5 MG/1
5 TABLET ORAL DAILY
Status: DISCONTINUED | OUTPATIENT
Start: 2022-09-24 | End: 2022-09-24

## 2022-09-23 RX ADMIN — METOPROLOL TARTRATE 50 MG: 50 TABLET, FILM COATED ORAL at 09:17

## 2022-09-23 RX ADMIN — INSULIN LISPRO 8 UNITS: 100 INJECTION, SOLUTION INTRAVENOUS; SUBCUTANEOUS at 12:25

## 2022-09-23 RX ADMIN — METOPROLOL TARTRATE 50 MG: 50 TABLET, FILM COATED ORAL at 20:25

## 2022-09-23 RX ADMIN — HEPARIN SODIUM 5000 UNITS: 5000 INJECTION INTRAVENOUS; SUBCUTANEOUS at 20:27

## 2022-09-23 RX ADMIN — SEVELAMER CARBONATE 2400 MG: 800 TABLET, FILM COATED ORAL at 09:16

## 2022-09-23 RX ADMIN — GABAPENTIN 100 MG: 100 CAPSULE ORAL at 20:25

## 2022-09-23 RX ADMIN — INSULIN LISPRO 5 UNITS: 100 INJECTION, SOLUTION INTRAVENOUS; SUBCUTANEOUS at 09:17

## 2022-09-23 RX ADMIN — GABAPENTIN 100 MG: 100 CAPSULE ORAL at 18:15

## 2022-09-23 RX ADMIN — DOCUSATE SODIUM 100 MG: 100 CAPSULE, LIQUID FILLED ORAL at 09:17

## 2022-09-23 RX ADMIN — Medication 220 MG: at 09:17

## 2022-09-23 RX ADMIN — SEVELAMER CARBONATE 2400 MG: 800 TABLET, FILM COATED ORAL at 12:25

## 2022-09-23 RX ADMIN — LEVETIRACETAM 500 MG: 500 TABLET, EXTENDED RELEASE ORAL at 09:22

## 2022-09-23 RX ADMIN — LOSARTAN POTASSIUM 100 MG: 50 TABLET, FILM COATED ORAL at 18:15

## 2022-09-23 RX ADMIN — RISPERIDONE 1 MG: 0.5 TABLET, ORALLY DISINTEGRATING ORAL at 20:25

## 2022-09-23 RX ADMIN — CINACALCET 30 MG: 30 TABLET, FILM COATED ORAL at 09:16

## 2022-09-23 RX ADMIN — PANTOPRAZOLE SODIUM 40 MG: 40 TABLET, DELAYED RELEASE ORAL at 06:36

## 2022-09-23 RX ADMIN — DOCUSATE SODIUM 100 MG: 100 CAPSULE, LIQUID FILLED ORAL at 20:25

## 2022-09-23 RX ADMIN — SEVELAMER CARBONATE 2400 MG: 800 TABLET, FILM COATED ORAL at 18:14

## 2022-09-23 RX ADMIN — GABAPENTIN 100 MG: 100 CAPSULE ORAL at 09:17

## 2022-09-24 LAB
ALBUMIN SERPL-MCNC: 3.8 G/DL (ref 3.5–5.2)
ANION GAP SERPL CALCULATED.3IONS-SCNC: 16 MMOL/L (ref 5–15)
BACTERIA SPEC AEROBE CULT: NORMAL
BACTERIA SPEC AEROBE CULT: NORMAL
BUN SERPL-MCNC: 37 MG/DL (ref 6–20)
BUN/CREAT SERPL: 5.9 (ref 7–25)
CALCIUM SPEC-SCNC: 9.4 MG/DL (ref 8.6–10.5)
CHLORIDE SERPL-SCNC: 95 MMOL/L (ref 98–107)
CO2 SERPL-SCNC: 22 MMOL/L (ref 22–29)
CREAT SERPL-MCNC: 6.27 MG/DL (ref 0.76–1.27)
DEPRECATED RDW RBC AUTO: 60.8 FL (ref 37–54)
EGFRCR SERPLBLD CKD-EPI 2021: 9.6 ML/MIN/1.73
ERYTHROCYTE [DISTWIDTH] IN BLOOD BY AUTOMATED COUNT: 18.3 % (ref 12.3–15.4)
GLUCOSE BLDC GLUCOMTR-MCNC: 150 MG/DL (ref 70–105)
GLUCOSE BLDC GLUCOMTR-MCNC: 161 MG/DL (ref 70–105)
GLUCOSE BLDC GLUCOMTR-MCNC: 182 MG/DL (ref 70–105)
GLUCOSE BLDC GLUCOMTR-MCNC: 243 MG/DL (ref 70–105)
GLUCOSE SERPL-MCNC: 268 MG/DL (ref 65–99)
HCT VFR BLD AUTO: 38.8 % (ref 37.5–51)
HGB BLD-MCNC: 12.3 G/DL (ref 13–17.7)
MCH RBC QN AUTO: 30.3 PG (ref 26.6–33)
MCHC RBC AUTO-ENTMCNC: 31.8 G/DL (ref 31.5–35.7)
MCV RBC AUTO: 95.3 FL (ref 79–97)
PHOSPHATE SERPL-MCNC: 3.8 MG/DL (ref 2.5–4.5)
PLATELET # BLD AUTO: 157 10*3/MM3 (ref 140–450)
PMV BLD AUTO: 9.3 FL (ref 6–12)
POTASSIUM SERPL-SCNC: 4.2 MMOL/L (ref 3.5–5.2)
RBC # BLD AUTO: 4.07 10*6/MM3 (ref 4.14–5.8)
SODIUM SERPL-SCNC: 133 MMOL/L (ref 136–145)
WBC NRBC COR # BLD: 10.6 10*3/MM3 (ref 3.4–10.8)

## 2022-09-24 PROCEDURE — 81001 URINALYSIS AUTO W/SCOPE: CPT | Performed by: FAMILY MEDICINE

## 2022-09-24 PROCEDURE — 82962 GLUCOSE BLOOD TEST: CPT

## 2022-09-24 PROCEDURE — 63710000001 INSULIN LISPRO (HUMAN) PER 5 UNITS: Performed by: INTERNAL MEDICINE

## 2022-09-24 PROCEDURE — 80069 RENAL FUNCTION PANEL: CPT | Performed by: INTERNAL MEDICINE

## 2022-09-24 PROCEDURE — 87086 URINE CULTURE/COLONY COUNT: CPT | Performed by: FAMILY MEDICINE

## 2022-09-24 PROCEDURE — 97110 THERAPEUTIC EXERCISES: CPT

## 2022-09-24 PROCEDURE — 87040 BLOOD CULTURE FOR BACTERIA: CPT | Performed by: FAMILY MEDICINE

## 2022-09-24 PROCEDURE — 97116 GAIT TRAINING THERAPY: CPT

## 2022-09-24 PROCEDURE — 85027 COMPLETE CBC AUTOMATED: CPT | Performed by: INTERNAL MEDICINE

## 2022-09-24 PROCEDURE — 99231 SBSQ HOSP IP/OBS SF/LOW 25: CPT

## 2022-09-24 PROCEDURE — 25010000002 HEPARIN (PORCINE) PER 1000 UNITS: Performed by: FAMILY MEDICINE

## 2022-09-24 RX ORDER — RISPERIDONE 1 MG/1
1 TABLET ORAL EVERY 12 HOURS SCHEDULED
Status: COMPLETED | OUTPATIENT
Start: 2022-09-24 | End: 2022-09-27

## 2022-09-24 RX ADMIN — LOSARTAN POTASSIUM 100 MG: 50 TABLET, FILM COATED ORAL at 17:56

## 2022-09-24 RX ADMIN — GABAPENTIN 100 MG: 100 CAPSULE ORAL at 20:26

## 2022-09-24 RX ADMIN — SEVELAMER CARBONATE 2400 MG: 800 TABLET, FILM COATED ORAL at 11:51

## 2022-09-24 RX ADMIN — RISPERIDONE 1 MG: 1 TABLET ORAL at 20:26

## 2022-09-24 RX ADMIN — DOCUSATE SODIUM 100 MG: 100 CAPSULE, LIQUID FILLED ORAL at 20:26

## 2022-09-24 RX ADMIN — CINACALCET 30 MG: 30 TABLET, FILM COATED ORAL at 08:50

## 2022-09-24 RX ADMIN — METOPROLOL TARTRATE 50 MG: 50 TABLET, FILM COATED ORAL at 08:50

## 2022-09-24 RX ADMIN — INSULIN LISPRO 3 UNITS: 100 INJECTION, SOLUTION INTRAVENOUS; SUBCUTANEOUS at 08:50

## 2022-09-24 RX ADMIN — METOPROLOL TARTRATE 50 MG: 50 TABLET, FILM COATED ORAL at 20:26

## 2022-09-24 RX ADMIN — DOCUSATE SODIUM 100 MG: 100 CAPSULE, LIQUID FILLED ORAL at 08:50

## 2022-09-24 RX ADMIN — GABAPENTIN 100 MG: 100 CAPSULE ORAL at 17:56

## 2022-09-24 RX ADMIN — GABAPENTIN 100 MG: 100 CAPSULE ORAL at 08:50

## 2022-09-24 RX ADMIN — Medication 220 MG: at 09:43

## 2022-09-24 RX ADMIN — INSULIN LISPRO 3 UNITS: 100 INJECTION, SOLUTION INTRAVENOUS; SUBCUTANEOUS at 11:51

## 2022-09-24 RX ADMIN — PANTOPRAZOLE SODIUM 40 MG: 40 TABLET, DELAYED RELEASE ORAL at 05:43

## 2022-09-24 RX ADMIN — HEPARIN SODIUM 5000 UNITS: 5000 INJECTION INTRAVENOUS; SUBCUTANEOUS at 20:26

## 2022-09-24 RX ADMIN — SEVELAMER CARBONATE 2400 MG: 800 TABLET, FILM COATED ORAL at 08:50

## 2022-09-24 RX ADMIN — HEPARIN SODIUM 5000 UNITS: 5000 INJECTION INTRAVENOUS; SUBCUTANEOUS at 08:50

## 2022-09-24 RX ADMIN — AMLODIPINE BESYLATE 5 MG: 5 TABLET ORAL at 08:50

## 2022-09-24 RX ADMIN — LEVETIRACETAM 500 MG: 500 TABLET, EXTENDED RELEASE ORAL at 08:50

## 2022-09-24 RX ADMIN — SEVELAMER CARBONATE 2400 MG: 800 TABLET, FILM COATED ORAL at 17:56

## 2022-09-24 RX ADMIN — INSULIN LISPRO 5 UNITS: 100 INJECTION, SOLUTION INTRAVENOUS; SUBCUTANEOUS at 17:54

## 2022-09-25 LAB
BACTERIA UR QL AUTO: ABNORMAL /HPF
BILIRUB UR QL STRIP: NEGATIVE
CLARITY UR: ABNORMAL
COLOR UR: YELLOW
GLUCOSE BLDC GLUCOMTR-MCNC: 124 MG/DL (ref 70–105)
GLUCOSE BLDC GLUCOMTR-MCNC: 141 MG/DL (ref 70–105)
GLUCOSE BLDC GLUCOMTR-MCNC: 184 MG/DL (ref 70–105)
GLUCOSE BLDC GLUCOMTR-MCNC: 223 MG/DL (ref 70–105)
GLUCOSE UR STRIP-MCNC: ABNORMAL MG/DL
HGB UR QL STRIP.AUTO: ABNORMAL
HYALINE CASTS UR QL AUTO: ABNORMAL /LPF
KETONES UR QL STRIP: NEGATIVE
LEUKOCYTE ESTERASE UR QL STRIP.AUTO: ABNORMAL
NITRITE UR QL STRIP: NEGATIVE
PH UR STRIP.AUTO: 6 [PH] (ref 5–8)
PROT UR QL STRIP: ABNORMAL
RBC # UR STRIP: ABNORMAL /HPF
REF LAB TEST METHOD: ABNORMAL
SP GR UR STRIP: 1.02 (ref 1–1.03)
SQUAMOUS #/AREA URNS HPF: ABNORMAL /HPF
UROBILINOGEN UR QL STRIP: ABNORMAL
WBC # UR STRIP: ABNORMAL /HPF
YEAST URNS QL MICRO: ABNORMAL /HPF

## 2022-09-25 PROCEDURE — 82962 GLUCOSE BLOOD TEST: CPT

## 2022-09-25 PROCEDURE — 63710000001 INSULIN GLARGINE PER 5 UNITS: Performed by: FAMILY MEDICINE

## 2022-09-25 PROCEDURE — 63710000001 INSULIN LISPRO (HUMAN) PER 5 UNITS: Performed by: INTERNAL MEDICINE

## 2022-09-25 PROCEDURE — 97165 OT EVAL LOW COMPLEX 30 MIN: CPT

## 2022-09-25 PROCEDURE — 25010000002 HEPARIN (PORCINE) PER 1000 UNITS: Performed by: FAMILY MEDICINE

## 2022-09-25 RX ADMIN — METOPROLOL TARTRATE 50 MG: 50 TABLET, FILM COATED ORAL at 08:55

## 2022-09-25 RX ADMIN — RISPERIDONE 1 MG: 1 TABLET ORAL at 21:16

## 2022-09-25 RX ADMIN — SEVELAMER CARBONATE 2400 MG: 800 TABLET, FILM COATED ORAL at 08:55

## 2022-09-25 RX ADMIN — INSULIN LISPRO 3 UNITS: 100 INJECTION, SOLUTION INTRAVENOUS; SUBCUTANEOUS at 12:40

## 2022-09-25 RX ADMIN — PANTOPRAZOLE SODIUM 40 MG: 40 TABLET, DELAYED RELEASE ORAL at 05:15

## 2022-09-25 RX ADMIN — GABAPENTIN 100 MG: 100 CAPSULE ORAL at 21:15

## 2022-09-25 RX ADMIN — Medication 220 MG: at 08:55

## 2022-09-25 RX ADMIN — DOCUSATE SODIUM 100 MG: 100 CAPSULE, LIQUID FILLED ORAL at 21:15

## 2022-09-25 RX ADMIN — METOPROLOL TARTRATE 50 MG: 50 TABLET, FILM COATED ORAL at 21:15

## 2022-09-25 RX ADMIN — LOSARTAN POTASSIUM 100 MG: 50 TABLET, FILM COATED ORAL at 16:25

## 2022-09-25 RX ADMIN — GABAPENTIN 100 MG: 100 CAPSULE ORAL at 16:25

## 2022-09-25 RX ADMIN — HEPARIN SODIUM 5000 UNITS: 5000 INJECTION INTRAVENOUS; SUBCUTANEOUS at 21:17

## 2022-09-25 RX ADMIN — GABAPENTIN 100 MG: 100 CAPSULE ORAL at 08:55

## 2022-09-25 RX ADMIN — SEVELAMER CARBONATE 2400 MG: 800 TABLET, FILM COATED ORAL at 16:25

## 2022-09-25 RX ADMIN — HEPARIN SODIUM 5000 UNITS: 5000 INJECTION INTRAVENOUS; SUBCUTANEOUS at 08:55

## 2022-09-25 RX ADMIN — CINACALCET 30 MG: 30 TABLET, FILM COATED ORAL at 08:55

## 2022-09-25 RX ADMIN — DOCUSATE SODIUM 100 MG: 100 CAPSULE, LIQUID FILLED ORAL at 08:55

## 2022-09-25 RX ADMIN — INSULIN LISPRO 2 UNITS: 100 INJECTION, SOLUTION INTRAVENOUS; SUBCUTANEOUS at 08:54

## 2022-09-25 RX ADMIN — RISPERIDONE 1 MG: 1 TABLET ORAL at 08:55

## 2022-09-25 RX ADMIN — AZTREONAM 1 G: 1 INJECTION, POWDER, LYOPHILIZED, FOR SOLUTION INTRAMUSCULAR; INTRAVENOUS at 16:22

## 2022-09-25 RX ADMIN — LEVETIRACETAM 500 MG: 500 TABLET, EXTENDED RELEASE ORAL at 08:55

## 2022-09-25 RX ADMIN — INSULIN GLARGINE 18 UNITS: 100 INJECTION, SOLUTION SUBCUTANEOUS at 21:19

## 2022-09-25 RX ADMIN — SEVELAMER CARBONATE 2400 MG: 800 TABLET, FILM COATED ORAL at 12:40

## 2022-09-25 RX ADMIN — Medication 3 ML: at 21:00

## 2022-09-26 LAB
BACTERIA SPEC AEROBE CULT: NORMAL
GLUCOSE BLDC GLUCOMTR-MCNC: 128 MG/DL (ref 70–105)
GLUCOSE BLDC GLUCOMTR-MCNC: 137 MG/DL (ref 70–105)
GLUCOSE BLDC GLUCOMTR-MCNC: 161 MG/DL (ref 70–105)

## 2022-09-26 PROCEDURE — 82962 GLUCOSE BLOOD TEST: CPT

## 2022-09-26 PROCEDURE — 25010000002 HEPARIN (PORCINE) PER 1000 UNITS: Performed by: FAMILY MEDICINE

## 2022-09-26 PROCEDURE — 97116 GAIT TRAINING THERAPY: CPT

## 2022-09-26 PROCEDURE — 63710000001 INSULIN GLARGINE PER 5 UNITS: Performed by: FAMILY MEDICINE

## 2022-09-26 PROCEDURE — 97530 THERAPEUTIC ACTIVITIES: CPT

## 2022-09-26 RX ORDER — DOCUSATE SODIUM 100 MG/1
100 CAPSULE, LIQUID FILLED ORAL 2 TIMES DAILY PRN
Start: 2022-09-26

## 2022-09-26 RX ORDER — LOSARTAN POTASSIUM 100 MG/1
100 TABLET ORAL DAILY
Qty: 30 TABLET | Refills: 1 | Status: SHIPPED | OUTPATIENT
Start: 2022-09-26 | End: 2023-01-31 | Stop reason: SDUPTHER

## 2022-09-26 RX ORDER — CINACALCET 30 MG/1
30 TABLET, FILM COATED ORAL
Qty: 30 TABLET | Refills: 1 | Status: SHIPPED | OUTPATIENT
Start: 2022-09-26

## 2022-09-26 RX ADMIN — Medication 220 MG: at 14:30

## 2022-09-26 RX ADMIN — RISPERIDONE 1 MG: 1 TABLET ORAL at 20:29

## 2022-09-26 RX ADMIN — PANTOPRAZOLE SODIUM 40 MG: 40 TABLET, DELAYED RELEASE ORAL at 05:04

## 2022-09-26 RX ADMIN — HEPARIN SODIUM 5000 UNITS: 5000 INJECTION INTRAVENOUS; SUBCUTANEOUS at 20:29

## 2022-09-26 RX ADMIN — LOSARTAN POTASSIUM 100 MG: 50 TABLET, FILM COATED ORAL at 19:43

## 2022-09-26 RX ADMIN — INSULIN GLARGINE 18 UNITS: 100 INJECTION, SOLUTION SUBCUTANEOUS at 20:29

## 2022-09-26 RX ADMIN — Medication 3 ML: at 20:29

## 2022-09-26 RX ADMIN — AZTREONAM 500 MG: 1 INJECTION, POWDER, LYOPHILIZED, FOR SOLUTION INTRAMUSCULAR; INTRAVENOUS at 05:03

## 2022-09-26 RX ADMIN — DOCUSATE SODIUM 100 MG: 100 CAPSULE, LIQUID FILLED ORAL at 20:29

## 2022-09-26 RX ADMIN — SEVELAMER CARBONATE 2400 MG: 800 TABLET, FILM COATED ORAL at 14:30

## 2022-09-26 RX ADMIN — CINACALCET 30 MG: 30 TABLET, FILM COATED ORAL at 14:30

## 2022-09-26 RX ADMIN — Medication 3 ML: at 08:00

## 2022-09-26 RX ADMIN — GABAPENTIN 100 MG: 100 CAPSULE ORAL at 20:29

## 2022-09-26 RX ADMIN — DOCUSATE SODIUM 100 MG: 100 CAPSULE, LIQUID FILLED ORAL at 14:30

## 2022-09-26 RX ADMIN — LEVETIRACETAM 500 MG: 500 TABLET, EXTENDED RELEASE ORAL at 14:30

## 2022-09-26 RX ADMIN — SEVELAMER CARBONATE 2400 MG: 800 TABLET, FILM COATED ORAL at 19:41

## 2022-09-26 RX ADMIN — GABAPENTIN 100 MG: 100 CAPSULE ORAL at 14:30

## 2022-09-26 RX ADMIN — LOSARTAN POTASSIUM 100 MG: 50 TABLET, FILM COATED ORAL at 14:30

## 2022-09-26 RX ADMIN — METOPROLOL TARTRATE 50 MG: 50 TABLET, FILM COATED ORAL at 20:29

## 2022-09-26 RX ADMIN — RISPERIDONE 1 MG: 1 TABLET ORAL at 14:30

## 2022-09-27 LAB
ALBUMIN SERPL-MCNC: 3.8 G/DL (ref 3.5–5.2)
ANION GAP SERPL CALCULATED.3IONS-SCNC: 14 MMOL/L (ref 5–15)
BUN SERPL-MCNC: 40 MG/DL (ref 6–20)
BUN/CREAT SERPL: 5.5 (ref 7–25)
CALCIUM SPEC-SCNC: 9.4 MG/DL (ref 8.6–10.5)
CHLORIDE SERPL-SCNC: 96 MMOL/L (ref 98–107)
CO2 SERPL-SCNC: 24 MMOL/L (ref 22–29)
CREAT SERPL-MCNC: 7.28 MG/DL (ref 0.76–1.27)
DEPRECATED RDW RBC AUTO: 59.9 FL (ref 37–54)
EGFRCR SERPLBLD CKD-EPI 2021: 8.1 ML/MIN/1.73
ERYTHROCYTE [DISTWIDTH] IN BLOOD BY AUTOMATED COUNT: 18.2 % (ref 12.3–15.4)
GLUCOSE BLDC GLUCOMTR-MCNC: 130 MG/DL (ref 70–105)
GLUCOSE BLDC GLUCOMTR-MCNC: 143 MG/DL (ref 70–105)
GLUCOSE BLDC GLUCOMTR-MCNC: 207 MG/DL (ref 70–105)
GLUCOSE BLDC GLUCOMTR-MCNC: 213 MG/DL (ref 70–105)
GLUCOSE SERPL-MCNC: 229 MG/DL (ref 65–99)
HCT VFR BLD AUTO: 36 % (ref 37.5–51)
HGB BLD-MCNC: 11.8 G/DL (ref 13–17.7)
HOLD SPECIMEN: NORMAL
MCH RBC QN AUTO: 30.6 PG (ref 26.6–33)
MCHC RBC AUTO-ENTMCNC: 32.7 G/DL (ref 31.5–35.7)
MCV RBC AUTO: 93.6 FL (ref 79–97)
PHOSPHATE SERPL-MCNC: 3.3 MG/DL (ref 2.5–4.5)
PLATELET # BLD AUTO: 151 10*3/MM3 (ref 140–450)
PMV BLD AUTO: 10.3 FL (ref 6–12)
POTASSIUM SERPL-SCNC: 4.7 MMOL/L (ref 3.5–5.2)
RBC # BLD AUTO: 3.85 10*6/MM3 (ref 4.14–5.8)
SODIUM SERPL-SCNC: 134 MMOL/L (ref 136–145)
WBC NRBC COR # BLD: 9.8 10*3/MM3 (ref 3.4–10.8)

## 2022-09-27 PROCEDURE — 97116 GAIT TRAINING THERAPY: CPT

## 2022-09-27 PROCEDURE — 25010000002 HEPARIN (PORCINE) PER 1000 UNITS: Performed by: FAMILY MEDICINE

## 2022-09-27 PROCEDURE — 87340 HEPATITIS B SURFACE AG IA: CPT | Performed by: INTERNAL MEDICINE

## 2022-09-27 PROCEDURE — 63710000001 INSULIN GLARGINE PER 5 UNITS: Performed by: FAMILY MEDICINE

## 2022-09-27 PROCEDURE — 63710000001 INSULIN LISPRO (HUMAN) PER 5 UNITS: Performed by: INTERNAL MEDICINE

## 2022-09-27 PROCEDURE — 97530 THERAPEUTIC ACTIVITIES: CPT

## 2022-09-27 PROCEDURE — 82962 GLUCOSE BLOOD TEST: CPT

## 2022-09-27 PROCEDURE — 80069 RENAL FUNCTION PANEL: CPT | Performed by: INTERNAL MEDICINE

## 2022-09-27 PROCEDURE — 85027 COMPLETE CBC AUTOMATED: CPT | Performed by: INTERNAL MEDICINE

## 2022-09-27 RX ADMIN — Medication 220 MG: at 08:35

## 2022-09-27 RX ADMIN — INSULIN GLARGINE 18 UNITS: 100 INJECTION, SOLUTION SUBCUTANEOUS at 21:35

## 2022-09-27 RX ADMIN — HEPARIN SODIUM 5000 UNITS: 5000 INJECTION INTRAVENOUS; SUBCUTANEOUS at 21:32

## 2022-09-27 RX ADMIN — GABAPENTIN 100 MG: 100 CAPSULE ORAL at 16:40

## 2022-09-27 RX ADMIN — PANTOPRAZOLE SODIUM 40 MG: 40 TABLET, DELAYED RELEASE ORAL at 06:16

## 2022-09-27 RX ADMIN — RISPERIDONE 1 MG: 1 TABLET ORAL at 08:35

## 2022-09-27 RX ADMIN — SEVELAMER CARBONATE 2400 MG: 800 TABLET, FILM COATED ORAL at 18:00

## 2022-09-27 RX ADMIN — LOSARTAN POTASSIUM 100 MG: 50 TABLET, FILM COATED ORAL at 16:40

## 2022-09-27 RX ADMIN — HEPARIN SODIUM 5000 UNITS: 5000 INJECTION INTRAVENOUS; SUBCUTANEOUS at 08:35

## 2022-09-27 RX ADMIN — DOCUSATE SODIUM 100 MG: 100 CAPSULE, LIQUID FILLED ORAL at 08:35

## 2022-09-27 RX ADMIN — CINACALCET 30 MG: 30 TABLET, FILM COATED ORAL at 08:34

## 2022-09-27 RX ADMIN — METOPROLOL TARTRATE 50 MG: 50 TABLET, FILM COATED ORAL at 21:32

## 2022-09-27 RX ADMIN — Medication 3 ML: at 08:36

## 2022-09-27 RX ADMIN — GABAPENTIN 100 MG: 100 CAPSULE ORAL at 08:35

## 2022-09-27 RX ADMIN — INSULIN LISPRO 5 UNITS: 100 INJECTION, SOLUTION INTRAVENOUS; SUBCUTANEOUS at 08:30

## 2022-09-27 RX ADMIN — METOPROLOL TARTRATE 50 MG: 50 TABLET, FILM COATED ORAL at 08:35

## 2022-09-27 RX ADMIN — SEVELAMER CARBONATE 2400 MG: 800 TABLET, FILM COATED ORAL at 08:34

## 2022-09-27 RX ADMIN — GABAPENTIN 100 MG: 100 CAPSULE ORAL at 21:32

## 2022-09-27 RX ADMIN — INSULIN LISPRO 5 UNITS: 100 INJECTION, SOLUTION INTRAVENOUS; SUBCUTANEOUS at 18:00

## 2022-09-27 RX ADMIN — DOCUSATE SODIUM 100 MG: 100 CAPSULE, LIQUID FILLED ORAL at 21:32

## 2022-09-27 RX ADMIN — LEVETIRACETAM 500 MG: 500 TABLET, EXTENDED RELEASE ORAL at 08:35

## 2022-09-27 RX ADMIN — SEVELAMER CARBONATE 2400 MG: 800 TABLET, FILM COATED ORAL at 12:44

## 2022-09-27 RX ADMIN — Medication 3 ML: at 21:37

## 2022-09-28 LAB
ALBUMIN SERPL-MCNC: 3.5 G/DL (ref 3.5–5.2)
ANION GAP SERPL CALCULATED.3IONS-SCNC: 15 MMOL/L (ref 5–15)
BUN SERPL-MCNC: 57 MG/DL (ref 6–20)
BUN/CREAT SERPL: 5.8 (ref 7–25)
CALCIUM SPEC-SCNC: 9.7 MG/DL (ref 8.6–10.5)
CHLORIDE SERPL-SCNC: 98 MMOL/L (ref 98–107)
CO2 SERPL-SCNC: 21 MMOL/L (ref 22–29)
CREAT SERPL-MCNC: 9.82 MG/DL (ref 0.76–1.27)
EGFRCR SERPLBLD CKD-EPI 2021: 5.6 ML/MIN/1.73
GLUCOSE BLDC GLUCOMTR-MCNC: 105 MG/DL (ref 70–105)
GLUCOSE BLDC GLUCOMTR-MCNC: 123 MG/DL (ref 70–105)
GLUCOSE BLDC GLUCOMTR-MCNC: 154 MG/DL (ref 70–105)
GLUCOSE BLDC GLUCOMTR-MCNC: 57 MG/DL (ref 70–105)
GLUCOSE BLDC GLUCOMTR-MCNC: 60 MG/DL (ref 70–105)
GLUCOSE BLDC GLUCOMTR-MCNC: 62 MG/DL (ref 70–105)
GLUCOSE BLDC GLUCOMTR-MCNC: 96 MG/DL (ref 70–105)
GLUCOSE SERPL-MCNC: 111 MG/DL (ref 65–99)
HBV SURFACE AG SERPL QL IA: NORMAL
PHOSPHATE SERPL-MCNC: 3.7 MG/DL (ref 2.5–4.5)
POTASSIUM SERPL-SCNC: 5.2 MMOL/L (ref 3.5–5.2)
SODIUM SERPL-SCNC: 134 MMOL/L (ref 136–145)

## 2022-09-28 PROCEDURE — 80069 RENAL FUNCTION PANEL: CPT | Performed by: INTERNAL MEDICINE

## 2022-09-28 PROCEDURE — 63710000001 INSULIN GLARGINE PER 5 UNITS: Performed by: NURSE PRACTITIONER

## 2022-09-28 PROCEDURE — 25010000002 HEPARIN (PORCINE) PER 1000 UNITS: Performed by: FAMILY MEDICINE

## 2022-09-28 PROCEDURE — 86704 HEP B CORE ANTIBODY TOTAL: CPT | Performed by: INTERNAL MEDICINE

## 2022-09-28 PROCEDURE — 82962 GLUCOSE BLOOD TEST: CPT

## 2022-09-28 RX ORDER — DIPHENHYDRAMINE HCL 25 MG
25 CAPSULE ORAL EVERY 6 HOURS PRN
Status: DISCONTINUED | OUTPATIENT
Start: 2022-09-28 | End: 2022-09-30 | Stop reason: HOSPADM

## 2022-09-28 RX ORDER — DIPHENHYDRAMINE HYDROCHLORIDE 50 MG/ML
25 INJECTION INTRAMUSCULAR; INTRAVENOUS EVERY 6 HOURS PRN
Status: DISCONTINUED | OUTPATIENT
Start: 2022-09-28 | End: 2022-09-30 | Stop reason: HOSPADM

## 2022-09-28 RX ADMIN — METOPROLOL TARTRATE 50 MG: 50 TABLET, FILM COATED ORAL at 21:16

## 2022-09-28 RX ADMIN — PANTOPRAZOLE SODIUM 40 MG: 40 TABLET, DELAYED RELEASE ORAL at 06:01

## 2022-09-28 RX ADMIN — DOCUSATE SODIUM 100 MG: 100 CAPSULE, LIQUID FILLED ORAL at 21:16

## 2022-09-28 RX ADMIN — SEVELAMER CARBONATE 2400 MG: 800 TABLET, FILM COATED ORAL at 08:15

## 2022-09-28 RX ADMIN — Medication 220 MG: at 08:15

## 2022-09-28 RX ADMIN — CINACALCET 30 MG: 30 TABLET, FILM COATED ORAL at 08:16

## 2022-09-28 RX ADMIN — Medication 3 ML: at 08:16

## 2022-09-28 RX ADMIN — DEXTROSE MONOHYDRATE 25 G: 25 INJECTION, SOLUTION INTRAVENOUS at 11:50

## 2022-09-28 RX ADMIN — GABAPENTIN 100 MG: 100 CAPSULE ORAL at 17:14

## 2022-09-28 RX ADMIN — HEPARIN SODIUM 5000 UNITS: 5000 INJECTION INTRAVENOUS; SUBCUTANEOUS at 08:16

## 2022-09-28 RX ADMIN — DOCUSATE SODIUM 100 MG: 100 CAPSULE, LIQUID FILLED ORAL at 08:15

## 2022-09-28 RX ADMIN — GABAPENTIN 100 MG: 100 CAPSULE ORAL at 08:15

## 2022-09-28 RX ADMIN — LEVETIRACETAM 500 MG: 500 TABLET, EXTENDED RELEASE ORAL at 08:15

## 2022-09-28 RX ADMIN — DEXTROSE 15 G: 15 GEL ORAL at 11:10

## 2022-09-28 RX ADMIN — LOSARTAN POTASSIUM 100 MG: 50 TABLET, FILM COATED ORAL at 17:14

## 2022-09-28 RX ADMIN — METOPROLOL TARTRATE 50 MG: 50 TABLET, FILM COATED ORAL at 08:15

## 2022-09-28 RX ADMIN — SEVELAMER CARBONATE 2400 MG: 800 TABLET, FILM COATED ORAL at 17:14

## 2022-09-28 RX ADMIN — GABAPENTIN 100 MG: 100 CAPSULE ORAL at 21:16

## 2022-09-28 RX ADMIN — Medication 3 ML: at 21:16

## 2022-09-28 RX ADMIN — HEPARIN SODIUM 5000 UNITS: 5000 INJECTION INTRAVENOUS; SUBCUTANEOUS at 21:16

## 2022-09-28 RX ADMIN — INSULIN GLARGINE 15 UNITS: 100 INJECTION, SOLUTION SUBCUTANEOUS at 21:16

## 2022-09-29 LAB
A-TOCOPHEROL VIT E SERPL-MCNC: 10.1 MG/L (ref 7–25.1)
BACTERIA SPEC AEROBE CULT: NORMAL
BACTERIA SPEC AEROBE CULT: NORMAL
GAMMA TOCOPHEROL SERPL-MCNC: 1.2 MG/L (ref 0.5–5.5)
GLUCOSE BLDC GLUCOMTR-MCNC: 123 MG/DL (ref 70–105)
GLUCOSE BLDC GLUCOMTR-MCNC: 210 MG/DL (ref 70–105)
GLUCOSE BLDC GLUCOMTR-MCNC: 53 MG/DL (ref 70–105)
GLUCOSE BLDC GLUCOMTR-MCNC: 58 MG/DL (ref 70–105)
GLUCOSE BLDC GLUCOMTR-MCNC: 59 MG/DL (ref 70–105)
GLUCOSE BLDC GLUCOMTR-MCNC: 72 MG/DL (ref 70–105)
GLUCOSE BLDC GLUCOMTR-MCNC: 76 MG/DL (ref 70–105)
HBV CORE AB SERPL QL IA: NEGATIVE

## 2022-09-29 PROCEDURE — 63710000001 INSULIN GLARGINE PER 5 UNITS: Performed by: NURSE PRACTITIONER

## 2022-09-29 PROCEDURE — 25010000002 HEPARIN (PORCINE) PER 1000 UNITS: Performed by: FAMILY MEDICINE

## 2022-09-29 PROCEDURE — 63710000001 INSULIN LISPRO (HUMAN) PER 5 UNITS: Performed by: INTERNAL MEDICINE

## 2022-09-29 PROCEDURE — 82962 GLUCOSE BLOOD TEST: CPT

## 2022-09-29 RX ADMIN — DOCUSATE SODIUM 100 MG: 100 CAPSULE, LIQUID FILLED ORAL at 20:55

## 2022-09-29 RX ADMIN — GABAPENTIN 100 MG: 100 CAPSULE ORAL at 09:00

## 2022-09-29 RX ADMIN — SEVELAMER CARBONATE 2400 MG: 800 TABLET, FILM COATED ORAL at 09:00

## 2022-09-29 RX ADMIN — Medication 3 ML: at 09:28

## 2022-09-29 RX ADMIN — METOPROLOL TARTRATE 50 MG: 50 TABLET, FILM COATED ORAL at 20:55

## 2022-09-29 RX ADMIN — PANTOPRAZOLE SODIUM 40 MG: 40 TABLET, DELAYED RELEASE ORAL at 05:16

## 2022-09-29 RX ADMIN — HEPARIN SODIUM 5000 UNITS: 5000 INJECTION INTRAVENOUS; SUBCUTANEOUS at 20:55

## 2022-09-29 RX ADMIN — HEPARIN SODIUM 5000 UNITS: 5000 INJECTION INTRAVENOUS; SUBCUTANEOUS at 09:00

## 2022-09-29 RX ADMIN — Medication 220 MG: at 09:00

## 2022-09-29 RX ADMIN — DOCUSATE SODIUM 100 MG: 100 CAPSULE, LIQUID FILLED ORAL at 09:00

## 2022-09-29 RX ADMIN — LOSARTAN POTASSIUM 100 MG: 50 TABLET, FILM COATED ORAL at 17:04

## 2022-09-29 RX ADMIN — INSULIN GLARGINE 15 UNITS: 100 INJECTION, SOLUTION SUBCUTANEOUS at 20:55

## 2022-09-29 RX ADMIN — GABAPENTIN 100 MG: 100 CAPSULE ORAL at 16:59

## 2022-09-29 RX ADMIN — CINACALCET 30 MG: 30 TABLET, FILM COATED ORAL at 09:00

## 2022-09-29 RX ADMIN — Medication 3 ML: at 21:52

## 2022-09-29 RX ADMIN — SEVELAMER CARBONATE 2400 MG: 800 TABLET, FILM COATED ORAL at 12:50

## 2022-09-29 RX ADMIN — INSULIN LISPRO 4 UNITS: 100 INJECTION, SOLUTION INTRAVENOUS; SUBCUTANEOUS at 12:50

## 2022-09-29 RX ADMIN — SEVELAMER CARBONATE 2400 MG: 800 TABLET, FILM COATED ORAL at 17:04

## 2022-09-29 RX ADMIN — LEVETIRACETAM 500 MG: 500 TABLET, EXTENDED RELEASE ORAL at 09:00

## 2022-09-29 RX ADMIN — METOPROLOL TARTRATE 50 MG: 50 TABLET, FILM COATED ORAL at 09:00

## 2022-09-29 RX ADMIN — GABAPENTIN 100 MG: 100 CAPSULE ORAL at 20:55

## 2022-09-30 ENCOUNTER — READMISSION MANAGEMENT (OUTPATIENT)
Dept: CALL CENTER | Facility: HOSPITAL | Age: 57
End: 2022-09-30

## 2022-09-30 VITALS
HEART RATE: 79 BPM | DIASTOLIC BLOOD PRESSURE: 68 MMHG | OXYGEN SATURATION: 97 % | WEIGHT: 223.33 LBS | SYSTOLIC BLOOD PRESSURE: 122 MMHG | TEMPERATURE: 97.7 F | HEIGHT: 69 IN | RESPIRATION RATE: 16 BRPM | BODY MASS INDEX: 33.08 KG/M2

## 2022-09-30 PROBLEM — E83.52 HYPERCALCEMIA: Status: ACTIVE | Noted: 2022-09-30

## 2022-09-30 PROBLEM — I12.0: Status: ACTIVE | Noted: 2017-06-16

## 2022-09-30 LAB — GLUCOSE BLDC GLUCOMTR-MCNC: 75 MG/DL (ref 70–105)

## 2022-09-30 PROCEDURE — 25010000002 HEPARIN (PORCINE) PER 1000 UNITS: Performed by: FAMILY MEDICINE

## 2022-09-30 PROCEDURE — 82962 GLUCOSE BLOOD TEST: CPT

## 2022-09-30 RX ADMIN — METOPROLOL TARTRATE 50 MG: 50 TABLET, FILM COATED ORAL at 12:19

## 2022-09-30 RX ADMIN — GABAPENTIN 100 MG: 100 CAPSULE ORAL at 12:18

## 2022-09-30 RX ADMIN — LEVETIRACETAM 500 MG: 500 TABLET, EXTENDED RELEASE ORAL at 12:19

## 2022-09-30 RX ADMIN — DOCUSATE SODIUM 100 MG: 100 CAPSULE, LIQUID FILLED ORAL at 12:19

## 2022-09-30 RX ADMIN — HEPARIN SODIUM 5000 UNITS: 5000 INJECTION INTRAVENOUS; SUBCUTANEOUS at 12:26

## 2022-09-30 RX ADMIN — PANTOPRAZOLE SODIUM 40 MG: 40 TABLET, DELAYED RELEASE ORAL at 05:35

## 2022-09-30 RX ADMIN — Medication 220 MG: at 12:19

## 2022-09-30 RX ADMIN — Medication 3 ML: at 12:20

## 2022-09-30 RX ADMIN — SEVELAMER CARBONATE 2400 MG: 800 TABLET, FILM COATED ORAL at 12:18

## 2022-10-03 NOTE — PAYOR COMM NOTE
"NOTIFICATION OF DISCHARGE:        Baljit Kurtz (57 y.o. Male) 1965  AUTH # [id number]      DISCHARGED TO HOME ON 09/30/2022.        AUTHORIZATION PENDING:   PLEASE CALL OR FAX DETERMINATION TO CONTACT BELOW. THANK YOU.        Leonor Brennan RN MSN  /UR  [facility]  [phone] office  [phone] fax  [email or web address]    [facility]  NPI: [id number]  Tax: [id number]          Baljit Kurtz (57 y.o. Male)             Date of Birth   1965    Social Security Number       Address   [address] [place] [address]    Home Phone   [phone]    MRN   [id number]       Evangelical   Hinduism    Marital Status                               Admission Date   9/19/22    Admission Type   Emergency    Admitting Provider   Jorge L Willis MD    Attending Provider       Department, Room/Bed   [facility] 2B MEDICAL INPATIENT, 239/1       Discharge Date   9/30/2022    Discharge Disposition   Home or Self Care    Discharge Destination                               Attending Provider: (none)   Allergies: Latex, Other, Penicillins, Latex, Natural Rubber, Penicillins, Amoxicillin    Isolation: None   Infection: None   Code Status: Prior   Advance Care Planning Activity    Ht: 175.3 cm (69\")   Wt: 101 kg (223 lb 5.2 oz)    Admission Cmt: None   Principal Problem: Weakness [R53.1]                 Active Insurance as of 9/19/2022     Primary Coverage     Payor Plan Insurance Group Employer/Plan Group    [facility] MEDICARE REPLACEMENT [facility] MEDICARE REPLACEMENT [id number]     Payor Plan Address Payor Plan Phone Number Payor Plan Fax Number Effective Dates    [address] [address]   1/1/2022 - None Entered    [facility] [id number]       Subscriber Name Subscriber Birth Date Member ID       Baljit Kurtz 1965 [id number]                 Emergency Contacts      (Rel.) Home Phone Work Phone Mobile Phone    " DORENE HOWELL (Daughter) -- -- [phone]    DESIREE SEBASTIAN (Friend) [phone] -- [phone]               Discharge Summary      Bhavana San APRN at 09/30/22 1239     Attestation signed by Karine Campos MD at 10/02/22 7590    I have reviewed this documentation and agree.                  Date of Discharge:  9/30/2022    Discharge Diagnosis:   Weakness- PT rec INPT rehab - ins will not cover    ESRD- dialysis per M/W/F schedule.     Hypercalcemia- off calcitriol and will continue sensipar.     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          Hospital Course  Patient is a 57 y.o. male presented with weakness. He ias ESRD and is a dialysis patient. Er found no MRI findings.Neuro was consulted and rec IV solumedrol for myopathy .Nephrology was consulted to manage HD the patient / OT consulted as it was thought that the pt would need inpt rehab. His leg weakness did improve with IV steroids. Ca was found to be elevated and Dr Ayala managed this. The pt was started on Cinacalcet . The pt had some development of aggressive behavior and psych was consulted and prn risperidone was started. Sodium level dropped and elavil was stopped. Mood improved.  Electrolytes stabilized. He was kept on M/W/F dialysis. Weakness in the legs is much improved. Pt was willing to go to rehab and ins would not approve. He feels strong enough to go home with family support. He denies Home health. He has a Hospital FU appointment set and will keep this.     Procedures Performed          Consults:   Consults     Date and Time Order Name Status Description    9/22/2022  8:43 AM Inpatient Psychiatrist Consult Completed     9/19/2022  9:10 PM Inpatient Neurology Consult General      9/19/2022  4:29 PM Family Medicine Consult      9/19/2022  4:28 PM Nephrology (on -call MD unless specified) Completed     9/19/2022 12:22 PM Inpatient Neurology Consult General Completed           Pertinent Test Results:    Lab Results (most recent)     Procedure Component Value Units Date/Time    POC Glucose Once [[id number]]  (Normal) Collected: 09/30/22 1144    Specimen: Blood Updated: 09/30/22 1145     Glucose 75 mg/dL      Comment: Serial Number: [id number]:  [id number]       POC Glucose Once [[id number]]  (Abnormal) Collected: 09/29/22 1930    Specimen: Blood Updated: 09/29/22 1935     Glucose 123 mg/dL      Comment: Serial Number: [id number]:  [id number]       Blood Culture - Blood, Hand, Right [[id number]]  (Normal) Collected: 09/24/22 1025    Specimen: Blood from Hand, Right Updated: 09/29/22 1048     Blood Culture No growth at 5 days    Blood Culture - Blood, Wrist, Right [[id number]]  (Normal) Collected: 09/24/22 1012    Specimen: Blood from Wrist, Right Updated: 09/29/22 1048     Blood Culture No growth at 5 days    Hepatitis B Core Antibody, Total [[id number]] Collected: 09/28/22 1008    Specimen: Blood Updated: 09/29/22 0712     Hep B Core Total Ab Negative    Narrative:      Performed at:  [address] [place] [address]  [id number]  : Kaleb Mi PhD, Phone:  [phone]    Vitamin E [[id number]] Collected: 09/19/22 1620    Specimen: Blood from Hand, Right Updated: 09/29/22 0338     Vitamin E (Alpha Tocopherol) 10.1 mg/L      Vitamin E (Gamma Tocopherol) 1.2 mg/L      Comment: Reference intervals for alpha and gamma-tocopherol determined from  National Health and Nutrition Examination Survey, 5595-2748.  Individuals with alpha-tocopherol levels less than 5.0 mg/L  are  considered vitamin E deficient.       Narrative:      Test(s) 070141-Vitamin E(Alpha Tocopherol); 996946-  Vitamin E(Gamma Tocopherol)  was developed and its performance characteristics determined  by [facility]. It has not been cleared or approved by the Food  and Drug Administration.  Performed at:  [address]  [id number]  : Anh Moss MD, Phone:  [phone]    Hepatitis B Surface Antigen [[id number]]  (Normal) Collected: 09/27/22 0448    Specimen: Blood Updated: 09/28/22 1048     Hepatitis B Surface Ag Non-Reactive    Renal Function Panel [[id number]]  (Abnormal) Collected: 09/28/22 0632    Specimen: Blood Updated: 09/28/22 0741     Glucose 111 mg/dL      BUN 57 mg/dL      Creatinine 9.82 mg/dL      Sodium 134 mmol/L      Potassium 5.2 mmol/L      Chloride 98 mmol/L      CO2 21.0 mmol/L      Calcium 9.7 mg/dL      Albumin 3.50 g/dL      Phosphorus 3.7 mg/dL      Anion Gap 15.0 mmol/L      BUN/Creatinine Ratio 5.8     eGFR 5.6 mL/min/1.73      Comment: <15 Indicative of kidney failure       Narrative:      GFR Normal >60  Chronic Kidney Disease <60  Kidney Failure <15      Extra Tubes [[id number]] Collected: 09/27/22 0448    Specimen: Blood Updated: 09/27/22 0602    Narrative:      The following orders were created for panel order Extra Tubes.  Procedure                               Abnormality         Status                     ---------                               -----------         ------                     Gold Top - SST[[id number]]                                   Final result                 Please view results for these tests on the individual orders.    Gold Top - SST [[id number]] Collected: 09/27/22 0448    Specimen: Blood Updated: 09/27/22 0602     Extra Tube Hold for add-ons.     Comment: Auto resulted.       Renal Function Panel [[id number]]  (Abnormal) Collected: 09/27/22 0448    Specimen: Blood Updated: 09/27/22 0555     Glucose 229  mg/dL      BUN 40 mg/dL      Creatinine 7.28 mg/dL      Sodium 134 mmol/L      Potassium 4.7 mmol/L      Comment: Slight hemolysis detected by analyzer. Results may be affected.        Chloride 96 mmol/L      CO2 24.0 mmol/L      Calcium 9.4 mg/dL      Albumin 3.80 g/dL      Phosphorus 3.3 mg/dL      Anion Gap 14.0 mmol/L      BUN/Creatinine Ratio 5.5     eGFR 8.1 mL/min/1.73      Comment: <15 Indicative of kidney failure       Narrative:      GFR Normal >60  Chronic Kidney Disease <60  Kidney Failure <15      CBC (No Diff) [[id number]]  (Abnormal) Collected: 09/27/22 0448    Specimen: Blood Updated: 09/27/22 0530     WBC 9.80 10*3/mm3      RBC 3.85 10*6/mm3      Hemoglobin 11.8 g/dL      Hematocrit 36.0 %      MCV 93.6 fL      MCH 30.6 pg      MCHC 32.7 g/dL      RDW 18.2 %      RDW-SD 59.9 fl      MPV 10.3 fL      Platelets 151 10*3/mm3     Urine Culture - Urine, Urine, Random Void [[id number]] Collected: 09/24/22 2323    Specimen: Urine, Random Void Updated: 09/26/22 1200     Urine Culture 25,000 CFU/mL Normal Urogenital Lulu    Narrative:      Colonization of the urinary tract without infection is common. Treatment is discouraged unless the patient is symptomatic, pregnant, or undergoing an invasive urologic procedure.    Urinalysis, Microscopic Only - Urine, Random Void [[id number]]  (Abnormal) Collected: 09/24/22 2323    Specimen: Urine, Random Void Updated: 09/25/22 0027     RBC, UA 3-5 /HPF      WBC, UA Too Numerous to Count /HPF      Bacteria, UA Trace /HPF      Squamous Epithelial Cells, UA 7-12 /HPF      Yeast, UA Moderate/2+ Budding Yeast /HPF      Hyaline Casts, UA None Seen /LPF      Methodology Manual Light Microscopy    Urinalysis With Culture If Indicated - Urine, Random Void [[id number]]  (Abnormal) Collected: 09/24/22 2323    Specimen: Urine, Random Void Updated: 09/25/22 0006     Color, UA Yellow     Appearance, UA Turbid     pH, UA 6.0     Specific Gravity, UA 1.017     Glucose,  mg/dL  "(Trace)     Ketones, UA Negative     Bilirubin, UA Negative     Blood, UA Moderate (2+)     Protein, UA >=300 mg/dL (3+)     Leuk Esterase, UA Large (3+)     Nitrite, UA Negative     Urobilinogen, UA 0.2 E.U./dL    Narrative:      In absence of clinical symptoms, the presence of pyuria, bacteria, and/or nitrites on the urinalysis result does not correlate with infection.    CBC (No Diff) [[id number]]  (Abnormal) Collected: 09/24/22 0009    Specimen: Blood Updated: 09/24/22 0039     WBC 10.60 10*3/mm3      RBC 4.07 10*6/mm3      Hemoglobin 12.3 g/dL      Hematocrit 38.8 %      MCV 95.3 fL      MCH 30.3 pg      MCHC 31.8 g/dL      RDW 18.3 %      RDW-SD 60.8 fl      MPV 9.3 fL      Platelets 157 10*3/mm3     Procalcitonin [[id number]]  (Abnormal) Collected: 09/23/22 0902    Specimen: Blood Updated: 09/23/22 0954     Procalcitonin 0.42 ng/mL     Narrative:      As a Marker for Sepsis (Non-Neonates):    1. <0.5 ng/mL represents a low risk of severe sepsis and/or septic shock.  2. >2 ng/mL represents a high risk of severe sepsis and/or septic shock.    As a Marker for Lower Respiratory Tract Infections that require antibiotic therapy:    PCT on Admission    Antibiotic Therapy       6-12 Hrs later    >0.5                Strongly Recommended  >0.25 - <0.5        Recommended   0.1 - 0.25          Discouraged              Remeasure/reassess PCT  <0.1                Strongly Discouraged     Remeasure/reassess PCT    As 28 day mortality risk marker: \"Change in Procalcitonin Result\" (>80% or <=80%) if Day 0 (or Day 1) and Day 4 values are available. Refer to http://www.[facility]-pct-calculator.com    Change in PCT <=80%  A decrease of PCT levels below or equal to 80% defines a positive change in PCT test result representing a higher risk for 28-day all-cause mortality of patients diagnosed with severe sepsis for septic shock.    Change in PCT >80%  A decrease of PCT levels of more than 80% defines a negative change in PCT result " representing a lower risk for 28-day all-cause mortality of patients diagnosed with severe sepsis or septic shock.       Magnesium [[id number]]  (Normal) Collected: 09/22/22 1159    Specimen: Blood Updated: 09/22/22 1231     Magnesium 2.3 mg/dL     Immunofixation, Serum [[id number]] Collected: 09/19/22 1931    Specimen: Blood, Venous Line Updated: 09/21/22 1510     Immunofixation Result, Serum Comment     Comment: No monoclonality detected.        IgG 1085 mg/dL      IgA 311 mg/dL      IgM 104 mg/dL     Narrative:      Performed at:  [address] [place] [address]  [id number]  : Kaleb Mi PhD, Phone:  [phone]    Hemoglobin A1c [[id number]]  (Abnormal) Collected: 09/20/22 0341    Specimen: Blood Updated: 09/20/22 2028     Hemoglobin A1C 6.1 %     Narrative:      Hemoglobin A1C Reference Range:    <5.7 %        Normal  5.7-6.4 %     Increased risk for diabetes  > 6.4 %        Diabetes       These guidelines have been recommended by the American Diabetic Association for Hgb A1c.      The following 2010 guidelines have been recommended by the American Diabetes Association for Hemoglobin A1c.    HBA1c 5.7-6.4% Increased risk for future diabetes (pre-diabetes)  HBA1c     >6.4% Diabetes      Aldolase [[id number]] Collected: 09/19/22 1620    Specimen: Blood from Hand, Right Updated: 09/20/22 1411     Aldolase 7.2 U/L     Narrative:      Performed at:  [address] [place] [address]  [id number]  : Kaleb Mi PhD, Phone:  [phone]    Lyme Disease Total Antibody With Reflex to Immunoassay [[id number]] Collected: 09/19/22 1620    Specimen: Blood from Hand, Right Updated: 09/20/22 1211     Lyme Total Antibody EIA Negative     Comment: Lyme Antibody Negative  No laboratory evidence of infection with B. burgdorferi (Lyme disease).  Negative results may occur in patients recently infected (less than  or equal to 14 days) with B. burgdorferi.  If recent  infection is  suspected, repeat testing on a new sample collected in 7 to 14 days is  recommended.       Narrative:      Performed at:  [address]  [id number]  : Kaleb Mi PhD, Phone:  [phone]    Hepatitis B Surface Antigen [[id number]]  (Normal) Collected: 09/19/22 1931    Specimen: Blood Updated: 09/20/22 1204     Hepatitis B Surface Ag Non-Reactive    Folate [[id number]]  (Normal) Collected: 09/20/22 0341    Specimen: Blood Updated: 09/20/22 1200     Folate 7.44 ng/mL     Narrative:      Results may be falsely increased if patient taking Biotin.      Vitamin B12 [[id number]]  (Normal) Collected: 09/20/22 0341    Specimen: Blood Updated: 09/20/22 1200     Vitamin B-12 826 pg/mL     Narrative:      Results may be falsely increased if patient taking Biotin.      Vitamin D 25 Hydroxy [[id number]]  (Normal) Collected: 09/19/22 1931    Specimen: Blood, Venous Line Updated: 09/20/22 0618     25 Hydroxy, Vitamin D 51.9 ng/ml     Narrative:      Reference Range for Total Vitamin D 25(OH)     Deficiency <20.0 ng/mL   Insufficiency 21-29 ng/mL   Sufficiency  ng/mL  Toxicity >100 ng/ml    Results may be falsely increased if patient taking Biotin.      TSH [[id number]]  (Normal) Collected: 09/20/22 0341    Specimen: Blood Updated: 09/20/22 0416     TSH 0.532 uIU/mL     CK [[id number]]  (Abnormal) Collected: 09/20/22 0341    Specimen: Blood Updated: 09/20/22 0405     Creatine Kinase 351 U/L     Extra Tubes [[id number]] Collected: 09/19/22 1931    Specimen: Blood Updated: 09/19/22 2047    Narrative:      The following orders were created for panel order Extra Tubes.  Procedure                               Abnormality         Status                     ---------                               -----------         ------                     Gold Top - [facility][[id number]]                                   Final result                 Please view results for these tests on the  individual orders.    Gold Top - [facility] [[id number]] Collected: 09/19/22 1931    Specimen: Blood Updated: 09/19/22 2047     Extra Tube Hold for add-ons.     Comment: Auto resulted.       PTH, Intact [[id number]]  (Abnormal) Collected: 09/19/22 1931    Specimen: Blood, Venous Line Updated: 09/19/22 2016     PTH, Intact 261.3 pg/mL     Narrative:      Results may be falsely decreased if patient taking Biotin.      Blood Gas, Arterial - [[id number]]  (Abnormal) Collected: 09/19/22 1227    Specimen: Arterial Blood Updated: 09/19/22 1522     Site Right Radial     Joe's Test Positive     pH, Arterial 7.376 pH units      pCO2, Arterial 49.3 mm Hg      pO2, Arterial 74.1 mm Hg      HCO3, Arterial 28.9 mmol/L      Base Excess, Arterial 2.9 mmol/L      Comment: Serial Number: [id number]:  [id number]        O2 Saturation, Arterial 94.1 %      CO2 Content 30.4 mmol/L      Barometric Pressure for Blood Gas --     Comment: N/A        Modality Room Air     FIO2 21 %      Hemodilution No    Troponin [[id number]]  (Abnormal) Collected: 09/19/22 1224    Specimen: Blood Updated: 09/19/22 1303     Troponin T 0.231 ng/mL     Narrative:      Troponin T Reference Range:  <= 0.03 ng/mL-   Negative for AMI  >0.03 ng/mL-     Abnormal for myocardial necrosis.  Clinicians would have to utilize clinical acumen, EKG, Troponin and serial changes to determine if it is an Acute Myocardial Infarction or myocardial injury due to an underlying chronic condition.       Results may be falsely decreased if patient taking Biotin.      Comprehensive Metabolic Panel [[id number]]  (Abnormal) Collected: 09/19/22 1224    Specimen: Blood Updated: 09/19/22 1301     Glucose 149 mg/dL      BUN 47 mg/dL      Creatinine 9.67 mg/dL      Sodium 134 mmol/L      Potassium 4.4 mmol/L      Chloride 90 mmol/L      CO2 29.0 mmol/L      Calcium 11.8 mg/dL      Total Protein 7.7 g/dL      Albumin 4.40 g/dL      ALT (SGPT) 11 U/L      AST (SGOT) 13 U/L      Alkaline Phosphatase 112  U/L      Total Bilirubin 0.3 mg/dL      Globulin 3.3 gm/dL      A/G Ratio 1.3 g/dL      BUN/Creatinine Ratio 4.9     Anion Gap 15.0 mmol/L      eGFR 5.7 mL/min/1.73      Comment: <15 Indicative of kidney failure       Narrative:      GFR Normal >60  Chronic Kidney Disease <60  Kidney Failure <15      Ethanol [[id number]] Collected: 09/19/22 1224    Specimen: Blood Updated: 09/19/22 1254     Ethanol % 0.010 %     Narrative:      Plasma Ethanol Clinical Symptoms:    ETOH (%)               Clinical Symptom  .01-.05              No apparent influence  .03-.12              Euphoria, Diminished judgment and attention   .09-.25              Impaired comprehension, Muscle incoordination  .18-.30              Confusion, Staggered gait, Slurred speech  .25-.40              Markedly decreased response to stimuli, unable to stand or                        walk, vomitting, sleep or stupor  .35-.50              Comatose, Anesthesia, Subnormal body temperature        Acetaminophen Level [[id number]]  (Normal) Collected: 09/19/22 1224    Specimen: Blood Updated: 09/19/22 1254     Acetaminophen <5.0 mcg/mL     Narrative:      Acetaminophen Therapeutic Range  5-20 ug/mL      Hours after ingestion            Toxic Value    4 Hours                           150 ug/mL    8 Hours                            70 ug/mL   12 Hours                            40 ug/mL   16 Hours                            20 ug/mL    These values apply to a single ingestion only.     Magnesium [[id number]]  (Normal) Collected: 09/19/22 1224    Specimen: Blood Updated: 09/19/22 1254     Magnesium 2.3 mg/dL     Salicylate Level [[id number]]  (Normal) Collected: 09/19/22 1224    Specimen: Blood Updated: 09/19/22 1254     Salicylate <0.3 mg/dL     Protime-INR [[id number]]  (Normal) Collected: 09/19/22 1224    Specimen: Blood Updated: 09/19/22 1250     Protime 10.0 Seconds      INR 0.97    aPTT [[id number]]  (Abnormal) Collected: 09/19/22 1224    Specimen: Blood Updated:  09/19/22 1250     PTT 25.6 seconds     CBC & Differential [[id number]]  (Abnormal) Collected: 09/19/22 1224    Specimen: Blood Updated: 09/19/22 1236    Narrative:      The following orders were created for panel order CBC & Differential.  Procedure                               Abnormality         Status                     ---------                               -----------         ------                     CBC Auto Differential[[id number]]        Abnormal            Final result                 Please view results for these tests on the individual orders.    CBC Auto Differential [[id number]]  (Abnormal) Collected: 09/19/22 1224    Specimen: Blood Updated: 09/19/22 1236     WBC 8.50 10*3/mm3      RBC 4.00 10*6/mm3      Hemoglobin 12.2 g/dL      Hematocrit 37.1 %      MCV 92.9 fL      MCH 30.5 pg      MCHC 32.8 g/dL      RDW 18.1 %      RDW-SD 59.5 fl      MPV 8.4 fL      Platelets 216 10*3/mm3      Neutrophil % 72.8 %      Lymphocyte % 12.7 %      Monocyte % 6.1 %      Eosinophil % 7.1 %      Basophil % 1.3 %      Neutrophils, Absolute 6.20 10*3/mm3      Lymphocytes, Absolute 1.10 10*3/mm3      Monocytes, Absolute 0.50 10*3/mm3      Eosinophils, Absolute 0.60 10*3/mm3      Basophils, Absolute 0.10 10*3/mm3      nRBC 0.1 /100 WBC     POC Lactate [[id number]]  (Normal) Collected: 09/19/22 1220    Specimen: Blood Updated: 09/19/22 1221     Lactate 0.6 mmol/L      Comment: Serial Number: [id number]:  [id number]              Results for orders placed during the hospital encounter of 08/11/20    Adult Transthoracic Echo Complete W/ Cont if Necessary Per Protocol    Interpretation Summary  · Left ventricular systolic function is mildly decreased.  · Estimated EF appears to be in the range of 46 - 50%.  · Left ventricular wall thickness is consistent with moderate-to-severe concentric hypertrophy.  · Left ventricular diastolic dysfunction (grade I) consistent with impaired relaxation.  · Right ventricular cavity is  mildly dilated.       Condition on Discharge:  Stable     Vital Signs  Temp:  [97.7 °F (36.5 °C)-98.4 °F (36.9 °C)] 97.7 °F (36.5 °C)  Heart Rate:  [74-83] 79  Resp:  [16-18] 16  BP: (106-158)/(57-78) 122/68    Physical Exam:     General Appearance:    Alert, cooperative, in no acute distress   Head:    Normocephalic, without obvious abnormality, atraumatic   Eyes:            Lids and lashes normal, conjunctivae and sclerae normal, no   icterus, no pallor, corneas clear, PERRLA   Ears:    Ears appear intact with no abnormalities noted   Throat:   No oral lesions, no thrush, oral mucosa moist   Neck:   No adenopathy, supple, trachea midline, no thyromegaly, no   carotid bruit, no JVD   Lungs:     Clear to auscultation,respirations regular, even and                  unlabored    Heart:    Regular rhythm and normal rate, normal S1 and S2, no            murmur, no gallop, no rub, no click   Chest Wall:    No abnormalities observed   Abdomen:     Normal bowel sounds, no masses, no organomegaly, soft        non-tender, non-distended, no guarding, no rebound                tenderness   Extremities:   Moves all extremities well,yet weak  no edema, no cyanosis, no             redness   Pulses:   Pulses palpable and equal bilaterally   Skin:   No bleeding, bruising or rash   Lymph nodes:   No palpable adenopathy   Neurologic:   Cranial nerves 2 - 12 grossly intact, sensation intact, DTR       present and equal bilaterally       Discharge Disposition  Home or Self Care    Discharge Medications     Discharge Medications      New Medications      Instructions Start Date   cinacalcet 30 MG tablet  Commonly known as: SENSIPAR   30 mg, Oral, Daily With Breakfast      losartan 100 MG tablet  Commonly known as: COZAAR   100 mg, Oral, Daily         Changes to Medications      Instructions Start Date   docusate sodium 100 MG capsule  Commonly known as: COLACE  What changed:   · when to take this  · reasons to take this   100 mg, Oral,  2 Times Daily PRN         Continue These Medications      Instructions Start Date   amitriptyline 25 MG tablet  Commonly known as: ELAVIL   25 mg, Oral, Nightly      atorvastatin 40 MG tablet  Commonly known as: LIPITOR   40 mg, Oral, Nightly      benzonatate 200 MG capsule  Commonly known as: TESSALON   200 mg, Oral, 3 Times Daily PRN      FeroSul 325 (65 FE) MG tablet  Generic drug: ferrous sulfate   325 mg, Oral, Daily With Breakfast      gabapentin 600 MG tablet  Commonly known as: NEURONTIN   600 mg, Oral, 2 Times Daily      glucagon 1 MG injection  Commonly known as: GLUCAGEN   GLUCAGON EMERGENCY 1 MG KIT      hydrOXYzine 25 MG tablet  Commonly known as: ATARAX   25 mg, Oral, Every 6 Hours PRN      insulin glargine 100 UNIT/ML injection  Commonly known as: LANTUS, SEMGLEE   40 Units, Subcutaneous, Nightly      Insulin Lispro 100 UNIT/ML solution pen-injector  Commonly known as: HUMALOG   10 Units, Subcutaneous, 3 Times Daily, Per SSI      levETIRAcetam  MG 24 hr tablet  Commonly known as: KEPPRA XR   500 mg, Oral, Daily      metoprolol tartrate 50 MG tablet  Commonly known as: LOPRESSOR   50 mg, Oral, 2 Times Daily      MIRCERA IJ   100 mcg, Every 14 Days      pantoprazole 20 MG EC tablet  Commonly known as: PROTONIX   20 mg, Oral, Daily      risperiDONE 1 MG tablet  Commonly known as: risperDAL   1 mg, Oral, 2 Times Daily      sevelamer 800 MG tablet  Commonly known as: RENVELA   2,400 mg, Oral, 3 Times Daily With Meals         Stop These Medications    amLODIPine 10 MG tablet  Commonly known as: NORVASC     azithromycin 250 MG tablet  Commonly known as: Zithromax Z-Alex     hydrALAZINE 50 MG tablet  Commonly known as: APRESOLINE     methylPREDNISolone 4 MG dose pack  Commonly known as: MEDROL            Discharge Diet:   Diet Instructions     Diet: Renal, Consistent Carbohydrate      Discharge Diet:  Renal  Consistent Carbohydrate             Activity at Discharge:   Activity Instructions     Activity  as Tolerated            Follow-up Appointments  Future Appointments   Date Time Provider Department Center   1/31/2023  3:30 PM Ophelia Murrieta MD [facility]     Additional Instructions for the Follow-ups that You Need to Schedule     Discharge Follow-up with PCP   As directed       Currently Documented PCP:    Kerri Jones MD    PCP Phone Number:    [phone]     Follow Up Details: Appt with STEPAN 10/3 at 1 pm               Test Results Pending at Discharge       Bhavana ROLAND Valdes  09/30/22  12:39 EDT    Time: Discharge 35 min            Electronically signed by Urbano Campos MD at 10/02/22 2130       Discharge Order (From admission, onward)     Start     Ordered    09/30/22 1243  Discharge patient  Once        Expected Discharge Date: 09/30/22    Discharge Disposition: Home or Self Care    Physician of Record for Attribution - Please select from Treatment Team: URBANO CAMPOS [[id number]]    Review needed by CMO to determine Physician of Record: No       Question Answer Comment   Physician of Record for Attribution - Please select from Treatment Team URBANO CAMPOS    Review needed by CMO to determine Physician of Record No        09/30/22 1245    09/26/22 1402  Discharge patient  Once,   Status:  Canceled        Expected Discharge Date: 09/26/22    Expected Discharge Time: Afternoon    Discharge Disposition: Home or Self Care    Physician of Record for Attribution - Please select from Treatment Team: URBANO CAMPOS [[id number]]    Review needed by CMO to determine Physician of Record: No    Please choose which facility the patient is currently admitted if they are being discharged to another facility or unit.:  [facility]    Mode: Family       Question Answer Comment   Physician of Record for Attribution - Please select from Treatment Team URBANO CAMPOS    Review needed by CMO to determine Physician of Record No    Please choose which facility the patient is currently admitted if they are being discharged to another facility  or unit. [facility]    Mode: Family        09/26/22 1469

## 2022-10-04 ENCOUNTER — READMISSION MANAGEMENT (OUTPATIENT)
Dept: CALL CENTER | Facility: HOSPITAL | Age: 57
End: 2022-10-04

## 2022-10-04 NOTE — OUTREACH NOTE
Medical Week 1 Survey    Flowsheet Row Responses   [facility] facility patient discharged from? [facility]   Does the patient have one of the following disease processes/diagnoses(primary or secondary)? Other   Week 1 attempt successful? No   Unsuccessful attempts Attempt 1          EM BRITO - Registered Nurse

## 2022-10-07 ENCOUNTER — READMISSION MANAGEMENT (OUTPATIENT)
Dept: CALL CENTER | Facility: HOSPITAL | Age: 57
End: 2022-10-07

## 2022-10-07 NOTE — OUTREACH NOTE
Medical Week 1 Survey    Flowsheet Row Responses   [facility] facility patient discharged from? [facility]   Does the patient have one of the following disease processes/diagnoses(primary or secondary)? Other   Week 1 attempt successful? No   Unsuccessful attempts Attempt 2          HUSAM EDWARDS - Licensed Nurse

## 2022-10-11 ENCOUNTER — READMISSION MANAGEMENT (OUTPATIENT)
Dept: CALL CENTER | Facility: HOSPITAL | Age: 57
End: 2022-10-11

## 2022-10-11 NOTE — PROGRESS NOTES
"Enter Query Response Below      Query Response: Pt had transverse myelitis of the lumbar spine.             If applicable, please update the problem list.      Patient: Baljit Kurtz        : 1965  Account: [id number]           Admit Date: 2022        How to Respond to this query:       a. Click New Note     b. Answer query within the yellow box.                c. Update the Problem List, if applicable.      If you have any questions about this query contact me at: [phone]    Dr. Campos:     57 year old male admitted with lower extremity weakness.  The H&P included that the weakness \"improved overnight with steroids\".  The Neurology progress note on 22 states, \"There was suspicion for transverse myelitis and high dose steroids were started\".  The Hospitalist progress notes from 22-22 include the diagnosis of Transverse myelitis of lumbosacral spine.  The diagnosis of myelitis is not included in the daily progress note documentation after 22 and not included in the discharge summary.   MRI from 22 includes findings of negative for acute fracture in the thoracic and lumbar spine,   chronic superior endplate height loss at L1 unchanged, and   no findings of high-grade foraminal or canal stenosis within limits of motion which degrades image quality. Medications during his hospital stay include IV Solu-Medrol (22-22).  He was also treated with occupational and physical therapy during his hospitalization.      After study, can the patient's condition be further clarified as:     Transverse myelitis of lumbosacral spine  Myelitis ruled out   Other_______________________________  Unable to determine     By submitting this query, we are merely seeking further clarification of documentation to accurately reflect all conditions that you are monitoring, evaluating, treating or that extend the hospitalization or utilize additional resources of care. Please utilize your " independent clinical judgment when addressing the question(s) above.     This query and your response, once completed, will be entered into the legal medical record.    Sincerely,  Ruth Hidalgo RN,BSN    [email or web address]  Clinical Documentation Integrity Program

## 2022-10-11 NOTE — OUTREACH NOTE
Medical Week 1 Survey    Flowsheet Row Responses   [facility] facility patient discharged from? [facility]   Does the patient have one of the following disease processes/diagnoses(primary or secondary)? Other   Week 1 attempt successful? No   Unsuccessful attempts Attempt 3          AVERY MCNEILL - Registered Nurse

## 2023-01-31 ENCOUNTER — OFFICE VISIT (OUTPATIENT)
Dept: ENDOCRINOLOGY | Facility: CLINIC | Age: 58
End: 2023-01-31
Payer: MEDICARE

## 2023-01-31 VITALS
DIASTOLIC BLOOD PRESSURE: 70 MMHG | BODY MASS INDEX: 30.07 KG/M2 | WEIGHT: 203 LBS | HEART RATE: 94 BPM | HEIGHT: 69 IN | OXYGEN SATURATION: 97 % | SYSTOLIC BLOOD PRESSURE: 120 MMHG

## 2023-01-31 DIAGNOSIS — Z79.4 TYPE 2 DIABETES MELLITUS WITH HYPERGLYCEMIA, WITH LONG-TERM CURRENT USE OF INSULIN: Primary | ICD-10-CM

## 2023-01-31 DIAGNOSIS — N18.6 ESRD (END STAGE RENAL DISEASE): ICD-10-CM

## 2023-01-31 DIAGNOSIS — I10 PRIMARY HYPERTENSION: ICD-10-CM

## 2023-01-31 DIAGNOSIS — E78.2 MIXED HYPERLIPIDEMIA: ICD-10-CM

## 2023-01-31 DIAGNOSIS — E11.65 TYPE 2 DIABETES MELLITUS WITH HYPERGLYCEMIA, WITH LONG-TERM CURRENT USE OF INSULIN: Primary | ICD-10-CM

## 2023-01-31 PROBLEM — Z86.73 PERSONAL HISTORY OF TRANSIENT ISCHEMIC ATTACK (TIA), AND CEREBRAL INFARCTION WITHOUT RESIDUAL DEFICITS: Status: ACTIVE | Noted: 2020-08-19

## 2023-01-31 LAB — GLUCOSE BLDC GLUCOMTR-MCNC: 135 MG/DL (ref 70–105)

## 2023-01-31 PROCEDURE — 82962 GLUCOSE BLOOD TEST: CPT | Performed by: INTERNAL MEDICINE

## 2023-01-31 PROCEDURE — 99214 OFFICE O/P EST MOD 30 MIN: CPT | Performed by: INTERNAL MEDICINE

## 2023-01-31 RX ORDER — INSULIN GLARGINE 100 [IU]/ML
10 INJECTION, SOLUTION SUBCUTANEOUS NIGHTLY
Qty: 10 ML | Refills: 6 | Status: SHIPPED | OUTPATIENT
Start: 2023-01-31

## 2023-01-31 RX ORDER — FLURBIPROFEN SODIUM 0.3 MG/ML
SOLUTION/ DROPS OPHTHALMIC
Qty: 100 EACH | Refills: 6 | Status: SHIPPED | OUTPATIENT
Start: 2023-01-31

## 2023-01-31 RX ORDER — GABAPENTIN 300 MG/1
1 CAPSULE ORAL
COMMUNITY
Start: 2022-10-03 | End: 2023-01-31 | Stop reason: SDUPTHER

## 2023-01-31 RX ORDER — AMLODIPINE BESYLATE 5 MG/1
TABLET ORAL
COMMUNITY
Start: 2022-10-26 | End: 2023-01-31 | Stop reason: SDUPTHER

## 2023-01-31 RX ORDER — INSULIN LISPRO 100 U/ML
INJECTION, SOLUTION SUBCUTANEOUS
Qty: 15 ML | Refills: 6 | Status: SHIPPED | OUTPATIENT
Start: 2023-01-31

## 2023-01-31 RX ORDER — FLUTICASONE PROPIONATE AND SALMETEROL 250; 50 UG/1; UG/1
POWDER RESPIRATORY (INHALATION)
COMMUNITY
Start: 2023-01-05

## 2023-01-31 RX ORDER — LOSARTAN POTASSIUM 100 MG/1
1 TABLET ORAL DAILY
COMMUNITY
Start: 2022-09-26

## 2023-01-31 RX ORDER — AMITRIPTYLINE HYDROCHLORIDE 50 MG/1
TABLET, FILM COATED ORAL
COMMUNITY
Start: 2023-01-05

## 2023-01-31 RX ORDER — CETIRIZINE HYDROCHLORIDE 10 MG/1
TABLET ORAL
COMMUNITY
Start: 2023-01-05

## 2023-01-31 RX ORDER — HYDRALAZINE HYDROCHLORIDE 50 MG/1
TABLET, FILM COATED ORAL
COMMUNITY
Start: 2023-01-05

## 2023-01-31 RX ORDER — AMLODIPINE BESYLATE 5 MG/1
1 TABLET ORAL DAILY
COMMUNITY
Start: 2022-10-26

## 2023-01-31 NOTE — PATIENT INSTRUCTIONS
Please stop chewing tobacco  Please resume Lantus/Basaglar at 10 units subcu nightly  Please change your Humalog to 4 units with each meal  Get your fasting labs done in the next few days  Annual eye exam  Always keep glucose source in case of low blood sugar

## 2023-01-31 NOTE — PROGRESS NOTES
Endocrine Progress Note Outpatient     Patient Care Team:  Kerri Jones MD as PCP - General (Family Medicine)  Kerri Jones MD (Family Medicine)    Chief Complaint: Follow up diabetes mellitus type II          HPI: This is a 57-year-old male who has history of type 2 diabetes, hypertension, hyperlipidemia, renal failure on dialysis is here for follow-up.  He is accompanied with his wife today.    For type 2 diabetes: He is currently on Humalog 10 units between 2-3 times a day before meals is not taking any long-acting insulin.  He tells me he was on Basaglar 40 units daily but he was having low blood sugar and it was in the emergency room and they stopped it.  Unfortunately did not bring blood sugar records for review.    Hypertension: Uncontrolled and blood pressure is high today.    Hyperlipidemia: Currently on atorvastatin.    Renal failure: On dialysis.        Past Medical History:   Diagnosis Date   • Anemia    • Cataract    • Constipation    • CVA (cerebral vascular accident) (HCC)     x 8   • CVA (cerebral vascular accident) (HCC)    • Depression    • Diabetes mellitus (HCC)    • ESRD (end stage renal disease) on dialysis ([facility])    • H/O colonoscopy 2016   • History of noncompliance with medical treatment    • HLD (hyperlipidemia) .   • Hyperlipidemia    • Hypertension    • Insomnia    • Neuropathy    • Renal failure     Stage IV - AI -- on dialysis -- [facility]   • Retinopathy    • TIA (transient ischemic attack)    • Type 2 diabetes mellitus (HCC)        Social History     Socioeconomic History   • Marital status:    • Number of children: 3   • Years of education: 12   Tobacco Use   • Smoking status: Never   • Smokeless tobacco: Current     Types: Chew   • Tobacco comments:     Passive Smoke exposure: no   Vaping Use   • Vaping Use: Never used   Substance and Sexual Activity   • Alcohol use: Yes     Comment: occasionally   • Drug use: Not Currently   • Sexual activity: Defer       Family History    Problem Relation Age of Onset   • Heart disease Mother    • Heart attack Mother    • Hypertension Mother    • Breast cancer Sister    • Hyperlipidemia Mother    • Stroke Mother    • Cancer Sister         Breast Cancer   • Diabetes Sister    • Heart disease Maternal Grandmother    • Hyperlipidemia Maternal Grandmother    • Hypertension Maternal Grandmother        Allergies   Allergen Reactions   • Latex Unknown (See Comments)   • Other Unknown (See Comments)   • Penicillins Anaphylaxis     Verified with ex-wife 9/25/2022 that patient had previous severe allergy. Patient with AMS and could not verify himself   • Latex, Natural Rubber Unknown - High Severity   • Penicillins Unknown - High Severity   • Amoxicillin Rash       ROS:   Constitutional:  Denies fatigue, tiredness.    Eyes:  Denies change in visual acuity   HENT:  Denies nasal congestion or sore throat   Respiratory: denies cough, shortness of breath.   Cardiovascular:  denies chest pain, edema   GI:  Denies abdominal pain, nausea, vomiting.   Musculoskeletal:  Denies back pain or joint pain   Integument:  Denies dry skin and rash   Neurologic:  Denies headache, focal weakness or sensory changes   Endocrine:  Denies polyuria or polydipsia   Psychiatric:  Denies depression or anxiety      Vitals:    01/31/23 1532   BP: 120/70   Pulse:    SpO2:      Body mass index is 29.98 kg/m².     Physical Exam:  GEN: NAD, conversant  EYES: EOMI, PERRL, no conjunctival erythema  NECK: no thyromegaly, full ROM   CV: RRR, no murmurs/rubs/gallops, no peripheral edema  LUNG: CTAB, no wheezes/rales/ronchi  SKIN: no rashes, no acanthosis  MSK: no deformities, full ROM of all extremities  NEURO: no tremors, DTR normal  PSYCH: AOX3, appropriate mood, affect normal      Results Review:     I reviewed the patient's new clinical results.    Lab Results   Component Value Date    HGBA1C 6.1 (H) 09/20/2022    HGBA1C 5.9 (H) 06/16/2022    HGBA1C 5.7 (H) 05/26/2022      Lab Results    Component Value Date    GLUCOSE 111 (H) 09/28/2022    BUN 57 (H) 09/28/2022    CREATININE 9.82 (H) 09/28/2022    EGFRIFNONA  01/28/2022      Comment:      <15 Indicative of kidney failure.    EGFRIFAFRI 10 (L) 01/28/2022    BCR 5.8 (L) 09/28/2022    K 5.2 09/28/2022    CO2 21.0 (L) 09/28/2022    CALCIUM 9.7 09/28/2022    ALBUMIN 3.50 09/28/2022    LABIL2 1.0 06/12/2019    AST 13 09/19/2022    ALT 11 09/19/2022    CHOL 147 05/26/2022    TRIG 112 05/26/2022    LDL 85 05/26/2022    HDL 42 05/26/2022     Lab Results   Component Value Date    TSH 0.532 09/20/2022         Medication Review: Reviewed.       Current Outpatient Medications:   •  amitriptyline (ELAVIL) 50 MG tablet, TAKE ONE TABLET BY MOUTH DAILY AT 9PM AT BEDTIME, Disp: , Rfl:   •  amLODIPine (NORVASC) 5 MG tablet, Take 1 tablet by mouth Daily., Disp: , Rfl:   •  atorvastatin (LIPITOR) 40 MG tablet, Take 40 mg by mouth Every Night., Disp: , Rfl: 12  •  benzonatate (TESSALON) 200 MG capsule, Take 1 capsule by mouth 3 (Three) Times a Day As Needed for Cough., Disp: 30 capsule, Rfl: 0  •  cetirizine (zyrTEC) 10 MG tablet, TAKE ONE TABLET BY MOUTH DAILY AT 9AM, Disp: , Rfl:   •  cinacalcet (SENSIPAR) 30 MG tablet, Take 1 tablet by mouth Daily With Breakfast., Disp: 30 tablet, Rfl: 1  •  docusate sodium (COLACE) 100 MG capsule, Take 1 capsule by mouth 2 (Two) Times a Day As Needed for Constipation., Disp: , Rfl:   •  FeroSul 325 (65 Fe) MG tablet, Take 325 mg by mouth Daily With Breakfast., Disp: , Rfl:   •  Fluticasone-Salmeterol (ADVAIR/WIXELA) 250-50 MCG/ACT DISKUS, INHALE 1 PUFF BY MOUTH IN THE MORNING and IN THE EVENING APPROXIMATELY 12 HOURS APART (BULK), Disp: , Rfl:   •  gabapentin (NEURONTIN) 600 MG tablet, Take 600 mg by mouth 2 (Two) Times a Day., Disp: , Rfl:   •  glucagon (GLUCAGEN) 1 MG injection, GLUCAGON EMERGENCY 1 MG KIT, Disp: , Rfl:   •  hydrALAZINE (APRESOLINE) 50 MG tablet, TAKE ONE TABLET BY MOUTH TWICE DAILY @ 9AM & 5PM WITH FOOD, Disp:  , Rfl:   •  hydrOXYzine (ATARAX) 25 MG tablet, Take 1 tablet by mouth Every 6 (Six) Hours As Needed for Itching (Discomfort from tongue irritation)., Disp: 12 tablet, Rfl: 0  •  Insulin Lispro (HUMALOG) 100 UNIT/ML solution pen-injector, Inject 10 Units under the skin into the appropriate area as directed 3 (Three) Times a Day. Per SSI, Disp: , Rfl:   •  iron sucrose in sodium chloride 0.9 % 100 mL IVPB, 100 mg., Disp: , Rfl:   •  levETIRAcetam XR (KEPPRA XR) 500 MG 24 hr tablet, Take 500 mg by mouth Daily., Disp: , Rfl:   •  losartan (COZAAR) 100 MG tablet, Take 1 tablet by mouth Daily., Disp: , Rfl:   •  Methoxy PEG-Epoetin Beta (MIRCERA IJ), 100 mcg Every 14 (Fourteen) Days., Disp: , Rfl:   •  metoprolol tartrate (LOPRESSOR) 50 MG tablet, Take 50 mg by mouth 2 (Two) Times a Day., Disp: , Rfl: 2  •  pantoprazole (PROTONIX) 20 MG EC tablet, Take 20 mg by mouth Daily., Disp: , Rfl:   •  risperiDONE (risperDAL) 1 MG tablet, Take 1 mg by mouth 2 (Two) Times a Day., Disp: , Rfl: 2  •  sevelamer (RENVELA) 800 MG tablet, Take 2,400 mg by mouth 3 (Three) Times a Day With Meals., Disp: , Rfl: 5  •  VITAMIN D PO, Take 0.75 mcg by mouth., Disp: , Rfl:           Assessment and plan:  Diabetes mellitus type 2 with hyperglycemia: No recent labs and no recent blood sugar records available for review.  At this time I have asked him that we need to do basal bolus insulin therapy, will add Basaglar about 10 units subcu daily and Humalog about 4 units with each meal and follow blood sugars and make adjustments as needed.  We will check A1c.    Hypertension: Well-controlled    Hyperlipidemia: Currently on atorvastatin.    End-stage renal disease: On hemodialysis.           Ophelia Murrieta MD FACE.

## 2023-02-07 ENCOUNTER — LAB (OUTPATIENT)
Dept: LAB | Facility: HOSPITAL | Age: 58
End: 2023-02-07
Payer: MEDICARE

## 2023-02-07 DIAGNOSIS — E11.65 TYPE 2 DIABETES MELLITUS WITH HYPERGLYCEMIA, WITH LONG-TERM CURRENT USE OF INSULIN: ICD-10-CM

## 2023-02-07 DIAGNOSIS — Z79.4 TYPE 2 DIABETES MELLITUS WITH HYPERGLYCEMIA, WITH LONG-TERM CURRENT USE OF INSULIN: ICD-10-CM

## 2023-02-07 LAB
ALBUMIN SERPL-MCNC: 4.6 G/DL (ref 3.5–5.2)
ALBUMIN/GLOB SERPL: 1.4 G/DL
ALP SERPL-CCNC: 157 U/L (ref 39–117)
ALT SERPL W P-5'-P-CCNC: 10 U/L (ref 1–41)
ANION GAP SERPL CALCULATED.3IONS-SCNC: 10.7 MMOL/L (ref 5–15)
AST SERPL-CCNC: 20 U/L (ref 1–40)
BILIRUB SERPL-MCNC: 0.4 MG/DL (ref 0–1.2)
BUN SERPL-MCNC: 22 MG/DL (ref 6–20)
BUN/CREAT SERPL: 3 (ref 7–25)
CALCIUM SPEC-SCNC: 9.7 MG/DL (ref 8.6–10.5)
CHLORIDE SERPL-SCNC: 96 MMOL/L (ref 98–107)
CHOLEST SERPL-MCNC: 127 MG/DL (ref 0–200)
CO2 SERPL-SCNC: 31.3 MMOL/L (ref 22–29)
CREAT SERPL-MCNC: 7.24 MG/DL (ref 0.76–1.27)
EGFRCR SERPLBLD CKD-EPI 2021: 8.1 ML/MIN/1.73
GLOBULIN UR ELPH-MCNC: 3.2 GM/DL
GLUCOSE SERPL-MCNC: 98 MG/DL (ref 65–99)
HBA1C MFR BLD: 6 % (ref 3.5–5.6)
HDLC SERPL-MCNC: 39 MG/DL (ref 40–60)
LDLC SERPL CALC-MCNC: 71 MG/DL (ref 0–100)
LDLC/HDLC SERPL: 1.8 {RATIO}
POTASSIUM SERPL-SCNC: 5 MMOL/L (ref 3.5–5.2)
PROT SERPL-MCNC: 7.8 G/DL (ref 6–8.5)
SODIUM SERPL-SCNC: 138 MMOL/L (ref 136–145)
TRIGL SERPL-MCNC: 89 MG/DL (ref 0–150)
TSH SERPL DL<=0.05 MIU/L-ACNC: 1.84 UIU/ML (ref 0.27–4.2)
VLDLC SERPL-MCNC: 17 MG/DL (ref 5–40)

## 2023-02-07 PROCEDURE — 84681 ASSAY OF C-PEPTIDE: CPT

## 2023-02-07 PROCEDURE — 36415 COLL VENOUS BLD VENIPUNCTURE: CPT

## 2023-02-07 PROCEDURE — 83036 HEMOGLOBIN GLYCOSYLATED A1C: CPT

## 2023-02-07 PROCEDURE — 80053 COMPREHEN METABOLIC PANEL: CPT

## 2023-02-07 PROCEDURE — 80061 LIPID PANEL: CPT

## 2023-02-07 PROCEDURE — 84443 ASSAY THYROID STIM HORMONE: CPT

## 2023-02-08 LAB — C PEPTIDE SERPL-MCNC: 7.5 NG/ML (ref 1.1–4.4)

## 2023-08-02 RX ORDER — AMLODIPINE BESYLATE 10 MG/1
1 TABLET ORAL DAILY
COMMUNITY
Start: 2023-06-09

## 2023-08-02 RX ORDER — PEN NEEDLE, DIABETIC 29 G X1/2"
NEEDLE, DISPOSABLE MISCELLANEOUS
COMMUNITY
Start: 2023-06-07

## 2023-08-02 RX ORDER — METHYLPREDNISOLONE 4 MG/1
TABLET ORAL
COMMUNITY
Start: 2023-06-10

## 2023-08-07 RX ORDER — SUCROFERRIC OXYHYDROXIDE 500 MG/1
TABLET, CHEWABLE ORAL
COMMUNITY
Start: 2023-08-01

## 2023-08-07 RX ORDER — CETIRIZINE HYDROCHLORIDE 5 MG/1
1 TABLET ORAL DAILY
COMMUNITY
Start: 2023-07-03

## 2023-08-08 ENCOUNTER — OFFICE VISIT (OUTPATIENT)
Dept: ENDOCRINOLOGY | Facility: CLINIC | Age: 58
End: 2023-08-08
Payer: MEDICARE

## 2023-08-08 VITALS
DIASTOLIC BLOOD PRESSURE: 68 MMHG | HEART RATE: 89 BPM | SYSTOLIC BLOOD PRESSURE: 120 MMHG | OXYGEN SATURATION: 97 % | HEIGHT: 69 IN | BODY MASS INDEX: 28.58 KG/M2 | WEIGHT: 193 LBS

## 2023-08-08 DIAGNOSIS — E78.2 MIXED HYPERLIPIDEMIA: ICD-10-CM

## 2023-08-08 DIAGNOSIS — N18.6 ESRD (END STAGE RENAL DISEASE): ICD-10-CM

## 2023-08-08 DIAGNOSIS — Z79.4 TYPE 2 DIABETES MELLITUS WITH HYPERGLYCEMIA, WITH LONG-TERM CURRENT USE OF INSULIN: Primary | ICD-10-CM

## 2023-08-08 DIAGNOSIS — E11.65 TYPE 2 DIABETES MELLITUS WITH HYPERGLYCEMIA, WITH LONG-TERM CURRENT USE OF INSULIN: Primary | ICD-10-CM

## 2023-08-08 DIAGNOSIS — I10 ESSENTIAL HYPERTENSION: ICD-10-CM

## 2023-08-08 LAB — GLUCOSE BLDC GLUCOMTR-MCNC: 230 MG/DL (ref 70–105)

## 2023-08-08 PROCEDURE — 1159F MED LIST DOCD IN RCRD: CPT | Performed by: INTERNAL MEDICINE

## 2023-08-08 PROCEDURE — 1160F RVW MEDS BY RX/DR IN RCRD: CPT | Performed by: INTERNAL MEDICINE

## 2023-08-08 PROCEDURE — 3078F DIAST BP <80 MM HG: CPT | Performed by: INTERNAL MEDICINE

## 2023-08-08 PROCEDURE — 3044F HG A1C LEVEL LT 7.0%: CPT | Performed by: INTERNAL MEDICINE

## 2023-08-08 PROCEDURE — 99214 OFFICE O/P EST MOD 30 MIN: CPT | Performed by: INTERNAL MEDICINE

## 2023-08-08 PROCEDURE — 3074F SYST BP LT 130 MM HG: CPT | Performed by: INTERNAL MEDICINE

## 2023-08-08 PROCEDURE — 82948 REAGENT STRIP/BLOOD GLUCOSE: CPT | Performed by: INTERNAL MEDICINE

## 2023-08-08 NOTE — PATIENT INSTRUCTIONS
Continue current medications  Always keep glucose source in case of low blood sugar  Labs before follow-up.

## 2023-08-08 NOTE — PROGRESS NOTES
Endocrine Progress Note Outpatient     Patient Care Team:  Kerri Jones MD as PCP - General (Family Medicine)  Kerri Jones MD (Family Medicine)    Chief Complaint: Follow up diabetes mellitus type II    HPI: This is a 57-year-old male who has history of type 2 diabetes, hypertension, hyperlipidemia, renal failure on dialysis is here for follow-up.  He is accompanied with his wife today.    For type 2 diabetes: Is on Basaglar 5 units twice a day and Humalog 10 units between 2-3 times per day.      Hypertension: blood pressure is controlled.    Hyperlipidemia: Currently on atorvastatin.    Renal failure: On dialysis.        Past Medical History:   Diagnosis Date    Anemia     Cataract     Constipation     CVA (cerebral vascular accident)     x 8    CVA (cerebral vascular accident)     Depression     Diabetes mellitus     ESRD (end stage renal disease) on dialysis     H/O colonoscopy 2016    History of noncompliance with medical treatment     HLD (hyperlipidemia) .    Hyperlipidemia     Hypertension     Insomnia     Neuropathy     Renal failure     Stage IV - AI -- on dialysis -- [facility]    Retinopathy     TIA (transient ischemic attack)     Type 2 diabetes mellitus        Social History     Socioeconomic History    Marital status:     Number of children: 3    Years of education: 12   Tobacco Use    Smoking status: Never    Smokeless tobacco: Current     Types: Chew    Tobacco comments:     Passive Smoke exposure: no   Vaping Use    Vaping Use: Never used   Substance and Sexual Activity    Alcohol use: Yes     Comment: occasionally    Drug use: Not Currently    Sexual activity: Defer       Family History   Problem Relation Age of Onset    Heart disease Mother     Heart attack Mother     Hypertension Mother     Breast cancer Sister     Hyperlipidemia Mother     Stroke Mother     Cancer Sister         Breast Cancer    Diabetes Sister     Heart disease Maternal Grandmother     Hyperlipidemia Maternal  Grandmother     Hypertension Maternal Grandmother        Allergies   Allergen Reactions    Latex Unknown (See Comments)    Other Unknown (See Comments)    Penicillins Anaphylaxis     Verified with ex-wife 9/25/2022 that patient had previous severe allergy. Patient with AMS and could not verify himself    Latex, Natural Rubber Unknown - High Severity    Penicillins Unknown - High Severity    Amoxicillin Rash       ROS:   Constitutional:  Denies fatigue, tiredness.    Eyes:  Denies change in visual acuity   HENT:  Denies nasal congestion or sore throat   Respiratory: denies cough, shortness of breath.   Cardiovascular:  denies chest pain, edema   GI:  Denies abdominal pain, nausea, vomiting.   Musculoskeletal:  Denies back pain or joint pain   Integument:  Denies dry skin and rash   Neurologic:  Denies headache, focal weakness or sensory changes   Endocrine:  Denies polyuria or polydipsia   Psychiatric:  Denies depression or anxiety      Vitals:    08/08/23 1345   BP: 120/68   Pulse: 89   SpO2: 97%     Body mass index is 28.5 kg/mý.     Physical Exam:  GEN: NAD, conversant  EYES: EOMI, PERRL, no conjunctival erythema  NECK: no thyromegaly, full ROM   CV: RRR, no murmurs/rubs/gallops, no peripheral edema  LUNG: CTAB, no wheezes/rales/ronchi  SKIN: no rashes, no acanthosis  MSK: no deformities, full ROM of all extremities  NEURO: no tremors, DTR normal  PSYCH: AOX3, appropriate mood, affect normal      Results Review:     I reviewed the patient's new clinical results.    Lab Results   Component Value Date    HGBA1C 6.0 (H) 02/07/2023    HGBA1C 6.1 (H) 09/20/2022    HGBA1C 5.9 (H) 06/16/2022      Lab Results   Component Value Date    GLUCOSE 98 02/07/2023    BUN 22 (H) 02/07/2023    CREATININE 7.24 (H) 02/07/2023    EGFRIFNONA  01/28/2022      Comment:      <15 Indicative of kidney failure.    EGFRIFAFRI 10 (L) 01/28/2022    BCR 3.0 (L) 02/07/2023    K 5.0 02/07/2023    CO2 31.3 (H) 02/07/2023    CALCIUM 9.7 02/07/2023  "   ALBUMIN 4.6 02/07/2023    LABIL2 1.0 06/12/2019    AST 20 02/07/2023    ALT 10 02/07/2023    CHOL 127 02/07/2023    TRIG 89 02/07/2023    LDL 71 02/07/2023    HDL 39 (L) 02/07/2023     Lab Results   Component Value Date    TSH 1.840 02/07/2023         Medication Review: Reviewed.       Current Outpatient Medications:     Allergy Relief Cetirizine 5 MG tablet, Take 1 tablet by mouth Daily., Disp: , Rfl:     amitriptyline (ELAVIL) 50 MG tablet, TAKE ONE TABLET BY MOUTH DAILY AT 9PM AT BEDTIME, Disp: , Rfl:     amLODIPine (NORVASC) 10 MG tablet, Take 1 tablet by mouth Daily., Disp: , Rfl:     amLODIPine (NORVASC) 5 MG tablet, Take 1 tablet by mouth Daily., Disp: , Rfl:     atorvastatin (LIPITOR) 40 MG tablet, Take 1 tablet by mouth Every Night., Disp: , Rfl: 12    BD Insulin Syringe U/F 31G X 5/16\" 1 ML misc, , Disp: , Rfl:     benzonatate (TESSALON) 200 MG capsule, Take 1 capsule by mouth 3 (Three) Times a Day As Needed for Cough., Disp: 30 capsule, Rfl: 0    cetirizine (zyrTEC) 10 MG tablet, TAKE ONE TABLET BY MOUTH DAILY AT 9AM, Disp: , Rfl:     cinacalcet (SENSIPAR) 30 MG tablet, Take 1 tablet by mouth Daily With Breakfast., Disp: 30 tablet, Rfl: 1    docusate sodium (COLACE) 100 MG capsule, Take 1 capsule by mouth 2 (Two) Times a Day As Needed for Constipation., Disp: , Rfl:     FeroSul 325 (65 Fe) MG tablet, Take 1 tablet by mouth Daily With Breakfast., Disp: , Rfl:     Fluticasone-Salmeterol (ADVAIR/WIXELA) 250-50 MCG/ACT DISKUS, INHALE 1 PUFF BY MOUTH IN THE MORNING and IN THE EVENING APPROXIMATELY 12 HOURS APART (BULK), Disp: , Rfl:     gabapentin (NEURONTIN) 600 MG tablet, Take 1 tablet by mouth 2 (Two) Times a Day., Disp: , Rfl:     glucagon (GLUCAGEN) 1 MG injection, GLUCAGON EMERGENCY 1 MG KIT, Disp: , Rfl:     hydrALAZINE (APRESOLINE) 50 MG tablet, TAKE ONE TABLET BY MOUTH TWICE DAILY @ 9AM & 5PM WITH FOOD, Disp: , Rfl:     hydrOXYzine (ATARAX) 25 MG tablet, Take 1 tablet by mouth Every 6 (Six) Hours " As Needed for Itching (Discomfort from tongue irritation)., Disp: 12 tablet, Rfl: 0    insulin glargine (LANTUS, SEMGLEE) 100 UNIT/ML injection, Inject 10 Units under the skin into the appropriate area as directed Every Night., Disp: 10 mL, Rfl: 6    Insulin Lispro (ADMELOG SOLOSTAR) 100 UNIT/ML injection pen, Inject 4 units before each meal, Disp: 15 mL, Rfl: 6    Insulin Pen Needle (B-D UF III MINI PEN NEEDLES) 31G X 5 MM misc, Used to inject insulin 4 times a day., Disp: 100 each, Rfl: 6    levETIRAcetam XR (KEPPRA XR) 500 MG 24 hr tablet, Take 1 tablet by mouth Daily., Disp: , Rfl:     losartan (COZAAR) 100 MG tablet, Take 1 tablet by mouth Daily., Disp: , Rfl:     methylPREDNISolone (MEDROL) 4 MG dose pack, , Disp: , Rfl:     metoprolol tartrate (LOPRESSOR) 50 MG tablet, Take 1 tablet by mouth 2 (Two) Times a Day., Disp: , Rfl: 2    pantoprazole (PROTONIX) 20 MG EC tablet, Take 1 tablet by mouth Daily., Disp: , Rfl:     risperiDONE (risperDAL) 1 MG tablet, Take 1 tablet by mouth 2 (Two) Times a Day., Disp: , Rfl: 2    sevelamer (RENVELA) 800 MG tablet, Take 3 tablets by mouth 3 (Three) Times a Day With Meals., Disp: , Rfl: 5    traMADol (ULTRAM) 50 MG tablet, Take 1 tablet by mouth Every 6 (Six) Hours As Needed for Moderate Pain., Disp: 12 tablet, Rfl: 0    Velphoro 500 MG chewable tablet, , Disp: , Rfl:     VITAMIN D PO, Take 0.75 mcg by mouth., Disp: , Rfl:           Assessment and plan:  Diabetes mellitus type 2 with hyperglycemia: No recent labs and no recent blood sugar records available for review.  At this time I will continue his current regimen and follow blood sugars.  Will check A1c..    Hypertension: Well-controlled    Hyperlipidemia: Currently on atorvastatin.    End-stage renal disease: On hemodialysis.    Assessment and plan from January 31, 2023 reviewed and updated.           Ophelia Murrieta MD FACE.

## 2023-09-11 ENCOUNTER — APPOINTMENT (OUTPATIENT)
Dept: CT IMAGING | Facility: HOSPITAL | Age: 58
End: 2023-09-11
Payer: MEDICARE

## 2023-09-11 ENCOUNTER — HOSPITAL ENCOUNTER (EMERGENCY)
Facility: HOSPITAL | Age: 58
Discharge: HOME | End: 2023-09-12
Attending: EMERGENCY MEDICINE
Payer: MEDICARE

## 2023-09-11 ENCOUNTER — APPOINTMENT (OUTPATIENT)
Dept: GENERAL RADIOLOGY | Facility: HOSPITAL | Age: 58
End: 2023-09-11
Payer: MEDICARE

## 2023-09-11 DIAGNOSIS — Z99.2 ESRD (END STAGE RENAL DISEASE) ON DIALYSIS: ICD-10-CM

## 2023-09-11 DIAGNOSIS — D64.9 ANEMIA, UNSPECIFIED TYPE: ICD-10-CM

## 2023-09-11 DIAGNOSIS — N18.6 ESRD (END STAGE RENAL DISEASE) ON DIALYSIS: ICD-10-CM

## 2023-09-11 DIAGNOSIS — R53.1 GENERALIZED WEAKNESS: Primary | ICD-10-CM

## 2023-09-11 DIAGNOSIS — R77.8 ELEVATED TROPONIN: ICD-10-CM

## 2023-09-11 LAB
ALBUMIN SERPL-MCNC: 4.1 G/DL (ref 3.5–5.2)
ALBUMIN/GLOB SERPL: 1.5 G/DL
ALP SERPL-CCNC: 109 U/L (ref 39–117)
ALT SERPL W P-5'-P-CCNC: 12 U/L (ref 1–41)
ANION GAP SERPL CALCULATED.3IONS-SCNC: 14 MMOL/L (ref 5–15)
AST SERPL-CCNC: 10 U/L (ref 1–40)
B PARAPERT DNA SPEC QL NAA+PROBE: NOT DETECTED
B PERT DNA SPEC QL NAA+PROBE: NOT DETECTED
BASOPHILS # BLD MANUAL: 0.09 10*3/MM3 (ref 0–0.2)
BASOPHILS NFR BLD MANUAL: 1 % (ref 0–1.5)
BILIRUB SERPL-MCNC: 0.2 MG/DL (ref 0–1.2)
BUN SERPL-MCNC: 22 MG/DL (ref 6–20)
BUN/CREAT SERPL: 3.5 (ref 7–25)
C PNEUM DNA NPH QL NAA+NON-PROBE: NOT DETECTED
CALCIUM SPEC-SCNC: 9.2 MG/DL (ref 8.6–10.5)
CHLORIDE SERPL-SCNC: 95 MMOL/L (ref 98–107)
CO2 SERPL-SCNC: 29 MMOL/L (ref 22–29)
CREAT SERPL-MCNC: 6.21 MG/DL (ref 0.76–1.27)
DEPRECATED RDW RBC AUTO: 45.6 FL (ref 37–54)
EGFRCR SERPLBLD CKD-EPI 2021: 9.7 ML/MIN/1.73
EOSINOPHIL # BLD MANUAL: 0.27 10*3/MM3 (ref 0–0.4)
EOSINOPHIL NFR BLD MANUAL: 3 % (ref 0.3–6.2)
ERYTHROCYTE [DISTWIDTH] IN BLOOD BY AUTOMATED COUNT: 14.4 % (ref 12.3–15.4)
FLUAV SUBTYP SPEC NAA+PROBE: NOT DETECTED
FLUBV RNA ISLT QL NAA+PROBE: NOT DETECTED
GLOBULIN UR ELPH-MCNC: 2.8 GM/DL
GLUCOSE SERPL-MCNC: 148 MG/DL (ref 65–99)
HADV DNA SPEC NAA+PROBE: NOT DETECTED
HCOV 229E RNA SPEC QL NAA+PROBE: NOT DETECTED
HCOV HKU1 RNA SPEC QL NAA+PROBE: NOT DETECTED
HCOV NL63 RNA SPEC QL NAA+PROBE: NOT DETECTED
HCOV OC43 RNA SPEC QL NAA+PROBE: NOT DETECTED
HCT VFR BLD AUTO: 29.8 % (ref 37.5–51)
HGB BLD-MCNC: 10.2 G/DL (ref 13–17.7)
HMPV RNA NPH QL NAA+NON-PROBE: NOT DETECTED
HPIV1 RNA ISLT QL NAA+PROBE: NOT DETECTED
HPIV2 RNA SPEC QL NAA+PROBE: NOT DETECTED
HPIV3 RNA NPH QL NAA+PROBE: NOT DETECTED
HPIV4 P GENE NPH QL NAA+PROBE: NOT DETECTED
LYMPHOCYTES # BLD MANUAL: 1.06 10*3/MM3 (ref 0.7–3.1)
LYMPHOCYTES NFR BLD MANUAL: 3 % (ref 5–12)
M PNEUMO IGG SER IA-ACNC: NOT DETECTED
MCH RBC QN AUTO: 30.2 PG (ref 26.6–33)
MCHC RBC AUTO-ENTMCNC: 34.2 G/DL (ref 31.5–35.7)
MCV RBC AUTO: 88.2 FL (ref 79–97)
MONOCYTES # BLD: 0.27 10*3/MM3 (ref 0.1–0.9)
NEUTROPHILS # BLD AUTO: 7.17 10*3/MM3 (ref 1.7–7)
NEUTROPHILS NFR BLD MANUAL: 81 % (ref 42.7–76)
NT-PROBNP SERPL-MCNC: ABNORMAL PG/ML (ref 0–900)
PLAT MORPH BLD: NORMAL
PLATELET # BLD AUTO: 127 10*3/MM3 (ref 140–450)
PMV BLD AUTO: 11.2 FL (ref 6–12)
POTASSIUM SERPL-SCNC: 4.3 MMOL/L (ref 3.5–5.2)
PROT SERPL-MCNC: 6.9 G/DL (ref 6–8.5)
RBC # BLD AUTO: 3.38 10*6/MM3 (ref 4.14–5.8)
RBC MORPH BLD: NORMAL
RHINOVIRUS RNA SPEC NAA+PROBE: NOT DETECTED
RSV RNA NPH QL NAA+NON-PROBE: NOT DETECTED
SARS-COV-2 RNA NPH QL NAA+NON-PROBE: NOT DETECTED
SODIUM SERPL-SCNC: 138 MMOL/L (ref 136–145)
TROPONIN T SERPL HS-MCNC: 188 NG/L
VARIANT LYMPHS NFR BLD MANUAL: 12 % (ref 19.6–45.3)
WBC MORPH BLD: NORMAL
WBC NRBC COR # BLD: 8.85 10*3/MM3 (ref 3.4–10.8)

## 2023-09-11 PROCEDURE — 70450 CT HEAD/BRAIN W/O DYE: CPT

## 2023-09-11 PROCEDURE — 36415 COLL VENOUS BLD VENIPUNCTURE: CPT

## 2023-09-11 PROCEDURE — 93010 ELECTROCARDIOGRAM REPORT: CPT | Performed by: INTERNAL MEDICINE

## 2023-09-11 PROCEDURE — 85007 BL SMEAR W/DIFF WBC COUNT: CPT | Performed by: EMERGENCY MEDICINE

## 2023-09-11 PROCEDURE — 93005 ELECTROCARDIOGRAM TRACING: CPT | Performed by: EMERGENCY MEDICINE

## 2023-09-11 PROCEDURE — 0202U NFCT DS 22 TRGT SARS-COV-2: CPT | Performed by: EMERGENCY MEDICINE

## 2023-09-11 PROCEDURE — 83880 ASSAY OF NATRIURETIC PEPTIDE: CPT | Performed by: EMERGENCY MEDICINE

## 2023-09-11 PROCEDURE — 84484 ASSAY OF TROPONIN QUANT: CPT | Performed by: EMERGENCY MEDICINE

## 2023-09-11 PROCEDURE — 80053 COMPREHEN METABOLIC PANEL: CPT | Performed by: EMERGENCY MEDICINE

## 2023-09-11 PROCEDURE — 71045 X-RAY EXAM CHEST 1 VIEW: CPT

## 2023-09-11 PROCEDURE — 99284 EMERGENCY DEPT VISIT MOD MDM: CPT

## 2023-09-11 PROCEDURE — 85025 COMPLETE CBC W/AUTO DIFF WBC: CPT | Performed by: EMERGENCY MEDICINE

## 2023-09-11 RX ORDER — SODIUM CHLORIDE 0.9 % (FLUSH) 0.9 %
10 SYRINGE (ML) INJECTION AS NEEDED
Status: DISCONTINUED | OUTPATIENT
Start: 2023-09-11 | End: 2023-09-12 | Stop reason: HOSPADM

## 2023-09-11 NOTE — ED NOTES
Pt had dialysis this morning. Family noticed confusion post dialysis with fatigue. Pt currently alert & oriented and complains of excessive fatigue.

## 2023-09-12 VITALS
TEMPERATURE: 98.2 F | OXYGEN SATURATION: 99 % | WEIGHT: 175 LBS | SYSTOLIC BLOOD PRESSURE: 116 MMHG | BODY MASS INDEX: 25.92 KG/M2 | DIASTOLIC BLOOD PRESSURE: 72 MMHG | HEIGHT: 69 IN | HEART RATE: 95 BPM | RESPIRATION RATE: 18 BRPM

## 2023-09-12 LAB
GEN 5 2HR TROPONIN T REFLEX: 172 NG/L
QT INTERVAL: 374 MS
QTC INTERVAL: 466 MS
TROPONIN T DELTA: -16 NG/L

## 2023-09-12 NOTE — ED PROVIDER NOTES
" EMERGENCY DEPARTMENT ENCOUNTER    Room Number:  13/13  PCP: Kerri Jones MD  Patient Care Team:  Kerri Jones MD as PCP - General (Family Medicine)  Kerri Jones MD (Family Medicine)   Independent Historians: Patient and wife    HPI:  Chief Complaint: Generalized weakness and some confusion    A complete HPI/ROS/PMH/PSH/SH/FH are unobtainable due to: None    Chronic or social conditions impacting patient care (Social Determinants of Health): Homeless and staying with a friend   (Financial Resource Strain / Food Insecurity / Transportation Needs / Physical Activity / Stress / Social Connections / Intimate Partner Violence / Housing Stability)    Context: Baljit Kurtz is a 58 y.o. male who presents to the ED c/o acute fatigue and generalized weakness since getting home from dialysis.  Patient feels like his blood sugar may have dropped.  He got fatigued and could not get out of the car when his Uber arrived at his friend's home where he staying.  Patient still feels overall fatigued but has no other specific complaints--he is asking for food.  He denies chest pain, shortness of breath, vomiting, diarrhea, headache, cough, runny nose.  Patient's wife remarks that they are \"going through a lot\".  They are currently homeless staying with a friend and having a lot of challenges taking care of patient's chronic illnesses.  Patient's wife says that at dialysis they took off some \"extra fluid\" since he had missed an appointment last week due to transportation issues.    Review of prior external notes (non-ED) -and- Review of prior external test results outside of this encounter: I reviewed patient's most recent primary care note where he is treated for his diabetes and hypertension.    Prescription drug monitoring program review:         PAST MEDICAL HISTORY  Active Ambulatory Problems     Diagnosis Date Noted    Acute otitis externa of right ear 01/23/2017    AV fistula 04/14/2017    Compression fracture " of vertebra 08/28/2018    Diabetes mellitus with hypoglycemia 04/22/2015    Diabetic peripheral neuropathy associated with type 2 diabetes mellitus 11/25/2015    Diabetic retinopathy 06/09/2017    Difficult or painful urination 05/07/2015    Discitis of thoracic region 08/28/2018    Dysphagia 08/24/2017    Encounter for general adult medical examination without abnormal findings 04/22/2015    Hyperlipidemia 04/22/2015    Primary hypertension 04/22/2015    Insomnia 04/22/2015    Onychomycosis of toenail 10/19/2015    Osteomyelitis of spine 08/28/2018    Peripheral neuropathy 04/22/2015    ESRD (end stage renal disease) 05/14/2019    Chronic renal failure 04/24/2015    Seizure disorder 07/05/2017    Speech and language deficits, late effect of cerebrovascular disease 04/22/2015    Shoulder pain, left 04/14/2017    Stage 5 chronic kidney disease 10/19/2015    Tinea pedis 11/25/2015    Transient cerebral ischemia 04/22/2015    Weakness 03/18/2016    Status epilepticus 08/11/2020    Sepsis 08/12/2020    Aspiration pneumonia due to vomitus 08/12/2020    Acute respiratory failure with hypoxia 08/12/2020    DKA (diabetic ketoacidoses) 08/12/2020    Essential hypertension 08/12/2020    ESRD (end stage renal disease) 08/12/2020    History of CVA (cerebrovascular accident) 08/12/2020    Hyperglycemia 02/16/2021    Uncontrolled insulin dependent type 1 diabetes mellitus 02/16/2021    Metabolic encephalopathy 02/16/2021    COVID-19 virus infection 02/16/2021    Cytokine release syndrome, grade 1 02/16/2021    Hypoglycemia 05/26/2022    ESRD (end stage renal disease) 05/26/2022    Cough 05/26/2022    Dependence on renal dialysis 08/19/2020    Hypertensive heart and chronic kidney disease with heart failure and with stage 5 chronic kidney disease, or end stage renal disease 08/19/2020    Anemia in chronic kidney disease 06/16/2017    Cerebral infarction, unspecified 06/16/2017    Iron deficiency anemia 01/13/2018    Major  depressive disorder, single episode, unspecified 06/16/2017    Renal osteodystrophy 06/16/2017    Secondary hyperparathyroidism of renal origin 06/16/2017    Unspecified protein-calorie malnutrition 07/03/2017    Vitamin D deficiency, unspecified 06/16/2017    Type 2 diabetes mellitus with hyperglycemia, with long-term current use of insulin 06/16/2017    Chronic kidney disease, stage 5 08/19/2020    End stage renal disease with hypertension 06/16/2017    Mixed hyperlipidemia 06/16/2017    Epileptic seizures related to external causes, not intractable, with status epilepticus 08/12/2020    Vascular dementia with behavior disturbance 09/23/2022    Hypercalcemia 09/30/2022    Personal history of transient ischemic attack (TIA), and cerebral infarction without residual deficits 08/19/2020     Resolved Ambulatory Problems     Diagnosis Date Noted    Depression 04/22/2015    Depression 08/12/2020     Past Medical History:   Diagnosis Date    Anemia     Cataract     Constipation     CVA (cerebral vascular accident)     CVA (cerebral vascular accident)     Diabetes mellitus     ESRD (end stage renal disease) on dialysis     H/O colonoscopy 2016    History of noncompliance with medical treatment     HLD (hyperlipidemia) .    Hypertension     Neuropathy     Renal failure     Retinopathy     TIA (transient ischemic attack)     Type 2 diabetes mellitus          PAST SURGICAL HISTORY  Past Surgical History:   Procedure Laterality Date    ANKLE SURGERY Left     ANKLE SURGERY      APPENDECTOMY      ARTERIOVENOUS FISTULA      CATARACT EXTRACTION, BILATERAL  2018    COLON RESECTION  2016    OTHER SURGICAL HISTORY Left 02/23/2017    Left Distula Placement Dr Guan         FAMILY HISTORY  Family History   Problem Relation Age of Onset    Heart disease Mother     Heart attack Mother     Hypertension Mother     Breast cancer Sister     Hyperlipidemia Mother     Stroke Mother     Cancer Sister         Breast Cancer    Diabetes Sister      Heart disease Maternal Grandmother     Hyperlipidemia Maternal Grandmother     Hypertension Maternal Grandmother          SOCIAL HISTORY  Social History     Socioeconomic History    Marital status:     Number of children: 3    Years of education: 12   Tobacco Use    Smoking status: Never    Smokeless tobacco: Current     Types: Chew    Tobacco comments:     Passive Smoke exposure: no   Vaping Use    Vaping Use: Never used   Substance and Sexual Activity    Alcohol use: Yes     Comment: occasionally    Drug use: Not Currently    Sexual activity: Defer         ALLERGIES  Latex; Other; Penicillins; Latex, natural rubber; Penicillins; and Amoxicillin        REVIEW OF SYSTEMS  Review of Systems  Included in HPI  All systems reviewed and negative except for those discussed in HPI.      PHYSICAL EXAM    I have reviewed the triage vital signs and nursing notes.    ED Triage Vitals [09/11/23 1847]   Temp Heart Rate Resp BP SpO2   98.2 °F (36.8 °C) 94 18 129/68 98 %      Temp src Heart Rate Source Patient Position BP Location FiO2 (%)   -- -- -- -- --       Physical Exam  GENERAL: pleasant cooperative aam, alert, no acute distress  SKIN: Warm, dry  HENT: Normocephalic, atraumatic, mmm  EYES: no scleral icterus, eomi  CV: regular rhythm, regular rate  RESPIRATORY: normal effort, lungs clear, no wheezes, no rhonchi  ABDOMEN: soft, nontender, nondistended  MUSCULOSKELETAL: no deformity, no calf ttp  NEURO: alert, moves all extremities, follows commands, face symmetric, normal speech, grossly intact strength and sensation to all 4 exts                                                               LAB RESULTS  Recent Results (from the past 24 hour(s))   CBC Auto Differential    Collection Time: 09/11/23  9:14 PM    Specimen: Blood   Result Value Ref Range    WBC 8.85 3.40 - 10.80 10*3/mm3    RBC 3.38 (L) 4.14 - 5.80 10*6/mm3    Hemoglobin 10.2 (L) 13.0 - 17.7 g/dL    Hematocrit 29.8 (L) 37.5 - 51.0 %    MCV 88.2 79.0 -  97.0 fL    MCH 30.2 26.6 - 33.0 pg    MCHC 34.2 31.5 - 35.7 g/dL    RDW 14.4 12.3 - 15.4 %    RDW-SD 45.6 37.0 - 54.0 fl    MPV 11.2 6.0 - 12.0 fL    Platelets 127 (L) 140 - 450 10*3/mm3   BNP    Collection Time: 09/11/23  9:14 PM    Specimen: Blood   Result Value Ref Range    proBNP 30,739.0 (H) 0.0 - 900.0 pg/mL   Manual Differential    Collection Time: 09/11/23  9:14 PM    Specimen: Blood   Result Value Ref Range    Neutrophil % 81.0 (H) 42.7 - 76.0 %    Lymphocyte % 12.0 (L) 19.6 - 45.3 %    Monocyte % 3.0 (L) 5.0 - 12.0 %    Eosinophil % 3.0 0.3 - 6.2 %    Basophil % 1.0 0.0 - 1.5 %    Neutrophils Absolute 7.17 (H) 1.70 - 7.00 10*3/mm3    Lymphocytes Absolute 1.06 0.70 - 3.10 10*3/mm3    Monocytes Absolute 0.27 0.10 - 0.90 10*3/mm3    Eosinophils Absolute 0.27 0.00 - 0.40 10*3/mm3    Basophils Absolute 0.09 0.00 - 0.20 10*3/mm3    RBC Morphology Normal Normal    WBC Morphology Normal Normal    Platelet Morphology Normal Normal   Respiratory Panel PCR w/COVID-19(SARS-CoV-2) RACHEL/AUGILA/DIONICIO/PAD/COR/MAD/ROBERT In-House, NP Swab in [facility], 3-4 HR TAT - Swab, Nasopharynx    Collection Time: 09/11/23  9:22 PM    Specimen: Nasopharynx; Swab   Result Value Ref Range    ADENOVIRUS, PCR Not Detected Not Detected    Coronavirus 229E Not Detected Not Detected    Coronavirus HKU1 Not Detected Not Detected    Coronavirus NL63 Not Detected Not Detected    Coronavirus OC43 Not Detected Not Detected    COVID19 Not Detected Not Detected - Ref. Range    Human Metapneumovirus Not Detected Not Detected    Human Rhinovirus/Enterovirus Not Detected Not Detected    Influenza A PCR Not Detected Not Detected    Influenza B PCR Not Detected Not Detected    Parainfluenza Virus 1 Not Detected Not Detected    Parainfluenza Virus 2 Not Detected Not Detected    Parainfluenza Virus 3 Not Detected Not Detected    Parainfluenza Virus 4 Not Detected Not Detected    RSV, PCR Not Detected Not Detected    Bordetella pertussis pcr Not Detected Not Detected     Bordetella parapertussis PCR Not Detected Not Detected    Chlamydophila pneumoniae PCR Not Detected Not Detected    Mycoplasma pneumo by PCR Not Detected Not Detected   ECG 12 Lead Syncope    Collection Time: 09/11/23  9:23 PM   Result Value Ref Range    QT Interval 374 ms    QTC Interval 466 ms   Comprehensive Metabolic Panel    Collection Time: 09/11/23 10:05 PM    Specimen: Blood   Result Value Ref Range    Glucose 148 (H) 65 - 99 mg/dL    BUN 22 (H) 6 - 20 mg/dL    Creatinine 6.21 (H) 0.76 - 1.27 mg/dL    Sodium 138 136 - 145 mmol/L    Potassium 4.3 3.5 - 5.2 mmol/L    Chloride 95 (L) 98 - 107 mmol/L    CO2 29.0 22.0 - 29.0 mmol/L    Calcium 9.2 8.6 - 10.5 mg/dL    Total Protein 6.9 6.0 - 8.5 g/dL    Albumin 4.1 3.5 - 5.2 g/dL    ALT (SGPT) 12 1 - 41 U/L    AST (SGOT) 10 1 - 40 U/L    Alkaline Phosphatase 109 39 - 117 U/L    Total Bilirubin 0.2 0.0 - 1.2 mg/dL    Globulin 2.8 gm/dL    A/G Ratio 1.5 g/dL    BUN/Creatinine Ratio 3.5 (L) 7.0 - 25.0    Anion Gap 14.0 5.0 - 15.0 mmol/L    eGFR 9.7 (L) >60.0 mL/min/1.73   High Sensitivity Troponin T    Collection Time: 09/11/23 10:05 PM    Specimen: Blood   Result Value Ref Range    HS Troponin T 188 (C) <15 ng/L   High Sensitivity Troponin T 2Hr    Collection Time: 09/11/23 11:57 PM    Specimen: Blood   Result Value Ref Range    HS Troponin T 172 (C) <15 ng/L    Troponin T Delta -16 (L) >=-4 - <+4 ng/L       I ordered the above labs and independently reviewed the results.        RADIOLOGY  CT Head Without Contrast    Result Date: 9/11/2023  Patient: RADHA HOWELL  Time Out: 22:43 Exam(s): CT HEAD Without Contrast EXAM:   CT Head Without Intravenous Contrast CLINICAL HISTORY:    Reason for exam: Mental status change, unknown cause. TECHNIQUE:   Axial computed tomography images of the head brain without intravenous contrast.  CTDI is 55.97 mGy and DLP is 914.5 mGy-cm.  This CT exam was performed according to the principle of ALARA (As Low As Reasonably  Achievable) by using one or more of the following dose reduction techniques: automated exposure control, adjustment of the mA and or kV according to patient size, and or use of iterative reconstruction technique. COMPARISON:   No relevant prior studies available. FINDINGS: No acute intracranial hemorrhage.  No midline shift or mass effect. Encephalomalacia in the RIGHT cerebellum and to a lesser extent LEFT cerebellum, consistent with old infarcts. Age-related cerebral volume loss.  Periventricular and subcortical white matter hypoattenuation, consistent with chronic microangiopathy. The visualized orbits appear grossly unremarkable. The calvarium is intact. The visualized paranasal sinuses and mastoid air cells are grossly clear. IMPRESSION: No acute intracranial hemorrhage, midline shift, or mass effect. Bilateral cerebellar infarcts.     Electronically signed by Juan Asher MD on 09-11-23 at 2243    XR Chest 1 View    Result Date: 9/11/2023  PORTABLE CHEST X-RAY  HISTORY: Weakness, altered mental status.  Portable chest x-ray is provided. Correlation: September 19, 2022.  FINDINGS: The cardiac silhouette is enlarged but unchanged. Vascular volume is normal. The lungs are clear. The costophrenic sulci are dry and the bones appear normal. There is no pneumothorax.      Stable cardiomegaly. No acute abnormality evident on chest x-ray.  This report was finalized on 9/11/2023 10:16 PM by Dr. Colin Morin M.D.       I ordered the above noted radiological studies. Reviewed by me. See dictation for official radiology interpretation.      PROCEDURES    Procedures      MEDICATIONS GIVEN IN ER    Medications - No data to display        ORDERS PLACED DURING THIS VISIT:  Orders Placed This Encounter   Procedures    Respiratory Panel PCR w/COVID-19(SARS-CoV-2) RACHEL/AGUILA/DIONICIO/PAD/COR/MAD/ROBERT In-House, NP Swab in UTM/VTM, 3-4 HR TAT - Swab, Nasopharynx    CT Head Without Contrast    XR Chest 1 View    Comprehensive Metabolic  Panel    CBC Auto Differential    High Sensitivity Troponin T    BNP    Manual Differential    High Sensitivity Troponin T 2Hr    ECG 12 Lead Syncope    CBC & Differential         PROGRESS, DATA ANALYSIS, CONSULTS, AND MEDICAL DECISION MAKING    All labs have been independently interpreted by me.  All radiology studies have been reviewed by me.   EKG's independently viewed and interpreted by me.  Discussion below represents my analysis of pertinent findings related to patient's condition, differential diagnosis, treatment plan and final disposition.    MDM This patient presents with generalized weakness without syncope, ha, fall, no cp/sob/illness.  Pt has chronic medical issues as well as social issues.complicating his care and making the differential broad for this patient who felt weak and tired after dialysis today.  The differential diagnosis list is  quite broad and includes but is not limited to: hypoglycemia, orthostasis/dehydration, arrythmia/acs, electrolyte derangements, viral illness or other infection like pna, profound anemia, aortic dissection, severe aortic stenosis, stroke, sah, gi bleeding, intoxication and medication effects.  Plan EKG, labs incl trop and covid swab, cxr, and ct head.  Plan po challenge and will reassess.    ED Course as of 09/12/23 1520   Mon Sep 11, 2023   2153 EKG ER MD interpretation   Time: 21: 23  Rhythm and rate: Normal sinus rhythm at a rate of 93  Axis: Normal  P waves: Normal  QRS complexes: LVH with repolarization abnormality and ST segment changes that correspond with that [AR]   2225 I viewed patient's chest x-ray and on my interpretation patient has cardiomegaly and no obvious pulmonary infiltrates [AR]   2225 I viewed patient's head CT and on my interpretation patient has an area of probable encephalomalacia right cerebellum, no obvious intracranial hemorrhage nor midline shift [AR]   2322 Care assumed while we await repeat troponin. [RS]   Tue Sep 12, 2023   0132  Troponin T Delta(!): -16  Troponin slightly down but certainly not rising.  Patient has eaten and feels well and prefer to go home if possible. [RS]      ED Course User Index  [AR] Pari Saleem MD  [RS] Castillo Lujan MD     23:10 I discussed results thusfar with patient and wife. He is feeling some better and asking for food.  They are amenable to plan for home if second trop is flat/lower.  Care transferred to Dr. Lujan pending repeat trop and re-eval after eating boxed lunch.    PPE: I wore and adhered to appropriate PPE per hospital protocols for specific patient presentation. (For respiratory patients with suspected Covid-19 or other infectious etiology suspected for patient's symptoms, the patient wore a mask and I wore an N95 mask throughout the entire patient encounter.) Proper hand hygiene both before and after patient encounter was performed as well.         AS OF 15:20 EDT VITALS:    BP - 116/72  HR - 95  TEMP - 98.2 °F (36.8 °C)  O2 SATS - 99%        DIAGNOSIS  Final diagnoses:   Generalized weakness   ESRD (end stage renal disease) on dialysis   Elevated troponin   Anemia, unspecified type         DISPOSITION  ED Disposition       ED Disposition   Discharge    Condition   Stable    Comment   --                  Note Disclaimer: At [facility], we believe that sharing information builds trust and better relationships. You are receiving this note because you recently visited [facility]. It is possible you will see health information before a provider has talked with you about it. This kind of information can be easy to misunderstand. To help you fully understand what it means for your health, we urge you to discuss this note with your provider.         Pari Saleem MD  09/12/23 0029       Pari Saleem MD  09/12/23 1520